# Patient Record
Sex: MALE | Race: WHITE | Employment: OTHER | URBAN - METROPOLITAN AREA
[De-identification: names, ages, dates, MRNs, and addresses within clinical notes are randomized per-mention and may not be internally consistent; named-entity substitution may affect disease eponyms.]

---

## 2017-01-01 ENCOUNTER — HOSPITAL ENCOUNTER (OUTPATIENT)
Dept: INFUSION THERAPY | Age: 77
End: 2017-01-01
Payer: MEDICARE

## 2017-01-01 ENCOUNTER — HOSPITAL ENCOUNTER (OUTPATIENT)
Dept: INFUSION THERAPY | Age: 77
Discharge: HOME | End: 2017-09-10
Payer: MEDICARE

## 2017-01-01 ENCOUNTER — HOSPITAL ENCOUNTER (OUTPATIENT)
Dept: INFUSION THERAPY | Age: 77
Discharge: HOME | End: 2017-07-29
Payer: MEDICARE

## 2017-01-01 ENCOUNTER — HOSPITAL ENCOUNTER (OUTPATIENT)
Dept: INFUSION THERAPY | Age: 77
Discharge: HOME | End: 2017-07-27
Payer: MEDICARE

## 2017-01-01 ENCOUNTER — TELEPHONE (OUTPATIENT)
Dept: INTERNAL MEDICINE CLINIC | Age: 77
End: 2017-01-01

## 2017-01-01 ENCOUNTER — HOSPITAL ENCOUNTER (OUTPATIENT)
Dept: INFUSION THERAPY | Age: 77
Discharge: HOME | End: 2017-08-09
Payer: MEDICARE

## 2017-01-01 ENCOUNTER — HOSPITAL ENCOUNTER (OUTPATIENT)
Dept: INFUSION THERAPY | Age: 77
Discharge: HOME | End: 2017-08-08
Payer: MEDICARE

## 2017-01-01 ENCOUNTER — HOSPITAL ENCOUNTER (OUTPATIENT)
Dept: INFUSION THERAPY | Age: 77
Discharge: HOME | End: 2017-08-24
Payer: MEDICARE

## 2017-01-01 ENCOUNTER — APPOINTMENT (OUTPATIENT)
Dept: CT IMAGING | Age: 77
DRG: 243 | End: 2017-01-01
Attending: INTERNAL MEDICINE
Payer: MEDICARE

## 2017-01-01 ENCOUNTER — APPOINTMENT (OUTPATIENT)
Dept: CT IMAGING | Age: 77
DRG: 243 | End: 2017-01-01
Attending: EMERGENCY MEDICINE
Payer: MEDICARE

## 2017-01-01 ENCOUNTER — HOSPITAL ENCOUNTER (OUTPATIENT)
Dept: INFUSION THERAPY | Age: 77
Discharge: HOME | End: 2017-09-04
Payer: MEDICARE

## 2017-01-01 ENCOUNTER — HOSPITAL ENCOUNTER (OUTPATIENT)
Dept: INFUSION THERAPY | Age: 77
Discharge: HOME | End: 2017-08-25
Payer: MEDICARE

## 2017-01-01 ENCOUNTER — HOSPITAL ENCOUNTER (OUTPATIENT)
Age: 77
Discharge: HOME | End: 2017-09-23
Attending: PHYSICAL MEDICINE & REHABILITATION | Admitting: PHYSICAL MEDICINE & REHABILITATION

## 2017-01-01 ENCOUNTER — PATIENT OUTREACH (OUTPATIENT)
Dept: INTERNAL MEDICINE CLINIC | Age: 77
End: 2017-01-01

## 2017-01-01 ENCOUNTER — OFFICE VISIT (OUTPATIENT)
Dept: INTERNAL MEDICINE CLINIC | Age: 77
End: 2017-01-01

## 2017-01-01 ENCOUNTER — HOSPITAL ENCOUNTER (OUTPATIENT)
Dept: INFUSION THERAPY | Age: 77
Discharge: HOME | End: 2017-09-03
Payer: MEDICARE

## 2017-01-01 ENCOUNTER — HOSPITAL ENCOUNTER (OUTPATIENT)
Dept: INFUSION THERAPY | Age: 77
Discharge: HOME | End: 2017-08-30
Payer: MEDICARE

## 2017-01-01 ENCOUNTER — HOSPITAL ENCOUNTER (OUTPATIENT)
Dept: INFUSION THERAPY | Age: 77
Discharge: HOME | End: 2017-08-07
Payer: MEDICARE

## 2017-01-01 ENCOUNTER — HOSPITAL ENCOUNTER (OUTPATIENT)
Dept: INFUSION THERAPY | Age: 77
Discharge: HOME | End: 2017-09-26
Payer: MEDICARE

## 2017-01-01 ENCOUNTER — APPOINTMENT (OUTPATIENT)
Dept: GENERAL RADIOLOGY | Age: 77
DRG: 064 | End: 2017-01-01
Attending: INTERNAL MEDICINE
Payer: MEDICARE

## 2017-01-01 ENCOUNTER — HOSPITAL ENCOUNTER (OUTPATIENT)
Dept: INFUSION THERAPY | Age: 77
Discharge: HOME | End: 2017-08-06
Payer: MEDICARE

## 2017-01-01 ENCOUNTER — ANESTHESIA (OUTPATIENT)
Dept: SURGERY | Age: 77
End: 2017-01-01
Payer: MEDICARE

## 2017-01-01 ENCOUNTER — HOSPITAL ENCOUNTER (OUTPATIENT)
Dept: INFUSION THERAPY | Age: 77
Discharge: HOME | End: 2017-10-16
Payer: MEDICARE

## 2017-01-01 ENCOUNTER — HOSPITAL ENCOUNTER (OUTPATIENT)
Dept: INFUSION THERAPY | Age: 77
Discharge: HOME | End: 2017-09-02
Payer: MEDICARE

## 2017-01-01 ENCOUNTER — HOSPITAL ENCOUNTER (OUTPATIENT)
Dept: INFUSION THERAPY | Age: 77
Discharge: HOME | End: 2017-08-03
Payer: MEDICARE

## 2017-01-01 ENCOUNTER — APPOINTMENT (OUTPATIENT)
Dept: GENERAL RADIOLOGY | Age: 77
DRG: 243 | End: 2017-01-01
Attending: INTERNAL MEDICINE
Payer: MEDICARE

## 2017-01-01 ENCOUNTER — HOSPITAL ENCOUNTER (OUTPATIENT)
Dept: INFUSION THERAPY | Age: 77
Discharge: HOME | End: 2017-10-02
Payer: MEDICARE

## 2017-01-01 ENCOUNTER — HOSPITAL ENCOUNTER (OUTPATIENT)
Dept: INFUSION THERAPY | Age: 77
Discharge: HOME | End: 2017-08-05
Payer: MEDICARE

## 2017-01-01 ENCOUNTER — HOSPITAL ENCOUNTER (OUTPATIENT)
Dept: INFUSION THERAPY | Age: 77
Discharge: HOME | End: 2017-10-11
Payer: MEDICARE

## 2017-01-01 ENCOUNTER — HOSPITAL ENCOUNTER (OUTPATIENT)
Age: 77
Discharge: HOME | End: 2017-07-26
Attending: ORTHOPAEDIC SURGERY | Admitting: ORTHOPAEDIC SURGERY
Payer: MEDICARE

## 2017-01-01 ENCOUNTER — APPOINTMENT (OUTPATIENT)
Dept: INFUSION THERAPY | Age: 77
End: 2017-01-01
Payer: MEDICARE

## 2017-01-01 ENCOUNTER — HOSPITAL ENCOUNTER (INPATIENT)
Age: 77
LOS: 6 days | Discharge: HOME HEALTH | DRG: 513 | End: 2017-08-21
Attending: ORTHOPAEDIC SURGERY | Admitting: ORTHOPAEDIC SURGERY
Payer: MEDICARE

## 2017-01-01 ENCOUNTER — HOSPITAL ENCOUNTER (OUTPATIENT)
Dept: INFUSION THERAPY | Age: 77
Discharge: HOME | End: 2017-08-31
Payer: MEDICARE

## 2017-01-01 ENCOUNTER — APPOINTMENT (OUTPATIENT)
Dept: CT IMAGING | Age: 77
DRG: 064 | End: 2017-01-01
Attending: INTERNAL MEDICINE
Payer: MEDICARE

## 2017-01-01 ENCOUNTER — HOSPITAL ENCOUNTER (OUTPATIENT)
Dept: INFUSION THERAPY | Age: 77
Discharge: HOME | End: 2017-07-31
Payer: MEDICARE

## 2017-01-01 ENCOUNTER — HOSPITAL ENCOUNTER (OUTPATIENT)
Dept: INFUSION THERAPY | Age: 77
Discharge: HOME | End: 2017-10-12
Payer: MEDICARE

## 2017-01-01 ENCOUNTER — HOSPITAL ENCOUNTER (OUTPATIENT)
Dept: INFUSION THERAPY | Age: 77
Discharge: HOME | End: 2017-10-08
Payer: MEDICARE

## 2017-01-01 ENCOUNTER — HOSPITAL ENCOUNTER (OUTPATIENT)
Dept: INFUSION THERAPY | Age: 77
Discharge: HOME | End: 2017-10-10
Payer: MEDICARE

## 2017-01-01 ENCOUNTER — APPOINTMENT (OUTPATIENT)
Dept: CT IMAGING | Age: 77
DRG: 064 | End: 2017-01-01
Attending: EMERGENCY MEDICINE
Payer: MEDICARE

## 2017-01-01 ENCOUNTER — HOSPITAL ENCOUNTER (OUTPATIENT)
Dept: INFUSION THERAPY | Age: 77
Discharge: HOME | End: 2017-09-28
Payer: MEDICARE

## 2017-01-01 ENCOUNTER — HOSPITAL ENCOUNTER (OUTPATIENT)
Dept: INFUSION THERAPY | Age: 77
Discharge: HOME | End: 2017-09-07
Payer: MEDICARE

## 2017-01-01 ENCOUNTER — HOSPITAL ENCOUNTER (OUTPATIENT)
Dept: INFUSION THERAPY | Age: 77
Discharge: HOME | End: 2017-09-06
Payer: MEDICARE

## 2017-01-01 ENCOUNTER — ANESTHESIA (OUTPATIENT)
Dept: MEDSURG UNIT | Age: 77
End: 2017-01-01
Payer: MEDICARE

## 2017-01-01 ENCOUNTER — APPOINTMENT (OUTPATIENT)
Dept: GENERAL RADIOLOGY | Age: 77
End: 2017-01-01
Attending: PHYSICIAN ASSISTANT
Payer: MEDICARE

## 2017-01-01 ENCOUNTER — HOSPITAL ENCOUNTER (OUTPATIENT)
Age: 77
Setting detail: OUTPATIENT SURGERY
Discharge: HOME | End: 2017-05-25
Attending: ORTHOPAEDIC SURGERY | Admitting: ORTHOPAEDIC SURGERY
Payer: MEDICARE

## 2017-01-01 ENCOUNTER — HOSPITAL ENCOUNTER (EMERGENCY)
Age: 77
Discharge: HOME | End: 2017-05-22
Attending: EMERGENCY MEDICINE | Admitting: EMERGENCY MEDICINE
Payer: MEDICARE

## 2017-01-01 ENCOUNTER — HOSPITAL ENCOUNTER (OUTPATIENT)
Dept: INFUSION THERAPY | Age: 77
Discharge: HOME | End: 2017-10-07
Payer: MEDICARE

## 2017-01-01 ENCOUNTER — HOSPITAL ENCOUNTER (OUTPATIENT)
Dept: INFUSION THERAPY | Age: 77
Discharge: HOME | End: 2017-08-28
Payer: MEDICARE

## 2017-01-01 ENCOUNTER — ANESTHESIA EVENT (OUTPATIENT)
Dept: SURGERY | Age: 77
End: 2017-01-01
Payer: MEDICARE

## 2017-01-01 ENCOUNTER — HOSPITAL ENCOUNTER (OUTPATIENT)
Dept: INFUSION THERAPY | Age: 77
Discharge: HOME | End: 2017-10-13
Payer: MEDICARE

## 2017-01-01 ENCOUNTER — HOSPITAL ENCOUNTER (OUTPATIENT)
Dept: INFUSION THERAPY | Age: 77
Discharge: HOME | End: 2017-09-29
Payer: MEDICARE

## 2017-01-01 ENCOUNTER — HOSPITAL ENCOUNTER (OUTPATIENT)
Dept: INFUSION THERAPY | Age: 77
Discharge: HOME | End: 2017-09-27
Payer: MEDICARE

## 2017-01-01 ENCOUNTER — HOSPITAL ENCOUNTER (OUTPATIENT)
Dept: INFUSION THERAPY | Age: 77
Discharge: HOME | End: 2017-07-26
Payer: MEDICARE

## 2017-01-01 ENCOUNTER — HOSPITAL ENCOUNTER (OUTPATIENT)
Dept: INFUSION THERAPY | Age: 77
Discharge: HOME | End: 2017-09-15
Payer: MEDICARE

## 2017-01-01 ENCOUNTER — HOSPITAL ENCOUNTER (OUTPATIENT)
Dept: INFUSION THERAPY | Age: 77
Discharge: HOME | End: 2017-07-25
Payer: MEDICARE

## 2017-01-01 ENCOUNTER — ANESTHESIA (OUTPATIENT)
Dept: SURGERY | Age: 77
DRG: 857 | End: 2017-01-01
Payer: MEDICARE

## 2017-01-01 ENCOUNTER — HOSPITAL ENCOUNTER (OUTPATIENT)
Dept: INFUSION THERAPY | Age: 77
Discharge: HOME | End: 2017-10-15
Payer: MEDICARE

## 2017-01-01 ENCOUNTER — ANESTHESIA EVENT (OUTPATIENT)
Dept: MEDSURG UNIT | Age: 77
End: 2017-01-01
Payer: MEDICARE

## 2017-01-01 ENCOUNTER — HOSPITAL ENCOUNTER (OUTPATIENT)
Age: 77
Setting detail: OUTPATIENT SURGERY
Discharge: HOME | End: 2017-08-08
Attending: ORTHOPAEDIC SURGERY | Admitting: ORTHOPAEDIC SURGERY
Payer: MEDICARE

## 2017-01-01 ENCOUNTER — HOSPITAL ENCOUNTER (OUTPATIENT)
Dept: INFUSION THERAPY | Age: 77
Discharge: HOME | End: 2017-10-14
Payer: MEDICARE

## 2017-01-01 ENCOUNTER — HOSPITAL ENCOUNTER (OUTPATIENT)
Dept: INFUSION THERAPY | Age: 77
Discharge: HOME | End: 2017-10-18
Payer: MEDICARE

## 2017-01-01 ENCOUNTER — HOSPITAL ENCOUNTER (OUTPATIENT)
Dept: INFUSION THERAPY | Age: 77
Discharge: HOME | End: 2017-10-20
Payer: MEDICARE

## 2017-01-01 ENCOUNTER — ANESTHESIA (OUTPATIENT)
Dept: MEDSURG UNIT | Age: 77
DRG: 513 | End: 2017-01-01
Payer: MEDICARE

## 2017-01-01 ENCOUNTER — HOSPITAL ENCOUNTER (OUTPATIENT)
Dept: INFUSION THERAPY | Age: 77
Discharge: HOME | End: 2017-10-06
Payer: MEDICARE

## 2017-01-01 ENCOUNTER — HOSPITAL ENCOUNTER (INPATIENT)
Age: 77
LOS: 5 days | Discharge: NURSING, REHAB OR LONG-TERM CARE | DRG: 243 | End: 2017-09-16
Attending: EMERGENCY MEDICINE | Admitting: INTERNAL MEDICINE
Payer: MEDICARE

## 2017-01-01 ENCOUNTER — HOSPITAL ENCOUNTER (OUTPATIENT)
Dept: INFUSION THERAPY | Age: 77
Discharge: HOME | End: 2017-08-26
Payer: MEDICARE

## 2017-01-01 ENCOUNTER — HOSPITAL ENCOUNTER (OUTPATIENT)
Dept: INFUSION THERAPY | Age: 77
Discharge: HOME | End: 2017-09-30
Payer: MEDICARE

## 2017-01-01 ENCOUNTER — HOSPITAL ENCOUNTER (INPATIENT)
Age: 77
LOS: 3 days | Discharge: HOME HEALTH | DRG: 064 | End: 2017-09-03
Attending: EMERGENCY MEDICINE | Admitting: INTERNAL MEDICINE
Payer: MEDICARE

## 2017-01-01 ENCOUNTER — HOSPITAL ENCOUNTER (OUTPATIENT)
Dept: INFUSION THERAPY | Age: 77
Discharge: HOME | End: 2017-08-22
Payer: MEDICARE

## 2017-01-01 ENCOUNTER — ANESTHESIA EVENT (OUTPATIENT)
Dept: SURGERY | Age: 77
DRG: 857 | End: 2017-01-01
Payer: MEDICARE

## 2017-01-01 ENCOUNTER — HOSPITAL ENCOUNTER (OUTPATIENT)
Dept: INFUSION THERAPY | Age: 77
Discharge: HOME | End: 2017-08-02
Payer: MEDICARE

## 2017-01-01 ENCOUNTER — HOSPITAL ENCOUNTER (OUTPATIENT)
Dept: INFUSION THERAPY | Age: 77
Discharge: HOME | End: 2017-10-17
Payer: MEDICARE

## 2017-01-01 ENCOUNTER — HOSPITAL ENCOUNTER (INPATIENT)
Age: 77
LOS: 4 days | Discharge: HOME HEALTH | DRG: 857 | End: 2017-07-24
Attending: ORTHOPAEDIC SURGERY | Admitting: ORTHOPAEDIC SURGERY
Payer: MEDICARE

## 2017-01-01 ENCOUNTER — HOSPITAL ENCOUNTER (OUTPATIENT)
Dept: INFUSION THERAPY | Age: 77
Discharge: HOME | End: 2017-08-27
Payer: MEDICARE

## 2017-01-01 ENCOUNTER — HOSPITAL ENCOUNTER (OUTPATIENT)
Dept: INFUSION THERAPY | Age: 77
Discharge: HOME | End: 2017-10-05
Payer: MEDICARE

## 2017-01-01 ENCOUNTER — HOSPITAL ENCOUNTER (OUTPATIENT)
Dept: INFUSION THERAPY | Age: 77
Discharge: HOME | End: 2017-09-11
Payer: MEDICARE

## 2017-01-01 ENCOUNTER — HOSPITAL ENCOUNTER (OUTPATIENT)
Dept: INFUSION THERAPY | Age: 77
Discharge: HOME | End: 2017-10-01
Payer: MEDICARE

## 2017-01-01 ENCOUNTER — HOSPITAL ENCOUNTER (OUTPATIENT)
Dept: INFUSION THERAPY | Age: 77
Discharge: HOME | End: 2017-10-19
Payer: MEDICARE

## 2017-01-01 ENCOUNTER — HOSPITAL ENCOUNTER (OUTPATIENT)
Dept: INFUSION THERAPY | Age: 77
Discharge: HOME | End: 2017-08-04
Payer: MEDICARE

## 2017-01-01 ENCOUNTER — HOSPITAL ENCOUNTER (OUTPATIENT)
Dept: INFUSION THERAPY | Age: 77
Discharge: HOME | End: 2017-09-24
Payer: MEDICARE

## 2017-01-01 ENCOUNTER — HOSPITAL ENCOUNTER (OUTPATIENT)
Dept: INFUSION THERAPY | Age: 77
Discharge: HOME | End: 2017-10-03
Payer: MEDICARE

## 2017-01-01 ENCOUNTER — HOSPITAL ENCOUNTER (OUTPATIENT)
Dept: INFUSION THERAPY | Age: 77
Discharge: HOME | End: 2017-07-30
Payer: MEDICARE

## 2017-01-01 ENCOUNTER — HOSPITAL ENCOUNTER (OUTPATIENT)
Dept: INFUSION THERAPY | Age: 77
Discharge: HOME | End: 2017-08-01
Payer: MEDICARE

## 2017-01-01 ENCOUNTER — APPOINTMENT (OUTPATIENT)
Dept: MRI IMAGING | Age: 77
DRG: 064 | End: 2017-01-01
Attending: INTERNAL MEDICINE
Payer: MEDICARE

## 2017-01-01 ENCOUNTER — HOSPITAL ENCOUNTER (OUTPATIENT)
Dept: INFUSION THERAPY | Age: 77
Discharge: HOME | End: 2017-08-29
Payer: MEDICARE

## 2017-01-01 ENCOUNTER — HOSPITAL ENCOUNTER (OUTPATIENT)
Dept: INFUSION THERAPY | Age: 77
Discharge: HOME | End: 2017-08-23
Payer: MEDICARE

## 2017-01-01 ENCOUNTER — HOSPITAL ENCOUNTER (OUTPATIENT)
Dept: INFUSION THERAPY | Age: 77
Discharge: HOME | End: 2017-09-05
Payer: MEDICARE

## 2017-01-01 ENCOUNTER — HOSPITAL ENCOUNTER (OUTPATIENT)
Dept: INFUSION THERAPY | Age: 77
Discharge: HOME | End: 2017-07-28
Payer: MEDICARE

## 2017-01-01 ENCOUNTER — HOSPITAL ENCOUNTER (OUTPATIENT)
Dept: INFUSION THERAPY | Age: 77
Discharge: HOME | End: 2017-09-25
Payer: MEDICARE

## 2017-01-01 ENCOUNTER — HOSPITAL ENCOUNTER (OUTPATIENT)
Dept: INFUSION THERAPY | Age: 77
Discharge: HOME | End: 2017-10-09
Payer: MEDICARE

## 2017-01-01 ENCOUNTER — HOSPITAL ENCOUNTER (OUTPATIENT)
Dept: INFUSION THERAPY | Age: 77
Discharge: HOME | End: 2017-10-04
Payer: MEDICARE

## 2017-01-01 ENCOUNTER — HOSPITAL ENCOUNTER (OUTPATIENT)
Dept: INFUSION THERAPY | Age: 77
Discharge: HOME | End: 2017-09-08
Payer: MEDICARE

## 2017-01-01 ENCOUNTER — HOSPITAL ENCOUNTER (OUTPATIENT)
Dept: INFUSION THERAPY | Age: 77
Discharge: HOME | End: 2017-09-09
Payer: MEDICARE

## 2017-01-01 ENCOUNTER — ANESTHESIA EVENT (OUTPATIENT)
Dept: MEDSURG UNIT | Age: 77
DRG: 513 | End: 2017-01-01
Payer: MEDICARE

## 2017-01-01 VITALS
HEART RATE: 59 BPM | DIASTOLIC BLOOD PRESSURE: 61 MMHG | SYSTOLIC BLOOD PRESSURE: 140 MMHG | TEMPERATURE: 97.7 F | RESPIRATION RATE: 18 BRPM

## 2017-01-01 VITALS
RESPIRATION RATE: 18 BRPM | RESPIRATION RATE: 18 BRPM | DIASTOLIC BLOOD PRESSURE: 58 MMHG | TEMPERATURE: 97.4 F | DIASTOLIC BLOOD PRESSURE: 52 MMHG | HEART RATE: 67 BPM | SYSTOLIC BLOOD PRESSURE: 126 MMHG | SYSTOLIC BLOOD PRESSURE: 146 MMHG | TEMPERATURE: 97.4 F | HEART RATE: 64 BPM

## 2017-01-01 VITALS
SYSTOLIC BLOOD PRESSURE: 150 MMHG | HEART RATE: 99 BPM | WEIGHT: 168 LBS | HEIGHT: 69 IN | BODY MASS INDEX: 24.88 KG/M2 | DIASTOLIC BLOOD PRESSURE: 65 MMHG

## 2017-01-01 VITALS
TEMPERATURE: 97 F | RESPIRATION RATE: 18 BRPM | OXYGEN SATURATION: 100 % | SYSTOLIC BLOOD PRESSURE: 138 MMHG | DIASTOLIC BLOOD PRESSURE: 66 MMHG | HEART RATE: 88 BPM

## 2017-01-01 VITALS
SYSTOLIC BLOOD PRESSURE: 122 MMHG | RESPIRATION RATE: 18 BRPM | DIASTOLIC BLOOD PRESSURE: 50 MMHG | TEMPERATURE: 98 F | HEART RATE: 72 BPM

## 2017-01-01 VITALS
RESPIRATION RATE: 18 BRPM | DIASTOLIC BLOOD PRESSURE: 48 MMHG | HEART RATE: 53 BPM | OXYGEN SATURATION: 99 % | SYSTOLIC BLOOD PRESSURE: 125 MMHG | TEMPERATURE: 97.1 F

## 2017-01-01 VITALS
HEART RATE: 62 BPM | OXYGEN SATURATION: 98 % | DIASTOLIC BLOOD PRESSURE: 42 MMHG | TEMPERATURE: 96.6 F | RESPIRATION RATE: 18 BRPM | SYSTOLIC BLOOD PRESSURE: 103 MMHG

## 2017-01-01 VITALS
RESPIRATION RATE: 18 BRPM | HEART RATE: 60 BPM | WEIGHT: 190 LBS | HEART RATE: 94 BPM | DIASTOLIC BLOOD PRESSURE: 63 MMHG | HEIGHT: 71 IN | OXYGEN SATURATION: 95 % | TEMPERATURE: 97.6 F | DIASTOLIC BLOOD PRESSURE: 56 MMHG | TEMPERATURE: 98.1 F | SYSTOLIC BLOOD PRESSURE: 156 MMHG | SYSTOLIC BLOOD PRESSURE: 134 MMHG | RESPIRATION RATE: 14 BRPM | BODY MASS INDEX: 26.6 KG/M2

## 2017-01-01 VITALS
RESPIRATION RATE: 18 BRPM | DIASTOLIC BLOOD PRESSURE: 50 MMHG | HEART RATE: 59 BPM | TEMPERATURE: 97.6 F | SYSTOLIC BLOOD PRESSURE: 118 MMHG

## 2017-01-01 VITALS
HEART RATE: 58 BPM | HEIGHT: 69 IN | SYSTOLIC BLOOD PRESSURE: 158 MMHG | WEIGHT: 193.4 LBS | BODY MASS INDEX: 28.64 KG/M2 | DIASTOLIC BLOOD PRESSURE: 60 MMHG | TEMPERATURE: 97.9 F | RESPIRATION RATE: 18 BRPM | OXYGEN SATURATION: 98 %

## 2017-01-01 VITALS
TEMPERATURE: 97.9 F | SYSTOLIC BLOOD PRESSURE: 130 MMHG | HEART RATE: 65 BPM | RESPIRATION RATE: 18 BRPM | DIASTOLIC BLOOD PRESSURE: 52 MMHG

## 2017-01-01 VITALS
TEMPERATURE: 98 F | RESPIRATION RATE: 18 BRPM | SYSTOLIC BLOOD PRESSURE: 156 MMHG | DIASTOLIC BLOOD PRESSURE: 61 MMHG | HEART RATE: 62 BPM

## 2017-01-01 VITALS
RESPIRATION RATE: 16 BRPM | SYSTOLIC BLOOD PRESSURE: 113 MMHG | OXYGEN SATURATION: 98 % | DIASTOLIC BLOOD PRESSURE: 61 MMHG | HEART RATE: 74 BPM | TEMPERATURE: 98.2 F | BODY MASS INDEX: 28.14 KG/M2 | WEIGHT: 190 LBS | HEIGHT: 69 IN

## 2017-01-01 VITALS
DIASTOLIC BLOOD PRESSURE: 53 MMHG | OXYGEN SATURATION: 100 % | HEART RATE: 64 BPM | RESPIRATION RATE: 18 BRPM | TEMPERATURE: 97.3 F | SYSTOLIC BLOOD PRESSURE: 143 MMHG

## 2017-01-01 VITALS
SYSTOLIC BLOOD PRESSURE: 117 MMHG | RESPIRATION RATE: 18 BRPM | HEART RATE: 63 BPM | TEMPERATURE: 97.6 F | DIASTOLIC BLOOD PRESSURE: 49 MMHG | OXYGEN SATURATION: 99 %

## 2017-01-01 VITALS
HEART RATE: 63 BPM | TEMPERATURE: 97.5 F | HEART RATE: 68 BPM | TEMPERATURE: 98 F | HEART RATE: 47 BPM | HEART RATE: 65 BPM | RESPIRATION RATE: 18 BRPM | DIASTOLIC BLOOD PRESSURE: 58 MMHG | SYSTOLIC BLOOD PRESSURE: 117 MMHG | OXYGEN SATURATION: 98 % | SYSTOLIC BLOOD PRESSURE: 160 MMHG | RESPIRATION RATE: 18 BRPM | OXYGEN SATURATION: 98 % | TEMPERATURE: 98.3 F | TEMPERATURE: 98 F | DIASTOLIC BLOOD PRESSURE: 57 MMHG | RESPIRATION RATE: 18 BRPM | DIASTOLIC BLOOD PRESSURE: 48 MMHG | SYSTOLIC BLOOD PRESSURE: 160 MMHG | SYSTOLIC BLOOD PRESSURE: 131 MMHG | DIASTOLIC BLOOD PRESSURE: 56 MMHG | RESPIRATION RATE: 18 BRPM

## 2017-01-01 VITALS
TEMPERATURE: 96.9 F | SYSTOLIC BLOOD PRESSURE: 143 MMHG | DIASTOLIC BLOOD PRESSURE: 51 MMHG | RESPIRATION RATE: 18 BRPM | HEART RATE: 60 BPM

## 2017-01-01 VITALS
OXYGEN SATURATION: 97 % | HEART RATE: 61 BPM | DIASTOLIC BLOOD PRESSURE: 50 MMHG | SYSTOLIC BLOOD PRESSURE: 134 MMHG | TEMPERATURE: 97.9 F | RESPIRATION RATE: 18 BRPM

## 2017-01-01 VITALS
HEART RATE: 63 BPM | SYSTOLIC BLOOD PRESSURE: 115 MMHG | RESPIRATION RATE: 18 BRPM | DIASTOLIC BLOOD PRESSURE: 55 MMHG | TEMPERATURE: 97.9 F

## 2017-01-01 VITALS
RESPIRATION RATE: 18 BRPM | TEMPERATURE: 96.9 F | DIASTOLIC BLOOD PRESSURE: 57 MMHG | SYSTOLIC BLOOD PRESSURE: 154 MMHG | HEART RATE: 55 BPM

## 2017-01-01 VITALS
TEMPERATURE: 98 F | BODY MASS INDEX: 26.6 KG/M2 | DIASTOLIC BLOOD PRESSURE: 52 MMHG | OXYGEN SATURATION: 97 % | RESPIRATION RATE: 14 BRPM | HEART RATE: 68 BPM | HEIGHT: 71 IN | SYSTOLIC BLOOD PRESSURE: 144 MMHG | WEIGHT: 190 LBS

## 2017-01-01 VITALS
SYSTOLIC BLOOD PRESSURE: 150 MMHG | HEART RATE: 56 BPM | TEMPERATURE: 97.7 F | DIASTOLIC BLOOD PRESSURE: 72 MMHG | OXYGEN SATURATION: 100 % | RESPIRATION RATE: 16 BRPM | HEIGHT: 69 IN | WEIGHT: 193 LBS | BODY MASS INDEX: 28.58 KG/M2

## 2017-01-01 VITALS
HEART RATE: 103 BPM | RESPIRATION RATE: 18 BRPM | TEMPERATURE: 96.9 F | DIASTOLIC BLOOD PRESSURE: 59 MMHG | SYSTOLIC BLOOD PRESSURE: 125 MMHG

## 2017-01-01 VITALS
DIASTOLIC BLOOD PRESSURE: 44 MMHG | RESPIRATION RATE: 18 BRPM | OXYGEN SATURATION: 100 % | TEMPERATURE: 96.9 F | SYSTOLIC BLOOD PRESSURE: 145 MMHG | HEART RATE: 84 BPM

## 2017-01-01 VITALS
RESPIRATION RATE: 18 BRPM | TEMPERATURE: 96.8 F | DIASTOLIC BLOOD PRESSURE: 51 MMHG | SYSTOLIC BLOOD PRESSURE: 154 MMHG | HEART RATE: 62 BPM

## 2017-01-01 VITALS
TEMPERATURE: 97.6 F | WEIGHT: 173 LBS | RESPIRATION RATE: 16 BRPM | BODY MASS INDEX: 25.62 KG/M2 | HEIGHT: 69 IN | DIASTOLIC BLOOD PRESSURE: 76 MMHG | SYSTOLIC BLOOD PRESSURE: 168 MMHG | OXYGEN SATURATION: 100 % | HEART RATE: 59 BPM

## 2017-01-01 VITALS
SYSTOLIC BLOOD PRESSURE: 168 MMHG | DIASTOLIC BLOOD PRESSURE: 56 MMHG | TEMPERATURE: 97.8 F | HEART RATE: 63 BPM | RESPIRATION RATE: 18 BRPM

## 2017-01-01 VITALS
WEIGHT: 175 LBS | SYSTOLIC BLOOD PRESSURE: 162 MMHG | HEIGHT: 69 IN | DIASTOLIC BLOOD PRESSURE: 64 MMHG | BODY MASS INDEX: 25.92 KG/M2 | RESPIRATION RATE: 16 BRPM | OXYGEN SATURATION: 98 % | HEART RATE: 115 BPM

## 2017-01-01 VITALS
OXYGEN SATURATION: 99 % | RESPIRATION RATE: 18 BRPM | SYSTOLIC BLOOD PRESSURE: 165 MMHG | TEMPERATURE: 98 F | DIASTOLIC BLOOD PRESSURE: 60 MMHG | HEART RATE: 60 BPM

## 2017-01-01 VITALS
SYSTOLIC BLOOD PRESSURE: 149 MMHG | HEART RATE: 54 BPM | TEMPERATURE: 96.9 F | DIASTOLIC BLOOD PRESSURE: 56 MMHG | RESPIRATION RATE: 18 BRPM

## 2017-01-01 VITALS
WEIGHT: 189.6 LBS | TEMPERATURE: 98.2 F | HEART RATE: 78 BPM | DIASTOLIC BLOOD PRESSURE: 62 MMHG | OXYGEN SATURATION: 100 % | SYSTOLIC BLOOD PRESSURE: 161 MMHG | BODY MASS INDEX: 27.14 KG/M2 | HEIGHT: 70 IN | RESPIRATION RATE: 18 BRPM

## 2017-01-01 VITALS
TEMPERATURE: 98.2 F | DIASTOLIC BLOOD PRESSURE: 62 MMHG | WEIGHT: 192.6 LBS | HEART RATE: 56 BPM | SYSTOLIC BLOOD PRESSURE: 126 MMHG | OXYGEN SATURATION: 98 % | HEIGHT: 69 IN | BODY MASS INDEX: 28.53 KG/M2 | RESPIRATION RATE: 18 BRPM

## 2017-01-01 VITALS
DIASTOLIC BLOOD PRESSURE: 45 MMHG | TEMPERATURE: 97.1 F | RESPIRATION RATE: 18 BRPM | SYSTOLIC BLOOD PRESSURE: 111 MMHG | HEART RATE: 53 BPM

## 2017-01-01 VITALS
RESPIRATION RATE: 18 BRPM | HEART RATE: 58 BPM | SYSTOLIC BLOOD PRESSURE: 150 MMHG | TEMPERATURE: 97.1 F | OXYGEN SATURATION: 100 % | DIASTOLIC BLOOD PRESSURE: 88 MMHG

## 2017-01-01 VITALS
DIASTOLIC BLOOD PRESSURE: 52 MMHG | TEMPERATURE: 98.5 F | HEART RATE: 94 BPM | SYSTOLIC BLOOD PRESSURE: 114 MMHG | RESPIRATION RATE: 18 BRPM

## 2017-01-01 VITALS
RESPIRATION RATE: 18 BRPM | TEMPERATURE: 98.7 F | DIASTOLIC BLOOD PRESSURE: 45 MMHG | HEART RATE: 93 BPM | SYSTOLIC BLOOD PRESSURE: 123 MMHG

## 2017-01-01 VITALS
DIASTOLIC BLOOD PRESSURE: 47 MMHG | TEMPERATURE: 97.6 F | RESPIRATION RATE: 16 BRPM | HEART RATE: 61 BPM | SYSTOLIC BLOOD PRESSURE: 118 MMHG

## 2017-01-01 VITALS
RESPIRATION RATE: 18 BRPM | DIASTOLIC BLOOD PRESSURE: 64 MMHG | HEART RATE: 76 BPM | OXYGEN SATURATION: 99 % | TEMPERATURE: 98.9 F | SYSTOLIC BLOOD PRESSURE: 132 MMHG

## 2017-01-01 VITALS
BODY MASS INDEX: 27.31 KG/M2 | HEIGHT: 69 IN | WEIGHT: 184.4 LBS | RESPIRATION RATE: 18 BRPM | OXYGEN SATURATION: 98 % | DIASTOLIC BLOOD PRESSURE: 56 MMHG | SYSTOLIC BLOOD PRESSURE: 148 MMHG | HEART RATE: 66 BPM

## 2017-01-01 VITALS
DIASTOLIC BLOOD PRESSURE: 39 MMHG | HEART RATE: 61 BPM | RESPIRATION RATE: 16 BRPM | TEMPERATURE: 97 F | SYSTOLIC BLOOD PRESSURE: 116 MMHG

## 2017-01-01 VITALS
SYSTOLIC BLOOD PRESSURE: 158 MMHG | DIASTOLIC BLOOD PRESSURE: 63 MMHG | RESPIRATION RATE: 18 BRPM | TEMPERATURE: 98 F | HEART RATE: 60 BPM | OXYGEN SATURATION: 100 %

## 2017-01-01 VITALS
DIASTOLIC BLOOD PRESSURE: 60 MMHG | TEMPERATURE: 97.7 F | SYSTOLIC BLOOD PRESSURE: 174 MMHG | RESPIRATION RATE: 18 BRPM | HEART RATE: 64 BPM

## 2017-01-01 VITALS
RESPIRATION RATE: 18 BRPM | DIASTOLIC BLOOD PRESSURE: 46 MMHG | SYSTOLIC BLOOD PRESSURE: 101 MMHG | TEMPERATURE: 98 F | HEART RATE: 49 BPM | OXYGEN SATURATION: 100 %

## 2017-01-01 VITALS
RESPIRATION RATE: 18 BRPM | TEMPERATURE: 97 F | SYSTOLIC BLOOD PRESSURE: 159 MMHG | DIASTOLIC BLOOD PRESSURE: 56 MMHG | HEART RATE: 58 BPM

## 2017-01-01 VITALS
TEMPERATURE: 97.6 F | HEART RATE: 58 BPM | SYSTOLIC BLOOD PRESSURE: 142 MMHG | DIASTOLIC BLOOD PRESSURE: 53 MMHG | RESPIRATION RATE: 18 BRPM

## 2017-01-01 VITALS
HEART RATE: 60 BPM | RESPIRATION RATE: 14 BRPM | DIASTOLIC BLOOD PRESSURE: 60 MMHG | SYSTOLIC BLOOD PRESSURE: 138 MMHG | HEIGHT: 69 IN | OXYGEN SATURATION: 97 % | TEMPERATURE: 94.6 F | BODY MASS INDEX: 25.33 KG/M2 | WEIGHT: 171 LBS

## 2017-01-01 VITALS
TEMPERATURE: 96.9 F | RESPIRATION RATE: 18 BRPM | OXYGEN SATURATION: 98 % | DIASTOLIC BLOOD PRESSURE: 62 MMHG | SYSTOLIC BLOOD PRESSURE: 161 MMHG | HEART RATE: 74 BPM

## 2017-01-01 VITALS
TEMPERATURE: 97.7 F | SYSTOLIC BLOOD PRESSURE: 94 MMHG | OXYGEN SATURATION: 100 % | DIASTOLIC BLOOD PRESSURE: 47 MMHG | HEART RATE: 60 BPM | RESPIRATION RATE: 18 BRPM

## 2017-01-01 VITALS
RESPIRATION RATE: 18 BRPM | OXYGEN SATURATION: 100 % | TEMPERATURE: 98.4 F | SYSTOLIC BLOOD PRESSURE: 164 MMHG | DIASTOLIC BLOOD PRESSURE: 60 MMHG | HEART RATE: 63 BPM

## 2017-01-01 VITALS
SYSTOLIC BLOOD PRESSURE: 169 MMHG | OXYGEN SATURATION: 100 % | TEMPERATURE: 97.8 F | DIASTOLIC BLOOD PRESSURE: 62 MMHG | RESPIRATION RATE: 18 BRPM | HEART RATE: 60 BPM

## 2017-01-01 VITALS
HEART RATE: 68 BPM | SYSTOLIC BLOOD PRESSURE: 157 MMHG | RESPIRATION RATE: 14 BRPM | BODY MASS INDEX: 26.6 KG/M2 | OXYGEN SATURATION: 100 % | DIASTOLIC BLOOD PRESSURE: 66 MMHG | HEIGHT: 71 IN | TEMPERATURE: 97.6 F | WEIGHT: 190 LBS

## 2017-01-01 VITALS
TEMPERATURE: 98 F | WEIGHT: 195.99 LBS | RESPIRATION RATE: 18 BRPM | HEIGHT: 71 IN | HEART RATE: 68 BPM | OXYGEN SATURATION: 100 % | BODY MASS INDEX: 27.44 KG/M2 | SYSTOLIC BLOOD PRESSURE: 148 MMHG | DIASTOLIC BLOOD PRESSURE: 71 MMHG

## 2017-01-01 VITALS
DIASTOLIC BLOOD PRESSURE: 52 MMHG | SYSTOLIC BLOOD PRESSURE: 150 MMHG | RESPIRATION RATE: 18 BRPM | RESPIRATION RATE: 18 BRPM | SYSTOLIC BLOOD PRESSURE: 171 MMHG | HEART RATE: 63 BPM | DIASTOLIC BLOOD PRESSURE: 58 MMHG | HEART RATE: 82 BPM | TEMPERATURE: 98.7 F | TEMPERATURE: 97.7 F

## 2017-01-01 VITALS
TEMPERATURE: 97.3 F | SYSTOLIC BLOOD PRESSURE: 148 MMHG | RESPIRATION RATE: 18 BRPM | HEART RATE: 67 BPM | DIASTOLIC BLOOD PRESSURE: 51 MMHG | OXYGEN SATURATION: 100 %

## 2017-01-01 VITALS
RESPIRATION RATE: 18 BRPM | SYSTOLIC BLOOD PRESSURE: 151 MMHG | HEART RATE: 68 BPM | DIASTOLIC BLOOD PRESSURE: 56 MMHG | TEMPERATURE: 96.6 F

## 2017-01-01 VITALS
OXYGEN SATURATION: 100 % | SYSTOLIC BLOOD PRESSURE: 154 MMHG | DIASTOLIC BLOOD PRESSURE: 44 MMHG | RESPIRATION RATE: 18 BRPM | TEMPERATURE: 97.7 F | HEART RATE: 50 BPM

## 2017-01-01 VITALS
TEMPERATURE: 97.8 F | DIASTOLIC BLOOD PRESSURE: 63 MMHG | HEART RATE: 86 BPM | OXYGEN SATURATION: 100 % | RESPIRATION RATE: 18 BRPM | SYSTOLIC BLOOD PRESSURE: 157 MMHG

## 2017-01-01 VITALS
OXYGEN SATURATION: 100 % | TEMPERATURE: 97.5 F | SYSTOLIC BLOOD PRESSURE: 155 MMHG | RESPIRATION RATE: 18 BRPM | TEMPERATURE: 97.8 F | RESPIRATION RATE: 18 BRPM | DIASTOLIC BLOOD PRESSURE: 47 MMHG | HEART RATE: 62 BPM | RESPIRATION RATE: 18 BRPM | SYSTOLIC BLOOD PRESSURE: 141 MMHG | DIASTOLIC BLOOD PRESSURE: 60 MMHG | DIASTOLIC BLOOD PRESSURE: 48 MMHG | TEMPERATURE: 98.5 F | OXYGEN SATURATION: 98 % | SYSTOLIC BLOOD PRESSURE: 115 MMHG | HEART RATE: 60 BPM | HEART RATE: 67 BPM

## 2017-01-01 VITALS
HEART RATE: 61 BPM | SYSTOLIC BLOOD PRESSURE: 129 MMHG | RESPIRATION RATE: 18 BRPM | TEMPERATURE: 96.9 F | DIASTOLIC BLOOD PRESSURE: 53 MMHG

## 2017-01-01 VITALS
TEMPERATURE: 97.7 F | SYSTOLIC BLOOD PRESSURE: 152 MMHG | OXYGEN SATURATION: 100 % | HEART RATE: 56 BPM | DIASTOLIC BLOOD PRESSURE: 46 MMHG | RESPIRATION RATE: 18 BRPM

## 2017-01-01 VITALS
HEART RATE: 74 BPM | TEMPERATURE: 97.8 F | SYSTOLIC BLOOD PRESSURE: 119 MMHG | OXYGEN SATURATION: 100 % | RESPIRATION RATE: 18 BRPM | DIASTOLIC BLOOD PRESSURE: 58 MMHG

## 2017-01-01 VITALS
RESPIRATION RATE: 16 BRPM | HEART RATE: 65 BPM | SYSTOLIC BLOOD PRESSURE: 116 MMHG | TEMPERATURE: 97.6 F | DIASTOLIC BLOOD PRESSURE: 45 MMHG

## 2017-01-01 VITALS
TEMPERATURE: 97.9 F | OXYGEN SATURATION: 100 % | HEART RATE: 105 BPM | SYSTOLIC BLOOD PRESSURE: 106 MMHG | BODY MASS INDEX: 26.73 KG/M2 | DIASTOLIC BLOOD PRESSURE: 50 MMHG | HEIGHT: 70 IN | WEIGHT: 186.73 LBS | RESPIRATION RATE: 18 BRPM

## 2017-01-01 VITALS
HEART RATE: 62 BPM | TEMPERATURE: 97.5 F | RESPIRATION RATE: 18 BRPM | SYSTOLIC BLOOD PRESSURE: 154 MMHG | DIASTOLIC BLOOD PRESSURE: 53 MMHG

## 2017-01-01 VITALS
WEIGHT: 181.8 LBS | RESPIRATION RATE: 16 BRPM | TEMPERATURE: 98 F | OXYGEN SATURATION: 98 % | SYSTOLIC BLOOD PRESSURE: 150 MMHG | BODY MASS INDEX: 26.93 KG/M2 | HEIGHT: 69 IN | DIASTOLIC BLOOD PRESSURE: 66 MMHG | HEART RATE: 90 BPM

## 2017-01-01 VITALS
DIASTOLIC BLOOD PRESSURE: 57 MMHG | RESPIRATION RATE: 18 BRPM | SYSTOLIC BLOOD PRESSURE: 153 MMHG | TEMPERATURE: 97 F | HEART RATE: 60 BPM

## 2017-01-01 DIAGNOSIS — I70.90 ASVD (ARTERIOSCLEROTIC VASCULAR DISEASE): ICD-10-CM

## 2017-01-01 DIAGNOSIS — I10 ESSENTIAL HYPERTENSION: Primary | ICD-10-CM

## 2017-01-01 DIAGNOSIS — L02.519 HAND ABSCESS: Primary | ICD-10-CM

## 2017-01-01 DIAGNOSIS — I63.311 THROMBOTIC STROKE INVOLVING RIGHT MIDDLE CEREBRAL ARTERY (HCC): ICD-10-CM

## 2017-01-01 DIAGNOSIS — E78.2 MIXED HYPERLIPIDEMIA: ICD-10-CM

## 2017-01-01 DIAGNOSIS — I44.2 COMPLETE HEART BLOCK (HCC): ICD-10-CM

## 2017-01-01 DIAGNOSIS — E11.9 CONTROLLED TYPE 2 DIABETES MELLITUS WITHOUT COMPLICATION, WITH LONG-TERM CURRENT USE OF INSULIN (HCC): ICD-10-CM

## 2017-01-01 DIAGNOSIS — K22.70 BARRETT'S ESOPHAGUS WITHOUT DYSPLASIA: ICD-10-CM

## 2017-01-01 DIAGNOSIS — N40.1 BENIGN NON-NODULAR PROSTATIC HYPERPLASIA WITH LOWER URINARY TRACT SYMPTOMS: ICD-10-CM

## 2017-01-01 DIAGNOSIS — I65.23 STENOSIS OF BOTH INTERNAL CAROTID ARTERIES: ICD-10-CM

## 2017-01-01 DIAGNOSIS — S61.213A LACERATION OF LEFT MIDDLE FINGER: ICD-10-CM

## 2017-01-01 DIAGNOSIS — Z79.4 CONTROLLED TYPE 2 DIABETES MELLITUS WITHOUT COMPLICATION, WITH LONG-TERM CURRENT USE OF INSULIN (HCC): ICD-10-CM

## 2017-01-01 DIAGNOSIS — D64.9 ANEMIA, UNSPECIFIED TYPE: ICD-10-CM

## 2017-01-01 DIAGNOSIS — Z95.0 S/P CARDIAC PACEMAKER PROCEDURE: ICD-10-CM

## 2017-01-01 DIAGNOSIS — S56.129A FLEXOR TENDON LACERATION OF FINGER WITH OPEN WOUND, INITIAL ENCOUNTER: ICD-10-CM

## 2017-01-01 DIAGNOSIS — I63.9 CEREBROVASCULAR ACCIDENT (CVA), UNSPECIFIED MECHANISM (HCC): Primary | ICD-10-CM

## 2017-01-01 DIAGNOSIS — R55 SYNCOPE AND COLLAPSE: ICD-10-CM

## 2017-01-01 DIAGNOSIS — S61.012A THUMB LACERATION, LEFT, INITIAL ENCOUNTER: Primary | ICD-10-CM

## 2017-01-01 DIAGNOSIS — I63.9 CEREBROVASCULAR ACCIDENT (CVA), UNSPECIFIED MECHANISM (HCC): ICD-10-CM

## 2017-01-01 DIAGNOSIS — M86.9 OSTEOMYELITIS OF LEFT HAND, UNSPECIFIED TYPE (HCC): ICD-10-CM

## 2017-01-01 DIAGNOSIS — S61.209A FLEXOR TENDON LACERATION OF FINGER WITH OPEN WOUND, INITIAL ENCOUNTER: ICD-10-CM

## 2017-01-01 DIAGNOSIS — I10 ESSENTIAL HYPERTENSION: ICD-10-CM

## 2017-01-01 DIAGNOSIS — L08.9 INFECTION OF LEFT HAND: ICD-10-CM

## 2017-01-01 DIAGNOSIS — R00.1 BRADYCARDIA: ICD-10-CM

## 2017-01-01 DIAGNOSIS — S61.215A LACERATION OF LEFT RING FINGER: ICD-10-CM

## 2017-01-01 DIAGNOSIS — E11.42 DIABETIC POLYNEUROPATHY ASSOCIATED WITH TYPE 2 DIABETES MELLITUS (HCC): ICD-10-CM

## 2017-01-01 DIAGNOSIS — L08.9 INFECTION OF LEFT HAND: Primary | ICD-10-CM

## 2017-01-01 DIAGNOSIS — S61.211A LACERATION OF LEFT INDEX FINGER: ICD-10-CM

## 2017-01-01 DIAGNOSIS — Z00.00 MEDICARE ANNUAL WELLNESS VISIT, INITIAL: ICD-10-CM

## 2017-01-01 DIAGNOSIS — I77.9 BILATERAL CAROTID ARTERY DISEASE (HCC): ICD-10-CM

## 2017-01-01 DIAGNOSIS — I65.23 BILATERAL CAROTID ARTERY STENOSIS: ICD-10-CM

## 2017-01-01 LAB
ALBUMIN SERPL-MCNC: 1.8 G/DL (ref 3.5–5)
ALBUMIN SERPL-MCNC: 1.9 G/DL (ref 3.5–5)
ALBUMIN SERPL-MCNC: 2 G/DL (ref 3.5–5)
ALBUMIN SERPL-MCNC: 2.1 G/DL (ref 3.5–5)
ALBUMIN SERPL-MCNC: 2.9 G/DL (ref 3.9–5.4)
ALBUMIN SERPL-MCNC: 3.1 G/DL (ref 3.9–5.4)
ALBUMIN/GLOB SERPL: 0.5 {RATIO} (ref 1.1–2.2)
ALBUMIN/GLOB SERPL: 0.6 {RATIO} (ref 1.1–2.2)
ALKALINE PHOS POC: 137 U/L (ref 38–126)
ALKALINE PHOS POC: 165 U/L (ref 38–126)
ALP SERPL-CCNC: 109 U/L (ref 45–117)
ALP SERPL-CCNC: 86 U/L (ref 45–117)
ALP SERPL-CCNC: 94 U/L (ref 45–117)
ALP SERPL-CCNC: 99 U/L (ref 45–117)
ALT SERPL-CCNC: 11 U/L (ref 12–78)
ALT SERPL-CCNC: 11 U/L (ref 12–78)
ALT SERPL-CCNC: 13 U/L (ref 12–78)
ALT SERPL-CCNC: 13 U/L (ref 12–78)
ALT SERPL-CCNC: 22 U/L (ref 9–52)
ALT SERPL-CCNC: 24 U/L (ref 9–52)
ANION GAP BLD CALC-SCNC: 10 MMOL/L (ref 5–15)
ANION GAP BLD CALC-SCNC: 10 MMOL/L (ref 5–15)
ANION GAP BLD CALC-SCNC: 5 MMOL/L (ref 5–15)
ANION GAP BLD CALC-SCNC: 5 MMOL/L (ref 5–15)
ANION GAP BLD CALC-SCNC: 6 MMOL/L (ref 5–15)
ANION GAP BLD CALC-SCNC: 7 MMOL/L (ref 5–15)
ANION GAP BLD CALC-SCNC: 8 MMOL/L (ref 5–15)
ANION GAP SERPL CALC-SCNC: 10 MMOL/L (ref 5–15)
ANION GAP SERPL CALC-SCNC: 11 MMOL/L (ref 5–15)
ANION GAP SERPL CALC-SCNC: 15 MMOL/L (ref 5–15)
ANION GAP SERPL CALC-SCNC: 4 MMOL/L (ref 5–15)
ANION GAP SERPL CALC-SCNC: 5 MMOL/L (ref 5–15)
ANION GAP SERPL CALC-SCNC: 6 MMOL/L (ref 5–15)
ANION GAP SERPL CALC-SCNC: 7 MMOL/L (ref 5–15)
ANION GAP SERPL CALC-SCNC: 8 MMOL/L (ref 5–15)
ANION GAP SERPL CALC-SCNC: 9 MMOL/L (ref 5–15)
APPEARANCE UR: ABNORMAL
APPEARANCE UR: CLEAR
APPEARANCE UR: CLEAR
AST SERPL-CCNC: 17 U/L (ref 15–37)
AST SERPL-CCNC: 17 U/L (ref 15–37)
AST SERPL-CCNC: 18 U/L (ref 15–37)
AST SERPL-CCNC: 20 U/L (ref 15–37)
AST SERPL-CCNC: 22 U/L (ref 14–36)
AST SERPL-CCNC: 22 U/L (ref 14–36)
ATRIAL RATE: 63 BPM
ATRIAL RATE: 64 BPM
ATRIAL RATE: 70 BPM
BACTERIA ISLT: ABNORMAL
BACTERIA SPEC CULT: ABNORMAL
BACTERIA SPEC CULT: NORMAL
BACTERIA UA POCT, BACTPOCT: ABNORMAL
BACTERIA URNS QL MICRO: NEGATIVE /HPF
BASOPHILS # BLD AUTO: 0 K/UL (ref 0–0.1)
BASOPHILS # BLD AUTO: 0 K/UL (ref 0–0.1)
BASOPHILS # BLD AUTO: 0.1 K/UL (ref 0–0.1)
BASOPHILS # BLD: 0 % (ref 0–1)
BASOPHILS # BLD: 0 K/UL
BASOPHILS # BLD: 0 K/UL (ref 0–0.1)
BASOPHILS # BLD: 0.1 K/UL (ref 0–0.1)
BASOPHILS # BLD: 1 % (ref 0–1)
BASOPHILS NFR BLD: 0 % (ref 0–1)
BASOPHILS NFR BLD: 1 %
BASOPHILS NFR BLD: 1 % (ref 0–1)
BILIRUB SERPL-MCNC: 0.2 MG/DL (ref 0.2–1)
BILIRUB SERPL-MCNC: 0.3 MG/DL (ref 0.2–1)
BILIRUB UR QL STRIP: NEGATIVE
BILIRUB UR QL: NEGATIVE
BUN BLD-MCNC: 21 MG/DL (ref 9–20)
BUN BLD-MCNC: 25 MG/DL (ref 9–20)
BUN BLD-MCNC: 25 MG/DL (ref 9–20)
BUN BLD-MCNC: 37 MG/DL (ref 9–20)
BUN BLD-MCNC: 51 MG/DL (ref 9–20)
BUN SERPL-MCNC: 13 MG/DL (ref 6–20)
BUN SERPL-MCNC: 14 MG/DL (ref 6–20)
BUN SERPL-MCNC: 17 MG/DL (ref 6–20)
BUN SERPL-MCNC: 17 MG/DL (ref 6–20)
BUN SERPL-MCNC: 18 MG/DL (ref 6–20)
BUN SERPL-MCNC: 18 MG/DL (ref 6–20)
BUN SERPL-MCNC: 19 MG/DL (ref 6–20)
BUN SERPL-MCNC: 20 MG/DL (ref 6–20)
BUN SERPL-MCNC: 20 MG/DL (ref 6–20)
BUN SERPL-MCNC: 21 MG/DL (ref 6–20)
BUN SERPL-MCNC: 21 MG/DL (ref 6–20)
BUN SERPL-MCNC: 22 MG/DL (ref 6–20)
BUN SERPL-MCNC: 22 MG/DL (ref 6–20)
BUN SERPL-MCNC: 23 MG/DL (ref 6–20)
BUN SERPL-MCNC: 24 MG/DL (ref 6–20)
BUN SERPL-MCNC: 25 MG/DL (ref 6–20)
BUN SERPL-MCNC: 26 MG/DL (ref 6–20)
BUN SERPL-MCNC: 27 MG/DL (ref 6–20)
BUN SERPL-MCNC: 28 MG/DL (ref 6–20)
BUN SERPL-MCNC: 28 MG/DL (ref 6–20)
BUN SERPL-MCNC: 29 MG/DL (ref 6–20)
BUN SERPL-MCNC: 30 MG/DL (ref 6–20)
BUN SERPL-MCNC: 31 MG/DL (ref 6–20)
BUN SERPL-MCNC: 33 MG/DL (ref 6–20)
BUN SERPL-MCNC: 34 MG/DL (ref 6–20)
BUN SERPL-MCNC: 36 MG/DL (ref 6–20)
BUN SERPL-MCNC: 41 MG/DL (ref 6–20)
BUN/CREAT SERPL: 15 (ref 12–20)
BUN/CREAT SERPL: 16 (ref 12–20)
BUN/CREAT SERPL: 17 (ref 12–20)
BUN/CREAT SERPL: 18 (ref 12–20)
BUN/CREAT SERPL: 19 (ref 12–20)
BUN/CREAT SERPL: 20 (ref 12–20)
BUN/CREAT SERPL: 20 (ref 12–20)
BUN/CREAT SERPL: 21 (ref 12–20)
BUN/CREAT SERPL: 22 (ref 12–20)
BUN/CREAT SERPL: 23 (ref 12–20)
BUN/CREAT SERPL: 24 (ref 12–20)
BUN/CREAT SERPL: 25 (ref 12–20)
BUN/CREAT SERPL: 26 (ref 12–20)
BUN/CREAT SERPL: 27 (ref 12–20)
BUN/CREAT SERPL: 30 (ref 12–20)
CALCIUM BLD-MCNC: 8.3 MG/DL (ref 8.4–10.2)
CALCIUM BLD-MCNC: 8.5 MG/DL (ref 8.4–10.2)
CALCIUM BLD-MCNC: 8.5 MG/DL (ref 8.4–10.2)
CALCIUM BLD-MCNC: 8.7 MG/DL (ref 8.4–10.2)
CALCIUM BLD-MCNC: 8.9 MG/DL (ref 8.4–10.2)
CALCIUM SERPL-MCNC: 7.1 MG/DL (ref 8.5–10.1)
CALCIUM SERPL-MCNC: 7.3 MG/DL (ref 8.5–10.1)
CALCIUM SERPL-MCNC: 7.3 MG/DL (ref 8.5–10.1)
CALCIUM SERPL-MCNC: 7.4 MG/DL (ref 8.5–10.1)
CALCIUM SERPL-MCNC: 7.5 MG/DL (ref 8.5–10.1)
CALCIUM SERPL-MCNC: 7.6 MG/DL (ref 8.5–10.1)
CALCIUM SERPL-MCNC: 7.7 MG/DL (ref 8.5–10.1)
CALCIUM SERPL-MCNC: 7.8 MG/DL (ref 8.5–10.1)
CALCIUM SERPL-MCNC: 7.9 MG/DL (ref 8.5–10.1)
CALCIUM SERPL-MCNC: 8 MG/DL (ref 8.5–10.1)
CALCIUM SERPL-MCNC: 8.1 MG/DL (ref 8.5–10.1)
CALCIUM SERPL-MCNC: 8.4 MG/DL (ref 8.5–10.1)
CALCULATED P AXIS, ECG09: 54 DEGREES
CALCULATED P AXIS, ECG09: 63 DEGREES
CALCULATED P AXIS, ECG09: 71 DEGREES
CALCULATED R AXIS, ECG10: -4 DEGREES
CALCULATED R AXIS, ECG10: -7 DEGREES
CALCULATED R AXIS, ECG10: 7 DEGREES
CALCULATED T AXIS, ECG11: -157 DEGREES
CALCULATED T AXIS, ECG11: -17 DEGREES
CALCULATED T AXIS, ECG11: -88 DEGREES
CASTS UA POCT: ABNORMAL
CC UR VC: ABNORMAL
CHLORIDE BLD-SCNC: 101 MMOL/L (ref 98–107)
CHLORIDE BLD-SCNC: 103 MMOL/L (ref 98–107)
CHLORIDE BLD-SCNC: 105 MMOL/L (ref 98–107)
CHLORIDE BLD-SCNC: 93 MMOL/L (ref 98–107)
CHLORIDE BLD-SCNC: 98 MMOL/L (ref 98–107)
CHLORIDE SERPL-SCNC: 101 MMOL/L (ref 97–108)
CHLORIDE SERPL-SCNC: 101 MMOL/L (ref 97–108)
CHLORIDE SERPL-SCNC: 102 MMOL/L (ref 97–108)
CHLORIDE SERPL-SCNC: 103 MMOL/L (ref 97–108)
CHLORIDE SERPL-SCNC: 104 MMOL/L (ref 97–108)
CHLORIDE SERPL-SCNC: 105 MMOL/L (ref 97–108)
CHLORIDE SERPL-SCNC: 105 MMOL/L (ref 97–108)
CHLORIDE SERPL-SCNC: 106 MMOL/L (ref 97–108)
CHLORIDE SERPL-SCNC: 108 MMOL/L (ref 97–108)
CHLORIDE SERPL-SCNC: 109 MMOL/L (ref 97–108)
CHLORIDE SERPL-SCNC: 110 MMOL/L (ref 97–108)
CHLORIDE SERPL-SCNC: 111 MMOL/L (ref 97–108)
CHLORIDE SERPL-SCNC: 112 MMOL/L (ref 97–108)
CHLORIDE SERPL-SCNC: 112 MMOL/L (ref 97–108)
CHLORIDE SERPL-SCNC: 98 MMOL/L (ref 97–108)
CHLORIDE SERPL-SCNC: 98 MMOL/L (ref 97–108)
CHOLEST SERPL-MCNC: 108 MG/DL
CHOLEST SERPL-MCNC: 181 MG/DL (ref 0–200)
CK (CPK) POC: 35 U/L (ref 30–135)
CK SERPL-CCNC: 125 U/L (ref 39–308)
CK SERPL-CCNC: 174 U/L (ref 39–308)
CK SERPL-CCNC: 175 U/L (ref 39–308)
CK SERPL-CCNC: 179 U/L (ref 39–308)
CK SERPL-CCNC: 273 U/L (ref 39–308)
CK SERPL-CCNC: 36 U/L (ref 39–308)
CK SERPL-CCNC: 46 U/L (ref 39–308)
CK SERPL-CCNC: 57 U/L (ref 39–308)
CLUE CELLS, CLUEPOCT: NEGATIVE
CO2 POC: 21 MMOL/L (ref 22–32)
CO2 POC: 24 MMOL/L (ref 22–32)
CO2 POC: 26 MMOL/L (ref 22–32)
CO2 POC: 27 MMOL/L (ref 22–32)
CO2 POC: 27 MMOL/L (ref 22–32)
CO2 SERPL-SCNC: 20 MMOL/L (ref 21–32)
CO2 SERPL-SCNC: 20 MMOL/L (ref 21–32)
CO2 SERPL-SCNC: 22 MMOL/L (ref 21–32)
CO2 SERPL-SCNC: 22 MMOL/L (ref 21–32)
CO2 SERPL-SCNC: 23 MMOL/L (ref 21–32)
CO2 SERPL-SCNC: 24 MMOL/L (ref 21–32)
CO2 SERPL-SCNC: 25 MMOL/L (ref 21–32)
CO2 SERPL-SCNC: 26 MMOL/L (ref 21–32)
CO2 SERPL-SCNC: 27 MMOL/L (ref 21–32)
CO2 SERPL-SCNC: 28 MMOL/L (ref 21–32)
CO2 SERPL-SCNC: 29 MMOL/L (ref 21–32)
COLOR UR: ABNORMAL
CREAT BLD-MCNC: 0.8 MG/DL (ref 0.8–1.5)
CREAT BLD-MCNC: 1 MG/DL (ref 0.8–1.5)
CREAT BLD-MCNC: 1.1 MG/DL (ref 0.8–1.5)
CREAT BLD-MCNC: 1.2 MG/DL (ref 0.8–1.5)
CREAT BLD-MCNC: 1.2 MG/DL (ref 0.8–1.5)
CREAT SERPL-MCNC: 0.81 MG/DL (ref 0.7–1.3)
CREAT SERPL-MCNC: 0.88 MG/DL (ref 0.7–1.3)
CREAT SERPL-MCNC: 0.92 MG/DL (ref 0.7–1.3)
CREAT SERPL-MCNC: 0.96 MG/DL (ref 0.7–1.3)
CREAT SERPL-MCNC: 0.97 MG/DL (ref 0.7–1.3)
CREAT SERPL-MCNC: 1.02 MG/DL (ref 0.7–1.3)
CREAT SERPL-MCNC: 1.04 MG/DL (ref 0.7–1.3)
CREAT SERPL-MCNC: 1.08 MG/DL (ref 0.7–1.3)
CREAT SERPL-MCNC: 1.08 MG/DL (ref 0.7–1.3)
CREAT SERPL-MCNC: 1.09 MG/DL (ref 0.7–1.3)
CREAT SERPL-MCNC: 1.09 MG/DL (ref 0.7–1.3)
CREAT SERPL-MCNC: 1.1 MG/DL (ref 0.7–1.3)
CREAT SERPL-MCNC: 1.1 MG/DL (ref 0.7–1.3)
CREAT SERPL-MCNC: 1.11 MG/DL (ref 0.7–1.3)
CREAT SERPL-MCNC: 1.12 MG/DL (ref 0.7–1.3)
CREAT SERPL-MCNC: 1.15 MG/DL (ref 0.7–1.3)
CREAT SERPL-MCNC: 1.2 MG/DL (ref 0.7–1.3)
CREAT SERPL-MCNC: 1.21 MG/DL (ref 0.7–1.3)
CREAT SERPL-MCNC: 1.21 MG/DL (ref 0.7–1.3)
CREAT SERPL-MCNC: 1.22 MG/DL (ref 0.7–1.3)
CREAT SERPL-MCNC: 1.23 MG/DL (ref 0.7–1.3)
CREAT SERPL-MCNC: 1.23 MG/DL (ref 0.7–1.3)
CREAT SERPL-MCNC: 1.24 MG/DL (ref 0.7–1.3)
CREAT SERPL-MCNC: 1.25 MG/DL (ref 0.7–1.3)
CREAT SERPL-MCNC: 1.27 MG/DL (ref 0.7–1.3)
CREAT SERPL-MCNC: 1.28 MG/DL (ref 0.7–1.3)
CREAT SERPL-MCNC: 1.29 MG/DL (ref 0.7–1.3)
CREAT SERPL-MCNC: 1.33 MG/DL (ref 0.7–1.3)
CREAT SERPL-MCNC: 1.5 MG/DL (ref 0.7–1.3)
CREAT SERPL-MCNC: 1.52 MG/DL (ref 0.7–1.3)
CREAT SERPL-MCNC: 1.53 MG/DL (ref 0.7–1.3)
CREAT SERPL-MCNC: 1.53 MG/DL (ref 0.7–1.3)
CREAT SERPL-MCNC: 1.57 MG/DL (ref 0.7–1.3)
CRP SERPL-MCNC: 0.56 MG/DL (ref 0–0.6)
CRP SERPL-MCNC: 13.1 MG/DL (ref 0–0.6)
CRP SERPL-MCNC: 6.95 MG/DL (ref 0–0.6)
CRYSTALS UA POCT, CRYSPOCT: NEGATIVE
DAILY QC (YES/NO)?: YES
DIAGNOSIS, 93000: NORMAL
DIFFERENTIAL METHOD BLD: ABNORMAL
EGFR (POC): 57.4
EGFR (POC): 58
EGFR (POC): 64.4
EGFR (POC): 72.3
EGFR (POC): 86.2
EOSINOPHIL # BLD: 0 K/UL (ref 0–0.4)
EOSINOPHIL # BLD: 0.1 K/UL
EOSINOPHIL # BLD: 0.1 K/UL (ref 0–0.4)
EOSINOPHIL # BLD: 0.2 K/UL (ref 0–0.4)
EOSINOPHIL NFR BLD: 0 % (ref 0–7)
EOSINOPHIL NFR BLD: 1 % (ref 0–7)
EOSINOPHIL NFR BLD: 2 %
EOSINOPHIL NFR BLD: 2 % (ref 0–7)
EOSINOPHIL NFR BLD: 3 % (ref 0–7)
EPITH CASTS URNS QL MICRO: ABNORMAL /LPF
EPITHELIAL CELLS POCT: ABNORMAL
ERYTHROCYTE [DISTWIDTH] IN BLOOD BY AUTOMATED COUNT: 15 % (ref 11.5–14.5)
ERYTHROCYTE [DISTWIDTH] IN BLOOD BY AUTOMATED COUNT: 15 % (ref 11.5–14.5)
ERYTHROCYTE [DISTWIDTH] IN BLOOD BY AUTOMATED COUNT: 15.1 % (ref 11.5–14.5)
ERYTHROCYTE [DISTWIDTH] IN BLOOD BY AUTOMATED COUNT: 15.1 % (ref 11.5–14.5)
ERYTHROCYTE [DISTWIDTH] IN BLOOD BY AUTOMATED COUNT: 15.3 % (ref 11.5–14.5)
ERYTHROCYTE [DISTWIDTH] IN BLOOD BY AUTOMATED COUNT: 15.4 % (ref 11.5–14.5)
ERYTHROCYTE [DISTWIDTH] IN BLOOD BY AUTOMATED COUNT: 15.4 % (ref 11.5–14.5)
ERYTHROCYTE [DISTWIDTH] IN BLOOD BY AUTOMATED COUNT: 15.5 % (ref 11.5–14.5)
ERYTHROCYTE [DISTWIDTH] IN BLOOD BY AUTOMATED COUNT: 15.6 % (ref 11.5–14.5)
ERYTHROCYTE [DISTWIDTH] IN BLOOD BY AUTOMATED COUNT: 15.6 % (ref 11.5–14.5)
ERYTHROCYTE [DISTWIDTH] IN BLOOD BY AUTOMATED COUNT: 15.8 % (ref 11.5–14.5)
ERYTHROCYTE [DISTWIDTH] IN BLOOD BY AUTOMATED COUNT: 16 % (ref 11.5–14.5)
ERYTHROCYTE [DISTWIDTH] IN BLOOD BY AUTOMATED COUNT: 16 % (ref 11.5–14.5)
ERYTHROCYTE [DISTWIDTH] IN BLOOD BY AUTOMATED COUNT: 16.1 % (ref 11.5–14.5)
ERYTHROCYTE [DISTWIDTH] IN BLOOD BY AUTOMATED COUNT: 16.3 % (ref 11.5–14.5)
ERYTHROCYTE [DISTWIDTH] IN BLOOD BY AUTOMATED COUNT: 16.4 % (ref 11.5–14.5)
ERYTHROCYTE [DISTWIDTH] IN BLOOD BY AUTOMATED COUNT: 16.7 % (ref 11.5–14.5)
ERYTHROCYTE [DISTWIDTH] IN BLOOD BY AUTOMATED COUNT: 17.1 % (ref 11.5–14.5)
ERYTHROCYTE [DISTWIDTH] IN BLOOD BY AUTOMATED COUNT: 17.2 % (ref 11.5–14.5)
ERYTHROCYTE [DISTWIDTH] IN BLOOD BY AUTOMATED COUNT: 17.3 % (ref 11.5–14.5)
ERYTHROCYTE [DISTWIDTH] IN BLOOD BY AUTOMATED COUNT: 17.4 % (ref 11.5–14.5)
ERYTHROCYTE [DISTWIDTH] IN BLOOD BY AUTOMATED COUNT: 17.6 % (ref 11.5–14.5)
ERYTHROCYTE [DISTWIDTH] IN BLOOD BY AUTOMATED COUNT: 17.7 % (ref 11.5–14.5)
ERYTHROCYTE [DISTWIDTH] IN BLOOD BY AUTOMATED COUNT: 17.7 % (ref 11.5–14.5)
ERYTHROCYTE [DISTWIDTH] IN BLOOD BY AUTOMATED COUNT: 17.9 % (ref 11.5–14.5)
ERYTHROCYTE [SEDIMENTATION RATE] IN BLOOD: 109 MM/HR (ref 0–20)
ERYTHROCYTE [SEDIMENTATION RATE] IN BLOOD: 39 MM/HR (ref 0–20)
ERYTHROCYTE [SEDIMENTATION RATE] IN BLOOD: 44 MM/HR (ref 0–20)
ERYTHROCYTE [SEDIMENTATION RATE] IN BLOOD: 50 MM/HR (ref 0–20)
ERYTHROCYTE [SEDIMENTATION RATE] IN BLOOD: 54 MM/HR (ref 0–20)
ERYTHROCYTE [SEDIMENTATION RATE] IN BLOOD: 56 MM/HR (ref 0–20)
ERYTHROCYTE [SEDIMENTATION RATE] IN BLOOD: 61 MM/HR (ref 0–20)
ERYTHROCYTE [SEDIMENTATION RATE] IN BLOOD: 65 MM/HR (ref 0–20)
ERYTHROCYTE [SEDIMENTATION RATE] IN BLOOD: 67 MM/HR (ref 0–20)
ERYTHROCYTE [SEDIMENTATION RATE] IN BLOOD: 71 MM/HR (ref 0–20)
ERYTHROCYTE [SEDIMENTATION RATE] IN BLOOD: 72 MM/HR (ref 0–10)
ERYTHROCYTE [SEDIMENTATION RATE] IN BLOOD: 89 MM/HR (ref 0–20)
ERYTHROCYTE [SEDIMENTATION RATE] IN BLOOD: 94 MM/HR (ref 0–20)
EST. AVERAGE GLUCOSE BLD GHB EST-MCNC: 151 MG/DL
EST. AVERAGE GLUCOSE BLD GHB EST-MCNC: 157 MG/DL
EST. AVERAGE GLUCOSE BLD GHB EST-MCNC: 186 MG/DL
GLOBULIN SER CALC-MCNC: 3.2 G/DL (ref 2–4)
GLOBULIN SER CALC-MCNC: 3.5 G/DL (ref 2–4)
GLOBULIN SER CALC-MCNC: 3.6 G/DL (ref 2–4)
GLOBULIN SER CALC-MCNC: 3.7 G/DL (ref 2–4)
GLUCOSE BLD STRIP.AUTO-MCNC: 100 MG/DL (ref 65–100)
GLUCOSE BLD STRIP.AUTO-MCNC: 101 MG/DL (ref 65–100)
GLUCOSE BLD STRIP.AUTO-MCNC: 108 MG/DL (ref 65–100)
GLUCOSE BLD STRIP.AUTO-MCNC: 111 MG/DL (ref 65–100)
GLUCOSE BLD STRIP.AUTO-MCNC: 112 MG/DL (ref 65–100)
GLUCOSE BLD STRIP.AUTO-MCNC: 118 MG/DL (ref 65–100)
GLUCOSE BLD STRIP.AUTO-MCNC: 118 MG/DL (ref 65–100)
GLUCOSE BLD STRIP.AUTO-MCNC: 123 MG/DL (ref 65–100)
GLUCOSE BLD STRIP.AUTO-MCNC: 127 MG/DL (ref 65–100)
GLUCOSE BLD STRIP.AUTO-MCNC: 131 MG/DL (ref 65–100)
GLUCOSE BLD STRIP.AUTO-MCNC: 135 MG/DL (ref 65–100)
GLUCOSE BLD STRIP.AUTO-MCNC: 136 MG/DL (ref 65–100)
GLUCOSE BLD STRIP.AUTO-MCNC: 139 MG/DL (ref 65–100)
GLUCOSE BLD STRIP.AUTO-MCNC: 142 MG/DL (ref 65–100)
GLUCOSE BLD STRIP.AUTO-MCNC: 145 MG/DL (ref 65–100)
GLUCOSE BLD STRIP.AUTO-MCNC: 146 MG/DL (ref 65–100)
GLUCOSE BLD STRIP.AUTO-MCNC: 147 MG/DL (ref 65–100)
GLUCOSE BLD STRIP.AUTO-MCNC: 158 MG/DL (ref 65–100)
GLUCOSE BLD STRIP.AUTO-MCNC: 159 MG/DL (ref 65–100)
GLUCOSE BLD STRIP.AUTO-MCNC: 171 MG/DL (ref 65–100)
GLUCOSE BLD STRIP.AUTO-MCNC: 176 MG/DL (ref 65–100)
GLUCOSE BLD STRIP.AUTO-MCNC: 177 MG/DL (ref 65–100)
GLUCOSE BLD STRIP.AUTO-MCNC: 179 MG/DL (ref 65–100)
GLUCOSE BLD STRIP.AUTO-MCNC: 188 MG/DL (ref 65–100)
GLUCOSE BLD STRIP.AUTO-MCNC: 189 MG/DL (ref 65–100)
GLUCOSE BLD STRIP.AUTO-MCNC: 191 MG/DL (ref 65–100)
GLUCOSE BLD STRIP.AUTO-MCNC: 191 MG/DL (ref 65–100)
GLUCOSE BLD STRIP.AUTO-MCNC: 193 MG/DL (ref 65–100)
GLUCOSE BLD STRIP.AUTO-MCNC: 193 MG/DL (ref 65–100)
GLUCOSE BLD STRIP.AUTO-MCNC: 197 MG/DL (ref 65–100)
GLUCOSE BLD STRIP.AUTO-MCNC: 198 MG/DL (ref 65–100)
GLUCOSE BLD STRIP.AUTO-MCNC: 201 MG/DL (ref 65–100)
GLUCOSE BLD STRIP.AUTO-MCNC: 202 MG/DL (ref 65–100)
GLUCOSE BLD STRIP.AUTO-MCNC: 202 MG/DL (ref 65–100)
GLUCOSE BLD STRIP.AUTO-MCNC: 205 MG/DL (ref 65–100)
GLUCOSE BLD STRIP.AUTO-MCNC: 205 MG/DL (ref 65–100)
GLUCOSE BLD STRIP.AUTO-MCNC: 206 MG/DL (ref 65–100)
GLUCOSE BLD STRIP.AUTO-MCNC: 207 MG/DL (ref 65–100)
GLUCOSE BLD STRIP.AUTO-MCNC: 211 MG/DL (ref 65–100)
GLUCOSE BLD STRIP.AUTO-MCNC: 214 MG/DL (ref 65–100)
GLUCOSE BLD STRIP.AUTO-MCNC: 215 MG/DL (ref 65–100)
GLUCOSE BLD STRIP.AUTO-MCNC: 221 MG/DL (ref 65–100)
GLUCOSE BLD STRIP.AUTO-MCNC: 223 MG/DL (ref 65–100)
GLUCOSE BLD STRIP.AUTO-MCNC: 225 MG/DL (ref 65–100)
GLUCOSE BLD STRIP.AUTO-MCNC: 230 MG/DL (ref 65–100)
GLUCOSE BLD STRIP.AUTO-MCNC: 232 MG/DL (ref 65–100)
GLUCOSE BLD STRIP.AUTO-MCNC: 235 MG/DL (ref 65–100)
GLUCOSE BLD STRIP.AUTO-MCNC: 239 MG/DL (ref 65–100)
GLUCOSE BLD STRIP.AUTO-MCNC: 241 MG/DL (ref 65–100)
GLUCOSE BLD STRIP.AUTO-MCNC: 242 MG/DL (ref 65–100)
GLUCOSE BLD STRIP.AUTO-MCNC: 245 MG/DL (ref 65–100)
GLUCOSE BLD STRIP.AUTO-MCNC: 249 MG/DL (ref 65–100)
GLUCOSE BLD STRIP.AUTO-MCNC: 250 MG/DL (ref 65–100)
GLUCOSE BLD STRIP.AUTO-MCNC: 256 MG/DL (ref 65–100)
GLUCOSE BLD STRIP.AUTO-MCNC: 263 MG/DL (ref 65–100)
GLUCOSE BLD STRIP.AUTO-MCNC: 263 MG/DL (ref 65–100)
GLUCOSE BLD STRIP.AUTO-MCNC: 267 MG/DL (ref 65–100)
GLUCOSE BLD STRIP.AUTO-MCNC: 269 MG/DL (ref 65–100)
GLUCOSE BLD STRIP.AUTO-MCNC: 271 MG/DL (ref 65–100)
GLUCOSE BLD STRIP.AUTO-MCNC: 272 MG/DL (ref 65–100)
GLUCOSE BLD STRIP.AUTO-MCNC: 272 MG/DL (ref 65–100)
GLUCOSE BLD STRIP.AUTO-MCNC: 274 MG/DL (ref 65–100)
GLUCOSE BLD STRIP.AUTO-MCNC: 277 MG/DL (ref 65–100)
GLUCOSE BLD STRIP.AUTO-MCNC: 282 MG/DL (ref 65–100)
GLUCOSE BLD STRIP.AUTO-MCNC: 292 MG/DL (ref 65–100)
GLUCOSE BLD STRIP.AUTO-MCNC: 302 MG/DL (ref 65–100)
GLUCOSE BLD STRIP.AUTO-MCNC: 303 MG/DL (ref 65–100)
GLUCOSE BLD STRIP.AUTO-MCNC: 306 MG/DL (ref 65–100)
GLUCOSE BLD STRIP.AUTO-MCNC: 313 MG/DL (ref 65–100)
GLUCOSE BLD STRIP.AUTO-MCNC: 321 MG/DL (ref 65–100)
GLUCOSE BLD STRIP.AUTO-MCNC: 332 MG/DL (ref 65–100)
GLUCOSE BLD STRIP.AUTO-MCNC: 350 MG/DL (ref 65–100)
GLUCOSE BLD STRIP.AUTO-MCNC: 353 MG/DL (ref 65–100)
GLUCOSE BLD STRIP.AUTO-MCNC: 353 MG/DL (ref 65–100)
GLUCOSE BLD STRIP.AUTO-MCNC: 355 MG/DL (ref 65–100)
GLUCOSE BLD STRIP.AUTO-MCNC: 355 MG/DL (ref 65–100)
GLUCOSE BLD STRIP.AUTO-MCNC: 367 MG/DL (ref 65–100)
GLUCOSE BLD STRIP.AUTO-MCNC: 368 MG/DL (ref 65–100)
GLUCOSE BLD STRIP.AUTO-MCNC: 369 MG/DL (ref 65–100)
GLUCOSE BLD STRIP.AUTO-MCNC: 373 MG/DL (ref 65–100)
GLUCOSE BLD STRIP.AUTO-MCNC: 379 MG/DL (ref 65–100)
GLUCOSE BLD STRIP.AUTO-MCNC: 380 MG/DL (ref 65–100)
GLUCOSE BLD STRIP.AUTO-MCNC: 397 MG/DL (ref 65–100)
GLUCOSE BLD STRIP.AUTO-MCNC: 405 MG/DL (ref 65–100)
GLUCOSE BLD STRIP.AUTO-MCNC: 407 MG/DL (ref 65–100)
GLUCOSE BLD STRIP.AUTO-MCNC: 424 MG/DL (ref 65–100)
GLUCOSE BLD STRIP.AUTO-MCNC: 426 MG/DL (ref 65–100)
GLUCOSE BLD STRIP.AUTO-MCNC: 53 MG/DL (ref 65–100)
GLUCOSE BLD STRIP.AUTO-MCNC: 54 MG/DL (ref 65–100)
GLUCOSE BLD STRIP.AUTO-MCNC: 61 MG/DL (ref 65–100)
GLUCOSE BLD STRIP.AUTO-MCNC: 61 MG/DL (ref 65–100)
GLUCOSE BLD STRIP.AUTO-MCNC: 62 MG/DL (ref 65–100)
GLUCOSE BLD STRIP.AUTO-MCNC: 62 MG/DL (ref 65–100)
GLUCOSE BLD STRIP.AUTO-MCNC: 65 MG/DL (ref 65–100)
GLUCOSE BLD STRIP.AUTO-MCNC: 69 MG/DL (ref 65–100)
GLUCOSE BLD STRIP.AUTO-MCNC: 72 MG/DL (ref 65–100)
GLUCOSE BLD STRIP.AUTO-MCNC: 75 MG/DL (ref 65–100)
GLUCOSE BLD STRIP.AUTO-MCNC: 76 MG/DL (ref 65–100)
GLUCOSE BLD STRIP.AUTO-MCNC: 76 MG/DL (ref 65–100)
GLUCOSE BLD STRIP.AUTO-MCNC: 78 MG/DL (ref 65–100)
GLUCOSE BLD STRIP.AUTO-MCNC: 80 MG/DL (ref 65–100)
GLUCOSE BLD STRIP.AUTO-MCNC: 86 MG/DL (ref 65–100)
GLUCOSE BLD STRIP.AUTO-MCNC: 89 MG/DL (ref 65–100)
GLUCOSE BLD STRIP.AUTO-MCNC: 90 MG/DL (ref 65–100)
GLUCOSE BLD STRIP.AUTO-MCNC: 92 MG/DL (ref 65–100)
GLUCOSE POC: 159 MG/DL (ref 75–110)
GLUCOSE POC: 162 MG/DL (ref 75–110)
GLUCOSE POC: 441 MG/DL (ref 75–110)
GLUCOSE POC: 568 MG/DL (ref 75–110)
GLUCOSE POC: 581 MG/DL (ref 75–110)
GLUCOSE SERPL-MCNC: 110 MG/DL (ref 65–100)
GLUCOSE SERPL-MCNC: 123 MG/DL (ref 65–100)
GLUCOSE SERPL-MCNC: 124 MG/DL (ref 65–100)
GLUCOSE SERPL-MCNC: 127 MG/DL (ref 65–100)
GLUCOSE SERPL-MCNC: 131 MG/DL (ref 65–100)
GLUCOSE SERPL-MCNC: 149 MG/DL (ref 65–100)
GLUCOSE SERPL-MCNC: 158 MG/DL (ref 65–100)
GLUCOSE SERPL-MCNC: 185 MG/DL (ref 65–100)
GLUCOSE SERPL-MCNC: 189 MG/DL (ref 65–100)
GLUCOSE SERPL-MCNC: 197 MG/DL (ref 65–100)
GLUCOSE SERPL-MCNC: 198 MG/DL (ref 65–100)
GLUCOSE SERPL-MCNC: 200 MG/DL (ref 65–100)
GLUCOSE SERPL-MCNC: 200 MG/DL (ref 65–100)
GLUCOSE SERPL-MCNC: 201 MG/DL (ref 65–100)
GLUCOSE SERPL-MCNC: 202 MG/DL (ref 65–100)
GLUCOSE SERPL-MCNC: 207 MG/DL (ref 65–100)
GLUCOSE SERPL-MCNC: 212 MG/DL (ref 65–100)
GLUCOSE SERPL-MCNC: 214 MG/DL (ref 65–100)
GLUCOSE SERPL-MCNC: 226 MG/DL (ref 65–100)
GLUCOSE SERPL-MCNC: 231 MG/DL (ref 65–100)
GLUCOSE SERPL-MCNC: 236 MG/DL (ref 65–100)
GLUCOSE SERPL-MCNC: 248 MG/DL (ref 65–100)
GLUCOSE SERPL-MCNC: 306 MG/DL (ref 65–100)
GLUCOSE SERPL-MCNC: 323 MG/DL (ref 65–100)
GLUCOSE SERPL-MCNC: 342 MG/DL (ref 65–100)
GLUCOSE SERPL-MCNC: 394 MG/DL (ref 65–100)
GLUCOSE SERPL-MCNC: 409 MG/DL (ref 65–100)
GLUCOSE SERPL-MCNC: 424 MG/DL (ref 65–100)
GLUCOSE SERPL-MCNC: 429 MG/DL (ref 65–100)
GLUCOSE SERPL-MCNC: 430 MG/DL (ref 65–100)
GLUCOSE SERPL-MCNC: 44 MG/DL (ref 65–100)
GLUCOSE SERPL-MCNC: 474 MG/DL (ref 65–100)
GLUCOSE SERPL-MCNC: 507 MG/DL (ref 65–100)
GLUCOSE SERPL-MCNC: 529 MG/DL (ref 65–100)
GLUCOSE SERPL-MCNC: 564 MG/DL (ref 65–100)
GLUCOSE SERPL-MCNC: 76 MG/DL (ref 65–100)
GLUCOSE SERPL-MCNC: 90 MG/DL (ref 65–100)
GLUCOSE UR STRIP.AUTO-MCNC: 250 MG/DL
GLUCOSE UR STRIP.AUTO-MCNC: NEGATIVE MG/DL
GLUCOSE UR STRIP.AUTO-MCNC: NEGATIVE MG/DL
GLUCOSE UR-MCNC: ABNORMAL MG/DL
GRAM STN SPEC: ABNORMAL
GRAN# POC: 3.2 K/UL (ref 2–7.8)
GRAN# POC: 3.7 K/UL (ref 2–7.8)
GRAN# POC: 4.8 K/UL (ref 2–7.8)
GRAN# POC: 5.4 K/UL (ref 2–7.8)
GRAN# POC: 5.9 K/UL (ref 2–7.8)
GRAN% POC: 70.7 % (ref 37–92)
GRAN% POC: 74.8 % (ref 37–92)
GRAN% POC: 77.4 % (ref 37–92)
GRAN% POC: 81.4 % (ref 37–92)
GRAN% POC: 83.9 % (ref 37–92)
HBA1C MFR BLD HPLC: 8.1 % (ref 4.5–5.7)
HBA1C MFR BLD: 6.9 % (ref 4.2–6.3)
HBA1C MFR BLD: 7.1 % (ref 4.2–6.3)
HBA1C MFR BLD: 8.1 % (ref 4.2–6.3)
HCT VFR BLD AUTO: 22.1 % (ref 36.6–50.3)
HCT VFR BLD AUTO: 22.7 % (ref 36.6–50.3)
HCT VFR BLD AUTO: 23 % (ref 36.6–50.3)
HCT VFR BLD AUTO: 23.3 % (ref 36.6–50.3)
HCT VFR BLD AUTO: 24 % (ref 36.6–50.3)
HCT VFR BLD AUTO: 25 % (ref 36.6–50.3)
HCT VFR BLD AUTO: 25.1 % (ref 36.6–50.3)
HCT VFR BLD AUTO: 25.6 % (ref 36.6–50.3)
HCT VFR BLD AUTO: 25.8 % (ref 36.6–50.3)
HCT VFR BLD AUTO: 26.1 % (ref 36.6–50.3)
HCT VFR BLD AUTO: 27.6 % (ref 36.6–50.3)
HCT VFR BLD AUTO: 27.6 % (ref 36.6–50.3)
HCT VFR BLD AUTO: 27.7 % (ref 36.6–50.3)
HCT VFR BLD AUTO: 27.8 % (ref 36.6–50.3)
HCT VFR BLD AUTO: 28 % (ref 36.6–50.3)
HCT VFR BLD AUTO: 28 % (ref 36.6–50.3)
HCT VFR BLD AUTO: 28.4 % (ref 36.6–50.3)
HCT VFR BLD AUTO: 28.6 % (ref 36.6–50.3)
HCT VFR BLD AUTO: 28.6 % (ref 36.6–50.3)
HCT VFR BLD AUTO: 29.2 % (ref 36.6–50.3)
HCT VFR BLD AUTO: 29.5 % (ref 36.6–50.3)
HCT VFR BLD AUTO: 29.6 % (ref 36.6–50.3)
HCT VFR BLD AUTO: 30.4 % (ref 36.6–50.3)
HCT VFR BLD AUTO: 30.9 % (ref 36.6–50.3)
HCT VFR BLD AUTO: 31.2 % (ref 36.6–50.3)
HCT VFR BLD CALC: 29.8 % (ref 37–51)
HCT VFR BLD CALC: 30.5 % (ref 37–51)
HCT VFR BLD CALC: 34.2 % (ref 37–51)
HCT VFR BLD CALC: 34.3 % (ref 37–51)
HCT VFR BLD CALC: 36.1 % (ref 37–51)
HDLC SERPL-MCNC: 48 MG/DL
HDLC SERPL-MCNC: 52 MG/DL (ref 35–130)
HDLC SERPL: 2.3 {RATIO} (ref 0–5)
HGB BLD-MCNC: 10 G/DL (ref 12.1–17)
HGB BLD-MCNC: 10 G/DL (ref 12–18)
HGB BLD-MCNC: 10.2 G/DL (ref 12.1–17)
HGB BLD-MCNC: 11.2 G/DL (ref 12–18)
HGB BLD-MCNC: 11.6 G/DL (ref 12–18)
HGB BLD-MCNC: 11.8 G/DL (ref 12.1–17)
HGB BLD-MCNC: 12.1 G/DL (ref 12–18)
HGB BLD-MCNC: 7.2 G/DL (ref 12.1–17)
HGB BLD-MCNC: 7.3 G/DL (ref 12.1–17)
HGB BLD-MCNC: 7.4 G/DL (ref 12.1–17)
HGB BLD-MCNC: 7.5 G/DL (ref 12.1–17)
HGB BLD-MCNC: 7.7 G/DL (ref 12.1–17)
HGB BLD-MCNC: 8 G/DL (ref 12.1–17)
HGB BLD-MCNC: 8.1 G/DL (ref 12.1–17)
HGB BLD-MCNC: 8.3 G/DL (ref 12.1–17)
HGB BLD-MCNC: 8.4 G/DL (ref 12.1–17)
HGB BLD-MCNC: 8.6 G/DL (ref 12.1–17)
HGB BLD-MCNC: 8.7 G/DL (ref 12.1–17)
HGB BLD-MCNC: 8.7 G/DL (ref 12.1–17)
HGB BLD-MCNC: 9 G/DL (ref 12.1–17)
HGB BLD-MCNC: 9.1 G/DL (ref 12.1–17)
HGB BLD-MCNC: 9.2 G/DL (ref 12.1–17)
HGB BLD-MCNC: 9.3 G/DL (ref 12.1–17)
HGB BLD-MCNC: 9.3 G/DL (ref 12.1–17)
HGB BLD-MCNC: 9.4 G/DL (ref 12.1–17)
HGB BLD-MCNC: 9.5 G/DL (ref 12.1–17)
HGB BLD-MCNC: 9.6 G/DL (ref 12.1–17)
HGB BLD-MCNC: 9.9 G/DL (ref 12.1–17)
HGB BLD-MCNC: 9.9 G/DL (ref 12–18)
HGB UR QL STRIP: ABNORMAL
HGB UR QL STRIP: NEGATIVE
HGB UR QL STRIP: NEGATIVE
HYALINE CASTS URNS QL MICRO: ABNORMAL /LPF (ref 0–5)
HYALINE CASTS URNS QL MICRO: ABNORMAL /LPF (ref 0–5)
INR PPP: 1 (ref 0.9–1.1)
INR PPP: 1.1 (ref 0.9–1.1)
KETONES P FAST UR STRIP-MCNC: NEGATIVE MG/DL
KETONES UR QL STRIP.AUTO: NEGATIVE MG/DL
LACTATE SERPL-SCNC: 1.2 MMOL/L (ref 0.4–2)
LDL CHOLESTEROL POC: 103.8 MG/DL (ref 0–130)
LDLC SERPL CALC-MCNC: 42.2 MG/DL (ref 0–100)
LEUKOCYTE ESTERASE UR QL STRIP.AUTO: ABNORMAL
LEUKOCYTE ESTERASE UR QL STRIP.AUTO: NEGATIVE
LEUKOCYTE ESTERASE UR QL STRIP.AUTO: NEGATIVE
LIPID PROFILE,FLP: NORMAL
LY# POC: 0.7 K/UL (ref 0.6–4.1)
LY# POC: 0.9 K/UL (ref 0.6–4.1)
LY# POC: 1 K/UL (ref 0.6–4.1)
LY# POC: 1.1 K/UL (ref 0.6–4.1)
LY# POC: 1.5 K/UL (ref 0.6–4.1)
LY% POC: 12.5 % (ref 10–58.5)
LY% POC: 15.6 % (ref 10–58.5)
LY% POC: 19.1 % (ref 10–58.5)
LY% POC: 21.5 % (ref 10–58.5)
LY% POC: 24.6 % (ref 10–58.5)
LYMPHOCYTES # BLD AUTO: 11 % (ref 12–49)
LYMPHOCYTES # BLD AUTO: 15 % (ref 12–49)
LYMPHOCYTES # BLD AUTO: 18 % (ref 12–49)
LYMPHOCYTES # BLD AUTO: 21 % (ref 12–49)
LYMPHOCYTES # BLD AUTO: 23 % (ref 12–49)
LYMPHOCYTES # BLD: 0.5 K/UL (ref 0.8–3.5)
LYMPHOCYTES # BLD: 0.7 K/UL (ref 0.8–3.5)
LYMPHOCYTES # BLD: 0.7 K/UL (ref 0.8–3.5)
LYMPHOCYTES # BLD: 0.8 K/UL
LYMPHOCYTES # BLD: 0.8 K/UL (ref 0.8–3.5)
LYMPHOCYTES # BLD: 0.9 K/UL (ref 0.8–3.5)
LYMPHOCYTES # BLD: 0.9 K/UL (ref 0.8–3.5)
LYMPHOCYTES # BLD: 1 K/UL (ref 0.8–3.5)
LYMPHOCYTES # BLD: 1.1 K/UL (ref 0.8–3.5)
LYMPHOCYTES # BLD: 1.2 K/UL (ref 0.8–3.5)
LYMPHOCYTES # BLD: 1.2 K/UL (ref 0.8–3.5)
LYMPHOCYTES # BLD: 1.3 K/UL (ref 0.8–3.5)
LYMPHOCYTES NFR BLD: 13 % (ref 12–49)
LYMPHOCYTES NFR BLD: 14 % (ref 12–49)
LYMPHOCYTES NFR BLD: 17 % (ref 12–49)
LYMPHOCYTES NFR BLD: 17 % (ref 12–49)
LYMPHOCYTES NFR BLD: 18 % (ref 12–49)
LYMPHOCYTES NFR BLD: 19 % (ref 12–49)
LYMPHOCYTES NFR BLD: 19 % (ref 12–49)
LYMPHOCYTES NFR BLD: 20 % (ref 12–49)
LYMPHOCYTES NFR BLD: 21 % (ref 12–49)
LYMPHOCYTES NFR BLD: 21 % (ref 12–49)
LYMPHOCYTES NFR BLD: 22 % (ref 12–49)
LYMPHOCYTES NFR BLD: 23 % (ref 12–49)
LYMPHOCYTES NFR BLD: 24 % (ref 12–49)
LYMPHOCYTES NFR BLD: 25 %
LYMPHOCYTES NFR BLD: 26 % (ref 12–49)
LYMPHOCYTES NFR BLD: 28 % (ref 12–49)
LYMPHOCYTES NFR BLD: 9 % (ref 12–49)
MAGNESIUM SERPL-MCNC: 2 MG/DL (ref 1.6–2.4)
MAGNESIUM SERPL-MCNC: 2 MG/DL (ref 1.6–2.4)
MCH RBC QN AUTO: 27.3 PG (ref 26–34)
MCH RBC QN AUTO: 27.3 PG (ref 26–34)
MCH RBC QN AUTO: 27.4 PG (ref 26–34)
MCH RBC QN AUTO: 27.5 PG (ref 26–34)
MCH RBC QN AUTO: 27.6 PG (ref 26–34)
MCH RBC QN AUTO: 27.7 PG (ref 26–34)
MCH RBC QN AUTO: 27.7 PG (ref 26–34)
MCH RBC QN AUTO: 27.8 PG (ref 26–34)
MCH RBC QN AUTO: 27.8 PG (ref 26–34)
MCH RBC QN AUTO: 27.9 PG (ref 26–34)
MCH RBC QN AUTO: 28 PG (ref 26–34)
MCH RBC QN AUTO: 28 PG (ref 26–34)
MCH RBC QN AUTO: 28.1 PG (ref 26–34)
MCH RBC QN AUTO: 28.1 PG (ref 26–34)
MCH RBC QN AUTO: 28.2 PG (ref 26–34)
MCH RBC QN AUTO: 28.4 PG (ref 26–34)
MCH RBC QN AUTO: 28.5 PG (ref 26–34)
MCH RBC QN AUTO: 28.5 PG (ref 26–34)
MCH RBC QN AUTO: 28.6 PG (ref 26–34)
MCH RBC QN AUTO: 28.8 PG (ref 26–34)
MCH RBC QN AUTO: 28.8 PG (ref 26–34)
MCH RBC QN AUTO: 28.9 PG (ref 26–34)
MCH RBC QN AUTO: 28.9 PG (ref 26–34)
MCH RBC QN AUTO: 29.1 PG (ref 26–34)
MCH RBC QN AUTO: 29.2 PG (ref 26–34)
MCH RBC QN: 28.2 PG (ref 26–32)
MCH RBC QN: 28.2 PG (ref 26–32)
MCH RBC QN: 28.8 PG (ref 26–32)
MCH RBC QN: 29.1 PG (ref 26–32)
MCH RBC QN: 29.8 PG (ref 26–32)
MCHC RBC AUTO-ENTMCNC: 31.5 G/DL (ref 30–36.5)
MCHC RBC AUTO-ENTMCNC: 31.5 G/DL (ref 30–36.5)
MCHC RBC AUTO-ENTMCNC: 31.7 G/DL (ref 30–36.5)
MCHC RBC AUTO-ENTMCNC: 32 G/DL (ref 30–36.5)
MCHC RBC AUTO-ENTMCNC: 32.1 G/DL (ref 30–36.5)
MCHC RBC AUTO-ENTMCNC: 32.1 G/DL (ref 30–36.5)
MCHC RBC AUTO-ENTMCNC: 32.2 G/DL (ref 30–36.5)
MCHC RBC AUTO-ENTMCNC: 32.3 G/DL (ref 30–36.5)
MCHC RBC AUTO-ENTMCNC: 32.4 G/DL (ref 30–36.5)
MCHC RBC AUTO-ENTMCNC: 32.5 G/DL (ref 30–36.5)
MCHC RBC AUTO-ENTMCNC: 32.5 G/DL (ref 30–36.5)
MCHC RBC AUTO-ENTMCNC: 32.6 G/DL (ref 30–36.5)
MCHC RBC AUTO-ENTMCNC: 32.7 G/DL (ref 30–36.5)
MCHC RBC AUTO-ENTMCNC: 32.8 G/DL (ref 30–36.5)
MCHC RBC AUTO-ENTMCNC: 32.9 G/DL (ref 30–36.5)
MCHC RBC AUTO-ENTMCNC: 32.9 G/DL (ref 30–36.5)
MCHC RBC AUTO-ENTMCNC: 33 G/DL (ref 30–36.5)
MCHC RBC AUTO-ENTMCNC: 33.2 G/DL (ref 30–36.5)
MCHC RBC AUTO-ENTMCNC: 33.6 G/DL (ref 30–36.5)
MCHC RBC-ENTMCNC: 32.7 G/DL (ref 30–36)
MCHC RBC-ENTMCNC: 32.7 G/DL (ref 30–36)
MCHC RBC-ENTMCNC: 33.4 G/DL (ref 30–36)
MCHC RBC-ENTMCNC: 33.4 G/DL (ref 30–36)
MCHC RBC-ENTMCNC: 34 G/DL (ref 30–36)
MCV RBC AUTO: 83.1 FL (ref 80–99)
MCV RBC AUTO: 83.9 FL (ref 80–99)
MCV RBC AUTO: 84.3 FL (ref 80–99)
MCV RBC AUTO: 85.8 FL (ref 80–99)
MCV RBC AUTO: 86 FL (ref 80–99)
MCV RBC AUTO: 86 FL (ref 80–99)
MCV RBC AUTO: 86.1 FL (ref 80–99)
MCV RBC AUTO: 86.2 FL (ref 80–99)
MCV RBC AUTO: 86.3 FL (ref 80–99)
MCV RBC AUTO: 86.5 FL (ref 80–99)
MCV RBC AUTO: 86.6 FL (ref 80–99)
MCV RBC AUTO: 86.8 FL (ref 80–99)
MCV RBC AUTO: 86.9 FL (ref 80–99)
MCV RBC AUTO: 87.1 FL (ref 80–99)
MCV RBC AUTO: 87.3 FL (ref 80–99)
MCV RBC AUTO: 87.3 FL (ref 80–99)
MCV RBC AUTO: 87.5 FL (ref 80–99)
MCV RBC AUTO: 87.6 FL (ref 80–99)
MCV RBC AUTO: 87.7 FL (ref 80–99)
MCV RBC AUTO: 87.9 FL (ref 80–99)
MCV RBC AUTO: 88 FL (ref 80–99)
MCV RBC AUTO: 88 FL (ref 80–99)
MCV RBC AUTO: 88.1 FL (ref 80–99)
MCV RBC AUTO: 88.7 FL (ref 80–99)
MCV RBC AUTO: 89.4 FL (ref 80–99)
MCV RBC: 85 FL (ref 80–97)
MCV RBC: 86 FL (ref 80–97)
MCV RBC: 87 FL (ref 80–97)
MCV RBC: 87 FL (ref 80–97)
MCV RBC: 89 FL (ref 80–97)
MICROALBUMIN UR TEST STR-MCNC: NEGATIVE MG/L (ref 0–20)
MID #, POC: 0.1 K/UL (ref 0–1.8)
MID #, POC: 0.2 K/UL (ref 0–1.8)
MID% POC: 3 % (ref 0.1–24)
MID% POC: 3.5 % (ref 0.1–24)
MID% POC: 3.6 % (ref 0.1–24)
MID% POC: 3.7 % (ref 0.1–24)
MID% POC: 4.7 % (ref 0.1–24)
MONOCYTES # BLD: 0.2 K/UL (ref 0–1)
MONOCYTES # BLD: 0.3 K/UL
MONOCYTES # BLD: 0.3 K/UL (ref 0–1)
MONOCYTES # BLD: 0.3 K/UL (ref 0–1)
MONOCYTES # BLD: 0.4 K/UL (ref 0–1)
MONOCYTES # BLD: 0.5 K/UL (ref 0–1)
MONOCYTES # BLD: 0.6 K/UL (ref 0–1)
MONOCYTES # BLD: 0.6 K/UL (ref 0–1)
MONOCYTES NFR BLD AUTO: 10 % (ref 5–13)
MONOCYTES NFR BLD AUTO: 3 % (ref 5–13)
MONOCYTES NFR BLD AUTO: 5 % (ref 5–13)
MONOCYTES NFR BLD AUTO: 6 % (ref 5–13)
MONOCYTES NFR BLD AUTO: 8 % (ref 5–13)
MONOCYTES NFR BLD: 10 % (ref 5–13)
MONOCYTES NFR BLD: 12 % (ref 5–13)
MONOCYTES NFR BLD: 3 % (ref 5–13)
MONOCYTES NFR BLD: 4 % (ref 5–13)
MONOCYTES NFR BLD: 5 % (ref 5–13)
MONOCYTES NFR BLD: 6 % (ref 5–13)
MONOCYTES NFR BLD: 7 % (ref 5–13)
MONOCYTES NFR BLD: 8 %
MONOCYTES NFR BLD: 8 % (ref 5–13)
MONOCYTES NFR BLD: 9 % (ref 5–13)
MONOCYTES NFR BLD: 9 % (ref 5–13)
MUCOUS THREADS URNS QL MICRO: ABNORMAL /LPF
MUCUS UA POCT, MUCPOCT: ABNORMAL
NEUTS SEG # BLD: 2 K/UL (ref 1.8–8)
NEUTS SEG # BLD: 2.1 K/UL
NEUTS SEG # BLD: 2.1 K/UL (ref 1.8–8)
NEUTS SEG # BLD: 2.9 K/UL (ref 1.8–8)
NEUTS SEG # BLD: 2.9 K/UL (ref 1.8–8)
NEUTS SEG # BLD: 3 K/UL (ref 1.8–8)
NEUTS SEG # BLD: 3.1 K/UL (ref 1.8–8)
NEUTS SEG # BLD: 3.3 K/UL (ref 1.8–8)
NEUTS SEG # BLD: 3.3 K/UL (ref 1.8–8)
NEUTS SEG # BLD: 3.5 K/UL (ref 1.8–8)
NEUTS SEG # BLD: 3.5 K/UL (ref 1.8–8)
NEUTS SEG # BLD: 4 K/UL (ref 1.8–8)
NEUTS SEG # BLD: 4.1 K/UL (ref 1.8–8)
NEUTS SEG # BLD: 4.1 K/UL (ref 1.8–8)
NEUTS SEG # BLD: 4.2 K/UL (ref 1.8–8)
NEUTS SEG # BLD: 4.2 K/UL (ref 1.8–8)
NEUTS SEG # BLD: 4.3 K/UL (ref 1.8–8)
NEUTS SEG # BLD: 4.4 K/UL (ref 1.8–8)
NEUTS SEG # BLD: 4.7 K/UL (ref 1.8–8)
NEUTS SEG # BLD: 5.7 K/UL (ref 1.8–8)
NEUTS SEG # BLD: 6.3 K/UL (ref 1.8–8)
NEUTS SEG # BLD: 7 K/UL (ref 1.8–8)
NEUTS SEG NFR BLD AUTO: 63 % (ref 32–75)
NEUTS SEG NFR BLD AUTO: 70 % (ref 32–75)
NEUTS SEG NFR BLD AUTO: 72 % (ref 32–75)
NEUTS SEG NFR BLD AUTO: 76 % (ref 32–75)
NEUTS SEG NFR BLD AUTO: 84 % (ref 32–75)
NEUTS SEG NFR BLD: 62 % (ref 32–75)
NEUTS SEG NFR BLD: 62 % (ref 32–75)
NEUTS SEG NFR BLD: 63 % (ref 32–75)
NEUTS SEG NFR BLD: 64 %
NEUTS SEG NFR BLD: 64 % (ref 32–75)
NEUTS SEG NFR BLD: 66 % (ref 32–75)
NEUTS SEG NFR BLD: 67 % (ref 32–75)
NEUTS SEG NFR BLD: 67 % (ref 32–75)
NEUTS SEG NFR BLD: 68 % (ref 32–75)
NEUTS SEG NFR BLD: 70 % (ref 32–75)
NEUTS SEG NFR BLD: 72 % (ref 32–75)
NEUTS SEG NFR BLD: 72 % (ref 32–75)
NEUTS SEG NFR BLD: 73 % (ref 32–75)
NEUTS SEG NFR BLD: 74 % (ref 32–75)
NEUTS SEG NFR BLD: 80 % (ref 32–75)
NEUTS SEG NFR BLD: 82 % (ref 32–75)
NEUTS SEG NFR BLD: 87 % (ref 32–75)
NITRITE UR QL STRIP.AUTO: NEGATIVE
OTHER ANTIBIOTIC SUSC ISLT: ABNORMAL
OTHER ANTIBIOTIC SUSC ISLT: NORMAL
P-R INTERVAL, ECG05: 232 MS
P-R INTERVAL, ECG05: 244 MS
PH UR STRIP: 5 [PH] (ref 5–7)
PH UR STRIP: 5.5 [PH] (ref 5–8)
PH UR STRIP: 6 [PH] (ref 5–8)
PH UR STRIP: 6 [PH] (ref 5–8)
PHOSPHATE SERPL-MCNC: 3.6 MG/DL (ref 2.6–4.7)
PLATELET # BLD AUTO: 172 K/UL (ref 150–400)
PLATELET # BLD AUTO: 200 K/UL (ref 150–400)
PLATELET # BLD AUTO: 211 K/UL (ref 150–400)
PLATELET # BLD AUTO: 213 K/UL (ref 150–400)
PLATELET # BLD AUTO: 213 K/UL (ref 150–400)
PLATELET # BLD AUTO: 214 K/UL (ref 150–400)
PLATELET # BLD AUTO: 216 K/UL (ref 150–400)
PLATELET # BLD AUTO: 221 K/UL (ref 150–400)
PLATELET # BLD AUTO: 226 K/UL (ref 150–400)
PLATELET # BLD AUTO: 226 K/UL (ref 150–400)
PLATELET # BLD AUTO: 230 K/UL (ref 150–400)
PLATELET # BLD AUTO: 241 K/UL (ref 150–400)
PLATELET # BLD AUTO: 249 K/UL (ref 150–400)
PLATELET # BLD AUTO: 250 K/UL (ref 150–400)
PLATELET # BLD AUTO: 262 K/UL (ref 150–400)
PLATELET # BLD AUTO: 264 K/UL (ref 150–400)
PLATELET # BLD AUTO: 274 K/UL (ref 150–400)
PLATELET # BLD AUTO: 275 K/UL (ref 150–400)
PLATELET # BLD AUTO: 285 K/UL (ref 150–400)
PLATELET # BLD AUTO: 290 K/UL (ref 150–400)
PLATELET # BLD AUTO: 297 K/UL (ref 150–400)
PLATELET # BLD AUTO: 301 K/UL (ref 150–400)
PLATELET # BLD AUTO: 305 K/UL (ref 150–400)
PLATELET # BLD AUTO: 314 K/UL (ref 150–400)
PLATELET # BLD AUTO: 318 K/UL (ref 150–400)
PLATELET # BLD: 193 K/UL (ref 140–440)
PLATELET # BLD: 283 K/UL (ref 140–440)
PLATELET # BLD: 335 K/UL (ref 140–440)
PLATELET # BLD: 337 K/UL (ref 140–440)
PLATELET # BLD: 372 K/UL (ref 140–440)
POTASSIUM SERPL-SCNC: 3.5 MMOL/L (ref 3.5–5.1)
POTASSIUM SERPL-SCNC: 3.5 MMOL/L (ref 3.5–5.1)
POTASSIUM SERPL-SCNC: 3.6 MMOL/L (ref 3.5–5.1)
POTASSIUM SERPL-SCNC: 3.6 MMOL/L (ref 3.5–5.1)
POTASSIUM SERPL-SCNC: 3.7 MMOL/L (ref 3.5–5.1)
POTASSIUM SERPL-SCNC: 3.7 MMOL/L (ref 3.5–5.1)
POTASSIUM SERPL-SCNC: 3.8 MMOL/L (ref 3.5–5.1)
POTASSIUM SERPL-SCNC: 3.9 MMOL/L (ref 3.5–5.1)
POTASSIUM SERPL-SCNC: 3.9 MMOL/L (ref 3.5–5.1)
POTASSIUM SERPL-SCNC: 4 MMOL/L (ref 3.5–5.1)
POTASSIUM SERPL-SCNC: 4.1 MMOL/L (ref 3.5–5.1)
POTASSIUM SERPL-SCNC: 4.2 MMOL/L (ref 3.5–5.1)
POTASSIUM SERPL-SCNC: 4.3 MMOL/L (ref 3.5–5.1)
POTASSIUM SERPL-SCNC: 4.3 MMOL/L (ref 3.6–5)
POTASSIUM SERPL-SCNC: 4.4 MMOL/L (ref 3.5–5.1)
POTASSIUM SERPL-SCNC: 4.4 MMOL/L (ref 3.6–5)
POTASSIUM SERPL-SCNC: 4.5 MMOL/L (ref 3.5–5.1)
POTASSIUM SERPL-SCNC: 4.7 MMOL/L (ref 3.6–5)
POTASSIUM SERPL-SCNC: 5 MMOL/L (ref 3.6–5)
POTASSIUM SERPL-SCNC: 5.1 MMOL/L (ref 3.6–5)
POTASSIUM SERPL-SCNC: 5.4 MMOL/L (ref 3.5–5.1)
PROT SERPL-MCNC: 5 G/DL (ref 6.4–8.2)
PROT SERPL-MCNC: 5.5 G/DL (ref 6.4–8.2)
PROT SERPL-MCNC: 5.5 G/DL (ref 6.4–8.2)
PROT SERPL-MCNC: 5.6 G/DL (ref 6.3–8.2)
PROT SERPL-MCNC: 5.8 G/DL (ref 6.4–8.2)
PROT SERPL-MCNC: 6.2 G/DL (ref 6.3–8.2)
PROT UR QL STRIP: ABNORMAL
PROT UR STRIP-MCNC: 300 MG/DL
PROT UR STRIP-MCNC: 300 MG/DL
PROT UR STRIP-MCNC: NEGATIVE MG/DL
PROTHROMBIN TIME: 10.5 SEC (ref 9–11.1)
PROTHROMBIN TIME: 10.6 SEC (ref 9–11.1)
Q-T INTERVAL, ECG07: 404 MS
Q-T INTERVAL, ECG07: 424 MS
Q-T INTERVAL, ECG07: 440 MS
QRS DURATION, ECG06: 104 MS
QRS DURATION, ECG06: 106 MS
QRS DURATION, ECG06: 98 MS
QTC CALCULATION (BEZET), ECG08: 416 MS
QTC CALCULATION (BEZET), ECG08: 420 MS
QTC CALCULATION (BEZET), ECG08: 433 MS
RBC # BLD AUTO: 2.56 M/UL (ref 4.1–5.7)
RBC # BLD AUTO: 2.6 M/UL (ref 4.1–5.7)
RBC # BLD AUTO: 2.67 M/UL (ref 4.1–5.7)
RBC # BLD AUTO: 2.71 M/UL (ref 4.1–5.7)
RBC # BLD AUTO: 2.74 M/UL (ref 4.1–5.7)
RBC # BLD AUTO: 2.88 M/UL (ref 4.1–5.7)
RBC # BLD AUTO: 2.9 M/UL (ref 4.1–5.7)
RBC # BLD AUTO: 3 M/UL (ref 4.1–5.7)
RBC # BLD AUTO: 3.05 M/UL (ref 4.1–5.7)
RBC # BLD AUTO: 3.11 M/UL (ref 4.1–5.7)
RBC # BLD AUTO: 3.14 M/UL (ref 4.1–5.7)
RBC # BLD AUTO: 3.16 M/UL (ref 4.1–5.7)
RBC # BLD AUTO: 3.16 M/UL (ref 4.1–5.7)
RBC # BLD AUTO: 3.18 M/UL (ref 4.1–5.7)
RBC # BLD AUTO: 3.19 M/UL (ref 4.1–5.7)
RBC # BLD AUTO: 3.2 M/UL (ref 4.1–5.7)
RBC # BLD AUTO: 3.23 M/UL (ref 4.1–5.7)
RBC # BLD AUTO: 3.25 M/UL (ref 4.1–5.7)
RBC # BLD AUTO: 3.25 M/UL (ref 4.1–5.7)
RBC # BLD AUTO: 3.36 M/UL (ref 4.1–5.7)
RBC # BLD AUTO: 3.37 M/UL (ref 4.1–5.7)
RBC # BLD AUTO: 3.39 M/UL (ref 4.1–5.7)
RBC # BLD AUTO: 3.49 M/UL (ref 4.1–5.7)
RBC # BLD AUTO: 3.6 M/UL (ref 4.1–5.7)
RBC # BLD AUTO: 3.7 M/UL (ref 4.1–5.7)
RBC # BLD: 3.42 M/UL (ref 4.2–6.3)
RBC # BLD: 3.53 M/UL (ref 4.2–6.3)
RBC # BLD: 3.97 M/UL (ref 4.2–6.3)
RBC # BLD: 4.04 M/UL (ref 4.2–6.3)
RBC # BLD: 4.05 M/UL (ref 4.2–6.3)
RBC #/AREA URNS HPF: ABNORMAL /HPF (ref 0–5)
RBC MORPH BLD: ABNORMAL
RBC UA POCT, RBCPOCT: ABNORMAL
SERVICE CMNT-IMP: ABNORMAL
SERVICE CMNT-IMP: NORMAL
SODIUM SERPL-SCNC: 129 MMOL/L (ref 137–145)
SODIUM SERPL-SCNC: 132 MMOL/L (ref 136–145)
SODIUM SERPL-SCNC: 133 MMOL/L (ref 136–145)
SODIUM SERPL-SCNC: 135 MMOL/L (ref 136–145)
SODIUM SERPL-SCNC: 135 MMOL/L (ref 137–145)
SODIUM SERPL-SCNC: 136 MMOL/L (ref 136–145)
SODIUM SERPL-SCNC: 137 MMOL/L (ref 136–145)
SODIUM SERPL-SCNC: 137 MMOL/L (ref 137–145)
SODIUM SERPL-SCNC: 138 MMOL/L (ref 136–145)
SODIUM SERPL-SCNC: 138 MMOL/L (ref 137–145)
SODIUM SERPL-SCNC: 138 MMOL/L (ref 137–145)
SODIUM SERPL-SCNC: 139 MMOL/L (ref 136–145)
SODIUM SERPL-SCNC: 140 MMOL/L (ref 136–145)
SODIUM SERPL-SCNC: 141 MMOL/L (ref 136–145)
SODIUM SERPL-SCNC: 142 MMOL/L (ref 136–145)
SODIUM SERPL-SCNC: 143 MMOL/L (ref 136–145)
SODIUM SERPL-SCNC: 144 MMOL/L (ref 136–145)
SODIUM SERPL-SCNC: 145 MMOL/L (ref 136–145)
SOURCE, RSRC70: ABNORMAL
SP GR UR REFRACTOMETRY: 1.02 (ref 1–1.03)
SP GR UR STRIP: 1.01 (ref 1.01–1.02)
SPECIMEN SOURCE: NORMAL
TCHOL/HDL RATIO (POC): 3.5 (ref 0–4)
TOTAL BILIRUBIN POC: 0.4 MG/DL (ref 0.2–1.3)
TOTAL BILIRUBIN POC: 0.4 MG/DL (ref 0.2–1.3)
TRICH UA POCT, TRICHPOC: NEGATIVE
TRIGL SERPL-MCNC: 126 MG/DL (ref 0–200)
TRIGL SERPL-MCNC: 89 MG/DL (ref ?–150)
TROPONIN I SERPL-MCNC: <0.04 NG/ML
UA UROBILINOGEN AMB POC: NORMAL (ref 0.2–1)
UA: UC IF INDICATED,UAUC: ABNORMAL
URINALYSIS CLARITY POC: CLEAR
URINALYSIS COLOR POC: ABNORMAL
URINE BLOOD POC: ABNORMAL
URINE CULT COMMENT, POCT: ABNORMAL
URINE LEUKOCYTES POC: NEGATIVE
URINE NITRITES POC: NEGATIVE
UROBILINOGEN UR QL STRIP.AUTO: 0.2 EU/DL (ref 0.2–1)
UROBILINOGEN UR QL STRIP.AUTO: 0.2 EU/DL (ref 0.2–1)
UROBILINOGEN UR QL STRIP.AUTO: 2 EU/DL (ref 0.2–1)
VENTRICULAR RATE, ECG03: 55 BPM
VENTRICULAR RATE, ECG03: 63 BPM
VENTRICULAR RATE, ECG03: 64 BPM
VIT B12 SERPL-MCNC: 232 PG/ML (ref 211–911)
VLDLC SERPL CALC-MCNC: 17.8 MG/DL
VLDLC SERPL CALC-MCNC: 25.2 MG/DL
WBC # BLD AUTO: 3 K/UL (ref 4.1–11.1)
WBC # BLD AUTO: 3.3 K/UL (ref 4.1–11.1)
WBC # BLD AUTO: 3.3 K/UL (ref 4.1–11.1)
WBC # BLD AUTO: 3.4 K/UL (ref 4.1–11.1)
WBC # BLD AUTO: 3.9 K/UL (ref 4.1–11.1)
WBC # BLD AUTO: 4.3 K/UL (ref 4.1–11.1)
WBC # BLD AUTO: 4.6 K/UL (ref 4.1–11.1)
WBC # BLD AUTO: 4.7 K/UL (ref 4.1–11.1)
WBC # BLD AUTO: 4.8 K/UL (ref 4.1–11.1)
WBC # BLD AUTO: 4.8 K/UL (ref 4.1–11.1)
WBC # BLD AUTO: 4.9 K/UL (ref 4.1–11.1)
WBC # BLD AUTO: 5 K/UL (ref 4.1–11.1)
WBC # BLD AUTO: 5 K/UL (ref 4.1–11.1)
WBC # BLD AUTO: 5.3 K/UL (ref 4.1–11.1)
WBC # BLD AUTO: 5.3 K/UL (ref 4.1–11.1)
WBC # BLD AUTO: 5.6 K/UL (ref 4.1–11.1)
WBC # BLD AUTO: 5.8 K/UL (ref 4.1–11.1)
WBC # BLD AUTO: 5.9 K/UL (ref 4.1–11.1)
WBC # BLD AUTO: 6 K/UL (ref 4.1–11.1)
WBC # BLD AUTO: 6.2 K/UL (ref 4.1–11.1)
WBC # BLD AUTO: 6.7 K/UL (ref 4.1–11.1)
WBC # BLD AUTO: 7.5 K/UL (ref 4.1–11.1)
WBC # BLD AUTO: 7.7 K/UL (ref 4.1–11.1)
WBC # BLD AUTO: 8.3 K/UL (ref 4.1–11.1)
WBC # BLD AUTO: 8.4 K/UL (ref 4.1–11.1)
WBC # BLD: 4.4 K/UL (ref 4.1–10.9)
WBC # BLD: 4.7 K/UL (ref 4.1–10.9)
WBC # BLD: 5.7 K/UL (ref 4.1–10.9)
WBC # BLD: 7.1 K/UL (ref 4.1–10.9)
WBC # BLD: 7.2 K/UL (ref 4.1–10.9)
WBC UA POCT, WBCPOCT: ABNORMAL
WBC URNS QL MICRO: ABNORMAL /HPF (ref 0–4)
YEAST UA POCT, YEASTPOC: NEGATIVE

## 2017-01-01 PROCEDURE — 74011000258 HC RX REV CODE- 258: Performed by: ORTHOPAEDIC SURGERY

## 2017-01-01 PROCEDURE — 36415 COLL VENOUS BLD VENIPUNCTURE: CPT | Performed by: INTERNAL MEDICINE

## 2017-01-01 PROCEDURE — 74011250637 HC RX REV CODE- 250/637: Performed by: INTERNAL MEDICINE

## 2017-01-01 PROCEDURE — 80048 BASIC METABOLIC PNL TOTAL CA: CPT | Performed by: INTERNAL MEDICINE

## 2017-01-01 PROCEDURE — 96365 THER/PROPH/DIAG IV INF INIT: CPT

## 2017-01-01 PROCEDURE — 74011250636 HC RX REV CODE- 250/636: Performed by: ORTHOPAEDIC SURGERY

## 2017-01-01 PROCEDURE — 86140 C-REACTIVE PROTEIN: CPT | Performed by: ORTHOPAEDIC SURGERY

## 2017-01-01 PROCEDURE — 85652 RBC SED RATE AUTOMATED: CPT | Performed by: PHYSICAL MEDICINE & REHABILITATION

## 2017-01-01 PROCEDURE — 74011000250 HC RX REV CODE- 250

## 2017-01-01 PROCEDURE — 87075 CULTR BACTERIA EXCEPT BLOOD: CPT | Performed by: ORTHOPAEDIC SURGERY

## 2017-01-01 PROCEDURE — 74011636637 HC RX REV CODE- 636/637: Performed by: ORTHOPAEDIC SURGERY

## 2017-01-01 PROCEDURE — 85025 COMPLETE CBC W/AUTO DIFF WBC: CPT | Performed by: PHYSICAL MEDICINE & REHABILITATION

## 2017-01-01 PROCEDURE — 87086 URINE CULTURE/COLONY COUNT: CPT | Performed by: PHYSICAL MEDICINE & REHABILITATION

## 2017-01-01 PROCEDURE — 80048 BASIC METABOLIC PNL TOTAL CA: CPT | Performed by: PHYSICAL MEDICINE & REHABILITATION

## 2017-01-01 PROCEDURE — 77030010509 HC AIRWY LMA MSK TELE -A: Performed by: ANESTHESIOLOGY

## 2017-01-01 PROCEDURE — 77030028224 HC PDNG CST BSNM -A: Performed by: ORTHOPAEDIC SURGERY

## 2017-01-01 PROCEDURE — 85025 COMPLETE CBC W/AUTO DIFF WBC: CPT | Performed by: INTERNAL MEDICINE

## 2017-01-01 PROCEDURE — 70551 MRI BRAIN STEM W/O DYE: CPT

## 2017-01-01 PROCEDURE — 74011000258 HC RX REV CODE- 258: Performed by: PHYSICAL MEDICINE & REHABILITATION

## 2017-01-01 PROCEDURE — 77030020782 HC GWN BAIR PAWS FLX 3M -B

## 2017-01-01 PROCEDURE — 74011000258 HC RX REV CODE- 258: Performed by: INTERNAL MEDICINE

## 2017-01-01 PROCEDURE — 82962 GLUCOSE BLOOD TEST: CPT

## 2017-01-01 PROCEDURE — 76210000020 HC REC RM PH II FIRST 0.5 HR: Performed by: ORTHOPAEDIC SURGERY

## 2017-01-01 PROCEDURE — 85025 COMPLETE CBC W/AUTO DIFF WBC: CPT | Performed by: ORTHOPAEDIC SURGERY

## 2017-01-01 PROCEDURE — 97162 PT EVAL MOD COMPLEX 30 MIN: CPT

## 2017-01-01 PROCEDURE — 74011250636 HC RX REV CODE- 250/636: Performed by: INTERNAL MEDICINE

## 2017-01-01 PROCEDURE — 99284 EMERGENCY DEPT VISIT MOD MDM: CPT

## 2017-01-01 PROCEDURE — 74011636637 HC RX REV CODE- 636/637: Performed by: HOSPITALIST

## 2017-01-01 PROCEDURE — 74011636637 HC RX REV CODE- 636/637: Performed by: PHYSICAL MEDICINE & REHABILITATION

## 2017-01-01 PROCEDURE — 74011250636 HC RX REV CODE- 250/636: Performed by: PHYSICAL MEDICINE & REHABILITATION

## 2017-01-01 PROCEDURE — 74011250636 HC RX REV CODE- 250/636: Performed by: EMERGENCY MEDICINE

## 2017-01-01 PROCEDURE — 51798 US URINE CAPACITY MEASURE: CPT

## 2017-01-01 PROCEDURE — 74011250637 HC RX REV CODE- 250/637: Performed by: ORTHOPAEDIC SURGERY

## 2017-01-01 PROCEDURE — 96375 TX/PRO/DX INJ NEW DRUG ADDON: CPT

## 2017-01-01 PROCEDURE — 74011636637 HC RX REV CODE- 636/637: Performed by: INTERNAL MEDICINE

## 2017-01-01 PROCEDURE — 77030011640 HC PAD GRND REM COVD -A: Performed by: ORTHOPAEDIC SURGERY

## 2017-01-01 PROCEDURE — 77030002912 HC SUT ETHBND J&J -A: Performed by: ORTHOPAEDIC SURGERY

## 2017-01-01 PROCEDURE — 82550 ASSAY OF CK (CPK): CPT | Performed by: EMERGENCY MEDICINE

## 2017-01-01 PROCEDURE — 77030009526 HC GEL CARSYN MDII -A

## 2017-01-01 PROCEDURE — 76060000033 HC ANESTHESIA 1 TO 1.5 HR: Performed by: ORTHOPAEDIC SURGERY

## 2017-01-01 PROCEDURE — 96374 THER/PROPH/DIAG INJ IV PUSH: CPT

## 2017-01-01 PROCEDURE — C1898 LEAD, PMKR, OTHER THAN TRANS: HCPCS

## 2017-01-01 PROCEDURE — 77030020754 HC CUF TRNQT 2BLA STRY -B: Performed by: ORTHOPAEDIC SURGERY

## 2017-01-01 PROCEDURE — 83605 ASSAY OF LACTIC ACID: CPT | Performed by: EMERGENCY MEDICINE

## 2017-01-01 PROCEDURE — 74011250637 HC RX REV CODE- 250/637: Performed by: PHYSICAL MEDICINE & REHABILITATION

## 2017-01-01 PROCEDURE — 36569 INSJ PICC 5 YR+ W/O IMAGING: CPT | Performed by: ORTHOPAEDIC SURGERY

## 2017-01-01 PROCEDURE — 36415 COLL VENOUS BLD VENIPUNCTURE: CPT | Performed by: PHYSICAL MEDICINE & REHABILITATION

## 2017-01-01 PROCEDURE — 77030018719 HC DRSG PTCH ANTIMIC J&J -A

## 2017-01-01 PROCEDURE — 74011000258 HC RX REV CODE- 258

## 2017-01-01 PROCEDURE — 85610 PROTHROMBIN TIME: CPT | Performed by: EMERGENCY MEDICINE

## 2017-01-01 PROCEDURE — 83036 HEMOGLOBIN GLYCOSYLATED A1C: CPT | Performed by: HOSPITALIST

## 2017-01-01 PROCEDURE — 76210000034 HC AMBSU PH I REC 0.5 TO 1 HR: Performed by: ORTHOPAEDIC SURGERY

## 2017-01-01 PROCEDURE — 80061 LIPID PANEL: CPT | Performed by: INTERNAL MEDICINE

## 2017-01-01 PROCEDURE — 77030002916 HC SUT ETHLN J&J -A: Performed by: ORTHOPAEDIC SURGERY

## 2017-01-01 PROCEDURE — 65620000000 HC RM CCU GENERAL

## 2017-01-01 PROCEDURE — 74011250636 HC RX REV CODE- 250/636: Performed by: HOSPITALIST

## 2017-01-01 PROCEDURE — 02HV33Z INSERTION OF INFUSION DEVICE INTO SUPERIOR VENA CAVA, PERCUTANEOUS APPROACH: ICD-10-PCS | Performed by: ORTHOPAEDIC SURGERY

## 2017-01-01 PROCEDURE — C1892 INTRO/SHEATH,FIXED,PEEL-AWAY: HCPCS

## 2017-01-01 PROCEDURE — 97530 THERAPEUTIC ACTIVITIES: CPT

## 2017-01-01 PROCEDURE — 77030006773 HC BLD SAW OSC BRSM -A: Performed by: ORTHOPAEDIC SURGERY

## 2017-01-01 PROCEDURE — 76210000006 HC OR PH I REC 0.5 TO 1 HR: Performed by: ORTHOPAEDIC SURGERY

## 2017-01-01 PROCEDURE — 96360 HYDRATION IV INFUSION INIT: CPT

## 2017-01-01 PROCEDURE — 88311 DECALCIFY TISSUE: CPT | Performed by: ORTHOPAEDIC SURGERY

## 2017-01-01 PROCEDURE — 70450 CT HEAD/BRAIN W/O DYE: CPT

## 2017-01-01 PROCEDURE — 87186 SC STD MICRODIL/AGAR DIL: CPT | Performed by: ORTHOPAEDIC SURGERY

## 2017-01-01 PROCEDURE — 36415 COLL VENOUS BLD VENIPUNCTURE: CPT | Performed by: ORTHOPAEDIC SURGERY

## 2017-01-01 PROCEDURE — 76030000001 HC AMB SURG OR TIME 1 TO 1.5: Performed by: ORTHOPAEDIC SURGERY

## 2017-01-01 PROCEDURE — 77030011255 HC DSG AQUACEL AG BMS -A

## 2017-01-01 PROCEDURE — 77030032490 HC SLV COMPR SCD KNE COVD -B

## 2017-01-01 PROCEDURE — 73130 X-RAY EXAM OF HAND: CPT

## 2017-01-01 PROCEDURE — 97116 GAIT TRAINING THERAPY: CPT

## 2017-01-01 PROCEDURE — 77030020847 HC STATLOK BARD -A

## 2017-01-01 PROCEDURE — 74011250636 HC RX REV CODE- 250/636

## 2017-01-01 PROCEDURE — 80048 BASIC METABOLIC PNL TOTAL CA: CPT | Performed by: ORTHOPAEDIC SURGERY

## 2017-01-01 PROCEDURE — 97165 OT EVAL LOW COMPLEX 30 MIN: CPT

## 2017-01-01 PROCEDURE — 84484 ASSAY OF TROPONIN QUANT: CPT | Performed by: EMERGENCY MEDICINE

## 2017-01-01 PROCEDURE — 77030019604 HC DRSG WND CA ALG S&N -A

## 2017-01-01 PROCEDURE — 82550 ASSAY OF CK (CPK): CPT | Performed by: INTERNAL MEDICINE

## 2017-01-01 PROCEDURE — 0JH606Z INSERTION OF PACEMAKER, DUAL CHAMBER INTO CHEST SUBCUTANEOUS TISSUE AND FASCIA, OPEN APPROACH: ICD-10-PCS | Performed by: INTERNAL MEDICINE

## 2017-01-01 PROCEDURE — 80053 COMPREHEN METABOLIC PANEL: CPT | Performed by: INTERNAL MEDICINE

## 2017-01-01 PROCEDURE — 77030020365 HC SOL INJ SOD CL 0.9% 50ML

## 2017-01-01 PROCEDURE — C1751 CATH, INF, PER/CENT/MIDLINE: HCPCS

## 2017-01-01 PROCEDURE — 77030002996 HC SUT SLK J&J -A

## 2017-01-01 PROCEDURE — 82607 VITAMIN B-12: CPT | Performed by: PSYCHIATRY & NEUROLOGY

## 2017-01-01 PROCEDURE — 90471 IMMUNIZATION ADMIN: CPT

## 2017-01-01 PROCEDURE — 77030018836 HC SOL IRR NACL ICUM -A: Performed by: ORTHOPAEDIC SURGERY

## 2017-01-01 PROCEDURE — 74011000250 HC RX REV CODE- 250: Performed by: INTERNAL MEDICINE

## 2017-01-01 PROCEDURE — 65270000029 HC RM PRIVATE

## 2017-01-01 PROCEDURE — 99285 EMERGENCY DEPT VISIT HI MDM: CPT

## 2017-01-01 PROCEDURE — G8979 MOBILITY GOAL STATUS: HCPCS

## 2017-01-01 PROCEDURE — 77030011640 HC PAD GRND REM COVD -A

## 2017-01-01 PROCEDURE — 99212 OFFICE O/P EST SF 10 MIN: CPT

## 2017-01-01 PROCEDURE — 74011250637 HC RX REV CODE- 250/637: Performed by: HOSPITALIST

## 2017-01-01 PROCEDURE — 77030018836 HC SOL IRR NACL ICUM -A

## 2017-01-01 PROCEDURE — 33208 INSRT HEART PM ATRIAL & VENT: CPT

## 2017-01-01 PROCEDURE — 76937 US GUIDE VASCULAR ACCESS: CPT

## 2017-01-01 PROCEDURE — 75810000293 HC SIMP/SUPERF WND  RPR

## 2017-01-01 PROCEDURE — 76210000021 HC REC RM PH II 0.5 TO 1 HR: Performed by: ORTHOPAEDIC SURGERY

## 2017-01-01 PROCEDURE — 74011000250 HC RX REV CODE- 250: Performed by: ORTHOPAEDIC SURGERY

## 2017-01-01 PROCEDURE — 77030019908 HC STETH ESOPH SIMS -A: Performed by: ANESTHESIOLOGY

## 2017-01-01 PROCEDURE — 02HV33Z INSERTION OF INFUSION DEVICE INTO SUPERIOR VENA CAVA, PERCUTANEOUS APPROACH: ICD-10-PCS | Performed by: INTERNAL MEDICINE

## 2017-01-01 PROCEDURE — 65660000000 HC RM CCU STEPDOWN

## 2017-01-01 PROCEDURE — 80053 COMPREHEN METABOLIC PANEL: CPT | Performed by: EMERGENCY MEDICINE

## 2017-01-01 PROCEDURE — 76210000046 HC AMBSU PH II REC FIRST 0.5 HR: Performed by: ORTHOPAEDIC SURGERY

## 2017-01-01 PROCEDURE — 70544 MR ANGIOGRAPHY HEAD W/O DYE: CPT

## 2017-01-01 PROCEDURE — C1785 PMKR, DUAL, RATE-RESP: HCPCS

## 2017-01-01 PROCEDURE — 71010 XR CHEST PORT: CPT

## 2017-01-01 PROCEDURE — 02H63JZ INSERTION OF PACEMAKER LEAD INTO RIGHT ATRIUM, PERCUTANEOUS APPROACH: ICD-10-PCS | Performed by: INTERNAL MEDICINE

## 2017-01-01 PROCEDURE — 92523 SPEECH SOUND LANG COMPREHEN: CPT

## 2017-01-01 PROCEDURE — 74011250637 HC RX REV CODE- 250/637: Performed by: ANESTHESIOLOGY

## 2017-01-01 PROCEDURE — G8987 SELF CARE CURRENT STATUS: HCPCS

## 2017-01-01 PROCEDURE — 77030013079 HC BLNKT BAIR HGGR 3M -A: Performed by: ANESTHESIOLOGY

## 2017-01-01 PROCEDURE — 85025 COMPLETE CBC W/AUTO DIFF WBC: CPT | Performed by: EMERGENCY MEDICINE

## 2017-01-01 PROCEDURE — 74011636320 HC RX REV CODE- 636/320

## 2017-01-01 PROCEDURE — 77030002986 HC SUT PROL J&J -A: Performed by: ORTHOPAEDIC SURGERY

## 2017-01-01 PROCEDURE — 36415 COLL VENOUS BLD VENIPUNCTURE: CPT | Performed by: PSYCHIATRY & NEUROLOGY

## 2017-01-01 PROCEDURE — 92610 EVALUATE SWALLOWING FUNCTION: CPT

## 2017-01-01 PROCEDURE — 77030002917 HC SUT ETHLN J&J -B: Performed by: ORTHOPAEDIC SURGERY

## 2017-01-01 PROCEDURE — 87040 BLOOD CULTURE FOR BACTERIA: CPT | Performed by: INTERNAL MEDICINE

## 2017-01-01 PROCEDURE — 76060000032 HC ANESTHESIA 0.5 TO 1 HR: Performed by: ORTHOPAEDIC SURGERY

## 2017-01-01 PROCEDURE — 81001 URINALYSIS AUTO W/SCOPE: CPT | Performed by: PHYSICAL MEDICINE & REHABILITATION

## 2017-01-01 PROCEDURE — 83735 ASSAY OF MAGNESIUM: CPT | Performed by: INTERNAL MEDICINE

## 2017-01-01 PROCEDURE — 81001 URINALYSIS AUTO W/SCOPE: CPT | Performed by: EMERGENCY MEDICINE

## 2017-01-01 PROCEDURE — L3670 SO ACRO/CLAV CAN WEB PRE OTS: HCPCS

## 2017-01-01 PROCEDURE — 77030005320 HC CATH PACE TEMP STJU -B

## 2017-01-01 PROCEDURE — 74011000272 HC RX REV CODE- 272: Performed by: ORTHOPAEDIC SURGERY

## 2017-01-01 PROCEDURE — 70460 CT HEAD/BRAIN W/DYE: CPT

## 2017-01-01 PROCEDURE — 0JDK0ZZ EXTRACTION OF LEFT HAND SUBCUTANEOUS TISSUE AND FASCIA, OPEN APPROACH: ICD-10-PCS | Performed by: ORTHOPAEDIC SURGERY

## 2017-01-01 PROCEDURE — G8988 SELF CARE GOAL STATUS: HCPCS | Performed by: OCCUPATIONAL THERAPIST

## 2017-01-01 PROCEDURE — 76060000061 HC AMB SURG ANES 0.5 TO 1 HR: Performed by: ORTHOPAEDIC SURGERY

## 2017-01-01 PROCEDURE — 77030018729 HC ELECTRD DEFIB PAD CARD -B

## 2017-01-01 PROCEDURE — 93005 ELECTROCARDIOGRAM TRACING: CPT

## 2017-01-01 PROCEDURE — 77030013169 SET IV BLD ICUM -A

## 2017-01-01 PROCEDURE — 74011250637 HC RX REV CODE- 250/637

## 2017-01-01 PROCEDURE — 99214 OFFICE O/P EST MOD 30 MIN: CPT

## 2017-01-01 PROCEDURE — 0PBQ0ZZ EXCISION OF LEFT METACARPAL, OPEN APPROACH: ICD-10-PCS | Performed by: ORTHOPAEDIC SURGERY

## 2017-01-01 PROCEDURE — G8978 MOBILITY CURRENT STATUS: HCPCS

## 2017-01-01 PROCEDURE — 90715 TDAP VACCINE 7 YRS/> IM: CPT | Performed by: PHYSICIAN ASSISTANT

## 2017-01-01 PROCEDURE — 02HK3JZ INSERTION OF PACEMAKER LEAD INTO RIGHT VENTRICLE, PERCUTANEOUS APPROACH: ICD-10-PCS | Performed by: INTERNAL MEDICINE

## 2017-01-01 PROCEDURE — 77030002996 HC SUT SLK J&J -A: Performed by: ORTHOPAEDIC SURGERY

## 2017-01-01 PROCEDURE — 74011250636 HC RX REV CODE- 250/636: Performed by: ANESTHESIOLOGY

## 2017-01-01 PROCEDURE — 71010 XR CHEST SNGL V: CPT

## 2017-01-01 PROCEDURE — G8987 SELF CARE CURRENT STATUS: HCPCS | Performed by: OCCUPATIONAL THERAPIST

## 2017-01-01 PROCEDURE — 93880 EXTRACRANIAL BILAT STUDY: CPT

## 2017-01-01 PROCEDURE — C1894 INTRO/SHEATH, NON-LASER: HCPCS

## 2017-01-01 PROCEDURE — 85652 RBC SED RATE AUTOMATED: CPT | Performed by: INTERNAL MEDICINE

## 2017-01-01 PROCEDURE — 87205 SMEAR GRAM STAIN: CPT | Performed by: ORTHOPAEDIC SURGERY

## 2017-01-01 PROCEDURE — 93306 TTE W/DOPPLER COMPLETE: CPT

## 2017-01-01 PROCEDURE — 85027 COMPLETE CBC AUTOMATED: CPT | Performed by: PHYSICAL MEDICINE & REHABILITATION

## 2017-01-01 PROCEDURE — 88305 TISSUE EXAM BY PATHOLOGIST: CPT | Performed by: ORTHOPAEDIC SURGERY

## 2017-01-01 PROCEDURE — 85652 RBC SED RATE AUTOMATED: CPT | Performed by: ORTHOPAEDIC SURGERY

## 2017-01-01 PROCEDURE — 77030034890 HC IMMOB HND ALUMI POS DISP KEYS -B: Performed by: ORTHOPAEDIC SURGERY

## 2017-01-01 PROCEDURE — 83735 ASSAY OF MAGNESIUM: CPT | Performed by: EMERGENCY MEDICINE

## 2017-01-01 PROCEDURE — 97535 SELF CARE MNGMENT TRAINING: CPT

## 2017-01-01 PROCEDURE — 76010000138 HC OR TIME 0.5 TO 1 HR: Performed by: ORTHOPAEDIC SURGERY

## 2017-01-01 PROCEDURE — 97165 OT EVAL LOW COMPLEX 30 MIN: CPT | Performed by: OCCUPATIONAL THERAPIST

## 2017-01-01 PROCEDURE — 77030034848

## 2017-01-01 PROCEDURE — 74011636320 HC RX REV CODE- 636/320: Performed by: INTERNAL MEDICINE

## 2017-01-01 PROCEDURE — 76010000149 HC OR TIME 1 TO 1.5 HR: Performed by: ORTHOPAEDIC SURGERY

## 2017-01-01 PROCEDURE — 77030029065 HC DRSG HEMO QCLOT ZMED -B

## 2017-01-01 PROCEDURE — 74011636637 HC RX REV CODE- 636/637: Performed by: ANESTHESIOLOGY

## 2017-01-01 PROCEDURE — 5A1223Z PERFORMANCE OF CARDIAC PACING, CONTINUOUS: ICD-10-PCS | Performed by: INTERNAL MEDICINE

## 2017-01-01 PROCEDURE — 77030031139 HC SUT VCRL2 J&J -A

## 2017-01-01 PROCEDURE — 74011250637 HC RX REV CODE- 250/637: Performed by: EMERGENCY MEDICINE

## 2017-01-01 PROCEDURE — 87077 CULTURE AEROBIC IDENTIFY: CPT | Performed by: ORTHOPAEDIC SURGERY

## 2017-01-01 PROCEDURE — 83036 HEMOGLOBIN GLYCOSYLATED A1C: CPT | Performed by: INTERNAL MEDICINE

## 2017-01-01 PROCEDURE — 80048 BASIC METABOLIC PNL TOTAL CA: CPT | Performed by: PSYCHIATRY & NEUROLOGY

## 2017-01-01 PROCEDURE — A4565 SLINGS: HCPCS

## 2017-01-01 PROCEDURE — 0J9K0ZZ DRAINAGE OF LEFT HAND SUBCUTANEOUS TISSUE AND FASCIA, OPEN APPROACH: ICD-10-PCS | Performed by: ORTHOPAEDIC SURGERY

## 2017-01-01 PROCEDURE — 77030031132 HC SUT NYL COVD -A

## 2017-01-01 PROCEDURE — 83036 HEMOGLOBIN GLYCOSYLATED A1C: CPT | Performed by: ORTHOPAEDIC SURGERY

## 2017-01-01 PROCEDURE — 97535 SELF CARE MNGMENT TRAINING: CPT | Performed by: OCCUPATIONAL THERAPIST

## 2017-01-01 PROCEDURE — 76030000000 HC AMB SURG OR TIME 0.5 TO 1: Performed by: ORTHOPAEDIC SURGERY

## 2017-01-01 PROCEDURE — 85027 COMPLETE CBC AUTOMATED: CPT | Performed by: ORTHOPAEDIC SURGERY

## 2017-01-01 PROCEDURE — 97161 PT EVAL LOW COMPLEX 20 MIN: CPT

## 2017-01-01 PROCEDURE — 64418 NJX AA&/STRD SPRSCAP NRV: CPT | Performed by: ANESTHESIOLOGY

## 2017-01-01 PROCEDURE — 70496 CT ANGIOGRAPHY HEAD: CPT

## 2017-01-01 PROCEDURE — 86140 C-REACTIVE PROTEIN: CPT | Performed by: INTERNAL MEDICINE

## 2017-01-01 PROCEDURE — 84100 ASSAY OF PHOSPHORUS: CPT | Performed by: INTERNAL MEDICINE

## 2017-01-01 PROCEDURE — 90686 IIV4 VACC NO PRSV 0.5 ML IM: CPT | Performed by: PHYSICAL MEDICINE & REHABILITATION

## 2017-01-01 PROCEDURE — 87076 CULTURE ANAEROBE IDENT EACH: CPT | Performed by: ORTHOPAEDIC SURGERY

## 2017-01-01 PROCEDURE — 82550 ASSAY OF CK (CPK): CPT | Performed by: PHYSICAL MEDICINE & REHABILITATION

## 2017-01-01 PROCEDURE — 76060000062 HC AMB SURG ANES 1 TO 1.5 HR: Performed by: ORTHOPAEDIC SURGERY

## 2017-01-01 PROCEDURE — 74011250636 HC RX REV CODE- 250/636: Performed by: PHYSICIAN ASSISTANT

## 2017-01-01 PROCEDURE — 85018 HEMOGLOBIN: CPT | Performed by: ANESTHESIOLOGY

## 2017-01-01 PROCEDURE — 77010033678 HC OXYGEN DAILY

## 2017-01-01 RX ORDER — KETOROLAC TROMETHAMINE 30 MG/ML
10 INJECTION, SOLUTION INTRAMUSCULAR; INTRAVENOUS
Status: COMPLETED | OUTPATIENT
Start: 2017-01-01 | End: 2017-01-01

## 2017-01-01 RX ORDER — TAMSULOSIN HYDROCHLORIDE 0.4 MG/1
0.4 CAPSULE ORAL DAILY
Status: DISCONTINUED | OUTPATIENT
Start: 2017-01-01 | End: 2017-01-01 | Stop reason: HOSPADM

## 2017-01-01 RX ORDER — LABETALOL HYDROCHLORIDE 5 MG/ML
10 INJECTION, SOLUTION INTRAVENOUS
Status: DISCONTINUED | OUTPATIENT
Start: 2017-01-01 | End: 2017-01-01 | Stop reason: HOSPADM

## 2017-01-01 RX ORDER — SODIUM CHLORIDE 0.9 % (FLUSH) 0.9 %
5-10 SYRINGE (ML) INJECTION EVERY 8 HOURS
Status: DISCONTINUED | OUTPATIENT
Start: 2017-01-01 | End: 2017-01-01 | Stop reason: HOSPADM

## 2017-01-01 RX ORDER — SODIUM CHLORIDE 0.9 % (FLUSH) 0.9 %
10 SYRINGE (ML) INJECTION EVERY 8 HOURS
Status: DISCONTINUED | OUTPATIENT
Start: 2017-01-01 | End: 2017-01-01 | Stop reason: HOSPADM

## 2017-01-01 RX ORDER — INSULIN GLARGINE 100 [IU]/ML
9-12 INJECTION, SOLUTION SUBCUTANEOUS
COMMUNITY
End: 2017-01-01 | Stop reason: SDUPTHER

## 2017-01-01 RX ORDER — SODIUM CHLORIDE 0.9 % (FLUSH) 0.9 %
5-10 SYRINGE (ML) INJECTION AS NEEDED
Status: DISCONTINUED | OUTPATIENT
Start: 2017-01-01 | End: 2017-01-01 | Stop reason: HOSPADM

## 2017-01-01 RX ORDER — FENTANYL CITRATE 50 UG/ML
50 INJECTION, SOLUTION INTRAMUSCULAR; INTRAVENOUS AS NEEDED
Status: DISCONTINUED | OUTPATIENT
Start: 2017-01-01 | End: 2017-01-01 | Stop reason: HOSPADM

## 2017-01-01 RX ORDER — HYDROMORPHONE HYDROCHLORIDE 1 MG/ML
0.2 INJECTION, SOLUTION INTRAMUSCULAR; INTRAVENOUS; SUBCUTANEOUS
Status: DISCONTINUED | OUTPATIENT
Start: 2017-01-01 | End: 2017-01-01 | Stop reason: HOSPADM

## 2017-01-01 RX ORDER — BACITRACIN 500 UNIT/G
1 PACKET (EA) TOPICAL AS NEEDED
Status: DISCONTINUED | OUTPATIENT
Start: 2017-01-01 | End: 2017-01-01 | Stop reason: HOSPADM

## 2017-01-01 RX ORDER — SODIUM CHLORIDE 9 MG/ML
25 INJECTION, SOLUTION INTRAVENOUS CONTINUOUS
Status: DISCONTINUED | OUTPATIENT
Start: 2017-01-01 | End: 2017-01-01 | Stop reason: HOSPADM

## 2017-01-01 RX ORDER — MIDAZOLAM HYDROCHLORIDE 1 MG/ML
1 INJECTION, SOLUTION INTRAMUSCULAR; INTRAVENOUS AS NEEDED
Status: DISCONTINUED | OUTPATIENT
Start: 2017-01-01 | End: 2017-01-01 | Stop reason: HOSPADM

## 2017-01-01 RX ORDER — SODIUM CHLORIDE, SODIUM LACTATE, POTASSIUM CHLORIDE, CALCIUM CHLORIDE 600; 310; 30; 20 MG/100ML; MG/100ML; MG/100ML; MG/100ML
INJECTION, SOLUTION INTRAVENOUS
Status: DISCONTINUED | OUTPATIENT
Start: 2017-01-01 | End: 2017-01-01 | Stop reason: HOSPADM

## 2017-01-01 RX ORDER — HEPARIN 100 UNIT/ML
500 SYRINGE INTRAVENOUS AS NEEDED
Status: ACTIVE | OUTPATIENT
Start: 2017-01-01 | End: 2017-01-01

## 2017-01-01 RX ORDER — SODIUM CHLORIDE 0.9 % (FLUSH) 0.9 %
10-40 SYRINGE (ML) INJECTION AS NEEDED
Status: ACTIVE | OUTPATIENT
Start: 2017-01-01 | End: 2017-01-01

## 2017-01-01 RX ORDER — LANOLIN ALCOHOL/MO/W.PET/CERES
3 CREAM (GRAM) TOPICAL
Status: DISCONTINUED | OUTPATIENT
Start: 2017-01-01 | End: 2017-01-01 | Stop reason: HOSPADM

## 2017-01-01 RX ORDER — ACETAMINOPHEN 325 MG/1
650 TABLET ORAL
Status: DISCONTINUED | OUTPATIENT
Start: 2017-01-01 | End: 2017-01-01

## 2017-01-01 RX ORDER — AMLODIPINE BESYLATE 5 MG/1
5 TABLET ORAL DAILY
Status: DISCONTINUED | OUTPATIENT
Start: 2017-01-01 | End: 2017-01-01 | Stop reason: HOSPADM

## 2017-01-01 RX ORDER — MUPIROCIN 20 MG/G
OINTMENT TOPICAL EVERY 12 HOURS
Status: DISCONTINUED | OUTPATIENT
Start: 2017-01-01 | End: 2017-01-01

## 2017-01-01 RX ORDER — SODIUM CHLORIDE 9 MG/ML
50 INJECTION, SOLUTION INTRAVENOUS AS NEEDED
Status: DISPENSED | OUTPATIENT
Start: 2017-01-01 | End: 2017-01-01

## 2017-01-01 RX ORDER — ONDANSETRON 2 MG/ML
4 INJECTION INTRAMUSCULAR; INTRAVENOUS
Status: DISCONTINUED | OUTPATIENT
Start: 2017-01-01 | End: 2017-01-01 | Stop reason: HOSPADM

## 2017-01-01 RX ORDER — PROPOFOL 10 MG/ML
INJECTION, EMULSION INTRAVENOUS
Status: DISCONTINUED | OUTPATIENT
Start: 2017-01-01 | End: 2017-01-01 | Stop reason: HOSPADM

## 2017-01-01 RX ORDER — SODIUM CHLORIDE, SODIUM LACTATE, POTASSIUM CHLORIDE, CALCIUM CHLORIDE 600; 310; 30; 20 MG/100ML; MG/100ML; MG/100ML; MG/100ML
125 INJECTION, SOLUTION INTRAVENOUS CONTINUOUS
Status: DISCONTINUED | OUTPATIENT
Start: 2017-01-01 | End: 2017-01-01 | Stop reason: HOSPADM

## 2017-01-01 RX ORDER — HEPARIN SODIUM 1000 [USP'U]/ML
INJECTION, SOLUTION INTRAVENOUS; SUBCUTANEOUS
Status: DISCONTINUED
Start: 2017-01-01 | End: 2017-01-01 | Stop reason: HOSPADM

## 2017-01-01 RX ORDER — MIDAZOLAM HYDROCHLORIDE 1 MG/ML
0.5 INJECTION, SOLUTION INTRAMUSCULAR; INTRAVENOUS
Status: DISCONTINUED | OUTPATIENT
Start: 2017-01-01 | End: 2017-01-01 | Stop reason: HOSPADM

## 2017-01-01 RX ORDER — PANTOPRAZOLE SODIUM 40 MG/1
40 TABLET, DELAYED RELEASE ORAL
Status: DISCONTINUED | OUTPATIENT
Start: 2017-01-01 | End: 2017-01-01 | Stop reason: HOSPADM

## 2017-01-01 RX ORDER — SODIUM CHLORIDE 0.9 % (FLUSH) 0.9 %
10 SYRINGE (ML) INJECTION EVERY 24 HOURS
Status: DISCONTINUED | OUTPATIENT
Start: 2017-01-01 | End: 2017-01-01 | Stop reason: HOSPADM

## 2017-01-01 RX ORDER — GLIPIZIDE 5 MG/1
5 TABLET, FILM COATED, EXTENDED RELEASE ORAL
Status: DISCONTINUED | OUTPATIENT
Start: 2017-01-01 | End: 2017-01-01 | Stop reason: HOSPADM

## 2017-01-01 RX ORDER — MAGNESIUM SULFATE 100 %
4 CRYSTALS MISCELLANEOUS AS NEEDED
Status: DISCONTINUED | OUTPATIENT
Start: 2017-01-01 | End: 2017-01-01 | Stop reason: HOSPADM

## 2017-01-01 RX ORDER — PRAVASTATIN SODIUM 10 MG/1
20 TABLET ORAL
Status: DISCONTINUED | OUTPATIENT
Start: 2017-01-01 | End: 2017-01-01 | Stop reason: HOSPADM

## 2017-01-01 RX ORDER — ONDANSETRON 2 MG/ML
INJECTION INTRAMUSCULAR; INTRAVENOUS AS NEEDED
Status: DISCONTINUED | OUTPATIENT
Start: 2017-01-01 | End: 2017-01-01 | Stop reason: HOSPADM

## 2017-01-01 RX ORDER — LIDOCAINE HYDROCHLORIDE 10 MG/ML
0.1 INJECTION, SOLUTION EPIDURAL; INFILTRATION; INTRACAUDAL; PERINEURAL AS NEEDED
Status: DISCONTINUED | OUTPATIENT
Start: 2017-01-01 | End: 2017-01-01 | Stop reason: HOSPADM

## 2017-01-01 RX ORDER — CLOPIDOGREL BISULFATE 75 MG/1
75 TABLET ORAL DAILY
Status: DISCONTINUED | OUTPATIENT
Start: 2017-01-01 | End: 2017-01-01 | Stop reason: HOSPADM

## 2017-01-01 RX ORDER — INSULIN LISPRO 100 [IU]/ML
8 INJECTION, SOLUTION INTRAVENOUS; SUBCUTANEOUS
Status: DISCONTINUED | OUTPATIENT
Start: 2017-01-01 | End: 2017-01-01

## 2017-01-01 RX ORDER — TRAMADOL HYDROCHLORIDE 50 MG/1
TABLET ORAL
Qty: 360 TAB | Refills: 1 | Status: SHIPPED | OUTPATIENT
Start: 2017-01-01

## 2017-01-01 RX ORDER — ACETAMINOPHEN 325 MG/1
650 TABLET ORAL
Status: DISCONTINUED | OUTPATIENT
Start: 2017-01-01 | End: 2017-01-01 | Stop reason: HOSPADM

## 2017-01-01 RX ORDER — SODIUM CHLORIDE 0.9 % (FLUSH) 0.9 %
10 SYRINGE (ML) INJECTION AS NEEDED
Status: DISCONTINUED | OUTPATIENT
Start: 2017-01-01 | End: 2017-01-01 | Stop reason: HOSPADM

## 2017-01-01 RX ORDER — HEPARIN 100 UNIT/ML
500 SYRINGE INTRAVENOUS AS NEEDED
Status: DISCONTINUED | OUTPATIENT
Start: 2017-01-01 | End: 2017-01-01 | Stop reason: HOSPADM

## 2017-01-01 RX ORDER — SODIUM CHLORIDE 0.9 % (FLUSH) 0.9 %
10 SYRINGE (ML) INJECTION
Status: COMPLETED | OUTPATIENT
Start: 2017-01-01 | End: 2017-01-01

## 2017-01-01 RX ORDER — METFORMIN HYDROCHLORIDE 500 MG/1
500 TABLET, EXTENDED RELEASE ORAL 2 TIMES DAILY
Status: DISCONTINUED | OUTPATIENT
Start: 2017-01-01 | End: 2017-01-01

## 2017-01-01 RX ORDER — LEVOFLOXACIN 5 MG/ML
250 INJECTION, SOLUTION INTRAVENOUS EVERY 24 HOURS
Status: DISCONTINUED | OUTPATIENT
Start: 2017-01-01 | End: 2017-01-01

## 2017-01-01 RX ORDER — LIDOCAINE HYDROCHLORIDE 20 MG/ML
INJECTION, SOLUTION EPIDURAL; INFILTRATION; INTRACAUDAL; PERINEURAL AS NEEDED
Status: DISCONTINUED | OUTPATIENT
Start: 2017-01-01 | End: 2017-01-01 | Stop reason: HOSPADM

## 2017-01-01 RX ORDER — SODIUM CHLORIDE 0.9 % (FLUSH) 0.9 %
5-10 SYRINGE (ML) INJECTION AS NEEDED
Status: ACTIVE | OUTPATIENT
Start: 2017-01-01 | End: 2017-01-01

## 2017-01-01 RX ORDER — BUPIVACAINE HYDROCHLORIDE 5 MG/ML
INJECTION, SOLUTION EPIDURAL; INTRACAUDAL AS NEEDED
Status: DISCONTINUED | OUTPATIENT
Start: 2017-01-01 | End: 2017-01-01 | Stop reason: HOSPADM

## 2017-01-01 RX ORDER — SODIUM CHLORIDE 0.9 % (FLUSH) 0.9 %
10-40 SYRINGE (ML) INJECTION AS NEEDED
Status: SHIPPED | OUTPATIENT
Start: 2017-01-01 | End: 2017-01-01

## 2017-01-01 RX ORDER — SODIUM CHLORIDE 9 MG/ML
25 INJECTION, SOLUTION INTRAVENOUS AS NEEDED
Status: DISPENSED | OUTPATIENT
Start: 2017-01-01 | End: 2017-01-01

## 2017-01-01 RX ORDER — DEXMEDETOMIDINE HYDROCHLORIDE 4 UG/ML
INJECTION, SOLUTION INTRAVENOUS AS NEEDED
Status: DISCONTINUED | OUTPATIENT
Start: 2017-01-01 | End: 2017-01-01 | Stop reason: HOSPADM

## 2017-01-01 RX ORDER — CEFTRIAXONE 1 G/1
1 INJECTION, POWDER, FOR SOLUTION INTRAMUSCULAR; INTRAVENOUS EVERY 24 HOURS
Status: DISCONTINUED | OUTPATIENT
Start: 2017-01-01 | End: 2017-01-01

## 2017-01-01 RX ORDER — PROPOFOL 10 MG/ML
INJECTION, EMULSION INTRAVENOUS AS NEEDED
Status: DISCONTINUED | OUTPATIENT
Start: 2017-01-01 | End: 2017-01-01 | Stop reason: HOSPADM

## 2017-01-01 RX ORDER — MORPHINE SULFATE 2 MG/ML
2 INJECTION, SOLUTION INTRAMUSCULAR; INTRAVENOUS
Status: DISCONTINUED | OUTPATIENT
Start: 2017-01-01 | End: 2017-01-01 | Stop reason: HOSPADM

## 2017-01-01 RX ORDER — MORPHINE SULFATE 4 MG/ML
INJECTION, SOLUTION INTRAMUSCULAR; INTRAVENOUS AS NEEDED
Status: DISCONTINUED | OUTPATIENT
Start: 2017-01-01 | End: 2017-01-01 | Stop reason: HOSPADM

## 2017-01-01 RX ORDER — TRAMADOL HYDROCHLORIDE 50 MG/1
50 TABLET ORAL
COMMUNITY
End: 2017-01-01 | Stop reason: ALTCHOICE

## 2017-01-01 RX ORDER — VANCOMYCIN 1.75 GRAM/500 ML IN 0.9 % SODIUM CHLORIDE INTRAVENOUS
1750 ONCE
Status: COMPLETED | OUTPATIENT
Start: 2017-01-01 | End: 2017-01-01

## 2017-01-01 RX ORDER — NADOLOL 40 MG/1
40 TABLET ORAL DAILY
Status: DISCONTINUED | OUTPATIENT
Start: 2017-01-01 | End: 2017-01-01 | Stop reason: HOSPADM

## 2017-01-01 RX ORDER — SODIUM CHLORIDE, SODIUM LACTATE, POTASSIUM CHLORIDE, CALCIUM CHLORIDE 600; 310; 30; 20 MG/100ML; MG/100ML; MG/100ML; MG/100ML
75 INJECTION, SOLUTION INTRAVENOUS CONTINUOUS
Status: DISCONTINUED | OUTPATIENT
Start: 2017-01-01 | End: 2017-01-01 | Stop reason: HOSPADM

## 2017-01-01 RX ORDER — SODIUM CHLORIDE 0.9 % (FLUSH) 0.9 %
10-40 SYRINGE (ML) INJECTION AS NEEDED
Status: DISCONTINUED | OUTPATIENT
Start: 2017-01-01 | End: 2017-01-01 | Stop reason: HOSPADM

## 2017-01-01 RX ORDER — HYDROCODONE BITARTRATE AND ACETAMINOPHEN 5; 325 MG/1; MG/1
1 TABLET ORAL
Qty: 20 TAB | Refills: 0 | Status: ON HOLD | OUTPATIENT
Start: 2017-01-01 | End: 2017-01-01

## 2017-01-01 RX ORDER — MIDAZOLAM HYDROCHLORIDE 1 MG/ML
INJECTION, SOLUTION INTRAMUSCULAR; INTRAVENOUS AS NEEDED
Status: DISCONTINUED | OUTPATIENT
Start: 2017-01-01 | End: 2017-01-01 | Stop reason: HOSPADM

## 2017-01-01 RX ORDER — LANOLIN ALCOHOL/MO/W.PET/CERES
325 CREAM (GRAM) TOPICAL
Status: DISCONTINUED | OUTPATIENT
Start: 2017-01-01 | End: 2017-01-01 | Stop reason: HOSPADM

## 2017-01-01 RX ORDER — TAMSULOSIN HYDROCHLORIDE 0.4 MG/1
0.4 CAPSULE ORAL
Status: DISCONTINUED | OUTPATIENT
Start: 2017-01-01 | End: 2017-01-01

## 2017-01-01 RX ORDER — HEPARIN SODIUM 200 [USP'U]/100ML
500 INJECTION, SOLUTION INTRAVENOUS ONCE
Status: COMPLETED | OUTPATIENT
Start: 2017-01-01 | End: 2017-01-01

## 2017-01-01 RX ORDER — MIDAZOLAM HYDROCHLORIDE 1 MG/ML
2 INJECTION, SOLUTION INTRAMUSCULAR; INTRAVENOUS ONCE
Status: COMPLETED | OUTPATIENT
Start: 2017-01-01 | End: 2017-01-01

## 2017-01-01 RX ORDER — ACETAMINOPHEN 650 MG/1
650 SUPPOSITORY RECTAL
Status: DISCONTINUED | OUTPATIENT
Start: 2017-01-01 | End: 2017-01-01 | Stop reason: HOSPADM

## 2017-01-01 RX ORDER — HYDROCODONE BITARTRATE AND ACETAMINOPHEN 5; 325 MG/1; MG/1
1-2 TABLET ORAL
Qty: 30 TAB | Refills: 0 | Status: ON HOLD | OUTPATIENT
Start: 2017-01-01 | End: 2017-01-01

## 2017-01-01 RX ORDER — INSULIN LISPRO 100 [IU]/ML
INJECTION, SOLUTION INTRAVENOUS; SUBCUTANEOUS EVERY 6 HOURS
Status: DISCONTINUED | OUTPATIENT
Start: 2017-01-01 | End: 2017-01-01 | Stop reason: HOSPADM

## 2017-01-01 RX ORDER — INSULIN LISPRO 100 [IU]/ML
10 INJECTION, SOLUTION INTRAVENOUS; SUBCUTANEOUS
Status: DISCONTINUED | OUTPATIENT
Start: 2017-01-01 | End: 2017-01-01

## 2017-01-01 RX ORDER — MIDAZOLAM HYDROCHLORIDE 1 MG/ML
INJECTION, SOLUTION INTRAMUSCULAR; INTRAVENOUS
Status: COMPLETED
Start: 2017-01-01 | End: 2017-01-01

## 2017-01-01 RX ORDER — OXYCODONE HYDROCHLORIDE 5 MG/1
2.5 TABLET ORAL
Status: DISCONTINUED | OUTPATIENT
Start: 2017-01-01 | End: 2017-01-01

## 2017-01-01 RX ORDER — HEPARIN SODIUM 200 [USP'U]/100ML
INJECTION, SOLUTION INTRAVENOUS
Status: COMPLETED
Start: 2017-01-01 | End: 2017-01-01

## 2017-01-01 RX ORDER — SODIUM CHLORIDE 0.9 % (FLUSH) 0.9 %
10-30 SYRINGE (ML) INJECTION AS NEEDED
Status: DISCONTINUED | OUTPATIENT
Start: 2017-01-01 | End: 2017-01-01 | Stop reason: HOSPADM

## 2017-01-01 RX ORDER — INSULIN LISPRO 100 [IU]/ML
INJECTION, SOLUTION INTRAVENOUS; SUBCUTANEOUS
Status: DISCONTINUED | OUTPATIENT
Start: 2017-01-01 | End: 2017-01-01 | Stop reason: HOSPADM

## 2017-01-01 RX ORDER — DEXTROSE MONOHYDRATE 100 MG/ML
125-250 INJECTION, SOLUTION INTRAVENOUS AS NEEDED
Status: DISCONTINUED | OUTPATIENT
Start: 2017-01-01 | End: 2017-01-01 | Stop reason: HOSPADM

## 2017-01-01 RX ORDER — SODIUM CHLORIDE 900 MG/100ML
INJECTION INTRAVENOUS
Status: DISCONTINUED
Start: 2017-01-01 | End: 2017-01-01 | Stop reason: HOSPADM

## 2017-01-01 RX ORDER — FENTANYL CITRATE 50 UG/ML
25 INJECTION, SOLUTION INTRAMUSCULAR; INTRAVENOUS
Status: DISCONTINUED | OUTPATIENT
Start: 2017-01-01 | End: 2017-01-01 | Stop reason: HOSPADM

## 2017-01-01 RX ORDER — HEPARIN 100 UNIT/ML
500 SYRINGE INTRAVENOUS AS NEEDED
Status: SHIPPED | OUTPATIENT
Start: 2017-01-01 | End: 2017-01-01

## 2017-01-01 RX ORDER — INSULIN GLARGINE 100 [IU]/ML
8 INJECTION, SOLUTION SUBCUTANEOUS
Status: DISCONTINUED | OUTPATIENT
Start: 2017-01-01 | End: 2017-01-01 | Stop reason: HOSPADM

## 2017-01-01 RX ORDER — GABAPENTIN 300 MG/1
300 CAPSULE ORAL 3 TIMES DAILY
Status: DISCONTINUED | OUTPATIENT
Start: 2017-01-01 | End: 2017-01-01

## 2017-01-01 RX ORDER — INSULIN GLARGINE 100 [IU]/ML
12 INJECTION, SOLUTION SUBCUTANEOUS
Status: DISCONTINUED | OUTPATIENT
Start: 2017-01-01 | End: 2017-01-01 | Stop reason: HOSPADM

## 2017-01-01 RX ORDER — GLIPIZIDE 5 MG/1
5 TABLET ORAL
Status: DISCONTINUED | OUTPATIENT
Start: 2017-01-01 | End: 2017-01-01 | Stop reason: ALTCHOICE

## 2017-01-01 RX ORDER — OXYCODONE AND ACETAMINOPHEN 5; 325 MG/1; MG/1
1 TABLET ORAL AS NEEDED
Status: DISCONTINUED | OUTPATIENT
Start: 2017-01-01 | End: 2017-01-01 | Stop reason: HOSPADM

## 2017-01-01 RX ORDER — HYDROCODONE BITARTRATE AND ACETAMINOPHEN 5; 325 MG/1; MG/1
1-2 TABLET ORAL
Status: DISCONTINUED | OUTPATIENT
Start: 2017-01-01 | End: 2017-01-01 | Stop reason: HOSPADM

## 2017-01-01 RX ORDER — BACITRACIN 50000 [IU]/1
INJECTION, POWDER, FOR SOLUTION INTRAMUSCULAR
Status: COMPLETED
Start: 2017-01-01 | End: 2017-01-01

## 2017-01-01 RX ORDER — SODIUM CHLORIDE 9 MG/ML
75 INJECTION, SOLUTION INTRAVENOUS CONTINUOUS
Status: DISCONTINUED | OUTPATIENT
Start: 2017-01-01 | End: 2017-01-01

## 2017-01-01 RX ORDER — HEPARIN 100 UNIT/ML
500 SYRINGE INTRAVENOUS AS NEEDED
Status: CANCELLED | OUTPATIENT
Start: 2017-01-01 | End: 2017-01-01

## 2017-01-01 RX ORDER — FENTANYL CITRATE 50 UG/ML
INJECTION, SOLUTION INTRAMUSCULAR; INTRAVENOUS
Status: COMPLETED
Start: 2017-01-01 | End: 2017-01-01

## 2017-01-01 RX ORDER — GUAIFENESIN 100 MG/5ML
81 LIQUID (ML) ORAL DAILY
Status: DISCONTINUED | OUTPATIENT
Start: 2017-01-01 | End: 2017-01-01 | Stop reason: HOSPADM

## 2017-01-01 RX ORDER — DIPHENHYDRAMINE HYDROCHLORIDE 50 MG/ML
12.5 INJECTION, SOLUTION INTRAMUSCULAR; INTRAVENOUS AS NEEDED
Status: DISCONTINUED | OUTPATIENT
Start: 2017-01-01 | End: 2017-01-01 | Stop reason: HOSPADM

## 2017-01-01 RX ORDER — GABAPENTIN 300 MG/1
300 CAPSULE ORAL 3 TIMES DAILY
Status: DISCONTINUED | OUTPATIENT
Start: 2017-01-01 | End: 2017-01-01 | Stop reason: HOSPADM

## 2017-01-01 RX ORDER — INSULIN GLARGINE 100 [IU]/ML
12 INJECTION, SOLUTION SUBCUTANEOUS
Status: DISCONTINUED | OUTPATIENT
Start: 2017-01-01 | End: 2017-01-01

## 2017-01-01 RX ORDER — CEPHALEXIN 500 MG/1
500 CAPSULE ORAL 4 TIMES DAILY
Qty: 28 CAP | Refills: 0 | Status: SHIPPED | OUTPATIENT
Start: 2017-01-01 | End: 2017-01-01

## 2017-01-01 RX ORDER — INSULIN DEGLUDEC 200 U/ML
20 INJECTION, SOLUTION SUBCUTANEOUS
Qty: 2 PEN | Refills: 0 | Status: SHIPPED | OUTPATIENT
Start: 2017-01-01 | End: 2018-01-01 | Stop reason: SDUPTHER

## 2017-01-01 RX ORDER — INSULIN LISPRO 100 [IU]/ML
INJECTION, SOLUTION INTRAVENOUS; SUBCUTANEOUS
Status: DISCONTINUED | OUTPATIENT
Start: 2017-01-01 | End: 2017-01-01

## 2017-01-01 RX ORDER — HEPARIN SODIUM 5000 [USP'U]/ML
5000 INJECTION, SOLUTION INTRAVENOUS; SUBCUTANEOUS EVERY 8 HOURS
Status: DISCONTINUED | OUTPATIENT
Start: 2017-01-01 | End: 2017-01-01 | Stop reason: HOSPADM

## 2017-01-01 RX ORDER — SODIUM CHLORIDE 9 MG/ML
25 INJECTION, SOLUTION INTRAVENOUS AS NEEDED
Status: DISCONTINUED | OUTPATIENT
Start: 2017-01-01 | End: 2017-01-01 | Stop reason: HOSPADM

## 2017-01-01 RX ORDER — HALOPERIDOL 5 MG/ML
5 INJECTION INTRAMUSCULAR
Status: DISCONTINUED | OUTPATIENT
Start: 2017-01-01 | End: 2017-01-01 | Stop reason: HOSPADM

## 2017-01-01 RX ORDER — HYDROCODONE BITARTRATE AND ACETAMINOPHEN 5; 325 MG/1; MG/1
1-2 TABLET ORAL
Qty: 20 TAB | Refills: 0 | Status: ON HOLD | OUTPATIENT
Start: 2017-01-01 | End: 2017-01-01

## 2017-01-01 RX ORDER — SODIUM CHLORIDE 0.9 % (FLUSH) 0.9 %
20 SYRINGE (ML) INJECTION AS NEEDED
Status: DISCONTINUED | OUTPATIENT
Start: 2017-01-01 | End: 2017-01-01 | Stop reason: HOSPADM

## 2017-01-01 RX ORDER — AMLODIPINE BESYLATE 5 MG/1
10 TABLET ORAL DAILY
Status: DISCONTINUED | OUTPATIENT
Start: 2017-01-01 | End: 2017-01-01

## 2017-01-01 RX ORDER — AMLODIPINE BESYLATE 5 MG/1
5 TABLET ORAL ONCE
Status: COMPLETED | OUTPATIENT
Start: 2017-01-01 | End: 2017-01-01

## 2017-01-01 RX ORDER — HEPARIN 100 UNIT/ML
300 SYRINGE INTRAVENOUS ONCE
Status: COMPLETED | OUTPATIENT
Start: 2017-01-01 | End: 2017-01-01

## 2017-01-01 RX ORDER — SODIUM CHLORIDE 0.9 % (FLUSH) 0.9 %
SYRINGE (ML) INJECTION
Status: COMPLETED
Start: 2017-01-01 | End: 2017-01-01

## 2017-01-01 RX ORDER — PRAVASTATIN SODIUM 10 MG/1
20 TABLET ORAL DAILY
Status: DISCONTINUED | OUTPATIENT
Start: 2017-01-01 | End: 2017-01-01

## 2017-01-01 RX ORDER — NADOLOL 40 MG/1
40 TABLET ORAL DAILY
COMMUNITY
Start: 2017-01-01

## 2017-01-01 RX ORDER — HYDROCODONE BITARTRATE AND ACETAMINOPHEN 5; 325 MG/1; MG/1
1-2 TABLET ORAL
Qty: 30 TAB | Refills: 0 | Status: SHIPPED | OUTPATIENT
Start: 2017-01-01 | End: 2017-01-01

## 2017-01-01 RX ORDER — ONDANSETRON 2 MG/ML
4 INJECTION INTRAMUSCULAR; INTRAVENOUS AS NEEDED
Status: DISCONTINUED | OUTPATIENT
Start: 2017-01-01 | End: 2017-01-01 | Stop reason: HOSPADM

## 2017-01-01 RX ORDER — ENOXAPARIN SODIUM 100 MG/ML
40 INJECTION SUBCUTANEOUS DAILY
Status: DISCONTINUED | OUTPATIENT
Start: 2017-01-01 | End: 2017-01-01 | Stop reason: HOSPADM

## 2017-01-01 RX ORDER — VALSARTAN 160 MG/1
160 TABLET ORAL DAILY
Status: DISCONTINUED | OUTPATIENT
Start: 2017-01-01 | End: 2017-01-01 | Stop reason: HOSPADM

## 2017-01-01 RX ORDER — INSULIN LISPRO 100 [IU]/ML
10 INJECTION, SOLUTION INTRAVENOUS; SUBCUTANEOUS ONCE
Status: COMPLETED | OUTPATIENT
Start: 2017-01-01 | End: 2017-01-01

## 2017-01-01 RX ORDER — MAGNESIUM SULFATE 100 %
CRYSTALS MISCELLANEOUS
Status: COMPLETED
Start: 2017-01-01 | End: 2017-01-01

## 2017-01-01 RX ORDER — INSULIN LISPRO 100 [IU]/ML
7 INJECTION, SOLUTION INTRAVENOUS; SUBCUTANEOUS ONCE
Status: COMPLETED | OUTPATIENT
Start: 2017-01-01 | End: 2017-01-01

## 2017-01-01 RX ORDER — DEXTROSE 50 % IN WATER (D50W) INTRAVENOUS SYRINGE
12.5-25 AS NEEDED
Status: DISCONTINUED | OUTPATIENT
Start: 2017-01-01 | End: 2017-01-01

## 2017-01-01 RX ORDER — PANTOPRAZOLE SODIUM 40 MG/1
40 TABLET, DELAYED RELEASE ORAL DAILY
Status: DISCONTINUED | OUTPATIENT
Start: 2017-01-01 | End: 2017-01-01 | Stop reason: HOSPADM

## 2017-01-01 RX ORDER — LOSARTAN POTASSIUM 50 MG/1
100 TABLET ORAL 2 TIMES DAILY
Status: DISCONTINUED | OUTPATIENT
Start: 2017-01-01 | End: 2017-01-01 | Stop reason: HOSPADM

## 2017-01-01 RX ORDER — PRAVASTATIN SODIUM 10 MG/1
20 TABLET ORAL
Status: DISCONTINUED | OUTPATIENT
Start: 2017-01-01 | End: 2017-01-01

## 2017-01-01 RX ORDER — LANOLIN ALCOHOL/MO/W.PET/CERES
500 CREAM (GRAM) TOPICAL
Status: DISCONTINUED | OUTPATIENT
Start: 2017-01-01 | End: 2017-01-01 | Stop reason: HOSPADM

## 2017-01-01 RX ORDER — INSULIN LISPRO 100 [IU]/ML
10 INJECTION, SOLUTION INTRAVENOUS; SUBCUTANEOUS
Status: DISCONTINUED | OUTPATIENT
Start: 2017-01-01 | End: 2017-01-01 | Stop reason: HOSPADM

## 2017-01-01 RX ORDER — SIMVASTATIN 10 MG/1
10 TABLET, FILM COATED ORAL
Status: DISCONTINUED | OUTPATIENT
Start: 2017-01-01 | End: 2017-01-01 | Stop reason: ALTCHOICE

## 2017-01-01 RX ORDER — SODIUM CHLORIDE 0.9 % (FLUSH) 0.9 %
10 SYRINGE (ML) INJECTION
Status: ACTIVE | OUTPATIENT
Start: 2017-01-01 | End: 2017-01-01

## 2017-01-01 RX ORDER — INSULIN GLARGINE 100 [IU]/ML
15 INJECTION, SOLUTION SUBCUTANEOUS
Status: DISCONTINUED | OUTPATIENT
Start: 2017-01-01 | End: 2017-01-01 | Stop reason: HOSPADM

## 2017-01-01 RX ORDER — METFORMIN HYDROCHLORIDE 500 MG/1
500 TABLET, EXTENDED RELEASE ORAL 2 TIMES DAILY WITH MEALS
Status: DISCONTINUED | OUTPATIENT
Start: 2017-01-01 | End: 2017-01-01 | Stop reason: HOSPADM

## 2017-01-01 RX ORDER — PRAVASTATIN SODIUM 40 MG/1
40 TABLET ORAL
Status: DISCONTINUED | OUTPATIENT
Start: 2017-01-01 | End: 2017-01-01 | Stop reason: HOSPADM

## 2017-01-01 RX ORDER — LISINOPRIL AND HYDROCHLOROTHIAZIDE 12.5; 2 MG/1; MG/1
2 TABLET ORAL
COMMUNITY
End: 2017-01-01

## 2017-01-01 RX ORDER — LORAZEPAM 2 MG/ML
1 INJECTION INTRAMUSCULAR
Status: DISCONTINUED | OUTPATIENT
Start: 2017-01-01 | End: 2017-01-01 | Stop reason: HOSPADM

## 2017-01-01 RX ORDER — LISINOPRIL 20 MG/1
20 TABLET ORAL DAILY
Status: DISCONTINUED | OUTPATIENT
Start: 2017-01-01 | End: 2017-01-01

## 2017-01-01 RX ORDER — HEPARIN 100 UNIT/ML
300 SYRINGE INTRAVENOUS AS NEEDED
Status: DISCONTINUED | OUTPATIENT
Start: 2017-01-01 | End: 2017-01-01 | Stop reason: HOSPADM

## 2017-01-01 RX ORDER — HYDROCODONE BITARTRATE AND ACETAMINOPHEN 5; 325 MG/1; MG/1
1 TABLET ORAL
Status: DISCONTINUED | OUTPATIENT
Start: 2017-01-01 | End: 2017-01-01 | Stop reason: HOSPADM

## 2017-01-01 RX ORDER — SODIUM CHLORIDE 9 MG/ML
25 INJECTION, SOLUTION INTRAVENOUS AS NEEDED
Status: CANCELLED | OUTPATIENT
Start: 2017-01-01 | End: 2017-01-01

## 2017-01-01 RX ORDER — HYDRALAZINE HYDROCHLORIDE 20 MG/ML
10 INJECTION INTRAMUSCULAR; INTRAVENOUS
Status: DISCONTINUED | OUTPATIENT
Start: 2017-01-01 | End: 2017-01-01 | Stop reason: HOSPADM

## 2017-01-01 RX ORDER — MORPHINE SULFATE 10 MG/ML
2 INJECTION, SOLUTION INTRAMUSCULAR; INTRAVENOUS
Status: DISCONTINUED | OUTPATIENT
Start: 2017-01-01 | End: 2017-01-01 | Stop reason: HOSPADM

## 2017-01-01 RX ORDER — VANCOMYCIN HYDROCHLORIDE
1250
Status: DISCONTINUED | OUTPATIENT
Start: 2017-01-01 | End: 2017-01-01

## 2017-01-01 RX ORDER — SODIUM CHLORIDE 0.9 % (FLUSH) 0.9 %
10-40 SYRINGE (ML) INJECTION EVERY 8 HOURS
Status: DISCONTINUED | OUTPATIENT
Start: 2017-01-01 | End: 2017-01-01 | Stop reason: HOSPADM

## 2017-01-01 RX ORDER — TAMSULOSIN HYDROCHLORIDE 0.4 MG/1
0.4 CAPSULE ORAL
Status: DISCONTINUED | OUTPATIENT
Start: 2017-01-01 | End: 2017-01-01 | Stop reason: HOSPADM

## 2017-01-01 RX ORDER — AMLODIPINE BESYLATE 5 MG/1
10 TABLET ORAL ONCE
Status: COMPLETED | OUTPATIENT
Start: 2017-01-01 | End: 2017-01-01

## 2017-01-01 RX ORDER — KETOROLAC TROMETHAMINE 30 MG/ML
15 INJECTION, SOLUTION INTRAMUSCULAR; INTRAVENOUS EVERY 6 HOURS
Status: COMPLETED | OUTPATIENT
Start: 2017-01-01 | End: 2017-01-01

## 2017-01-01 RX ORDER — INSULIN DEGLUDEC 200 U/ML
12 INJECTION, SOLUTION SUBCUTANEOUS
Qty: 2 PEN | Refills: 11 | Status: SHIPPED | OUTPATIENT
Start: 2017-01-01 | End: 2017-01-01 | Stop reason: SDUPTHER

## 2017-01-01 RX ORDER — LIDOCAINE HYDROCHLORIDE 10 MG/ML
INJECTION INFILTRATION; PERINEURAL
Status: COMPLETED
Start: 2017-01-01 | End: 2017-01-01

## 2017-01-01 RX ORDER — SODIUM CHLORIDE 0.9 % (FLUSH) 0.9 %
5-10 SYRINGE (ML) INJECTION AS NEEDED
Status: CANCELLED | OUTPATIENT
Start: 2017-01-01 | End: 2017-01-01

## 2017-01-01 RX ORDER — INSULIN DEGLUDEC 200 U/ML
16 INJECTION, SOLUTION SUBCUTANEOUS
Qty: 2 PEN | Refills: 11
Start: 2017-01-01 | End: 2017-01-01 | Stop reason: SDUPTHER

## 2017-01-01 RX ORDER — SODIUM CHLORIDE 9 MG/ML
50 INJECTION, SOLUTION INTRAVENOUS
Status: COMPLETED | OUTPATIENT
Start: 2017-01-01 | End: 2017-01-01

## 2017-01-01 RX ORDER — HEPARIN 100 UNIT/ML
300-900 SYRINGE INTRAVENOUS DAILY
Status: DISCONTINUED | OUTPATIENT
Start: 2017-01-01 | End: 2017-01-01 | Stop reason: HOSPADM

## 2017-01-01 RX ORDER — FENTANYL CITRATE 50 UG/ML
25-50 INJECTION, SOLUTION INTRAMUSCULAR; INTRAVENOUS
Status: DISCONTINUED | OUTPATIENT
Start: 2017-01-01 | End: 2017-01-01

## 2017-01-01 RX ORDER — ADHESIVE BANDAGE
30 BANDAGE TOPICAL DAILY PRN
Status: DISCONTINUED | OUTPATIENT
Start: 2017-01-01 | End: 2017-01-01 | Stop reason: HOSPADM

## 2017-01-01 RX ORDER — DEXTROSE 50 % IN WATER (D50W) INTRAVENOUS SYRINGE
12.5-25 AS NEEDED
Status: DISCONTINUED | OUTPATIENT
Start: 2017-01-01 | End: 2017-01-01 | Stop reason: RX

## 2017-01-01 RX ORDER — SODIUM CHLORIDE 9 MG/ML
100 INJECTION, SOLUTION INTRAVENOUS CONTINUOUS
Status: DISCONTINUED | OUTPATIENT
Start: 2017-01-01 | End: 2017-01-01

## 2017-01-01 RX ORDER — MORPHINE SULFATE 2 MG/ML
2 INJECTION, SOLUTION INTRAMUSCULAR; INTRAVENOUS
Status: DISCONTINUED | OUTPATIENT
Start: 2017-01-01 | End: 2017-01-01

## 2017-01-01 RX ORDER — LANOLIN ALCOHOL/MO/W.PET/CERES
65 CREAM (GRAM) TOPICAL
Qty: 90 TAB | Status: SHIPPED | OUTPATIENT
Start: 2017-01-01 | End: 2017-01-01 | Stop reason: DRUGHIGH

## 2017-01-01 RX ORDER — SODIUM CHLORIDE 0.9 % (FLUSH) 0.9 %
20-60 SYRINGE (ML) INJECTION EVERY 8 HOURS
Status: DISCONTINUED | OUTPATIENT
Start: 2017-01-01 | End: 2017-01-01 | Stop reason: HOSPADM

## 2017-01-01 RX ORDER — VANCOMYCIN HYDROCHLORIDE 1 G/20ML
INJECTION, POWDER, LYOPHILIZED, FOR SOLUTION INTRAVENOUS
Status: DISCONTINUED
Start: 2017-01-01 | End: 2017-01-01 | Stop reason: HOSPADM

## 2017-01-01 RX ORDER — ENOXAPARIN SODIUM 100 MG/ML
40 INJECTION SUBCUTANEOUS EVERY 24 HOURS
Status: DISCONTINUED | OUTPATIENT
Start: 2017-01-01 | End: 2017-01-01 | Stop reason: HOSPADM

## 2017-01-01 RX ORDER — MUPIROCIN 20 MG/G
OINTMENT TOPICAL 2 TIMES DAILY
Status: DISCONTINUED | OUTPATIENT
Start: 2017-01-01 | End: 2017-01-01 | Stop reason: HOSPADM

## 2017-01-01 RX ORDER — SODIUM CHLORIDE 9 MG/ML
10 INJECTION INTRAMUSCULAR; INTRAVENOUS; SUBCUTANEOUS AS NEEDED
Status: ACTIVE | OUTPATIENT
Start: 2017-01-01 | End: 2017-01-01

## 2017-01-01 RX ORDER — MIDAZOLAM HYDROCHLORIDE 1 MG/ML
.5-2 INJECTION, SOLUTION INTRAMUSCULAR; INTRAVENOUS
Status: DISCONTINUED | OUTPATIENT
Start: 2017-01-01 | End: 2017-01-01

## 2017-01-01 RX ORDER — AMLODIPINE BESYLATE 5 MG/1
5 TABLET ORAL DAILY
Status: DISCONTINUED | OUTPATIENT
Start: 2017-01-01 | End: 2017-01-01

## 2017-01-01 RX ORDER — SODIUM CHLORIDE 9 MG/ML
50 INJECTION, SOLUTION INTRAVENOUS CONTINUOUS
Status: DISCONTINUED | OUTPATIENT
Start: 2017-01-01 | End: 2017-01-01 | Stop reason: HOSPADM

## 2017-01-01 RX ORDER — LIDOCAINE HYDROCHLORIDE 10 MG/ML
1-40 INJECTION INFILTRATION; PERINEURAL
Status: DISCONTINUED | OUTPATIENT
Start: 2017-01-01 | End: 2017-01-01 | Stop reason: HOSPADM

## 2017-01-01 RX ORDER — MIDAZOLAM HYDROCHLORIDE 1 MG/ML
1-5 INJECTION, SOLUTION INTRAMUSCULAR; INTRAVENOUS
Status: DISCONTINUED | OUTPATIENT
Start: 2017-01-01 | End: 2017-01-01 | Stop reason: HOSPADM

## 2017-01-01 RX ORDER — GABAPENTIN 300 MG/1
300 CAPSULE ORAL 3 TIMES DAILY
COMMUNITY
End: 2017-01-01 | Stop reason: ALTCHOICE

## 2017-01-01 RX ORDER — SODIUM CHLORIDE 9 MG/ML
75 INJECTION, SOLUTION INTRAVENOUS CONTINUOUS
Status: DISCONTINUED | OUTPATIENT
Start: 2017-01-01 | End: 2017-01-01 | Stop reason: HOSPADM

## 2017-01-01 RX ORDER — LANOLIN ALCOHOL/MO/W.PET/CERES
325 CREAM (GRAM) TOPICAL
COMMUNITY

## 2017-01-01 RX ORDER — SODIUM CHLORIDE, SODIUM LACTATE, POTASSIUM CHLORIDE, CALCIUM CHLORIDE 600; 310; 30; 20 MG/100ML; MG/100ML; MG/100ML; MG/100ML
50 INJECTION, SOLUTION INTRAVENOUS CONTINUOUS
Status: DISCONTINUED | OUTPATIENT
Start: 2017-01-01 | End: 2017-01-01 | Stop reason: HOSPADM

## 2017-01-01 RX ORDER — MIDAZOLAM HYDROCHLORIDE 1 MG/ML
1 INJECTION, SOLUTION INTRAMUSCULAR; INTRAVENOUS AS NEEDED
Status: COMPLETED | OUTPATIENT
Start: 2017-01-01 | End: 2017-01-01

## 2017-01-01 RX ORDER — DEXTROSE MONOHYDRATE 100 MG/ML
INJECTION, SOLUTION INTRAVENOUS
Status: DISPENSED
Start: 2017-01-01 | End: 2017-01-01

## 2017-01-01 RX ORDER — FENTANYL CITRATE 50 UG/ML
12.5-5 INJECTION, SOLUTION INTRAMUSCULAR; INTRAVENOUS
Status: DISCONTINUED | OUTPATIENT
Start: 2017-01-01 | End: 2017-01-01 | Stop reason: HOSPADM

## 2017-01-01 RX ORDER — HEPARIN 100 UNIT/ML
300-900 SYRINGE INTRAVENOUS AS NEEDED
Status: DISCONTINUED | OUTPATIENT
Start: 2017-01-01 | End: 2017-01-01 | Stop reason: HOSPADM

## 2017-01-01 RX ORDER — LIDOCAINE HYDROCHLORIDE 20 MG/ML
20 INJECTION, SOLUTION EPIDURAL; INFILTRATION; INTRACAUDAL; PERINEURAL ONCE
Status: DISCONTINUED | OUTPATIENT
Start: 2017-01-01 | End: 2017-01-01 | Stop reason: HOSPADM

## 2017-01-01 RX ORDER — LISINOPRIL AND HYDROCHLOROTHIAZIDE 12.5; 2 MG/1; MG/1
2 TABLET ORAL DAILY
COMMUNITY

## 2017-01-01 RX ORDER — INSULIN LISPRO 100 [IU]/ML
12 INJECTION, SOLUTION INTRAVENOUS; SUBCUTANEOUS
Status: DISCONTINUED | OUTPATIENT
Start: 2017-01-01 | End: 2017-01-01

## 2017-01-01 RX ORDER — SODIUM CHLORIDE 9 MG/ML
50 INJECTION, SOLUTION INTRAVENOUS ONCE
Status: COMPLETED | OUTPATIENT
Start: 2017-01-01 | End: 2017-01-01

## 2017-01-01 RX ORDER — OXYCODONE HYDROCHLORIDE 5 MG/1
5 TABLET ORAL
Status: DISCONTINUED | OUTPATIENT
Start: 2017-01-01 | End: 2017-01-01 | Stop reason: HOSPADM

## 2017-01-01 RX ORDER — PHENYLEPHRINE HCL IN 0.9% NACL 0.4MG/10ML
SYRINGE (ML) INTRAVENOUS AS NEEDED
Status: DISCONTINUED | OUTPATIENT
Start: 2017-01-01 | End: 2017-01-01 | Stop reason: HOSPADM

## 2017-01-01 RX ORDER — SODIUM CHLORIDE 9 MG/ML
50 INJECTION, SOLUTION INTRAVENOUS
Status: DISPENSED | OUTPATIENT
Start: 2017-01-01 | End: 2017-01-01

## 2017-01-01 RX ORDER — FENTANYL CITRATE 50 UG/ML
INJECTION, SOLUTION INTRAMUSCULAR; INTRAVENOUS AS NEEDED
Status: DISCONTINUED | OUTPATIENT
Start: 2017-01-01 | End: 2017-01-01 | Stop reason: HOSPADM

## 2017-01-01 RX ORDER — ASCORBIC ACID 250 MG
250 TABLET ORAL 3 TIMES DAILY
Qty: 90 TAB | Status: SHIPPED | OUTPATIENT
Start: 2017-01-01 | End: 2017-01-01 | Stop reason: DRUGHIGH

## 2017-01-01 RX ORDER — LANOLIN ALCOHOL/MO/W.PET/CERES
65 CREAM (GRAM) TOPICAL
Status: ON HOLD | COMMUNITY
End: 2017-01-01

## 2017-01-01 RX ORDER — LOSARTAN POTASSIUM 50 MG/1
100 TABLET ORAL DAILY
Status: DISCONTINUED | OUTPATIENT
Start: 2017-01-01 | End: 2017-01-01 | Stop reason: HOSPADM

## 2017-01-01 RX ORDER — SODIUM CHLORIDE 9 MG/ML
INJECTION, SOLUTION INTRAVENOUS
Status: DISCONTINUED | OUTPATIENT
Start: 2017-01-01 | End: 2017-01-01 | Stop reason: HOSPADM

## 2017-01-01 RX ORDER — INSULIN ASPART 100 [IU]/ML
10 INJECTION, SOLUTION INTRAVENOUS; SUBCUTANEOUS
COMMUNITY
End: 2018-01-01 | Stop reason: SDUPTHER

## 2017-01-01 RX ORDER — ROPIVACAINE HYDROCHLORIDE 5 MG/ML
30 INJECTION, SOLUTION EPIDURAL; INFILTRATION; PERINEURAL ONCE
Status: DISCONTINUED | OUTPATIENT
Start: 2017-01-01 | End: 2017-01-01 | Stop reason: HOSPADM

## 2017-01-01 RX ORDER — LISINOPRIL 5 MG/1
5 TABLET ORAL
Status: DISCONTINUED | OUTPATIENT
Start: 2017-01-01 | End: 2017-01-01

## 2017-01-01 RX ORDER — SODIUM CHLORIDE 0.9 % (FLUSH) 0.9 %
10 SYRINGE (ML) INJECTION AS NEEDED
Status: ACTIVE | OUTPATIENT
Start: 2017-01-01 | End: 2017-01-01

## 2017-01-01 RX ORDER — ASPIRIN 325 MG
325 TABLET ORAL ONCE
Status: COMPLETED | OUTPATIENT
Start: 2017-01-01 | End: 2017-01-01

## 2017-01-01 RX ORDER — LANOLIN ALCOHOL/MO/W.PET/CERES
1 CREAM (GRAM) TOPICAL
Status: DISCONTINUED | OUTPATIENT
Start: 2017-01-01 | End: 2017-01-01 | Stop reason: HOSPADM

## 2017-01-01 RX ORDER — MORPHINE SULFATE 2 MG/ML
4 INJECTION, SOLUTION INTRAMUSCULAR; INTRAVENOUS
Status: COMPLETED | OUTPATIENT
Start: 2017-01-01 | End: 2017-01-01

## 2017-01-01 RX ORDER — SIMVASTATIN 10 MG/1
10 TABLET, FILM COATED ORAL
Status: ON HOLD | COMMUNITY
End: 2018-01-01

## 2017-01-01 RX ORDER — LIDOCAINE HYDROCHLORIDE AND EPINEPHRINE 10; 10 MG/ML; UG/ML
0-30 INJECTION, SOLUTION INFILTRATION; PERINEURAL ONCE
Status: ACTIVE | OUTPATIENT
Start: 2017-01-01 | End: 2017-01-01

## 2017-01-01 RX ORDER — INSULIN LISPRO 100 [IU]/ML
15 INJECTION, SOLUTION INTRAVENOUS; SUBCUTANEOUS
Status: DISCONTINUED | OUTPATIENT
Start: 2017-01-01 | End: 2017-01-01 | Stop reason: HOSPADM

## 2017-01-01 RX ORDER — GABAPENTIN 100 MG/1
100 CAPSULE ORAL 3 TIMES DAILY
Status: DISCONTINUED | OUTPATIENT
Start: 2017-01-01 | End: 2017-01-01 | Stop reason: HOSPADM

## 2017-01-01 RX ORDER — LANOLIN ALCOHOL/MO/W.PET/CERES
5 CREAM (GRAM) TOPICAL
Status: DISCONTINUED | OUTPATIENT
Start: 2017-01-01 | End: 2017-01-01 | Stop reason: HOSPADM

## 2017-01-01 RX ORDER — LEVOFLOXACIN 5 MG/ML
500 INJECTION, SOLUTION INTRAVENOUS ONCE
Status: COMPLETED | OUTPATIENT
Start: 2017-01-01 | End: 2017-01-01

## 2017-01-01 RX ORDER — SIMVASTATIN 10 MG/1
10 TABLET, FILM COATED ORAL
Status: DISCONTINUED | OUTPATIENT
Start: 2017-01-01 | End: 2017-01-01 | Stop reason: HOSPADM

## 2017-01-01 RX ORDER — NITROGLYCERIN 0.4 MG/1
0.4 TABLET SUBLINGUAL
Status: DISCONTINUED | OUTPATIENT
Start: 2017-01-01 | End: 2017-01-01 | Stop reason: HOSPADM

## 2017-01-01 RX ORDER — BACITRACIN 50000 [IU]/1
50000 INJECTION, POWDER, FOR SOLUTION INTRAMUSCULAR ONCE
Status: COMPLETED | OUTPATIENT
Start: 2017-01-01 | End: 2017-01-01

## 2017-01-01 RX ORDER — ACETAMINOPHEN 325 MG/1
650 TABLET ORAL EVERY 6 HOURS
Status: DISCONTINUED | OUTPATIENT
Start: 2017-01-01 | End: 2017-01-01 | Stop reason: HOSPADM

## 2017-01-01 RX ORDER — DEXAMETHASONE SODIUM PHOSPHATE 4 MG/ML
INJECTION, SOLUTION INTRA-ARTICULAR; INTRALESIONAL; INTRAMUSCULAR; INTRAVENOUS; SOFT TISSUE AS NEEDED
Status: DISCONTINUED | OUTPATIENT
Start: 2017-01-01 | End: 2017-01-01 | Stop reason: HOSPADM

## 2017-01-01 RX ORDER — TAMSULOSIN HYDROCHLORIDE 0.4 MG/1
0.4 CAPSULE ORAL DAILY
Status: DISCONTINUED | OUTPATIENT
Start: 2017-01-01 | End: 2017-01-01

## 2017-01-01 RX ORDER — KETOROLAC TROMETHAMINE 30 MG/ML
15 INJECTION, SOLUTION INTRAMUSCULAR; INTRAVENOUS ONCE
Status: COMPLETED | OUTPATIENT
Start: 2017-01-01 | End: 2017-01-01

## 2017-01-01 RX ORDER — DEXTROSE 50 % IN WATER (D50W) INTRAVENOUS SYRINGE
12.5-25 AS NEEDED
Status: DISCONTINUED | OUTPATIENT
Start: 2017-01-01 | End: 2017-01-01 | Stop reason: CLARIF

## 2017-01-01 RX ORDER — ATROPINE SULFATE 0.1 MG/ML
1 INJECTION INTRAVENOUS
Status: COMPLETED | OUTPATIENT
Start: 2017-01-01 | End: 2017-01-01

## 2017-01-01 RX ORDER — INSULIN GLARGINE 100 [IU]/ML
9-12 INJECTION, SOLUTION SUBCUTANEOUS
Qty: 1 VIAL | Refills: 0
Start: 2017-01-01 | End: 2017-01-01 | Stop reason: CLARIF

## 2017-01-01 RX ORDER — CLOPIDOGREL BISULFATE 75 MG/1
75 TABLET ORAL DAILY
Qty: 30 TAB | Status: SHIPPED | OUTPATIENT
Start: 2017-01-01 | End: 2017-01-01 | Stop reason: DRUGHIGH

## 2017-01-01 RX ORDER — SODIUM CHLORIDE 9 MG/ML
125 INJECTION, SOLUTION INTRAVENOUS CONTINUOUS
Status: DISCONTINUED | OUTPATIENT
Start: 2017-01-01 | End: 2017-01-01

## 2017-01-01 RX ORDER — SODIUM CHLORIDE 0.9 % (FLUSH) 0.9 %
10-40 SYRINGE (ML) INJECTION AS NEEDED
Status: CANCELLED | OUTPATIENT
Start: 2017-01-01 | End: 2017-01-01

## 2017-01-01 RX ORDER — SODIUM CHLORIDE 0.9 % (FLUSH) 0.9 %
20-60 SYRINGE (ML) INJECTION AS NEEDED
Status: DISCONTINUED | OUTPATIENT
Start: 2017-01-01 | End: 2017-01-01 | Stop reason: HOSPADM

## 2017-01-01 RX ORDER — CLOPIDOGREL BISULFATE 75 MG/1
75 TABLET ORAL DAILY
COMMUNITY

## 2017-01-01 RX ORDER — LANOLIN ALCOHOL/MO/W.PET/CERES
325 CREAM (GRAM) TOPICAL
Status: DISCONTINUED | OUTPATIENT
Start: 2017-01-01 | End: 2017-01-01

## 2017-01-01 RX ORDER — ROCURONIUM BROMIDE 10 MG/ML
INJECTION, SOLUTION INTRAVENOUS AS NEEDED
Status: DISCONTINUED | OUTPATIENT
Start: 2017-01-01 | End: 2017-01-01 | Stop reason: HOSPADM

## 2017-01-01 RX ORDER — ONDANSETRON 4 MG/1
4 TABLET, ORALLY DISINTEGRATING ORAL
Status: DISCONTINUED | OUTPATIENT
Start: 2017-01-01 | End: 2017-01-01 | Stop reason: HOSPADM

## 2017-01-01 RX ORDER — SODIUM CHLORIDE 0.9 % (FLUSH) 0.9 %
20 SYRINGE (ML) INJECTION EVERY 8 HOURS
Status: DISCONTINUED | OUTPATIENT
Start: 2017-01-01 | End: 2017-01-01 | Stop reason: HOSPADM

## 2017-01-01 RX ORDER — ASCORBIC ACID 500 MG
250 TABLET ORAL 3 TIMES DAILY
Status: DISCONTINUED | OUTPATIENT
Start: 2017-01-01 | End: 2017-01-01 | Stop reason: HOSPADM

## 2017-01-01 RX ORDER — VALSARTAN 80 MG/1
160 TABLET ORAL DAILY
Status: DISCONTINUED | OUTPATIENT
Start: 2017-01-01 | End: 2017-01-01 | Stop reason: HOSPADM

## 2017-01-01 RX ORDER — SODIUM CHLORIDE 450 MG/100ML
75 INJECTION, SOLUTION INTRAVENOUS CONTINUOUS
Status: DISCONTINUED | OUTPATIENT
Start: 2017-01-01 | End: 2017-01-01

## 2017-01-01 RX ORDER — PANTOPRAZOLE SODIUM 40 MG/1
40 TABLET, DELAYED RELEASE ORAL DAILY
Status: DISCONTINUED | OUTPATIENT
Start: 2017-01-01 | End: 2017-01-01

## 2017-01-01 RX ORDER — BACITRACIN ZINC 500 UNIT/G
OINTMENT (GRAM) TOPICAL AS NEEDED
Status: DISCONTINUED | OUTPATIENT
Start: 2017-01-01 | End: 2017-01-01 | Stop reason: HOSPADM

## 2017-01-01 RX ORDER — GLUCOSAMINE/CHONDR SU A SOD 167-133 MG
500 CAPSULE ORAL
COMMUNITY
End: 2017-01-01

## 2017-01-01 RX ORDER — NADOLOL 40 MG/1
40 TABLET ORAL DAILY
Status: DISCONTINUED | OUTPATIENT
Start: 2017-01-01 | End: 2017-01-01

## 2017-01-01 RX ORDER — DEXTROSE MONOHYDRATE 100 MG/ML
250 INJECTION, SOLUTION INTRAVENOUS ONCE
Status: DISCONTINUED | OUTPATIENT
Start: 2017-01-01 | End: 2017-01-01 | Stop reason: HOSPADM

## 2017-01-01 RX ORDER — LOSARTAN POTASSIUM 100 MG/1
100 TABLET ORAL 2 TIMES DAILY
COMMUNITY

## 2017-01-01 RX ORDER — SODIUM POLYSTYRENE SULFONATE 15 G/60ML
30 SUSPENSION ORAL; RECTAL
Status: ACTIVE | OUTPATIENT
Start: 2017-01-01 | End: 2017-01-01

## 2017-01-01 RX ORDER — SUCCINYLCHOLINE CHLORIDE 20 MG/ML
INJECTION INTRAMUSCULAR; INTRAVENOUS AS NEEDED
Status: DISCONTINUED | OUTPATIENT
Start: 2017-01-01 | End: 2017-01-01 | Stop reason: HOSPADM

## 2017-01-01 RX ORDER — ASCORBIC ACID 250 MG
250 TABLET ORAL 3 TIMES DAILY
COMMUNITY
End: 2017-01-01 | Stop reason: ALTCHOICE

## 2017-01-01 RX ORDER — LISINOPRIL 20 MG/1
20 TABLET ORAL DAILY
Status: CANCELLED | OUTPATIENT
Start: 2017-01-01

## 2017-01-01 RX ORDER — OMEPRAZOLE 20 MG/1
20 CAPSULE, DELAYED RELEASE ORAL
COMMUNITY

## 2017-01-01 RX ORDER — FACIAL-BODY WIPES
10 EACH TOPICAL DAILY PRN
Status: DISCONTINUED | OUTPATIENT
Start: 2017-01-01 | End: 2017-01-01 | Stop reason: HOSPADM

## 2017-01-01 RX ORDER — CEFAZOLIN SODIUM IN 0.9 % NACL 2 G/50 ML
2 INTRAVENOUS SOLUTION, PIGGYBACK (ML) INTRAVENOUS ONCE
Status: COMPLETED | OUTPATIENT
Start: 2017-01-01 | End: 2017-01-01

## 2017-01-01 RX ORDER — ASCORBIC ACID 500 MG
250 TABLET ORAL 3 TIMES DAILY
COMMUNITY
End: 2017-01-01

## 2017-01-01 RX ORDER — SIMVASTATIN 10 MG/1
10 TABLET, FILM COATED ORAL
Status: DISCONTINUED | OUTPATIENT
Start: 2017-01-01 | End: 2017-01-01 | Stop reason: SDUPTHER

## 2017-01-01 RX ADMIN — Medication 10 ML: at 14:16

## 2017-01-01 RX ADMIN — FENTANYL CITRATE 25 MCG: 50 INJECTION, SOLUTION INTRAMUSCULAR; INTRAVENOUS at 08:35

## 2017-01-01 RX ADMIN — LOSARTAN POTASSIUM 100 MG: 50 TABLET ORAL at 17:56

## 2017-01-01 RX ADMIN — SODIUM CHLORIDE, PRESERVATIVE FREE 500 UNITS: 5 INJECTION INTRAVENOUS at 11:45

## 2017-01-01 RX ADMIN — PIPERACILLIN SODIUM,TAZOBACTAM SODIUM 3.38 G: 3; .375 INJECTION, POWDER, FOR SOLUTION INTRAVENOUS at 21:40

## 2017-01-01 RX ADMIN — LIDOCAINE HYDROCHLORIDE 60 MG: 20 INJECTION, SOLUTION EPIDURAL; INFILTRATION; INTRACAUDAL; PERINEURAL at 10:45

## 2017-01-01 RX ADMIN — INSULIN LISPRO 12 UNITS: 100 INJECTION, SOLUTION INTRAVENOUS; SUBCUTANEOUS at 09:35

## 2017-01-01 RX ADMIN — Medication 40 ML: at 22:29

## 2017-01-01 RX ADMIN — FERROUS SULFATE TAB 325 MG (65 MG ELEMENTAL FE) 325 MG: 325 (65 FE) TAB at 08:49

## 2017-01-01 RX ADMIN — HUMAN INSULIN 30 UNITS: 100 INJECTION, SUSPENSION SUBCUTANEOUS at 08:00

## 2017-01-01 RX ADMIN — Medication 16 G: at 18:11

## 2017-01-01 RX ADMIN — AMLODIPINE BESYLATE 5 MG: 5 TABLET ORAL at 09:48

## 2017-01-01 RX ADMIN — HEPARIN SODIUM 5000 UNITS: 5000 INJECTION, SOLUTION INTRAVENOUS; SUBCUTANEOUS at 17:45

## 2017-01-01 RX ADMIN — Medication 10 ML: at 22:36

## 2017-01-01 RX ADMIN — CLOPIDOGREL BISULFATE 75 MG: 75 TABLET, FILM COATED ORAL at 08:53

## 2017-01-01 RX ADMIN — Medication 80 MCG: at 18:32

## 2017-01-01 RX ADMIN — Medication 10 ML: at 22:31

## 2017-01-01 RX ADMIN — AMLODIPINE BESYLATE 5 MG: 5 TABLET ORAL at 10:13

## 2017-01-01 RX ADMIN — Medication 500 UNITS: at 08:54

## 2017-01-01 RX ADMIN — CLOPIDOGREL BISULFATE 75 MG: 75 TABLET, FILM COATED ORAL at 08:40

## 2017-01-01 RX ADMIN — INSULIN LISPRO 12 UNITS: 100 INJECTION, SOLUTION INTRAVENOUS; SUBCUTANEOUS at 13:13

## 2017-01-01 RX ADMIN — Medication 10 ML: at 09:38

## 2017-01-01 RX ADMIN — Medication 500 UNITS: at 15:20

## 2017-01-01 RX ADMIN — Medication 10 ML: at 14:36

## 2017-01-01 RX ADMIN — Medication 1 CAPSULE: at 08:48

## 2017-01-01 RX ADMIN — PROPOFOL 10 MG: 10 INJECTION, EMULSION INTRAVENOUS at 13:12

## 2017-01-01 RX ADMIN — NADOLOL 40 MG: 40 TABLET ORAL at 09:48

## 2017-01-01 RX ADMIN — GABAPENTIN 300 MG: 300 CAPSULE ORAL at 15:08

## 2017-01-01 RX ADMIN — MUPIROCIN: 20 OINTMENT TOPICAL at 21:59

## 2017-01-01 RX ADMIN — TAMSULOSIN HYDROCHLORIDE 0.4 MG: 0.4 CAPSULE ORAL at 17:44

## 2017-01-01 RX ADMIN — PROPOFOL 100 MG: 10 INJECTION, EMULSION INTRAVENOUS at 09:38

## 2017-01-01 RX ADMIN — METFORMIN HYDROCHLORIDE 500 MG: 500 TABLET, EXTENDED RELEASE ORAL at 08:24

## 2017-01-01 RX ADMIN — CEFTRIAXONE 1 G: 1 INJECTION, POWDER, FOR SOLUTION INTRAMUSCULAR; INTRAVENOUS at 09:01

## 2017-01-01 RX ADMIN — Medication 10 ML: at 10:53

## 2017-01-01 RX ADMIN — HYDROCODONE BITARTRATE AND ACETAMINOPHEN 2 TABLET: 5; 325 TABLET ORAL at 22:40

## 2017-01-01 RX ADMIN — Medication 1 CAPSULE: at 09:13

## 2017-01-01 RX ADMIN — SODIUM CHLORIDE 1 G: 900 INJECTION, SOLUTION INTRAVENOUS at 13:02

## 2017-01-01 RX ADMIN — SODIUM CHLORIDE 1 G: 9 INJECTION, SOLUTION INTRAVENOUS at 16:26

## 2017-01-01 RX ADMIN — Medication 10 ML: at 15:33

## 2017-01-01 RX ADMIN — INSULIN LISPRO 10 UNITS: 100 INJECTION, SOLUTION INTRAVENOUS; SUBCUTANEOUS at 18:24

## 2017-01-01 RX ADMIN — GABAPENTIN 300 MG: 300 CAPSULE ORAL at 08:52

## 2017-01-01 RX ADMIN — VALSARTAN 160 MG: 80 TABLET ORAL at 08:43

## 2017-01-01 RX ADMIN — Medication 10 ML: at 18:53

## 2017-01-01 RX ADMIN — Medication 500 UNITS: at 15:08

## 2017-01-01 RX ADMIN — LIDOCAINE HYDROCHLORIDE 80 MG: 20 INJECTION, SOLUTION EPIDURAL; INFILTRATION; INTRACAUDAL; PERINEURAL at 09:38

## 2017-01-01 RX ADMIN — Medication 10 ML: at 14:57

## 2017-01-01 RX ADMIN — FERROUS SULFATE TAB 325 MG (65 MG ELEMENTAL FE) 325 MG: 325 (65 FE) TAB at 08:24

## 2017-01-01 RX ADMIN — FERROUS SULFATE TAB 325 MG (65 MG ELEMENTAL FE) 325 MG: 325 (65 FE) TAB at 08:53

## 2017-01-01 RX ADMIN — Medication 10 ML: at 11:04

## 2017-01-01 RX ADMIN — HYDROCODONE BITARTRATE AND ACETAMINOPHEN 2 TABLET: 5; 325 TABLET ORAL at 09:39

## 2017-01-01 RX ADMIN — FENTANYL CITRATE 25 MCG: 50 INJECTION, SOLUTION INTRAMUSCULAR; INTRAVENOUS at 18:27

## 2017-01-01 RX ADMIN — Medication 10 ML: at 15:43

## 2017-01-01 RX ADMIN — Medication 10 ML: at 15:09

## 2017-01-01 RX ADMIN — SODIUM CHLORIDE 1 G: 900 INJECTION, SOLUTION INTRAVENOUS at 14:54

## 2017-01-01 RX ADMIN — Medication 10 ML: at 05:21

## 2017-01-01 RX ADMIN — TAMSULOSIN HYDROCHLORIDE 0.4 MG: 0.4 CAPSULE ORAL at 21:11

## 2017-01-01 RX ADMIN — SODIUM CHLORIDE 1 G: 900 INJECTION, SOLUTION INTRAVENOUS at 08:15

## 2017-01-01 RX ADMIN — PANTOPRAZOLE SODIUM 40 MG: 40 TABLET, DELAYED RELEASE ORAL at 08:49

## 2017-01-01 RX ADMIN — DEXMEDETOMIDINE HYDROCHLORIDE 4 MCG: 4 INJECTION, SOLUTION INTRAVENOUS at 18:42

## 2017-01-01 RX ADMIN — SODIUM CHLORIDE 1 G: 900 INJECTION, SOLUTION INTRAVENOUS at 14:57

## 2017-01-01 RX ADMIN — SIMVASTATIN 10 MG: 10 TABLET, FILM COATED ORAL at 21:00

## 2017-01-01 RX ADMIN — ASPIRIN 81 MG 81 MG: 81 TABLET ORAL at 09:38

## 2017-01-01 RX ADMIN — SIMVASTATIN 10 MG: 10 TABLET, FILM COATED ORAL at 07:32

## 2017-01-01 RX ADMIN — GABAPENTIN 300 MG: 300 CAPSULE ORAL at 15:42

## 2017-01-01 RX ADMIN — Medication 10 ML: at 14:56

## 2017-01-01 RX ADMIN — PRAVASTATIN SODIUM 20 MG: 10 TABLET ORAL at 22:24

## 2017-01-01 RX ADMIN — Medication 20 ML: at 14:00

## 2017-01-01 RX ADMIN — HEPARIN SODIUM 5000 UNITS: 5000 INJECTION, SOLUTION INTRAVENOUS; SUBCUTANEOUS at 02:01

## 2017-01-01 RX ADMIN — TAMSULOSIN HYDROCHLORIDE 0.4 MG: 0.4 CAPSULE ORAL at 08:49

## 2017-01-01 RX ADMIN — Medication 1 CAPSULE: at 08:18

## 2017-01-01 RX ADMIN — Medication 10 ML: at 15:06

## 2017-01-01 RX ADMIN — Medication 500 UNITS: at 16:00

## 2017-01-01 RX ADMIN — SODIUM CHLORIDE 25 ML/HR: 900 INJECTION, SOLUTION INTRAVENOUS at 08:58

## 2017-01-01 RX ADMIN — LOSARTAN POTASSIUM 100 MG: 50 TABLET ORAL at 22:26

## 2017-01-01 RX ADMIN — VANCOMYCIN HYDROCHLORIDE 1750 MG: 10 INJECTION, POWDER, LYOPHILIZED, FOR SOLUTION INTRAVENOUS at 15:41

## 2017-01-01 RX ADMIN — Medication 5 ML: at 14:00

## 2017-01-01 RX ADMIN — ASPIRIN 81 MG 81 MG: 81 TABLET ORAL at 09:52

## 2017-01-01 RX ADMIN — Medication 10 ML: at 15:10

## 2017-01-01 RX ADMIN — Medication 500 UNITS: at 15:14

## 2017-01-01 RX ADMIN — INSULIN LISPRO 3 UNITS: 100 INJECTION, SOLUTION INTRAVENOUS; SUBCUTANEOUS at 06:50

## 2017-01-01 RX ADMIN — ONDANSETRON 4 MG: 2 INJECTION INTRAMUSCULAR; INTRAVENOUS at 19:35

## 2017-01-01 RX ADMIN — VALSARTAN 160 MG: 80 TABLET ORAL at 08:52

## 2017-01-01 RX ADMIN — INSULIN LISPRO 10 UNITS: 100 INJECTION, SOLUTION INTRAVENOUS; SUBCUTANEOUS at 18:33

## 2017-01-01 RX ADMIN — PIPERACILLIN SODIUM,TAZOBACTAM SODIUM 3.38 G: 3; .375 INJECTION, POWDER, FOR SOLUTION INTRAVENOUS at 21:21

## 2017-01-01 RX ADMIN — OXYCODONE HYDROCHLORIDE AND ACETAMINOPHEN 250 MG: 500 TABLET ORAL at 08:55

## 2017-01-01 RX ADMIN — SODIUM CHLORIDE, PRESERVATIVE FREE 500 UNITS: 5 INJECTION INTRAVENOUS at 15:32

## 2017-01-01 RX ADMIN — ASPIRIN 81 MG 81 MG: 81 TABLET ORAL at 09:48

## 2017-01-01 RX ADMIN — INSULIN LISPRO 4 UNITS: 100 INJECTION, SOLUTION INTRAVENOUS; SUBCUTANEOUS at 21:46

## 2017-01-01 RX ADMIN — Medication 10 ML: at 15:18

## 2017-01-01 RX ADMIN — FERROUS SULFATE TAB 325 MG (65 MG ELEMENTAL FE) 325 MG: 325 (65 FE) TAB at 08:23

## 2017-01-01 RX ADMIN — Medication 500 UNITS: at 15:43

## 2017-01-01 RX ADMIN — HUMAN INSULIN 20 UNITS: 100 INJECTION, SUSPENSION SUBCUTANEOUS at 08:24

## 2017-01-01 RX ADMIN — OXYCODONE HYDROCHLORIDE AND ACETAMINOPHEN 250 MG: 500 TABLET ORAL at 16:42

## 2017-01-01 RX ADMIN — Medication 10 ML: at 15:01

## 2017-01-01 RX ADMIN — INSULIN LISPRO 2 UNITS: 100 INJECTION, SOLUTION INTRAVENOUS; SUBCUTANEOUS at 22:01

## 2017-01-01 RX ADMIN — TAMSULOSIN HYDROCHLORIDE 0.4 MG: 0.4 CAPSULE ORAL at 08:18

## 2017-01-01 RX ADMIN — GABAPENTIN 300 MG: 300 CAPSULE ORAL at 22:30

## 2017-01-01 RX ADMIN — INSULIN LISPRO 4 UNITS: 100 INJECTION, SOLUTION INTRAVENOUS; SUBCUTANEOUS at 22:32

## 2017-01-01 RX ADMIN — Medication 10 ML: at 15:59

## 2017-01-01 RX ADMIN — Medication 500 UNITS: at 16:42

## 2017-01-01 RX ADMIN — GABAPENTIN 300 MG: 300 CAPSULE ORAL at 18:51

## 2017-01-01 RX ADMIN — Medication 10 ML: at 15:05

## 2017-01-01 RX ADMIN — Medication 500 UNITS: at 15:44

## 2017-01-01 RX ADMIN — HYDROCODONE BITARTRATE AND ACETAMINOPHEN 1 TABLET: 5; 325 TABLET ORAL at 12:22

## 2017-01-01 RX ADMIN — INSULIN LISPRO 3 UNITS: 100 INJECTION, SOLUTION INTRAVENOUS; SUBCUTANEOUS at 05:10

## 2017-01-01 RX ADMIN — SODIUM CHLORIDE 1 G: 900 INJECTION, SOLUTION INTRAVENOUS at 08:12

## 2017-01-01 RX ADMIN — PIPERACILLIN SODIUM,TAZOBACTAM SODIUM 3.38 G: 3; .375 INJECTION, POWDER, FOR SOLUTION INTRAVENOUS at 15:42

## 2017-01-01 RX ADMIN — METFORMIN HYDROCHLORIDE 500 MG: 500 TABLET, EXTENDED RELEASE ORAL at 17:00

## 2017-01-01 RX ADMIN — HEPARIN SODIUM 5000 UNITS: 5000 INJECTION, SOLUTION INTRAVENOUS; SUBCUTANEOUS at 03:25

## 2017-01-01 RX ADMIN — MUPIROCIN: 20 OINTMENT TOPICAL at 08:03

## 2017-01-01 RX ADMIN — PROPOFOL 100 MG: 10 INJECTION, EMULSION INTRAVENOUS at 18:27

## 2017-01-01 RX ADMIN — SODIUM CHLORIDE, PRESERVATIVE FREE 500 UNITS: 5 INJECTION INTRAVENOUS at 08:47

## 2017-01-01 RX ADMIN — LOSARTAN POTASSIUM 100 MG: 50 TABLET ORAL at 21:52

## 2017-01-01 RX ADMIN — OXYCODONE HYDROCHLORIDE AND ACETAMINOPHEN 250 MG: 500 TABLET ORAL at 22:34

## 2017-01-01 RX ADMIN — Medication 10 ML: at 21:57

## 2017-01-01 RX ADMIN — PIPERACILLIN SODIUM,TAZOBACTAM SODIUM 3.38 G: 3; .375 INJECTION, POWDER, FOR SOLUTION INTRAVENOUS at 14:43

## 2017-01-01 RX ADMIN — Medication 10 ML: at 15:12

## 2017-01-01 RX ADMIN — Medication 1 CAPSULE: at 08:43

## 2017-01-01 RX ADMIN — INSULIN LISPRO 10 UNITS: 100 INJECTION, SOLUTION INTRAVENOUS; SUBCUTANEOUS at 16:57

## 2017-01-01 RX ADMIN — AMLODIPINE BESYLATE 10 MG: 5 TABLET ORAL at 20:44

## 2017-01-01 RX ADMIN — PIPERACILLIN SODIUM,TAZOBACTAM SODIUM 3.38 G: 3; .375 INJECTION, POWDER, FOR SOLUTION INTRAVENOUS at 13:35

## 2017-01-01 RX ADMIN — Medication 500 MG: at 21:35

## 2017-01-01 RX ADMIN — Medication 20 ML: at 14:15

## 2017-01-01 RX ADMIN — SODIUM CHLORIDE 50 ML/HR: 900 INJECTION, SOLUTION INTRAVENOUS at 15:21

## 2017-01-01 RX ADMIN — HYDROCODONE BITARTRATE AND ACETAMINOPHEN 2 TABLET: 5; 325 TABLET ORAL at 19:43

## 2017-01-01 RX ADMIN — Medication 10 ML: at 13:47

## 2017-01-01 RX ADMIN — ASPIRIN 81 MG 81 MG: 81 TABLET ORAL at 08:49

## 2017-01-01 RX ADMIN — INSULIN LISPRO 7 UNITS: 100 INJECTION, SOLUTION INTRAVENOUS; SUBCUTANEOUS at 22:02

## 2017-01-01 RX ADMIN — ASPIRIN 81 MG 81 MG: 81 TABLET ORAL at 10:13

## 2017-01-01 RX ADMIN — PRAVASTATIN SODIUM 20 MG: 10 TABLET ORAL at 22:25

## 2017-01-01 RX ADMIN — LOSARTAN POTASSIUM 100 MG: 50 TABLET ORAL at 17:19

## 2017-01-01 RX ADMIN — CEFTRIAXONE 1 G: 1 INJECTION, POWDER, FOR SOLUTION INTRAMUSCULAR; INTRAVENOUS at 08:49

## 2017-01-01 RX ADMIN — GABAPENTIN 300 MG: 300 CAPSULE ORAL at 16:00

## 2017-01-01 RX ADMIN — ENOXAPARIN SODIUM 40 MG: 40 INJECTION SUBCUTANEOUS at 08:37

## 2017-01-01 RX ADMIN — Medication 10 ML: at 10:55

## 2017-01-01 RX ADMIN — GABAPENTIN 300 MG: 300 CAPSULE ORAL at 23:38

## 2017-01-01 RX ADMIN — ASPIRIN 81 MG 81 MG: 81 TABLET ORAL at 08:43

## 2017-01-01 RX ADMIN — Medication 500 UNITS: at 15:57

## 2017-01-01 RX ADMIN — SODIUM CHLORIDE 50 ML/HR: 900 INJECTION, SOLUTION INTRAVENOUS at 09:50

## 2017-01-01 RX ADMIN — Medication 500 UNITS: at 13:47

## 2017-01-01 RX ADMIN — ENOXAPARIN SODIUM 40 MG: 40 INJECTION SUBCUTANEOUS at 08:48

## 2017-01-01 RX ADMIN — Medication 1 CAPSULE: at 08:39

## 2017-01-01 RX ADMIN — Medication 500 MG: at 22:04

## 2017-01-01 RX ADMIN — Medication 10 ML: at 16:05

## 2017-01-01 RX ADMIN — SODIUM CHLORIDE 50 ML/HR: 900 INJECTION, SOLUTION INTRAVENOUS at 13:07

## 2017-01-01 RX ADMIN — PANTOPRAZOLE SODIUM 40 MG: 40 TABLET, DELAYED RELEASE ORAL at 06:55

## 2017-01-01 RX ADMIN — SIMVASTATIN 10 MG: 10 TABLET, FILM COATED ORAL at 21:46

## 2017-01-01 RX ADMIN — INSULIN LISPRO 5 UNITS: 100 INJECTION, SOLUTION INTRAVENOUS; SUBCUTANEOUS at 13:02

## 2017-01-01 RX ADMIN — CLOPIDOGREL BISULFATE 75 MG: 75 TABLET, FILM COATED ORAL at 08:43

## 2017-01-01 RX ADMIN — SODIUM CHLORIDE 1 G: 900 INJECTION, SOLUTION INTRAVENOUS at 08:02

## 2017-01-01 RX ADMIN — Medication 20 ML: at 06:00

## 2017-01-01 RX ADMIN — PIPERACILLIN SODIUM,TAZOBACTAM SODIUM 3.38 G: 3; .375 INJECTION, POWDER, FOR SOLUTION INTRAVENOUS at 05:50

## 2017-01-01 RX ADMIN — LOSARTAN POTASSIUM 100 MG: 50 TABLET ORAL at 21:19

## 2017-01-01 RX ADMIN — INSULIN LISPRO 8 UNITS: 100 INJECTION, SOLUTION INTRAVENOUS; SUBCUTANEOUS at 12:24

## 2017-01-01 RX ADMIN — FERROUS SULFATE TAB 325 MG (65 MG ELEMENTAL FE) 325 MG: 325 (65 FE) TAB at 18:51

## 2017-01-01 RX ADMIN — INSULIN GLARGINE 8 UNITS: 100 INJECTION, SOLUTION SUBCUTANEOUS at 22:30

## 2017-01-01 RX ADMIN — HEPARIN SODIUM 5000 UNITS: 5000 INJECTION, SOLUTION INTRAVENOUS; SUBCUTANEOUS at 17:20

## 2017-01-01 RX ADMIN — DAPTOMYCIN 500 MG: 500 INJECTION, POWDER, LYOPHILIZED, FOR SOLUTION INTRAVENOUS at 15:05

## 2017-01-01 RX ADMIN — Medication 20 ML: at 15:18

## 2017-01-01 RX ADMIN — Medication 10 ML: at 15:57

## 2017-01-01 RX ADMIN — Medication 10 ML: at 15:48

## 2017-01-01 RX ADMIN — Medication 500 UNITS: at 14:58

## 2017-01-01 RX ADMIN — FENTANYL CITRATE 25 MCG: 50 INJECTION, SOLUTION INTRAMUSCULAR; INTRAVENOUS at 10:51

## 2017-01-01 RX ADMIN — Medication 10 ML: at 15:49

## 2017-01-01 RX ADMIN — INSULIN GLARGINE 12 UNITS: 100 INJECTION, SOLUTION SUBCUTANEOUS at 22:24

## 2017-01-01 RX ADMIN — INSULIN LISPRO 8 UNITS: 100 INJECTION, SOLUTION INTRAVENOUS; SUBCUTANEOUS at 12:54

## 2017-01-01 RX ADMIN — OXYCODONE HYDROCHLORIDE 5 MG: 5 TABLET ORAL at 07:13

## 2017-01-01 RX ADMIN — INSULIN LISPRO 8 UNITS: 100 INJECTION, SOLUTION INTRAVENOUS; SUBCUTANEOUS at 08:41

## 2017-01-01 RX ADMIN — INSULIN LISPRO 7 UNITS: 100 INJECTION, SOLUTION INTRAVENOUS; SUBCUTANEOUS at 07:11

## 2017-01-01 RX ADMIN — Medication 10 ML: at 15:07

## 2017-01-01 RX ADMIN — Medication 10 ML: at 15:40

## 2017-01-01 RX ADMIN — SODIUM CHLORIDE 25 ML/HR: 900 INJECTION, SOLUTION INTRAVENOUS at 11:05

## 2017-01-01 RX ADMIN — Medication 10 ML: at 10:29

## 2017-01-01 RX ADMIN — SODIUM CHLORIDE 1 G: 900 INJECTION, SOLUTION INTRAVENOUS at 15:35

## 2017-01-01 RX ADMIN — SODIUM CHLORIDE 1 G: 900 INJECTION, SOLUTION INTRAVENOUS at 15:23

## 2017-01-01 RX ADMIN — Medication 500 UNITS: at 13:48

## 2017-01-01 RX ADMIN — INSULIN HUMAN 20 UNITS: 100 INJECTION, SUSPENSION SUBCUTANEOUS at 08:44

## 2017-01-01 RX ADMIN — GABAPENTIN 100 MG: 100 CAPSULE ORAL at 09:48

## 2017-01-01 RX ADMIN — OXYCODONE HYDROCHLORIDE AND ACETAMINOPHEN 250 MG: 500 TABLET ORAL at 08:23

## 2017-01-01 RX ADMIN — SODIUM CHLORIDE, SODIUM LACTATE, POTASSIUM CHLORIDE, AND CALCIUM CHLORIDE 50 ML/HR: 600; 310; 30; 20 INJECTION, SOLUTION INTRAVENOUS at 18:17

## 2017-01-01 RX ADMIN — LOSARTAN POTASSIUM 100 MG: 50 TABLET ORAL at 10:13

## 2017-01-01 RX ADMIN — INSULIN LISPRO 8 UNITS: 100 INJECTION, SOLUTION INTRAVENOUS; SUBCUTANEOUS at 18:09

## 2017-01-01 RX ADMIN — HYDROCODONE BITARTRATE AND ACETAMINOPHEN 2 TABLET: 5; 325 TABLET ORAL at 14:50

## 2017-01-01 RX ADMIN — SODIUM CHLORIDE 75 ML/HR: 900 INJECTION, SOLUTION INTRAVENOUS at 15:39

## 2017-01-01 RX ADMIN — VALSARTAN 160 MG: 160 TABLET ORAL at 08:43

## 2017-01-01 RX ADMIN — SODIUM CHLORIDE, PRESERVATIVE FREE 500 UNITS: 5 INJECTION INTRAVENOUS at 08:39

## 2017-01-01 RX ADMIN — Medication 10 ML: at 20:56

## 2017-01-01 RX ADMIN — Medication 10 ML: at 15:03

## 2017-01-01 RX ADMIN — Medication 10 ML: at 11:03

## 2017-01-01 RX ADMIN — SODIUM CHLORIDE 75 ML/HR: 900 INJECTION, SOLUTION INTRAVENOUS at 15:42

## 2017-01-01 RX ADMIN — HUMAN INSULIN 20 UNITS: 100 INJECTION, SUSPENSION SUBCUTANEOUS at 08:54

## 2017-01-01 RX ADMIN — ENOXAPARIN SODIUM 40 MG: 40 INJECTION SUBCUTANEOUS at 17:27

## 2017-01-01 RX ADMIN — PROPOFOL 25 MCG/KG/MIN: 10 INJECTION, EMULSION INTRAVENOUS at 13:11

## 2017-01-01 RX ADMIN — Medication 500 UNITS: at 10:54

## 2017-01-01 RX ADMIN — Medication 500 UNITS: at 14:56

## 2017-01-01 RX ADMIN — FENTANYL CITRATE 25 MCG: 50 INJECTION, SOLUTION INTRAMUSCULAR; INTRAVENOUS at 18:40

## 2017-01-01 RX ADMIN — BACITRACIN 50000 UNITS: 50000 INJECTION, POWDER, FOR SOLUTION INTRAMUSCULAR at 09:11

## 2017-01-01 RX ADMIN — SODIUM CHLORIDE, PRESERVATIVE FREE 500 UNITS: 5 INJECTION INTRAVENOUS at 11:49

## 2017-01-01 RX ADMIN — PIPERACILLIN SODIUM,TAZOBACTAM SODIUM 3.38 G: 3; .375 INJECTION, POWDER, FOR SOLUTION INTRAVENOUS at 14:12

## 2017-01-01 RX ADMIN — SODIUM CHLORIDE 25 ML/HR: 900 INJECTION, SOLUTION INTRAVENOUS at 15:19

## 2017-01-01 RX ADMIN — MUPIROCIN: 20 OINTMENT TOPICAL at 17:20

## 2017-01-01 RX ADMIN — INSULIN LISPRO 10 UNITS: 100 INJECTION, SOLUTION INTRAVENOUS; SUBCUTANEOUS at 12:27

## 2017-01-01 RX ADMIN — SODIUM CHLORIDE 1 G: 900 INJECTION, SOLUTION INTRAVENOUS at 15:24

## 2017-01-01 RX ADMIN — DEXAMETHASONE SODIUM PHOSPHATE 4 MG: 4 INJECTION, SOLUTION INTRA-ARTICULAR; INTRALESIONAL; INTRAMUSCULAR; INTRAVENOUS; SOFT TISSUE at 18:35

## 2017-01-01 RX ADMIN — Medication 500 UNITS: at 15:10

## 2017-01-01 RX ADMIN — SODIUM CHLORIDE 50 ML/HR: 900 INJECTION, SOLUTION INTRAVENOUS at 15:17

## 2017-01-01 RX ADMIN — TETANUS TOXOID, REDUCED DIPHTHERIA TOXOID AND ACELLULAR PERTUSSIS VACCINE, ADSORBED 0.5 ML: 5; 2.5; 8; 8; 2.5 SUSPENSION INTRAMUSCULAR at 13:11

## 2017-01-01 RX ADMIN — TAMSULOSIN HYDROCHLORIDE 0.4 MG: 0.4 CAPSULE ORAL at 17:00

## 2017-01-01 RX ADMIN — MIDAZOLAM HYDROCHLORIDE 2 MG: 1 INJECTION, SOLUTION INTRAMUSCULAR; INTRAVENOUS at 21:42

## 2017-01-01 RX ADMIN — SODIUM CHLORIDE 1 G: 900 INJECTION, SOLUTION INTRAVENOUS at 11:02

## 2017-01-01 RX ADMIN — OXYCODONE HYDROCHLORIDE 5 MG: 5 TABLET ORAL at 21:06

## 2017-01-01 RX ADMIN — ENOXAPARIN SODIUM 40 MG: 40 INJECTION SUBCUTANEOUS at 08:52

## 2017-01-01 RX ADMIN — HEPARIN SODIUM 5000 UNITS: 5000 INJECTION, SOLUTION INTRAVENOUS; SUBCUTANEOUS at 09:52

## 2017-01-01 RX ADMIN — ACETAMINOPHEN 650 MG: 325 TABLET, FILM COATED ORAL at 07:11

## 2017-01-01 RX ADMIN — SODIUM CHLORIDE 1 G: 900 INJECTION, SOLUTION INTRAVENOUS at 07:59

## 2017-01-01 RX ADMIN — Medication 500 UNITS: at 15:58

## 2017-01-01 RX ADMIN — GABAPENTIN 300 MG: 300 CAPSULE ORAL at 16:41

## 2017-01-01 RX ADMIN — OXYCODONE HYDROCHLORIDE 5 MG: 5 TABLET ORAL at 07:35

## 2017-01-01 RX ADMIN — ASPIRIN 81 MG 81 MG: 81 TABLET ORAL at 08:39

## 2017-01-01 RX ADMIN — INSULIN LISPRO 3 UNITS: 100 INJECTION, SOLUTION INTRAVENOUS; SUBCUTANEOUS at 06:54

## 2017-01-01 RX ADMIN — Medication 80 MCG: at 18:54

## 2017-01-01 RX ADMIN — SODIUM CHLORIDE 500 ML: 900 INJECTION, SOLUTION INTRAVENOUS at 14:11

## 2017-01-01 RX ADMIN — SODIUM CHLORIDE 75 ML/HR: 450 INJECTION, SOLUTION INTRAVENOUS at 09:54

## 2017-01-01 RX ADMIN — Medication 500 UNITS: at 15:40

## 2017-01-01 RX ADMIN — INSULIN LISPRO 10 UNITS: 100 INJECTION, SOLUTION INTRAVENOUS; SUBCUTANEOUS at 17:01

## 2017-01-01 RX ADMIN — AMLODIPINE BESYLATE 5 MG: 5 TABLET ORAL at 10:05

## 2017-01-01 RX ADMIN — HEPARIN SODIUM 5000 UNITS: 5000 INJECTION, SOLUTION INTRAVENOUS; SUBCUTANEOUS at 17:58

## 2017-01-01 RX ADMIN — Medication 10 ML: at 09:56

## 2017-01-01 RX ADMIN — Medication 10 ML: at 05:12

## 2017-01-01 RX ADMIN — GABAPENTIN 100 MG: 100 CAPSULE ORAL at 21:06

## 2017-01-01 RX ADMIN — ACETAMINOPHEN 650 MG: 325 TABLET, FILM COATED ORAL at 22:52

## 2017-01-01 RX ADMIN — PROPOFOL 125 MG: 10 INJECTION, EMULSION INTRAVENOUS at 10:45

## 2017-01-01 RX ADMIN — PRAVASTATIN SODIUM 20 MG: 10 TABLET ORAL at 21:52

## 2017-01-01 RX ADMIN — MUPIROCIN: 20 OINTMENT TOPICAL at 21:00

## 2017-01-01 RX ADMIN — CEFTRIAXONE 1 G: 1 INJECTION, POWDER, FOR SOLUTION INTRAMUSCULAR; INTRAVENOUS at 06:36

## 2017-01-01 RX ADMIN — Medication 10 ML: at 11:46

## 2017-01-01 RX ADMIN — AMLODIPINE BESYLATE 5 MG: 5 TABLET ORAL at 09:42

## 2017-01-01 RX ADMIN — Medication 10 ML: at 13:02

## 2017-01-01 RX ADMIN — FENTANYL CITRATE 25 MCG: 50 INJECTION, SOLUTION INTRAMUSCULAR; INTRAVENOUS at 21:51

## 2017-01-01 RX ADMIN — PANTOPRAZOLE SODIUM 40 MG: 40 TABLET, DELAYED RELEASE ORAL at 09:14

## 2017-01-01 RX ADMIN — Medication 20 ML: at 15:49

## 2017-01-01 RX ADMIN — GABAPENTIN 300 MG: 300 CAPSULE ORAL at 08:24

## 2017-01-01 RX ADMIN — OXYCODONE HYDROCHLORIDE AND ACETAMINOPHEN 250 MG: 500 TABLET ORAL at 09:39

## 2017-01-01 RX ADMIN — HYDROCODONE BITARTRATE AND ACETAMINOPHEN 2 TABLET: 5; 325 TABLET ORAL at 11:38

## 2017-01-01 RX ADMIN — INSULIN LISPRO 4 UNITS: 100 INJECTION, SOLUTION INTRAVENOUS; SUBCUTANEOUS at 08:00

## 2017-01-01 RX ADMIN — Medication 2 MG: at 07:30

## 2017-01-01 RX ADMIN — MORPHINE SULFATE 4 MG: 2 INJECTION, SOLUTION INTRAMUSCULAR; INTRAVENOUS at 13:23

## 2017-01-01 RX ADMIN — Medication 30 ML: at 21:52

## 2017-01-01 RX ADMIN — MORPHINE SULFATE 2 MG: 4 INJECTION, SOLUTION INTRAMUSCULAR; INTRAVENOUS at 11:09

## 2017-01-01 RX ADMIN — SODIUM CHLORIDE 25 ML/HR: 900 INJECTION, SOLUTION INTRAVENOUS at 15:16

## 2017-01-01 RX ADMIN — VALSARTAN 160 MG: 160 TABLET ORAL at 08:37

## 2017-01-01 RX ADMIN — MIDAZOLAM HYDROCHLORIDE 1 MG: 1 INJECTION, SOLUTION INTRAMUSCULAR; INTRAVENOUS at 08:31

## 2017-01-01 RX ADMIN — VALSARTAN 160 MG: 160 TABLET ORAL at 08:48

## 2017-01-01 RX ADMIN — LOSARTAN POTASSIUM 100 MG: 50 TABLET ORAL at 09:38

## 2017-01-01 RX ADMIN — METFORMIN HYDROCHLORIDE 500 MG: 500 TABLET, EXTENDED RELEASE ORAL at 08:55

## 2017-01-01 RX ADMIN — INSULIN GLARGINE 15 UNITS: 100 INJECTION, SOLUTION SUBCUTANEOUS at 22:03

## 2017-01-01 RX ADMIN — MIDAZOLAM HYDROCHLORIDE 2 MG: 1 INJECTION INTRAMUSCULAR; INTRAVENOUS at 10:15

## 2017-01-01 RX ADMIN — SODIUM CHLORIDE 1 G: 900 INJECTION, SOLUTION INTRAVENOUS at 14:26

## 2017-01-01 RX ADMIN — HYDROCODONE BITARTRATE AND ACETAMINOPHEN 2 TABLET: 5; 325 TABLET ORAL at 16:04

## 2017-01-01 RX ADMIN — KETOROLAC TROMETHAMINE 15 MG: 30 INJECTION, SOLUTION INTRAMUSCULAR at 11:18

## 2017-01-01 RX ADMIN — Medication 10 ML: at 13:36

## 2017-01-01 RX ADMIN — Medication 10 ML: at 15:44

## 2017-01-01 RX ADMIN — MIDAZOLAM HYDROCHLORIDE 1 MG: 1 INJECTION, SOLUTION INTRAMUSCULAR; INTRAVENOUS at 09:09

## 2017-01-01 RX ADMIN — Medication 20 ML: at 22:00

## 2017-01-01 RX ADMIN — Medication 500 UNITS: at 16:01

## 2017-01-01 RX ADMIN — AMLODIPINE BESYLATE 5 MG: 5 TABLET ORAL at 09:14

## 2017-01-01 RX ADMIN — CEFTRIAXONE 1 G: 1 INJECTION, POWDER, FOR SOLUTION INTRAMUSCULAR; INTRAVENOUS at 09:43

## 2017-01-01 RX ADMIN — LIDOCAINE HYDROCHLORIDE 30 MG: 10; .005 INJECTION, SOLUTION EPIDURAL; INFILTRATION; INTRACAUDAL; PERINEURAL at 08:32

## 2017-01-01 RX ADMIN — SODIUM CHLORIDE, SODIUM LACTATE, POTASSIUM CHLORIDE, CALCIUM CHLORIDE: 600; 310; 30; 20 INJECTION, SOLUTION INTRAVENOUS at 10:30

## 2017-01-01 RX ADMIN — HUMAN INSULIN 20 UNITS: 100 INJECTION, SUSPENSION SUBCUTANEOUS at 09:36

## 2017-01-01 RX ADMIN — Medication 10 ML: at 15:08

## 2017-01-01 RX ADMIN — PIPERACILLIN SODIUM,TAZOBACTAM SODIUM 3.38 G: 3; .375 INJECTION, POWDER, FOR SOLUTION INTRAVENOUS at 18:02

## 2017-01-01 RX ADMIN — Medication 40 ML: at 23:03

## 2017-01-01 RX ADMIN — INSULIN LISPRO 10 UNITS: 100 INJECTION, SOLUTION INTRAVENOUS; SUBCUTANEOUS at 17:34

## 2017-01-01 RX ADMIN — AMLODIPINE BESYLATE 5 MG: 5 TABLET ORAL at 09:39

## 2017-01-01 RX ADMIN — Medication 500 UNITS: at 15:03

## 2017-01-01 RX ADMIN — NADOLOL 40 MG: 40 TABLET ORAL at 09:39

## 2017-01-01 RX ADMIN — INSULIN HUMAN 20 UNITS: 100 INJECTION, SUSPENSION SUBCUTANEOUS at 08:48

## 2017-01-01 RX ADMIN — CLOPIDOGREL BISULFATE 75 MG: 75 TABLET, FILM COATED ORAL at 08:37

## 2017-01-01 RX ADMIN — HEPARIN SODIUM 5000 UNITS: 5000 INJECTION, SOLUTION INTRAVENOUS; SUBCUTANEOUS at 02:36

## 2017-01-01 RX ADMIN — PIPERACILLIN SODIUM,TAZOBACTAM SODIUM 3.38 G: 3; .375 INJECTION, POWDER, FOR SOLUTION INTRAVENOUS at 02:00

## 2017-01-01 RX ADMIN — VANCOMYCIN HYDROCHLORIDE 1000 MG: 1 INJECTION, POWDER, LYOPHILIZED, FOR SOLUTION INTRAVENOUS at 07:50

## 2017-01-01 RX ADMIN — Medication 20 ML: at 13:01

## 2017-01-01 RX ADMIN — INSULIN LISPRO 10 UNITS: 100 INJECTION, SOLUTION INTRAVENOUS; SUBCUTANEOUS at 13:07

## 2017-01-01 RX ADMIN — Medication 10 ML: at 15:19

## 2017-01-01 RX ADMIN — INSULIN LISPRO 7 UNITS: 100 INJECTION, SOLUTION INTRAVENOUS; SUBCUTANEOUS at 16:30

## 2017-01-01 RX ADMIN — INSULIN GLARGINE 12 UNITS: 100 INJECTION, SOLUTION SUBCUTANEOUS at 21:26

## 2017-01-01 RX ADMIN — ASPIRIN 81 MG CHEWABLE TABLET 81 MG: 81 TABLET CHEWABLE at 08:53

## 2017-01-01 RX ADMIN — PRAVASTATIN SODIUM 40 MG: 40 TABLET ORAL at 22:28

## 2017-01-01 RX ADMIN — Medication 10 ML: at 09:49

## 2017-01-01 RX ADMIN — CLOPIDOGREL BISULFATE 75 MG: 75 TABLET, FILM COATED ORAL at 09:14

## 2017-01-01 RX ADMIN — KETOROLAC TROMETHAMINE 10 MG: 30 INJECTION, SOLUTION INTRAMUSCULAR at 04:32

## 2017-01-01 RX ADMIN — PIPERACILLIN SODIUM,TAZOBACTAM SODIUM 3.38 G: 3; .375 INJECTION, POWDER, FOR SOLUTION INTRAVENOUS at 05:00

## 2017-01-01 RX ADMIN — Medication 10 ML: at 10:54

## 2017-01-01 RX ADMIN — ACETAMINOPHEN 650 MG: 325 TABLET, FILM COATED ORAL at 23:23

## 2017-01-01 RX ADMIN — TAMSULOSIN HYDROCHLORIDE 0.4 MG: 0.4 CAPSULE ORAL at 23:38

## 2017-01-01 RX ADMIN — GABAPENTIN 100 MG: 100 CAPSULE ORAL at 21:46

## 2017-01-01 RX ADMIN — FERROUS SULFATE TAB 325 MG (65 MG ELEMENTAL FE) 325 MG: 325 (65 FE) TAB at 13:03

## 2017-01-01 RX ADMIN — OXYCODONE HYDROCHLORIDE 5 MG: 5 TABLET ORAL at 01:26

## 2017-01-01 RX ADMIN — OXYCODONE HYDROCHLORIDE AND ACETAMINOPHEN 250 MG: 500 TABLET ORAL at 16:00

## 2017-01-01 RX ADMIN — MIDAZOLAM HYDROCHLORIDE 1 MG: 1 INJECTION, SOLUTION INTRAMUSCULAR; INTRAVENOUS at 08:34

## 2017-01-01 RX ADMIN — Medication 10 ML: at 14:15

## 2017-01-01 RX ADMIN — INSULIN LISPRO 15 UNITS: 100 INJECTION, SOLUTION INTRAVENOUS; SUBCUTANEOUS at 12:37

## 2017-01-01 RX ADMIN — LOSARTAN POTASSIUM 100 MG: 50 TABLET ORAL at 08:43

## 2017-01-01 RX ADMIN — ATROPINE SULFATE 1 MG: 0.1 INJECTION, SOLUTION INTRAVENOUS at 12:32

## 2017-01-01 RX ADMIN — AMLODIPINE BESYLATE 10 MG: 5 TABLET ORAL at 08:49

## 2017-01-01 RX ADMIN — HEPARIN SODIUM 5000 UNITS: 5000 INJECTION, SOLUTION INTRAVENOUS; SUBCUTANEOUS at 10:29

## 2017-01-01 RX ADMIN — GABAPENTIN 300 MG: 300 CAPSULE ORAL at 22:04

## 2017-01-01 RX ADMIN — GABAPENTIN 300 MG: 300 CAPSULE ORAL at 22:26

## 2017-01-01 RX ADMIN — SODIUM CHLORIDE 1 G: 900 INJECTION, SOLUTION INTRAVENOUS at 15:17

## 2017-01-01 RX ADMIN — INSULIN GLARGINE 12 UNITS: 100 INJECTION, SOLUTION SUBCUTANEOUS at 22:26

## 2017-01-01 RX ADMIN — MIDAZOLAM HYDROCHLORIDE 1 MG: 1 INJECTION, SOLUTION INTRAMUSCULAR; INTRAVENOUS at 08:51

## 2017-01-01 RX ADMIN — INSULIN LISPRO 8 UNITS: 100 INJECTION, SOLUTION INTRAVENOUS; SUBCUTANEOUS at 17:32

## 2017-01-01 RX ADMIN — SODIUM CHLORIDE 50 ML/HR: 900 INJECTION, SOLUTION INTRAVENOUS at 14:54

## 2017-01-01 RX ADMIN — ACETAMINOPHEN 650 MG: 325 TABLET, FILM COATED ORAL at 07:32

## 2017-01-01 RX ADMIN — SODIUM CHLORIDE 1 G: 900 INJECTION, SOLUTION INTRAVENOUS at 15:21

## 2017-01-01 RX ADMIN — INSULIN GLARGINE 8 UNITS: 100 INJECTION, SOLUTION SUBCUTANEOUS at 22:43

## 2017-01-01 RX ADMIN — LOSARTAN POTASSIUM 100 MG: 50 TABLET ORAL at 10:40

## 2017-01-01 RX ADMIN — MELATONIN 3 MG ORAL TABLET 4.5 MG: 3 TABLET ORAL at 00:21

## 2017-01-01 RX ADMIN — TAMSULOSIN HYDROCHLORIDE 0.4 MG: 0.4 CAPSULE ORAL at 08:51

## 2017-01-01 RX ADMIN — ACETAMINOPHEN 650 MG: 325 TABLET ORAL at 17:18

## 2017-01-01 RX ADMIN — LIDOCAINE HYDROCHLORIDE 30 ML: 10 INJECTION, SOLUTION INFILTRATION; PERINEURAL at 15:07

## 2017-01-01 RX ADMIN — INSULIN LISPRO 12 UNITS: 100 INJECTION, SOLUTION INTRAVENOUS; SUBCUTANEOUS at 12:40

## 2017-01-01 RX ADMIN — CLOPIDOGREL BISULFATE 75 MG: 75 TABLET ORAL at 08:52

## 2017-01-01 RX ADMIN — Medication 20 ML: at 22:12

## 2017-01-01 RX ADMIN — AMLODIPINE BESYLATE 5 MG: 5 TABLET ORAL at 08:37

## 2017-01-01 RX ADMIN — GABAPENTIN 300 MG: 300 CAPSULE ORAL at 08:54

## 2017-01-01 RX ADMIN — SODIUM CHLORIDE 25 ML/HR: 900 INJECTION, SOLUTION INTRAVENOUS at 14:20

## 2017-01-01 RX ADMIN — Medication 10 ML: at 06:00

## 2017-01-01 RX ADMIN — HYDROCHLOROTHIAZIDE: 25 TABLET ORAL at 08:54

## 2017-01-01 RX ADMIN — Medication 10 ML: at 08:54

## 2017-01-01 RX ADMIN — Medication 10 ML: at 15:04

## 2017-01-01 RX ADMIN — ACETAMINOPHEN 650 MG: 325 TABLET, FILM COATED ORAL at 17:56

## 2017-01-01 RX ADMIN — OXYCODONE HYDROCHLORIDE AND ACETAMINOPHEN 250 MG: 500 TABLET ORAL at 08:44

## 2017-01-01 RX ADMIN — GABAPENTIN 300 MG: 300 CAPSULE ORAL at 21:34

## 2017-01-01 RX ADMIN — INSULIN GLARGINE 15 UNITS: 100 INJECTION, SOLUTION SUBCUTANEOUS at 22:28

## 2017-01-01 RX ADMIN — AMLODIPINE BESYLATE 5 MG: 5 TABLET ORAL at 12:22

## 2017-01-01 RX ADMIN — FERROUS SULFATE TAB 325 MG (65 MG ELEMENTAL FE) 325 MG: 325 (65 FE) TAB at 08:37

## 2017-01-01 RX ADMIN — Medication 10 ML: at 13:38

## 2017-01-01 RX ADMIN — PIPERACILLIN SODIUM,TAZOBACTAM SODIUM 3.38 G: 3; .375 INJECTION, POWDER, FOR SOLUTION INTRAVENOUS at 07:07

## 2017-01-01 RX ADMIN — INSULIN LISPRO 2 UNITS: 100 INJECTION, SOLUTION INTRAVENOUS; SUBCUTANEOUS at 18:18

## 2017-01-01 RX ADMIN — INSULIN GLARGINE 12 UNITS: 100 INJECTION, SOLUTION SUBCUTANEOUS at 22:00

## 2017-01-01 RX ADMIN — Medication 10 ML: at 22:00

## 2017-01-01 RX ADMIN — FENTANYL CITRATE 50 MCG: 50 INJECTION, SOLUTION INTRAMUSCULAR; INTRAVENOUS at 08:17

## 2017-01-01 RX ADMIN — TAMSULOSIN HYDROCHLORIDE 0.4 MG: 0.4 CAPSULE ORAL at 08:40

## 2017-01-01 RX ADMIN — MORPHINE SULFATE 2 MG: 2 INJECTION, SOLUTION INTRAMUSCULAR; INTRAVENOUS at 12:14

## 2017-01-01 RX ADMIN — SIMVASTATIN 10 MG: 10 TABLET, FILM COATED ORAL at 22:05

## 2017-01-01 RX ADMIN — Medication 10 ML: at 08:12

## 2017-01-01 RX ADMIN — Medication 10 ML: at 08:10

## 2017-01-01 RX ADMIN — DAPTOMYCIN 500 MG: 500 INJECTION, POWDER, LYOPHILIZED, FOR SOLUTION INTRAVENOUS at 10:34

## 2017-01-01 RX ADMIN — OXYCODONE HYDROCHLORIDE AND ACETAMINOPHEN 250 MG: 500 TABLET ORAL at 15:16

## 2017-01-01 RX ADMIN — Medication 10 ML: at 16:41

## 2017-01-01 RX ADMIN — HALOPERIDOL LACTATE 5 MG: 5 INJECTION, SOLUTION INTRAMUSCULAR at 00:31

## 2017-01-01 RX ADMIN — Medication 10 ML: at 10:23

## 2017-01-01 RX ADMIN — PIPERACILLIN SODIUM,TAZOBACTAM SODIUM 3.38 G: 3; .375 INJECTION, POWDER, FOR SOLUTION INTRAVENOUS at 21:52

## 2017-01-01 RX ADMIN — INSULIN GLARGINE 12 UNITS: 100 INJECTION, SOLUTION SUBCUTANEOUS at 23:27

## 2017-01-01 RX ADMIN — MORPHINE SULFATE 2 MG: 4 INJECTION, SOLUTION INTRAMUSCULAR; INTRAVENOUS at 11:45

## 2017-01-01 RX ADMIN — INSULIN LISPRO 3 UNITS: 100 INJECTION, SOLUTION INTRAVENOUS; SUBCUTANEOUS at 12:25

## 2017-01-01 RX ADMIN — CLOPIDOGREL BISULFATE 75 MG: 75 TABLET, FILM COATED ORAL at 09:38

## 2017-01-01 RX ADMIN — SODIUM CHLORIDE 75 ML/HR: 900 INJECTION, SOLUTION INTRAVENOUS at 22:59

## 2017-01-01 RX ADMIN — INSULIN LISPRO 3 UNITS: 100 INJECTION, SOLUTION INTRAVENOUS; SUBCUTANEOUS at 00:47

## 2017-01-01 RX ADMIN — OXYCODONE HYDROCHLORIDE AND ACETAMINOPHEN 250 MG: 500 TABLET ORAL at 16:35

## 2017-01-01 RX ADMIN — Medication 10 ML: at 15:56

## 2017-01-01 RX ADMIN — GABAPENTIN 300 MG: 300 CAPSULE ORAL at 16:35

## 2017-01-01 RX ADMIN — OXYCODONE HYDROCHLORIDE AND ACETAMINOPHEN 250 MG: 500 TABLET ORAL at 21:46

## 2017-01-01 RX ADMIN — HYDROCODONE BITARTRATE AND ACETAMINOPHEN 2 TABLET: 5; 325 TABLET ORAL at 09:54

## 2017-01-01 RX ADMIN — Medication 500 UNITS: at 10:44

## 2017-01-01 RX ADMIN — PRAVASTATIN SODIUM 20 MG: 10 TABLET ORAL at 22:30

## 2017-01-01 RX ADMIN — Medication 10 ML: at 13:48

## 2017-01-01 RX ADMIN — SODIUM CHLORIDE 1 G: 900 INJECTION, SOLUTION INTRAVENOUS at 13:09

## 2017-01-01 RX ADMIN — DAPTOMYCIN 500 MG: 500 INJECTION, POWDER, LYOPHILIZED, FOR SOLUTION INTRAVENOUS at 15:02

## 2017-01-01 RX ADMIN — INSULIN LISPRO 10 UNITS: 100 INJECTION, SOLUTION INTRAVENOUS; SUBCUTANEOUS at 16:47

## 2017-01-01 RX ADMIN — HUMAN INSULIN 20 UNITS: 100 INJECTION, SUSPENSION SUBCUTANEOUS at 08:41

## 2017-01-01 RX ADMIN — SODIUM CHLORIDE 1 G: 900 INJECTION, SOLUTION INTRAVENOUS at 15:50

## 2017-01-01 RX ADMIN — METFORMIN HYDROCHLORIDE 500 MG: 500 TABLET, EXTENDED RELEASE ORAL at 17:38

## 2017-01-01 RX ADMIN — Medication 10 ML: at 14:00

## 2017-01-01 RX ADMIN — HEPARIN SODIUM 5000 UNITS: 5000 INJECTION, SOLUTION INTRAVENOUS; SUBCUTANEOUS at 02:17

## 2017-01-01 RX ADMIN — LOSARTAN POTASSIUM 100 MG: 50 TABLET ORAL at 09:48

## 2017-01-01 RX ADMIN — ACETAMINOPHEN 650 MG: 325 TABLET ORAL at 23:38

## 2017-01-01 RX ADMIN — HYDROCODONE BITARTRATE AND ACETAMINOPHEN 2 TABLET: 5; 325 TABLET ORAL at 17:48

## 2017-01-01 RX ADMIN — ENOXAPARIN SODIUM 40 MG: 40 INJECTION SUBCUTANEOUS at 08:38

## 2017-01-01 RX ADMIN — AMLODIPINE BESYLATE 5 MG: 5 TABLET ORAL at 08:41

## 2017-01-01 RX ADMIN — GABAPENTIN 300 MG: 300 CAPSULE ORAL at 15:39

## 2017-01-01 RX ADMIN — INSULIN HUMAN 20 UNITS: 100 INJECTION, SUSPENSION SUBCUTANEOUS at 08:37

## 2017-01-01 RX ADMIN — ASPIRIN 81 MG 81 MG: 81 TABLET ORAL at 08:37

## 2017-01-01 RX ADMIN — Medication 10 ML: at 13:05

## 2017-01-01 RX ADMIN — ASPIRIN 81 MG CHEWABLE TABLET 81 MG: 81 TABLET CHEWABLE at 08:24

## 2017-01-01 RX ADMIN — PROPOFOL 50 MCG/KG/MIN: 10 INJECTION, EMULSION INTRAVENOUS at 10:39

## 2017-01-01 RX ADMIN — Medication 10 ML: at 16:00

## 2017-01-01 RX ADMIN — Medication 10 ML: at 09:37

## 2017-01-01 RX ADMIN — ACETAMINOPHEN 650 MG: 325 TABLET ORAL at 09:41

## 2017-01-01 RX ADMIN — LOSARTAN POTASSIUM 100 MG: 50 TABLET ORAL at 08:27

## 2017-01-01 RX ADMIN — AMLODIPINE BESYLATE 5 MG: 5 TABLET ORAL at 08:43

## 2017-01-01 RX ADMIN — Medication 80 MCG: at 18:35

## 2017-01-01 RX ADMIN — OXYCODONE HYDROCHLORIDE 5 MG: 5 TABLET ORAL at 17:19

## 2017-01-01 RX ADMIN — Medication 1 CAPSULE: at 08:52

## 2017-01-01 RX ADMIN — Medication 20 ML: at 14:56

## 2017-01-01 RX ADMIN — FERROUS SULFATE TAB 325 MG (65 MG ELEMENTAL FE) 325 MG: 325 (65 FE) TAB at 08:42

## 2017-01-01 RX ADMIN — NADOLOL 40 MG: 40 TABLET ORAL at 08:54

## 2017-01-01 RX ADMIN — METFORMIN HYDROCHLORIDE 500 MG: 500 TABLET, EXTENDED RELEASE ORAL at 16:41

## 2017-01-01 RX ADMIN — DAPTOMYCIN 500 MG: 500 INJECTION, POWDER, LYOPHILIZED, FOR SOLUTION INTRAVENOUS at 15:07

## 2017-01-01 RX ADMIN — PRAVASTATIN SODIUM 20 MG: 10 TABLET ORAL at 21:01

## 2017-01-01 RX ADMIN — Medication 10 ML: at 15:32

## 2017-01-01 RX ADMIN — Medication 500 MG: at 22:52

## 2017-01-01 RX ADMIN — PANTOPRAZOLE SODIUM 40 MG: 40 TABLET, DELAYED RELEASE ORAL at 08:00

## 2017-01-01 RX ADMIN — Medication 500 MG: at 23:38

## 2017-01-01 RX ADMIN — Medication 10 ML: at 13:03

## 2017-01-01 RX ADMIN — INSULIN LISPRO 3 UNITS: 100 INJECTION, SOLUTION INTRAVENOUS; SUBCUTANEOUS at 12:10

## 2017-01-01 RX ADMIN — SODIUM CHLORIDE 1 G: 900 INJECTION, SOLUTION INTRAVENOUS at 15:04

## 2017-01-01 RX ADMIN — DAPTOMYCIN 500 MG: 500 INJECTION, POWDER, LYOPHILIZED, FOR SOLUTION INTRAVENOUS at 10:53

## 2017-01-01 RX ADMIN — Medication 10 ML: at 14:18

## 2017-01-01 RX ADMIN — Medication 10 ML: at 15:13

## 2017-01-01 RX ADMIN — LOSARTAN POTASSIUM 100 MG: 50 TABLET ORAL at 10:30

## 2017-01-01 RX ADMIN — MIDAZOLAM HYDROCHLORIDE 1 MG: 1 INJECTION, SOLUTION INTRAMUSCULAR; INTRAVENOUS at 08:40

## 2017-01-01 RX ADMIN — Medication: at 16:23

## 2017-01-01 RX ADMIN — LOSARTAN POTASSIUM 100 MG: 50 TABLET ORAL at 09:49

## 2017-01-01 RX ADMIN — KETOROLAC TROMETHAMINE 15 MG: 30 INJECTION, SOLUTION INTRAMUSCULAR at 22:28

## 2017-01-01 RX ADMIN — FENTANYL CITRATE 25 MCG: 50 INJECTION, SOLUTION INTRAMUSCULAR; INTRAVENOUS at 18:23

## 2017-01-01 RX ADMIN — SODIUM CHLORIDE 1 G: 900 INJECTION, SOLUTION INTRAVENOUS at 15:18

## 2017-01-01 RX ADMIN — FENTANYL CITRATE 25 MCG: 50 INJECTION, SOLUTION INTRAMUSCULAR; INTRAVENOUS at 18:33

## 2017-01-01 RX ADMIN — Medication 20 ML: at 14:57

## 2017-01-01 RX ADMIN — Medication 10 ML: at 15:20

## 2017-01-01 RX ADMIN — Medication 300 UNITS: at 19:50

## 2017-01-01 RX ADMIN — SODIUM CHLORIDE, PRESERVATIVE FREE 500 UNITS: 5 INJECTION INTRAVENOUS at 11:28

## 2017-01-01 RX ADMIN — Medication 500 UNITS: at 10:46

## 2017-01-01 RX ADMIN — VANCOMYCIN HYDROCHLORIDE 1250 MG: 10 INJECTION, POWDER, LYOPHILIZED, FOR SOLUTION INTRAVENOUS at 07:00

## 2017-01-01 RX ADMIN — CEFTRIAXONE 1 G: 1 INJECTION, POWDER, FOR SOLUTION INTRAMUSCULAR; INTRAVENOUS at 12:46

## 2017-01-01 RX ADMIN — ACETAMINOPHEN 650 MG: 325 TABLET, FILM COATED ORAL at 17:19

## 2017-01-01 RX ADMIN — Medication 20 ML: at 15:54

## 2017-01-01 RX ADMIN — PIPERACILLIN SODIUM,TAZOBACTAM SODIUM 3.38 G: 3; .375 INJECTION, POWDER, FOR SOLUTION INTRAVENOUS at 21:30

## 2017-01-01 RX ADMIN — Medication 10 ML: at 15:41

## 2017-01-01 RX ADMIN — ASPIRIN 325 MG ORAL TABLET 325 MG: 325 PILL ORAL at 14:06

## 2017-01-01 RX ADMIN — Medication 10 ML: at 10:46

## 2017-01-01 RX ADMIN — SODIUM CHLORIDE 1 G: 900 INJECTION, SOLUTION INTRAVENOUS at 14:34

## 2017-01-01 RX ADMIN — INSULIN LISPRO 12 UNITS: 100 INJECTION, SOLUTION INTRAVENOUS; SUBCUTANEOUS at 11:17

## 2017-01-01 RX ADMIN — INSULIN LISPRO 4 UNITS: 100 INJECTION, SOLUTION INTRAVENOUS; SUBCUTANEOUS at 21:26

## 2017-01-01 RX ADMIN — Medication 2 MG: at 23:03

## 2017-01-01 RX ADMIN — HEPARIN SODIUM 1000 UNITS: 200 INJECTION, SOLUTION INTRAVENOUS at 08:20

## 2017-01-01 RX ADMIN — MIDAZOLAM HYDROCHLORIDE 1 MG: 1 INJECTION, SOLUTION INTRAMUSCULAR; INTRAVENOUS at 09:26

## 2017-01-01 RX ADMIN — HUMAN INSULIN 20 UNITS: 100 INJECTION, SUSPENSION SUBCUTANEOUS at 08:17

## 2017-01-01 RX ADMIN — SODIUM CHLORIDE 25 ML/HR: 900 INJECTION, SOLUTION INTRAVENOUS at 14:58

## 2017-01-01 RX ADMIN — HALOPERIDOL LACTATE 5 MG: 5 INJECTION, SOLUTION INTRAMUSCULAR at 10:35

## 2017-01-01 RX ADMIN — INSULIN LISPRO 10 UNITS: 100 INJECTION, SOLUTION INTRAVENOUS; SUBCUTANEOUS at 11:49

## 2017-01-01 RX ADMIN — OXYCODONE HYDROCHLORIDE AND ACETAMINOPHEN 250 MG: 500 TABLET ORAL at 08:52

## 2017-01-01 RX ADMIN — Medication 10 ML: at 08:59

## 2017-01-01 RX ADMIN — INSULIN LISPRO 2 UNITS: 100 INJECTION, SOLUTION INTRAVENOUS; SUBCUTANEOUS at 12:00

## 2017-01-01 RX ADMIN — INSULIN GLARGINE 15 UNITS: 100 INJECTION, SOLUTION SUBCUTANEOUS at 21:49

## 2017-01-01 RX ADMIN — SODIUM CHLORIDE 1 G: 900 INJECTION, SOLUTION INTRAVENOUS at 14:28

## 2017-01-01 RX ADMIN — TAMSULOSIN HYDROCHLORIDE 0.4 MG: 0.4 CAPSULE ORAL at 18:33

## 2017-01-01 RX ADMIN — FERROUS SULFATE TAB 325 MG (65 MG ELEMENTAL FE) 325 MG: 325 (65 FE) TAB at 09:43

## 2017-01-01 RX ADMIN — DAPTOMYCIN 500 MG: 500 INJECTION, POWDER, LYOPHILIZED, FOR SOLUTION INTRAVENOUS at 15:09

## 2017-01-01 RX ADMIN — ACETAMINOPHEN 650 MG: 325 TABLET, FILM COATED ORAL at 12:26

## 2017-01-01 RX ADMIN — Medication 10 ML: at 15:22

## 2017-01-01 RX ADMIN — HEPARIN SODIUM 1000 UNITS: 200 INJECTION, SOLUTION INTRAVENOUS at 15:04

## 2017-01-01 RX ADMIN — LIDOCAINE HYDROCHLORIDE 80 MG: 20 INJECTION, SOLUTION EPIDURAL; INFILTRATION; INTRACAUDAL; PERINEURAL at 21:51

## 2017-01-01 RX ADMIN — ENOXAPARIN SODIUM 40 MG: 40 INJECTION SUBCUTANEOUS at 18:47

## 2017-01-01 RX ADMIN — AMLODIPINE BESYLATE 5 MG: 5 TABLET ORAL at 09:38

## 2017-01-01 RX ADMIN — SODIUM CHLORIDE 25 ML/HR: 900 INJECTION, SOLUTION INTRAVENOUS at 14:56

## 2017-01-01 RX ADMIN — PRAVASTATIN SODIUM 20 MG: 10 TABLET ORAL at 21:07

## 2017-01-01 RX ADMIN — Medication 10 ML: at 05:11

## 2017-01-01 RX ADMIN — GABAPENTIN 100 MG: 100 CAPSULE ORAL at 16:33

## 2017-01-01 RX ADMIN — HYDRALAZINE HYDROCHLORIDE 10 MG: 20 INJECTION INTRAMUSCULAR; INTRAVENOUS at 16:03

## 2017-01-01 RX ADMIN — MIDAZOLAM HYDROCHLORIDE 2 MG: 1 INJECTION, SOLUTION INTRAMUSCULAR; INTRAVENOUS at 10:15

## 2017-01-01 RX ADMIN — SODIUM CHLORIDE, PRESERVATIVE FREE 500 UNITS: 5 INJECTION INTRAVENOUS at 15:34

## 2017-01-01 RX ADMIN — Medication 10 ML: at 15:17

## 2017-01-01 RX ADMIN — DAPTOMYCIN 500 MG: 500 INJECTION, POWDER, LYOPHILIZED, FOR SOLUTION INTRAVENOUS at 17:52

## 2017-01-01 RX ADMIN — OXYCODONE HYDROCHLORIDE AND ACETAMINOPHEN 250 MG: 500 TABLET ORAL at 21:52

## 2017-01-01 RX ADMIN — Medication 10 ML: at 15:02

## 2017-01-01 RX ADMIN — SODIUM CHLORIDE 1 G: 900 INJECTION, SOLUTION INTRAVENOUS at 15:22

## 2017-01-01 RX ADMIN — Medication: at 16:40

## 2017-01-01 RX ADMIN — DAPTOMYCIN 500 MG: 500 INJECTION, POWDER, LYOPHILIZED, FOR SOLUTION INTRAVENOUS at 16:30

## 2017-01-01 RX ADMIN — PANTOPRAZOLE SODIUM 40 MG: 40 TABLET, DELAYED RELEASE ORAL at 09:48

## 2017-01-01 RX ADMIN — ACETAMINOPHEN 650 MG: 325 TABLET, FILM COATED ORAL at 12:54

## 2017-01-01 RX ADMIN — Medication 20 ML: at 13:49

## 2017-01-01 RX ADMIN — Medication 80 MCG: at 18:29

## 2017-01-01 RX ADMIN — Medication 500 UNITS: at 15:55

## 2017-01-01 RX ADMIN — HUMAN INSULIN 5 UNITS: 100 INJECTION, SOLUTION SUBCUTANEOUS at 12:14

## 2017-01-01 RX ADMIN — INSULIN LISPRO 3 UNITS: 100 INJECTION, SOLUTION INTRAVENOUS; SUBCUTANEOUS at 18:04

## 2017-01-01 RX ADMIN — HYDROCODONE BITARTRATE AND ACETAMINOPHEN 2 TABLET: 5; 325 TABLET ORAL at 21:28

## 2017-01-01 RX ADMIN — Medication 10 ML: at 15:16

## 2017-01-01 RX ADMIN — SODIUM CHLORIDE 1 G: 900 INJECTION, SOLUTION INTRAVENOUS at 09:51

## 2017-01-01 RX ADMIN — HYDROCODONE BITARTRATE AND ACETAMINOPHEN 2 TABLET: 5; 325 TABLET ORAL at 01:24

## 2017-01-01 RX ADMIN — DEXTROSE MONOHYDRATE 125 ML: 10 INJECTION, SOLUTION INTRAVENOUS at 18:38

## 2017-01-01 RX ADMIN — CEFTRIAXONE 1 G: 1 INJECTION, POWDER, FOR SOLUTION INTRAMUSCULAR; INTRAVENOUS at 09:29

## 2017-01-01 RX ADMIN — Medication 500 UNITS: at 15:32

## 2017-01-01 RX ADMIN — LOSARTAN POTASSIUM 100 MG: 50 TABLET ORAL at 08:53

## 2017-01-01 RX ADMIN — MIDAZOLAM HYDROCHLORIDE 2 MG: 1 INJECTION, SOLUTION INTRAMUSCULAR; INTRAVENOUS at 12:52

## 2017-01-01 RX ADMIN — Medication 1 CAPSULE: at 08:53

## 2017-01-01 RX ADMIN — SODIUM CHLORIDE, PRESERVATIVE FREE 500 UNITS: 5 INJECTION INTRAVENOUS at 11:15

## 2017-01-01 RX ADMIN — Medication 10 ML: at 15:15

## 2017-01-01 RX ADMIN — MUPIROCIN: 20 OINTMENT TOPICAL at 22:26

## 2017-01-01 RX ADMIN — ASPIRIN 81 MG 81 MG: 81 TABLET ORAL at 08:53

## 2017-01-01 RX ADMIN — Medication 500 MG: at 21:46

## 2017-01-01 RX ADMIN — DAPTOMYCIN 500 MG: 500 INJECTION, POWDER, LYOPHILIZED, FOR SOLUTION INTRAVENOUS at 15:40

## 2017-01-01 RX ADMIN — CLOPIDOGREL BISULFATE 75 MG: 75 TABLET, FILM COATED ORAL at 08:39

## 2017-01-01 RX ADMIN — OXYCODONE HYDROCHLORIDE 5 MG: 5 TABLET ORAL at 12:26

## 2017-01-01 RX ADMIN — MEROPENEM 1 G: 1 INJECTION, POWDER, FOR SOLUTION INTRAVENOUS at 15:40

## 2017-01-01 RX ADMIN — OXYCODONE HYDROCHLORIDE AND ACETAMINOPHEN 250 MG: 500 TABLET ORAL at 15:39

## 2017-01-01 RX ADMIN — HYDROCODONE BITARTRATE AND ACETAMINOPHEN 2 TABLET: 5; 325 TABLET ORAL at 05:25

## 2017-01-01 RX ADMIN — Medication 10 ML: at 05:33

## 2017-01-01 RX ADMIN — HYDROCODONE BITARTRATE AND ACETAMINOPHEN 2 TABLET: 5; 325 TABLET ORAL at 07:21

## 2017-01-01 RX ADMIN — GABAPENTIN 300 MG: 300 CAPSULE ORAL at 21:46

## 2017-01-01 RX ADMIN — GABAPENTIN 300 MG: 300 CAPSULE ORAL at 22:35

## 2017-01-01 RX ADMIN — INSULIN GLARGINE 12 UNITS: 100 INJECTION, SOLUTION SUBCUTANEOUS at 21:49

## 2017-01-01 RX ADMIN — FENTANYL CITRATE 50 MCG: 50 INJECTION, SOLUTION INTRAMUSCULAR; INTRAVENOUS at 08:30

## 2017-01-01 RX ADMIN — Medication 10 ML: at 22:29

## 2017-01-01 RX ADMIN — INSULIN LISPRO 10 UNITS: 100 INJECTION, SOLUTION INTRAVENOUS; SUBCUTANEOUS at 12:59

## 2017-01-01 RX ADMIN — METFORMIN HYDROCHLORIDE 500 MG: 500 TABLET, EXTENDED RELEASE ORAL at 09:39

## 2017-01-01 RX ADMIN — ASPIRIN 81 MG 81 MG: 81 TABLET ORAL at 09:14

## 2017-01-01 RX ADMIN — SODIUM CHLORIDE 1 G: 900 INJECTION, SOLUTION INTRAVENOUS at 15:13

## 2017-01-01 RX ADMIN — GABAPENTIN 300 MG: 300 CAPSULE ORAL at 17:44

## 2017-01-01 RX ADMIN — DAPTOMYCIN 500 MG: 500 INJECTION, POWDER, LYOPHILIZED, FOR SOLUTION INTRAVENOUS at 15:10

## 2017-01-01 RX ADMIN — SODIUM CHLORIDE 1 G: 900 INJECTION, SOLUTION INTRAVENOUS at 15:11

## 2017-01-01 RX ADMIN — MELATONIN 3 MG ORAL TABLET 4.5 MG: 3 TABLET ORAL at 00:59

## 2017-01-01 RX ADMIN — VANCOMYCIN HYDROCHLORIDE 1250 MG: 10 INJECTION, POWDER, LYOPHILIZED, FOR SOLUTION INTRAVENOUS at 16:41

## 2017-01-01 RX ADMIN — Medication 20 ML: at 15:02

## 2017-01-01 RX ADMIN — SODIUM CHLORIDE 1 G: 900 INJECTION, SOLUTION INTRAVENOUS at 16:10

## 2017-01-01 RX ADMIN — SODIUM CHLORIDE 1 G: 900 INJECTION, SOLUTION INTRAVENOUS at 15:14

## 2017-01-01 RX ADMIN — HYDROCODONE BITARTRATE AND ACETAMINOPHEN 2 TABLET: 5; 325 TABLET ORAL at 12:41

## 2017-01-01 RX ADMIN — OXYCODONE HYDROCHLORIDE AND ACETAMINOPHEN 250 MG: 500 TABLET ORAL at 22:00

## 2017-01-01 RX ADMIN — SODIUM CHLORIDE 1 G: 900 INJECTION, SOLUTION INTRAVENOUS at 14:22

## 2017-01-01 RX ADMIN — FERROUS SULFATE TAB 325 MG (65 MG ELEMENTAL FE) 325 MG: 325 (65 FE) TAB at 12:22

## 2017-01-01 RX ADMIN — Medication 20 ML: at 05:04

## 2017-01-01 RX ADMIN — INSULIN LISPRO 2 UNITS: 100 INJECTION, SOLUTION INTRAVENOUS; SUBCUTANEOUS at 12:38

## 2017-01-01 RX ADMIN — INSULIN LISPRO 8 UNITS: 100 INJECTION, SOLUTION INTRAVENOUS; SUBCUTANEOUS at 07:11

## 2017-01-01 RX ADMIN — Medication 500 UNITS: at 14:36

## 2017-01-01 RX ADMIN — MEROPENEM 1 G: 1 INJECTION, POWDER, FOR SOLUTION INTRAVENOUS at 09:30

## 2017-01-01 RX ADMIN — SODIUM CHLORIDE 1 G: 9 INJECTION, SOLUTION INTRAVENOUS at 16:02

## 2017-01-01 RX ADMIN — TAMSULOSIN HYDROCHLORIDE 0.4 MG: 0.4 CAPSULE ORAL at 18:04

## 2017-01-01 RX ADMIN — CEFTRIAXONE 1 G: 1 INJECTION, POWDER, FOR SOLUTION INTRAMUSCULAR; INTRAVENOUS at 11:19

## 2017-01-01 RX ADMIN — Medication 20 ML: at 21:01

## 2017-01-01 RX ADMIN — Medication 20 ML: at 15:13

## 2017-01-01 RX ADMIN — CEFTRIAXONE 1 G: 1 INJECTION, POWDER, FOR SOLUTION INTRAMUSCULAR; INTRAVENOUS at 10:33

## 2017-01-01 RX ADMIN — Medication 1 CAPSULE: at 08:37

## 2017-01-01 RX ADMIN — AMLODIPINE BESYLATE 5 MG: 5 TABLET ORAL at 13:00

## 2017-01-01 RX ADMIN — Medication 10 ML: at 10:44

## 2017-01-01 RX ADMIN — TAMSULOSIN HYDROCHLORIDE 0.4 MG: 0.4 CAPSULE ORAL at 09:38

## 2017-01-01 RX ADMIN — ACETAMINOPHEN 650 MG: 325 TABLET, FILM COATED ORAL at 07:08

## 2017-01-01 RX ADMIN — Medication 500 UNITS: at 08:02

## 2017-01-01 RX ADMIN — INSULIN LISPRO 5 UNITS: 100 INJECTION, SOLUTION INTRAVENOUS; SUBCUTANEOUS at 07:08

## 2017-01-01 RX ADMIN — Medication 40 ML: at 22:11

## 2017-01-01 RX ADMIN — HUMAN INSULIN 20 UNITS: 100 INJECTION, SUSPENSION SUBCUTANEOUS at 08:00

## 2017-01-01 RX ADMIN — GABAPENTIN 100 MG: 100 CAPSULE ORAL at 21:11

## 2017-01-01 RX ADMIN — Medication 10 ML: at 23:39

## 2017-01-01 RX ADMIN — INSULIN LISPRO 6 UNITS: 100 INJECTION, SOLUTION INTRAVENOUS; SUBCUTANEOUS at 17:20

## 2017-01-01 RX ADMIN — SODIUM CHLORIDE 1 G: 900 INJECTION, SOLUTION INTRAVENOUS at 14:25

## 2017-01-01 RX ADMIN — INSULIN LISPRO 2 UNITS: 100 INJECTION, SOLUTION INTRAVENOUS; SUBCUTANEOUS at 22:42

## 2017-01-01 RX ADMIN — GABAPENTIN 300 MG: 300 CAPSULE ORAL at 08:23

## 2017-01-01 RX ADMIN — HEPARIN SODIUM 5000 UNITS: 5000 INJECTION, SOLUTION INTRAVENOUS; SUBCUTANEOUS at 17:05

## 2017-01-01 RX ADMIN — HYDROCODONE BITARTRATE AND ACETAMINOPHEN 2 TABLET: 5; 325 TABLET ORAL at 20:01

## 2017-01-01 RX ADMIN — SODIUM CHLORIDE 1 G: 900 INJECTION, SOLUTION INTRAVENOUS at 13:50

## 2017-01-01 RX ADMIN — Medication 500 UNITS: at 15:54

## 2017-01-01 RX ADMIN — GABAPENTIN 300 MG: 300 CAPSULE ORAL at 22:29

## 2017-01-01 RX ADMIN — HUMAN INSULIN 20 UNITS: 100 INJECTION, SUSPENSION SUBCUTANEOUS at 10:06

## 2017-01-01 RX ADMIN — FERROUS SULFATE TAB 325 MG (65 MG ELEMENTAL FE) 325 MG: 325 (65 FE) TAB at 08:18

## 2017-01-01 RX ADMIN — Medication 500 UNITS: at 15:56

## 2017-01-01 RX ADMIN — MIDAZOLAM HYDROCHLORIDE 1 MG: 1 INJECTION, SOLUTION INTRAMUSCULAR; INTRAVENOUS at 09:11

## 2017-01-01 RX ADMIN — Medication 500 UNITS: at 10:48

## 2017-01-01 RX ADMIN — HUMAN INSULIN 20 UNITS: 100 INJECTION, SUSPENSION SUBCUTANEOUS at 08:49

## 2017-01-01 RX ADMIN — FERROUS SULFATE TAB 325 MG (65 MG ELEMENTAL FE) 325 MG: 325 (65 FE) TAB at 07:32

## 2017-01-01 RX ADMIN — Medication 500 UNITS: at 15:50

## 2017-01-01 RX ADMIN — FENTANYL CITRATE 25 MCG: 50 INJECTION, SOLUTION INTRAMUSCULAR; INTRAVENOUS at 15:00

## 2017-01-01 RX ADMIN — GABAPENTIN 100 MG: 100 CAPSULE ORAL at 08:28

## 2017-01-01 RX ADMIN — HUMAN INSULIN 20 UNITS: 100 INJECTION, SUSPENSION SUBCUTANEOUS at 09:38

## 2017-01-01 RX ADMIN — GABAPENTIN 300 MG: 300 CAPSULE ORAL at 08:43

## 2017-01-01 RX ADMIN — Medication 500 MG: at 22:26

## 2017-01-01 RX ADMIN — METFORMIN HYDROCHLORIDE 500 MG: 500 TABLET, EXTENDED RELEASE ORAL at 08:54

## 2017-01-01 RX ADMIN — OXYCODONE HYDROCHLORIDE 5 MG: 5 TABLET ORAL at 16:34

## 2017-01-01 RX ADMIN — ASPIRIN 81 MG CHEWABLE TABLET 81 MG: 81 TABLET CHEWABLE at 12:22

## 2017-01-01 RX ADMIN — PANTOPRAZOLE SODIUM 40 MG: 40 TABLET, DELAYED RELEASE ORAL at 08:42

## 2017-01-01 RX ADMIN — SODIUM CHLORIDE, POTASSIUM CHLORIDE, SODIUM LACTATE AND CALCIUM CHLORIDE: 600; 310; 30; 20 INJECTION, SOLUTION INTRAVENOUS at 21:45

## 2017-01-01 RX ADMIN — INSULIN LISPRO 4 UNITS: 100 INJECTION, SOLUTION INTRAVENOUS; SUBCUTANEOUS at 08:38

## 2017-01-01 RX ADMIN — OXYCODONE HYDROCHLORIDE AND ACETAMINOPHEN 250 MG: 500 TABLET ORAL at 15:08

## 2017-01-01 RX ADMIN — Medication 10 ML: at 16:40

## 2017-01-01 RX ADMIN — Medication 10 ML: at 10:07

## 2017-01-01 RX ADMIN — SIMVASTATIN 10 MG: 10 TABLET, FILM COATED ORAL at 21:52

## 2017-01-01 RX ADMIN — INFLUENZA VIRUS VACCINE 0.5 ML: 15; 15; 15; 15 SUSPENSION INTRAMUSCULAR at 08:45

## 2017-01-01 RX ADMIN — OXYCODONE HYDROCHLORIDE 2.5 MG: 5 TABLET ORAL at 01:57

## 2017-01-01 RX ADMIN — Medication 20 ML: at 16:01

## 2017-01-01 RX ADMIN — OXYCODONE HYDROCHLORIDE 5 MG: 5 TABLET ORAL at 07:15

## 2017-01-01 RX ADMIN — Medication 20 ML: at 12:48

## 2017-01-01 RX ADMIN — AMLODIPINE BESYLATE 5 MG: 5 TABLET ORAL at 09:49

## 2017-01-01 RX ADMIN — CLOPIDOGREL BISULFATE 75 MG: 75 TABLET, FILM COATED ORAL at 10:05

## 2017-01-01 RX ADMIN — INSULIN GLARGINE 12 UNITS: 100 INJECTION, SOLUTION SUBCUTANEOUS at 21:07

## 2017-01-01 RX ADMIN — PIPERACILLIN SODIUM,TAZOBACTAM SODIUM 3.38 G: 3; .375 INJECTION, POWDER, FOR SOLUTION INTRAVENOUS at 05:05

## 2017-01-01 RX ADMIN — Medication 30 ML: at 15:09

## 2017-01-01 RX ADMIN — Medication 20 ML: at 15:07

## 2017-01-01 RX ADMIN — INSULIN LISPRO 5 UNITS: 100 INJECTION, SOLUTION INTRAVENOUS; SUBCUTANEOUS at 17:20

## 2017-01-01 RX ADMIN — GABAPENTIN 100 MG: 100 CAPSULE ORAL at 17:19

## 2017-01-01 RX ADMIN — MUPIROCIN: 20 OINTMENT TOPICAL at 11:14

## 2017-01-01 RX ADMIN — TAMSULOSIN HYDROCHLORIDE 0.4 MG: 0.4 CAPSULE ORAL at 18:18

## 2017-01-01 RX ADMIN — Medication 20 ML: at 06:05

## 2017-01-01 RX ADMIN — Medication 1 CAPSULE: at 09:42

## 2017-01-01 RX ADMIN — HYDROCHLOROTHIAZIDE: 25 TABLET ORAL at 08:23

## 2017-01-01 RX ADMIN — INSULIN LISPRO 6 UNITS: 100 INJECTION, SOLUTION INTRAVENOUS; SUBCUTANEOUS at 08:52

## 2017-01-01 RX ADMIN — PANTOPRAZOLE SODIUM 40 MG: 40 TABLET, DELAYED RELEASE ORAL at 10:13

## 2017-01-01 RX ADMIN — ASPIRIN 81 MG 81 MG: 81 TABLET ORAL at 08:28

## 2017-01-01 RX ADMIN — Medication 40 ML: at 05:06

## 2017-01-01 RX ADMIN — INSULIN LISPRO 12 UNITS: 100 INJECTION, SOLUTION INTRAVENOUS; SUBCUTANEOUS at 16:35

## 2017-01-01 RX ADMIN — OXYCODONE HYDROCHLORIDE 5 MG: 5 TABLET ORAL at 03:07

## 2017-01-01 RX ADMIN — HYDROCODONE BITARTRATE AND ACETAMINOPHEN 2 TABLET: 5; 325 TABLET ORAL at 16:00

## 2017-01-01 RX ADMIN — ASPIRIN 81 MG 81 MG: 81 TABLET ORAL at 08:27

## 2017-01-01 RX ADMIN — SODIUM CHLORIDE 50 ML/HR: 900 INJECTION, SOLUTION INTRAVENOUS at 15:03

## 2017-01-01 RX ADMIN — SODIUM CHLORIDE 100 ML/HR: 900 INJECTION, SOLUTION INTRAVENOUS at 13:44

## 2017-01-01 RX ADMIN — PANTOPRAZOLE SODIUM 40 MG: 40 TABLET, DELAYED RELEASE ORAL at 08:29

## 2017-01-01 RX ADMIN — PIPERACILLIN SODIUM,TAZOBACTAM SODIUM 3.38 G: 3; .375 INJECTION, POWDER, FOR SOLUTION INTRAVENOUS at 13:30

## 2017-01-01 RX ADMIN — GABAPENTIN 300 MG: 300 CAPSULE ORAL at 09:39

## 2017-01-01 RX ADMIN — Medication 10 ML: at 15:47

## 2017-01-01 RX ADMIN — SODIUM CHLORIDE 25 ML/HR: 900 INJECTION, SOLUTION INTRAVENOUS at 15:18

## 2017-01-01 RX ADMIN — SODIUM CHLORIDE 50 ML/HR: 900 INJECTION, SOLUTION INTRAVENOUS at 08:12

## 2017-01-01 RX ADMIN — AMLODIPINE BESYLATE 5 MG: 5 TABLET ORAL at 08:23

## 2017-01-01 RX ADMIN — OXYCODONE HYDROCHLORIDE AND ACETAMINOPHEN 250 MG: 500 TABLET ORAL at 17:44

## 2017-01-01 RX ADMIN — HYDROCODONE BITARTRATE AND ACETAMINOPHEN 1 TABLET: 5; 325 TABLET ORAL at 19:09

## 2017-01-01 RX ADMIN — SODIUM CHLORIDE 1 G: 900 INJECTION, SOLUTION INTRAVENOUS at 15:16

## 2017-01-01 RX ADMIN — AMLODIPINE BESYLATE 5 MG: 5 TABLET ORAL at 08:28

## 2017-01-01 RX ADMIN — TAMSULOSIN HYDROCHLORIDE 0.4 MG: 0.4 CAPSULE ORAL at 08:37

## 2017-01-01 RX ADMIN — TAMSULOSIN HYDROCHLORIDE 0.4 MG: 0.4 CAPSULE ORAL at 21:06

## 2017-01-01 RX ADMIN — HYDRALAZINE HYDROCHLORIDE 10 MG: 20 INJECTION INTRAMUSCULAR; INTRAVENOUS at 06:09

## 2017-01-01 RX ADMIN — OXYCODONE HYDROCHLORIDE AND ACETAMINOPHEN 250 MG: 500 TABLET ORAL at 08:24

## 2017-01-01 RX ADMIN — SODIUM CHLORIDE 75 ML/HR: 900 INJECTION, SOLUTION INTRAVENOUS at 08:18

## 2017-01-01 RX ADMIN — NADOLOL 40 MG: 40 TABLET ORAL at 08:23

## 2017-01-01 RX ADMIN — PANTOPRAZOLE SODIUM 40 MG: 40 TABLET, DELAYED RELEASE ORAL at 07:00

## 2017-01-01 RX ADMIN — Medication 10 ML: at 11:45

## 2017-01-01 RX ADMIN — INSULIN LISPRO 4 UNITS: 100 INJECTION, SOLUTION INTRAVENOUS; SUBCUTANEOUS at 17:08

## 2017-01-01 RX ADMIN — Medication 10 ML: at 21:41

## 2017-01-01 RX ADMIN — SODIUM CHLORIDE 25 ML/HR: 900 INJECTION, SOLUTION INTRAVENOUS at 09:48

## 2017-01-01 RX ADMIN — HUMAN INSULIN 30 UNITS: 100 INJECTION, SUSPENSION SUBCUTANEOUS at 08:27

## 2017-01-01 RX ADMIN — Medication 500 UNITS: at 15:04

## 2017-01-01 RX ADMIN — FENTANYL CITRATE 50 MCG: 50 INJECTION, SOLUTION INTRAMUSCULAR; INTRAVENOUS at 12:50

## 2017-01-01 RX ADMIN — Medication 500 UNITS: at 15:38

## 2017-01-01 RX ADMIN — DAPTOMYCIN 500 MG: 500 INJECTION, POWDER, LYOPHILIZED, FOR SOLUTION INTRAVENOUS at 10:46

## 2017-01-01 RX ADMIN — INSULIN GLARGINE 15 UNITS: 100 INJECTION, SOLUTION SUBCUTANEOUS at 21:54

## 2017-01-01 RX ADMIN — PIPERACILLIN SODIUM,TAZOBACTAM SODIUM 3.38 G: 3; .375 INJECTION, POWDER, FOR SOLUTION INTRAVENOUS at 05:12

## 2017-01-01 RX ADMIN — SODIUM CHLORIDE, PRESERVATIVE FREE 500 UNITS: 5 INJECTION INTRAVENOUS at 08:53

## 2017-01-01 RX ADMIN — INSULIN LISPRO 6 UNITS: 100 INJECTION, SOLUTION INTRAVENOUS; SUBCUTANEOUS at 18:04

## 2017-01-01 RX ADMIN — FERROUS SULFATE TAB 325 MG (65 MG ELEMENTAL FE) 325 MG: 325 (65 FE) TAB at 08:39

## 2017-01-01 RX ADMIN — Medication 500 UNITS: at 15:48

## 2017-01-01 RX ADMIN — MUPIROCIN: 20 OINTMENT TOPICAL at 11:40

## 2017-01-01 RX ADMIN — Medication 20 ML: at 23:02

## 2017-01-01 RX ADMIN — SODIUM CHLORIDE 50 ML/HR: 900 INJECTION, SOLUTION INTRAVENOUS at 14:01

## 2017-01-01 RX ADMIN — AMLODIPINE BESYLATE 5 MG: 5 TABLET ORAL at 08:47

## 2017-01-01 RX ADMIN — INSULIN HUMAN 20 UNITS: 100 INJECTION, SUSPENSION SUBCUTANEOUS at 08:42

## 2017-01-01 RX ADMIN — OXYCODONE HYDROCHLORIDE AND ACETAMINOPHEN 250 MG: 500 TABLET ORAL at 08:19

## 2017-01-01 RX ADMIN — Medication 500 UNITS: at 10:23

## 2017-01-01 RX ADMIN — PRAVASTATIN SODIUM 40 MG: 40 TABLET ORAL at 22:35

## 2017-01-01 RX ADMIN — INSULIN LISPRO 7 UNITS: 100 INJECTION, SOLUTION INTRAVENOUS; SUBCUTANEOUS at 18:29

## 2017-01-01 RX ADMIN — Medication 40 ML: at 22:28

## 2017-01-01 RX ADMIN — LOSARTAN POTASSIUM 100 MG: 50 TABLET ORAL at 21:46

## 2017-01-01 RX ADMIN — INSULIN HUMAN 20 UNITS: 100 INJECTION, SUSPENSION SUBCUTANEOUS at 08:38

## 2017-01-01 RX ADMIN — FERROUS SULFATE TAB 325 MG (65 MG ELEMENTAL FE) 325 MG: 325 (65 FE) TAB at 16:35

## 2017-01-01 RX ADMIN — Medication 500 UNITS: at 10:05

## 2017-01-01 RX ADMIN — HUMAN INSULIN 20 UNITS: 100 INJECTION, SUSPENSION SUBCUTANEOUS at 11:06

## 2017-01-01 RX ADMIN — AMLODIPINE BESYLATE 5 MG: 5 TABLET ORAL at 08:18

## 2017-01-01 RX ADMIN — MUPIROCIN: 20 OINTMENT TOPICAL at 17:06

## 2017-01-01 RX ADMIN — Medication: at 18:00

## 2017-01-01 RX ADMIN — SODIUM CHLORIDE 1 G: 900 INJECTION, SOLUTION INTRAVENOUS at 14:16

## 2017-01-01 RX ADMIN — ASPIRIN 81 MG CHEWABLE TABLET 81 MG: 81 TABLET CHEWABLE at 09:38

## 2017-01-01 RX ADMIN — HYDROCHLOROTHIAZIDE: 25 TABLET ORAL at 08:53

## 2017-01-01 RX ADMIN — SODIUM CHLORIDE, SODIUM LACTATE, POTASSIUM CHLORIDE, AND CALCIUM CHLORIDE 125 ML/HR: 600; 310; 30; 20 INJECTION, SOLUTION INTRAVENOUS at 12:06

## 2017-01-01 RX ADMIN — PIPERACILLIN SODIUM,TAZOBACTAM SODIUM 3.38 G: 3; .375 INJECTION, POWDER, FOR SOLUTION INTRAVENOUS at 05:11

## 2017-01-01 RX ADMIN — Medication 40 ML: at 07:05

## 2017-01-01 RX ADMIN — AMLODIPINE BESYLATE 5 MG: 5 TABLET ORAL at 08:52

## 2017-01-01 RX ADMIN — LOSARTAN POTASSIUM 100 MG: 50 TABLET ORAL at 08:28

## 2017-01-01 RX ADMIN — Medication 80 MCG: at 18:52

## 2017-01-01 RX ADMIN — HEPARIN SODIUM 5000 UNITS: 5000 INJECTION, SOLUTION INTRAVENOUS; SUBCUTANEOUS at 09:29

## 2017-01-01 RX ADMIN — LEVOFLOXACIN 500 MG: 5 INJECTION, SOLUTION INTRAVENOUS at 00:38

## 2017-01-01 RX ADMIN — FERROUS SULFATE TAB 325 MG (65 MG ELEMENTAL FE) 325 MG: 325 (65 FE) TAB at 08:58

## 2017-01-01 RX ADMIN — Medication 20 ML: at 15:57

## 2017-01-01 RX ADMIN — AMLODIPINE BESYLATE 5 MG: 5 TABLET ORAL at 08:27

## 2017-01-01 RX ADMIN — TAMSULOSIN HYDROCHLORIDE 0.4 MG: 0.4 CAPSULE ORAL at 08:43

## 2017-01-01 RX ADMIN — DAPTOMYCIN 500 MG: 500 INJECTION, POWDER, LYOPHILIZED, FOR SOLUTION INTRAVENOUS at 18:33

## 2017-01-01 RX ADMIN — MIDAZOLAM HYDROCHLORIDE 2 MG: 1 INJECTION INTRAMUSCULAR; INTRAVENOUS at 08:17

## 2017-01-01 RX ADMIN — GLIPIZIDE 5 MG: 5 TABLET, FILM COATED, EXTENDED RELEASE ORAL at 13:38

## 2017-01-01 RX ADMIN — Medication 500 UNITS: at 15:49

## 2017-01-01 RX ADMIN — Medication 500 MG: at 21:47

## 2017-01-01 RX ADMIN — CLOPIDOGREL BISULFATE 75 MG: 75 TABLET ORAL at 18:51

## 2017-01-01 RX ADMIN — HYDROCODONE BITARTRATE AND ACETAMINOPHEN 1 TABLET: 5; 325 TABLET ORAL at 11:56

## 2017-01-01 RX ADMIN — DAPTOMYCIN 500 MG: 500 INJECTION, POWDER, LYOPHILIZED, FOR SOLUTION INTRAVENOUS at 10:44

## 2017-01-01 RX ADMIN — INSULIN LISPRO 2 UNITS: 100 INJECTION, SOLUTION INTRAVENOUS; SUBCUTANEOUS at 11:56

## 2017-01-01 RX ADMIN — GABAPENTIN 100 MG: 100 CAPSULE ORAL at 10:13

## 2017-01-01 RX ADMIN — ROCURONIUM BROMIDE 10 MG: 10 INJECTION, SOLUTION INTRAVENOUS at 09:38

## 2017-01-01 RX ADMIN — CEFTRIAXONE 1 G: 1 INJECTION, POWDER, FOR SOLUTION INTRAMUSCULAR; INTRAVENOUS at 10:09

## 2017-01-01 RX ADMIN — INSULIN LISPRO 7 UNITS: 100 INJECTION, SOLUTION INTRAVENOUS; SUBCUTANEOUS at 12:54

## 2017-01-01 RX ADMIN — INSULIN LISPRO 7 UNITS: 100 INJECTION, SOLUTION INTRAVENOUS; SUBCUTANEOUS at 16:47

## 2017-01-01 RX ADMIN — Medication 500 UNITS: at 15:06

## 2017-01-01 RX ADMIN — VALSARTAN 160 MG: 80 TABLET ORAL at 08:18

## 2017-01-01 RX ADMIN — CEFAZOLIN 2 G: 1 INJECTION, POWDER, FOR SOLUTION INTRAMUSCULAR; INTRAVENOUS; PARENTERAL at 13:11

## 2017-01-01 RX ADMIN — Medication 500 UNITS: at 16:43

## 2017-01-01 RX ADMIN — Medication 10 ML: at 15:42

## 2017-01-01 RX ADMIN — SODIUM CHLORIDE 75 ML/HR: 450 INJECTION, SOLUTION INTRAVENOUS at 17:38

## 2017-01-01 RX ADMIN — SODIUM CHLORIDE, PRESERVATIVE FREE 500 UNITS: 5 INJECTION INTRAVENOUS at 11:16

## 2017-01-01 RX ADMIN — Medication 10 ML: at 08:02

## 2017-01-01 RX ADMIN — SODIUM CHLORIDE 1 G: 900 INJECTION, SOLUTION INTRAVENOUS at 15:00

## 2017-01-01 RX ADMIN — PIPERACILLIN SODIUM,TAZOBACTAM SODIUM 3.38 G: 3; .375 INJECTION, POWDER, FOR SOLUTION INTRAVENOUS at 22:07

## 2017-01-01 RX ADMIN — ACETAMINOPHEN 650 MG: 325 TABLET, FILM COATED ORAL at 12:37

## 2017-01-01 RX ADMIN — SODIUM CHLORIDE, SODIUM LACTATE, POTASSIUM CHLORIDE, CALCIUM CHLORIDE: 600; 310; 30; 20 INJECTION, SOLUTION INTRAVENOUS at 13:05

## 2017-01-01 RX ADMIN — Medication 500 MG: at 21:51

## 2017-01-01 RX ADMIN — INSULIN LISPRO 10 UNITS: 100 INJECTION, SOLUTION INTRAVENOUS; SUBCUTANEOUS at 12:46

## 2017-01-01 RX ADMIN — NADOLOL 40 MG: 40 TABLET ORAL at 08:28

## 2017-01-01 RX ADMIN — Medication 10 ML: at 14:27

## 2017-01-01 RX ADMIN — Medication 10 ML: at 05:06

## 2017-01-01 RX ADMIN — MUPIROCIN: 20 OINTMENT TOPICAL at 08:41

## 2017-01-01 RX ADMIN — ACETAMINOPHEN 650 MG: 325 TABLET, FILM COATED ORAL at 12:50

## 2017-01-01 RX ADMIN — INSULIN LISPRO 3 UNITS: 100 INJECTION, SOLUTION INTRAVENOUS; SUBCUTANEOUS at 08:18

## 2017-01-01 RX ADMIN — KETOROLAC TROMETHAMINE 15 MG: 30 INJECTION, SOLUTION INTRAMUSCULAR at 04:59

## 2017-01-01 RX ADMIN — PRAVASTATIN SODIUM 20 MG: 10 TABLET ORAL at 22:10

## 2017-01-01 RX ADMIN — HYDROCODONE BITARTRATE AND ACETAMINOPHEN 2 TABLET: 5; 325 TABLET ORAL at 17:31

## 2017-01-01 RX ADMIN — MIDAZOLAM HYDROCHLORIDE 1 MG: 1 INJECTION, SOLUTION INTRAMUSCULAR; INTRAVENOUS at 10:32

## 2017-01-01 RX ADMIN — FERROUS SULFATE TAB 325 MG (65 MG ELEMENTAL FE) 325 MG: 325 (65 FE) TAB at 09:14

## 2017-01-01 RX ADMIN — MIDAZOLAM HYDROCHLORIDE 1 MG: 1 INJECTION INTRAMUSCULAR; INTRAVENOUS at 08:23

## 2017-01-01 RX ADMIN — LOSARTAN POTASSIUM 100 MG: 50 TABLET ORAL at 08:23

## 2017-01-01 RX ADMIN — ACETAMINOPHEN 650 MG: 325 TABLET, FILM COATED ORAL at 17:11

## 2017-01-01 RX ADMIN — PIPERACILLIN SODIUM,TAZOBACTAM SODIUM 3.38 G: 3; .375 INJECTION, POWDER, FOR SOLUTION INTRAVENOUS at 22:28

## 2017-01-01 RX ADMIN — FERROUS SULFATE TAB 325 MG (65 MG ELEMENTAL FE) 325 MG: 325 (65 FE) TAB at 17:27

## 2017-01-01 RX ADMIN — FENTANYL CITRATE 25 MCG: 50 INJECTION, SOLUTION INTRAMUSCULAR; INTRAVENOUS at 09:09

## 2017-01-01 RX ADMIN — HYDROCHLOROTHIAZIDE: 25 TABLET ORAL at 09:38

## 2017-01-01 RX ADMIN — HUMAN INSULIN 20 UNITS: 100 INJECTION, SUSPENSION SUBCUTANEOUS at 22:00

## 2017-01-01 RX ADMIN — Medication 10 ML: at 14:02

## 2017-01-01 RX ADMIN — Medication 30 ML: at 03:11

## 2017-01-01 RX ADMIN — VALSARTAN 160 MG: 160 TABLET ORAL at 08:53

## 2017-01-01 RX ADMIN — MIDAZOLAM HYDROCHLORIDE 1 MG: 1 INJECTION, SOLUTION INTRAMUSCULAR; INTRAVENOUS at 08:23

## 2017-01-01 RX ADMIN — HEPARIN SODIUM 5000 UNITS: 5000 INJECTION, SOLUTION INTRAVENOUS; SUBCUTANEOUS at 18:33

## 2017-01-01 RX ADMIN — SODIUM CHLORIDE 1 G: 900 INJECTION, SOLUTION INTRAVENOUS at 15:02

## 2017-01-01 RX ADMIN — Medication 10 ML: at 14:53

## 2017-01-01 RX ADMIN — AMLODIPINE BESYLATE 5 MG: 5 TABLET ORAL at 08:54

## 2017-01-01 RX ADMIN — Medication 10 ML: at 11:15

## 2017-01-01 RX ADMIN — AMLODIPINE BESYLATE 5 MG: 5 TABLET ORAL at 08:39

## 2017-01-01 RX ADMIN — INSULIN LISPRO 10 UNITS: 100 INJECTION, SOLUTION INTRAVENOUS; SUBCUTANEOUS at 18:04

## 2017-01-01 RX ADMIN — INSULIN LISPRO 10 UNITS: 100 INJECTION, SOLUTION INTRAVENOUS; SUBCUTANEOUS at 16:59

## 2017-01-01 RX ADMIN — Medication 10 ML: at 15:38

## 2017-01-01 RX ADMIN — ASPIRIN 81 MG 81 MG: 81 TABLET ORAL at 09:42

## 2017-01-01 RX ADMIN — SODIUM CHLORIDE, PRESERVATIVE FREE 300 UNITS: 5 INJECTION INTRAVENOUS at 11:00

## 2017-01-01 RX ADMIN — INSULIN GLARGINE 12 UNITS: 100 INJECTION, SOLUTION SUBCUTANEOUS at 22:10

## 2017-01-01 RX ADMIN — DEXMEDETOMIDINE HYDROCHLORIDE 4 MCG: 4 INJECTION, SOLUTION INTRAVENOUS at 18:39

## 2017-01-01 RX ADMIN — PANTOPRAZOLE SODIUM 40 MG: 40 TABLET, DELAYED RELEASE ORAL at 22:35

## 2017-01-01 RX ADMIN — FERROUS SULFATE TAB 325 MG (65 MG ELEMENTAL FE) 325 MG: 325 (65 FE) TAB at 08:52

## 2017-01-01 RX ADMIN — Medication 20 ML: at 22:13

## 2017-01-01 RX ADMIN — ACETAMINOPHEN 650 MG: 325 TABLET ORAL at 08:49

## 2017-01-01 RX ADMIN — INSULIN LISPRO 5 UNITS: 100 INJECTION, SOLUTION INTRAVENOUS; SUBCUTANEOUS at 05:50

## 2017-01-01 RX ADMIN — IOPAMIDOL 100 ML: 755 INJECTION, SOLUTION INTRAVENOUS at 14:01

## 2017-01-01 RX ADMIN — OXYCODONE HYDROCHLORIDE AND ACETAMINOPHEN 250 MG: 500 TABLET ORAL at 22:26

## 2017-01-01 RX ADMIN — ASPIRIN 81 MG CHEWABLE TABLET 81 MG: 81 TABLET CHEWABLE at 08:23

## 2017-01-01 RX ADMIN — FERROUS SULFATE TAB 325 MG (65 MG ELEMENTAL FE) 325 MG: 325 (65 FE) TAB at 09:39

## 2017-01-01 RX ADMIN — AMLODIPINE BESYLATE 5 MG: 5 TABLET ORAL at 08:53

## 2017-01-01 RX ADMIN — PANTOPRAZOLE SODIUM 40 MG: 40 TABLET, DELAYED RELEASE ORAL at 06:51

## 2017-01-01 RX ADMIN — PANTOPRAZOLE SODIUM 40 MG: 40 TABLET, DELAYED RELEASE ORAL at 06:30

## 2017-01-01 RX ADMIN — Medication 40 ML: at 21:08

## 2017-01-01 RX ADMIN — SODIUM CHLORIDE 1 G: 900 INJECTION, SOLUTION INTRAVENOUS at 09:46

## 2017-01-01 RX ADMIN — Medication 500 UNITS: at 15:07

## 2017-01-01 RX ADMIN — GABAPENTIN 300 MG: 300 CAPSULE ORAL at 08:18

## 2017-01-01 RX ADMIN — TAMSULOSIN HYDROCHLORIDE 0.4 MG: 0.4 CAPSULE ORAL at 17:53

## 2017-01-01 RX ADMIN — PANTOPRAZOLE SODIUM 40 MG: 40 TABLET, DELAYED RELEASE ORAL at 08:39

## 2017-01-01 RX ADMIN — NADOLOL 40 MG: 40 TABLET ORAL at 08:27

## 2017-01-01 RX ADMIN — NADOLOL 40 MG: 40 TABLET ORAL at 10:12

## 2017-01-01 RX ADMIN — Medication 500 UNITS: at 08:10

## 2017-01-01 RX ADMIN — SODIUM CHLORIDE 25 ML/HR: 900 INJECTION, SOLUTION INTRAVENOUS at 14:54

## 2017-01-01 RX ADMIN — VANCOMYCIN HYDROCHLORIDE 1250 MG: 10 INJECTION, POWDER, LYOPHILIZED, FOR SOLUTION INTRAVENOUS at 23:55

## 2017-01-01 RX ADMIN — ENOXAPARIN SODIUM 40 MG: 40 INJECTION SUBCUTANEOUS at 18:51

## 2017-01-01 RX ADMIN — MIDAZOLAM HYDROCHLORIDE 1 MG: 1 INJECTION INTRAMUSCULAR; INTRAVENOUS at 15:00

## 2017-01-01 RX ADMIN — Medication 10 ML: at 16:37

## 2017-01-01 RX ADMIN — SODIUM CHLORIDE 1 G: 900 INJECTION, SOLUTION INTRAVENOUS at 15:12

## 2017-01-01 RX ADMIN — SODIUM CHLORIDE, PRESERVATIVE FREE 500 UNITS: 5 INJECTION INTRAVENOUS at 14:58

## 2017-01-01 RX ADMIN — INSULIN LISPRO 8 UNITS: 100 INJECTION, SOLUTION INTRAVENOUS; SUBCUTANEOUS at 17:38

## 2017-01-01 RX ADMIN — FERROUS SULFATE TAB 325 MG (65 MG ELEMENTAL FE) 325 MG: 325 (65 FE) TAB at 07:35

## 2017-01-01 RX ADMIN — LISINOPRIL 20 MG: 20 TABLET ORAL at 10:10

## 2017-01-01 RX ADMIN — ACETAMINOPHEN 650 MG: 325 TABLET ORAL at 20:56

## 2017-01-01 RX ADMIN — INSULIN LISPRO 3 UNITS: 100 INJECTION, SOLUTION INTRAVENOUS; SUBCUTANEOUS at 18:00

## 2017-01-01 RX ADMIN — PANTOPRAZOLE SODIUM 40 MG: 40 TABLET, DELAYED RELEASE ORAL at 08:28

## 2017-01-01 RX ADMIN — INSULIN LISPRO 2 UNITS: 100 INJECTION, SOLUTION INTRAVENOUS; SUBCUTANEOUS at 21:53

## 2017-01-01 RX ADMIN — Medication 10 ML: at 14:30

## 2017-01-01 RX ADMIN — MIDAZOLAM HYDROCHLORIDE 2 MG: 1 INJECTION, SOLUTION INTRAMUSCULAR; INTRAVENOUS at 12:50

## 2017-01-01 RX ADMIN — INSULIN LISPRO 10 UNITS: 100 INJECTION, SOLUTION INTRAVENOUS; SUBCUTANEOUS at 07:31

## 2017-01-01 RX ADMIN — CLOPIDOGREL BISULFATE 75 MG: 75 TABLET ORAL at 08:43

## 2017-01-01 RX ADMIN — HYDROCODONE BITARTRATE AND ACETAMINOPHEN 2 TABLET: 5; 325 TABLET ORAL at 06:57

## 2017-01-01 RX ADMIN — IOPAMIDOL 20 ML: 755 INJECTION, SOLUTION INTRAVENOUS at 08:13

## 2017-01-01 RX ADMIN — SODIUM CHLORIDE 1 G: 900 INJECTION, SOLUTION INTRAVENOUS at 07:49

## 2017-01-01 RX ADMIN — PROPOFOL 80 MG: 10 INJECTION, EMULSION INTRAVENOUS at 21:51

## 2017-01-01 RX ADMIN — GABAPENTIN 300 MG: 300 CAPSULE ORAL at 21:19

## 2017-01-01 RX ADMIN — GABAPENTIN 300 MG: 300 CAPSULE ORAL at 21:52

## 2017-01-01 RX ADMIN — CEFTRIAXONE 1 G: 1 INJECTION, POWDER, FOR SOLUTION INTRAMUSCULAR; INTRAVENOUS at 12:48

## 2017-01-01 RX ADMIN — Medication 10 ML: at 11:16

## 2017-01-01 RX ADMIN — SODIUM CHLORIDE 25 ML/HR: 900 INJECTION, SOLUTION INTRAVENOUS at 14:16

## 2017-01-01 RX ADMIN — HYDROCODONE BITARTRATE AND ACETAMINOPHEN 1 TABLET: 5; 325 TABLET ORAL at 13:46

## 2017-01-01 RX ADMIN — SODIUM CHLORIDE 1 G: 900 INJECTION, SOLUTION INTRAVENOUS at 14:55

## 2017-01-01 RX ADMIN — SODIUM CHLORIDE 1 G: 900 INJECTION, SOLUTION INTRAVENOUS at 08:58

## 2017-01-01 RX ADMIN — PANTOPRAZOLE SODIUM 40 MG: 40 TABLET, DELAYED RELEASE ORAL at 09:42

## 2017-01-01 RX ADMIN — Medication 20 ML: at 06:55

## 2017-01-01 RX ADMIN — Medication 10 ML: at 10:45

## 2017-01-01 RX ADMIN — CLOPIDOGREL BISULFATE 75 MG: 75 TABLET, FILM COATED ORAL at 09:42

## 2017-01-01 RX ADMIN — FERROUS SULFATE TAB 325 MG (65 MG ELEMENTAL FE) 325 MG: 325 (65 FE) TAB at 08:27

## 2017-01-01 RX ADMIN — ENOXAPARIN SODIUM 40 MG: 40 INJECTION SUBCUTANEOUS at 09:43

## 2017-01-01 RX ADMIN — SODIUM CHLORIDE 1 G: 900 INJECTION, SOLUTION INTRAVENOUS at 10:44

## 2017-01-01 RX ADMIN — Medication 500 UNITS: at 15:09

## 2017-01-01 RX ADMIN — SODIUM CHLORIDE 1 G: 900 INJECTION, SOLUTION INTRAVENOUS at 11:22

## 2017-01-01 RX ADMIN — MUPIROCIN: 20 OINTMENT TOPICAL at 08:56

## 2017-01-01 RX ADMIN — LOSARTAN POTASSIUM 100 MG: 50 TABLET ORAL at 21:35

## 2017-01-01 RX ADMIN — Medication 500 UNITS: at 14:40

## 2017-01-01 RX ADMIN — FERROUS SULFATE TAB 325 MG (65 MG ELEMENTAL FE) 325 MG: 325 (65 FE) TAB at 08:28

## 2017-01-01 RX ADMIN — Medication 10 ML: at 09:50

## 2017-01-01 RX ADMIN — INSULIN LISPRO 6 UNITS: 100 INJECTION, SOLUTION INTRAVENOUS; SUBCUTANEOUS at 17:27

## 2017-01-01 RX ADMIN — GABAPENTIN 300 MG: 300 CAPSULE ORAL at 15:16

## 2017-01-01 RX ADMIN — DEXTROSE MONOHYDRATE 125 ML: 10 INJECTION, SOLUTION INTRAVENOUS at 22:39

## 2017-01-01 RX ADMIN — PANTOPRAZOLE SODIUM 40 MG: 40 TABLET, DELAYED RELEASE ORAL at 22:28

## 2017-01-01 RX ADMIN — OXYCODONE HYDROCHLORIDE AND ACETAMINOPHEN 250 MG: 500 TABLET ORAL at 22:28

## 2017-01-01 RX ADMIN — OXYCODONE HYDROCHLORIDE AND ACETAMINOPHEN 250 MG: 500 TABLET ORAL at 08:54

## 2017-01-01 RX ADMIN — Medication 2 MG: at 02:52

## 2017-01-01 RX ADMIN — INSULIN HUMAN 20 UNITS: 100 INJECTION, SUSPENSION SUBCUTANEOUS at 08:56

## 2017-01-01 RX ADMIN — INSULIN LISPRO 10 UNITS: 100 INJECTION, SOLUTION INTRAVENOUS; SUBCUTANEOUS at 11:17

## 2017-01-01 RX ADMIN — INSULIN LISPRO 10 UNITS: 100 INJECTION, SOLUTION INTRAVENOUS; SUBCUTANEOUS at 12:51

## 2017-01-01 RX ADMIN — SODIUM CHLORIDE, SODIUM LACTATE, POTASSIUM CHLORIDE, AND CALCIUM CHLORIDE 125 ML/HR: 600; 310; 30; 20 INJECTION, SOLUTION INTRAVENOUS at 10:05

## 2017-01-01 RX ADMIN — SODIUM CHLORIDE, PRESERVATIVE FREE 500 UNITS: 5 INJECTION INTRAVENOUS at 09:01

## 2017-01-01 RX ADMIN — METFORMIN HYDROCHLORIDE 500 MG: 500 TABLET, EXTENDED RELEASE ORAL at 16:57

## 2017-01-01 RX ADMIN — INSULIN GLARGINE 12 UNITS: 100 INJECTION, SOLUTION SUBCUTANEOUS at 21:25

## 2017-01-01 RX ADMIN — SODIUM CHLORIDE 25 ML/HR: 900 INJECTION, SOLUTION INTRAVENOUS at 13:49

## 2017-01-01 RX ADMIN — INSULIN GLARGINE 15 UNITS: 100 INJECTION, SOLUTION SUBCUTANEOUS at 22:45

## 2017-01-01 RX ADMIN — Medication 20 ML: at 13:08

## 2017-01-01 RX ADMIN — Medication 10 ML: at 21:39

## 2017-01-01 RX ADMIN — PANTOPRAZOLE SODIUM 40 MG: 40 TABLET, DELAYED RELEASE ORAL at 22:30

## 2017-01-01 RX ADMIN — INSULIN LISPRO 3 UNITS: 100 INJECTION, SOLUTION INTRAVENOUS; SUBCUTANEOUS at 22:00

## 2017-01-01 RX ADMIN — SODIUM CHLORIDE 25 ML/HR: 900 INJECTION, SOLUTION INTRAVENOUS at 15:10

## 2017-01-01 RX ADMIN — DAPTOMYCIN 500 MG: 500 INJECTION, POWDER, LYOPHILIZED, FOR SOLUTION INTRAVENOUS at 10:47

## 2017-01-01 RX ADMIN — Medication 10 ML: at 22:07

## 2017-01-01 RX ADMIN — ENOXAPARIN SODIUM 40 MG: 40 INJECTION SUBCUTANEOUS at 08:45

## 2017-01-01 RX ADMIN — Medication 1 MG: at 18:57

## 2017-01-01 RX ADMIN — Medication 10 ML: at 16:38

## 2017-01-01 RX ADMIN — HYDROCODONE BITARTRATE AND ACETAMINOPHEN 2 TABLET: 5; 325 TABLET ORAL at 18:18

## 2017-01-01 RX ADMIN — INSULIN GLARGINE 8 UNITS: 100 INJECTION, SOLUTION SUBCUTANEOUS at 22:33

## 2017-01-01 RX ADMIN — CLOPIDOGREL BISULFATE 75 MG: 75 TABLET, FILM COATED ORAL at 08:49

## 2017-01-01 RX ADMIN — SODIUM CHLORIDE, PRESERVATIVE FREE 500 UNITS: 5 INJECTION INTRAVENOUS at 10:29

## 2017-01-01 RX ADMIN — PIPERACILLIN SODIUM,TAZOBACTAM SODIUM 3.38 G: 3; .375 INJECTION, POWDER, FOR SOLUTION INTRAVENOUS at 13:46

## 2017-01-01 RX ADMIN — HEPARIN SODIUM 5000 UNITS: 5000 INJECTION, SOLUTION INTRAVENOUS; SUBCUTANEOUS at 10:05

## 2017-01-01 RX ADMIN — LIDOCAINE HYDROCHLORIDE 80 MG: 20 INJECTION, SOLUTION EPIDURAL; INFILTRATION; INTRACAUDAL; PERINEURAL at 18:27

## 2017-01-01 RX ADMIN — INSULIN LISPRO 2 UNITS: 100 INJECTION, SOLUTION INTRAVENOUS; SUBCUTANEOUS at 22:05

## 2017-01-01 RX ADMIN — MIDAZOLAM HYDROCHLORIDE 2 MG: 1 INJECTION, SOLUTION INTRAMUSCULAR; INTRAVENOUS at 08:17

## 2017-01-01 RX ADMIN — INSULIN LISPRO 12 UNITS: 100 INJECTION, SOLUTION INTRAVENOUS; SUBCUTANEOUS at 11:06

## 2017-01-01 RX ADMIN — INSULIN LISPRO 3 UNITS: 100 INJECTION, SOLUTION INTRAVENOUS; SUBCUTANEOUS at 08:03

## 2017-01-01 RX ADMIN — HUMAN INSULIN 20 UNITS: 100 INJECTION, SUSPENSION SUBCUTANEOUS at 08:22

## 2017-01-01 RX ADMIN — HYDROCODONE BITARTRATE AND ACETAMINOPHEN 1 TABLET: 5; 325 TABLET ORAL at 05:11

## 2017-01-01 RX ADMIN — LIDOCAINE HYDROCHLORIDE 30 ML: 10 INJECTION INFILTRATION; PERINEURAL at 15:07

## 2017-01-01 RX ADMIN — OXYCODONE HYDROCHLORIDE AND ACETAMINOPHEN 250 MG: 500 TABLET ORAL at 15:42

## 2017-01-01 RX ADMIN — ASPIRIN 81 MG 81 MG: 81 TABLET ORAL at 10:05

## 2017-01-01 RX ADMIN — SODIUM CHLORIDE 50 ML/HR: 900 INJECTION, SOLUTION INTRAVENOUS at 14:32

## 2017-01-01 RX ADMIN — DAPTOMYCIN 500 MG: 500 INJECTION, POWDER, LYOPHILIZED, FOR SOLUTION INTRAVENOUS at 18:08

## 2017-01-01 RX ADMIN — SODIUM CHLORIDE 1 G: 900 INJECTION, SOLUTION INTRAVENOUS at 10:10

## 2017-01-01 RX ADMIN — OXYCODONE HYDROCHLORIDE AND ACETAMINOPHEN 250 MG: 500 TABLET ORAL at 21:19

## 2017-01-01 RX ADMIN — INSULIN GLARGINE 12 UNITS: 100 INJECTION, SOLUTION SUBCUTANEOUS at 23:01

## 2017-01-01 RX ADMIN — CLOPIDOGREL BISULFATE 75 MG: 75 TABLET ORAL at 08:18

## 2017-01-01 RX ADMIN — CEFTRIAXONE 1 G: 1 INJECTION, POWDER, FOR SOLUTION INTRAMUSCULAR; INTRAVENOUS at 10:06

## 2017-01-01 RX ADMIN — ONDANSETRON 4 MG: 2 INJECTION INTRAMUSCULAR; INTRAVENOUS at 18:35

## 2017-01-01 RX ADMIN — FERROUS SULFATE TAB 325 MG (65 MG ELEMENTAL FE) 325 MG: 325 (65 FE) TAB at 08:44

## 2017-01-01 RX ADMIN — Medication 10 ML: at 15:54

## 2017-01-01 RX ADMIN — TAMSULOSIN HYDROCHLORIDE 0.4 MG: 0.4 CAPSULE ORAL at 21:46

## 2017-01-01 RX ADMIN — Medication 500 MG: at 21:19

## 2017-01-01 RX ADMIN — ONDANSETRON 4 MG: 2 INJECTION INTRAMUSCULAR; INTRAVENOUS at 22:05

## 2017-01-01 RX ADMIN — ENOXAPARIN SODIUM 40 MG: 40 INJECTION SUBCUTANEOUS at 09:14

## 2017-01-01 RX ADMIN — Medication 10 ML: at 14:40

## 2017-01-01 RX ADMIN — INSULIN LISPRO 6 UNITS: 100 INJECTION, SOLUTION INTRAVENOUS; SUBCUTANEOUS at 10:05

## 2017-01-01 RX ADMIN — SODIUM CHLORIDE 75 ML/HR: 900 INJECTION, SOLUTION INTRAVENOUS at 16:36

## 2017-01-01 RX ADMIN — SUCCINYLCHOLINE CHLORIDE 100 MG: 20 INJECTION INTRAMUSCULAR; INTRAVENOUS at 09:38

## 2017-01-01 RX ADMIN — VALSARTAN 160 MG: 160 TABLET ORAL at 08:38

## 2017-01-01 RX ADMIN — SODIUM CHLORIDE 1 G: 900 INJECTION, SOLUTION INTRAVENOUS at 15:07

## 2017-01-01 RX ADMIN — Medication 500 MG: at 21:11

## 2017-01-01 RX ADMIN — HYDROCHLOROTHIAZIDE: 25 TABLET ORAL at 09:49

## 2017-01-01 RX ADMIN — KETOROLAC TROMETHAMINE 15 MG: 30 INJECTION, SOLUTION INTRAMUSCULAR at 17:00

## 2017-01-01 RX ADMIN — Medication 10 ML: at 17:28

## 2017-01-01 RX ADMIN — INSULIN HUMAN 20 UNITS: 100 INJECTION, SUSPENSION SUBCUTANEOUS at 09:17

## 2017-01-01 RX ADMIN — SODIUM CHLORIDE, PRESERVATIVE FREE 500 UNITS: 5 INJECTION INTRAVENOUS at 10:30

## 2017-01-01 RX ADMIN — METFORMIN HYDROCHLORIDE 500 MG: 500 TABLET, EXTENDED RELEASE ORAL at 17:44

## 2017-01-01 RX ADMIN — VALSARTAN 160 MG: 160 TABLET ORAL at 09:42

## 2017-01-01 RX ADMIN — ASPIRIN 81 MG CHEWABLE TABLET 81 MG: 81 TABLET CHEWABLE at 09:00

## 2017-01-01 RX ADMIN — INSULIN GLARGINE 12 UNITS: 100 INJECTION, SOLUTION SUBCUTANEOUS at 21:00

## 2017-01-01 RX ADMIN — PANTOPRAZOLE SODIUM 40 MG: 40 TABLET, DELAYED RELEASE ORAL at 06:56

## 2017-01-01 RX ADMIN — Medication 10 ML: at 09:48

## 2017-01-01 RX ADMIN — INSULIN LISPRO 8 UNITS: 100 INJECTION, SOLUTION INTRAVENOUS; SUBCUTANEOUS at 16:22

## 2017-01-01 RX ADMIN — INSULIN LISPRO 4 UNITS: 100 INJECTION, SOLUTION INTRAVENOUS; SUBCUTANEOUS at 08:44

## 2017-01-01 RX ADMIN — VALSARTAN 160 MG: 160 TABLET ORAL at 09:14

## 2017-01-01 RX ADMIN — MIDAZOLAM HYDROCHLORIDE 1 MG: 1 INJECTION, SOLUTION INTRAMUSCULAR; INTRAVENOUS at 15:00

## 2017-01-01 RX ADMIN — Medication 1 CAPSULE: at 10:07

## 2017-01-01 RX ADMIN — METFORMIN HYDROCHLORIDE 500 MG: 500 TABLET, EXTENDED RELEASE ORAL at 08:23

## 2017-01-01 RX ADMIN — INSULIN LISPRO 10 UNITS: 100 INJECTION, SOLUTION INTRAVENOUS; SUBCUTANEOUS at 22:29

## 2017-01-01 RX ADMIN — OXYCODONE HYDROCHLORIDE AND ACETAMINOPHEN 250 MG: 500 TABLET ORAL at 22:04

## 2017-01-01 RX ADMIN — FENTANYL CITRATE 25 MCG: 50 INJECTION, SOLUTION INTRAMUSCULAR; INTRAVENOUS at 10:56

## 2017-01-01 RX ADMIN — Medication 10 ML: at 10:05

## 2017-01-01 RX ADMIN — INSULIN LISPRO 3 UNITS: 100 INJECTION, SOLUTION INTRAVENOUS; SUBCUTANEOUS at 11:25

## 2017-01-01 RX ADMIN — FENTANYL CITRATE 25 MCG: 50 INJECTION, SOLUTION INTRAMUSCULAR; INTRAVENOUS at 22:03

## 2017-01-01 RX ADMIN — INSULIN GLARGINE 15 UNITS: 100 INJECTION, SOLUTION SUBCUTANEOUS at 21:52

## 2017-01-01 RX ADMIN — DAPTOMYCIN 500 MG: 500 INJECTION, POWDER, LYOPHILIZED, FOR SOLUTION INTRAVENOUS at 15:15

## 2017-01-01 RX ADMIN — SODIUM CHLORIDE, PRESERVATIVE FREE 500 UNITS: 5 INJECTION INTRAVENOUS at 14:57

## 2017-01-01 RX ADMIN — ACETAMINOPHEN 650 MG: 325 TABLET, FILM COATED ORAL at 07:35

## 2017-01-01 RX ADMIN — Medication 20 ML: at 17:00

## 2017-01-01 RX ADMIN — CEFTRIAXONE 1 G: 1 INJECTION, POWDER, FOR SOLUTION INTRAMUSCULAR; INTRAVENOUS at 09:14

## 2017-01-01 RX ADMIN — INSULIN LISPRO 5 UNITS: 100 INJECTION, SOLUTION INTRAVENOUS; SUBCUTANEOUS at 11:55

## 2017-01-01 RX ADMIN — Medication 20 ML: at 14:54

## 2017-01-01 RX ADMIN — LOSARTAN POTASSIUM 100 MG: 50 TABLET ORAL at 17:11

## 2017-01-01 RX ADMIN — OXYCODONE HYDROCHLORIDE 5 MG: 5 TABLET ORAL at 21:11

## 2017-01-01 RX ADMIN — PRAVASTATIN SODIUM 20 MG: 10 TABLET ORAL at 23:01

## 2017-01-01 RX ADMIN — OXYCODONE HYDROCHLORIDE AND ACETAMINOPHEN 1 TABLET: 5; 325 TABLET ORAL at 20:30

## 2017-01-01 RX ADMIN — SODIUM CHLORIDE, PRESERVATIVE FREE 300 UNITS: 5 INJECTION INTRAVENOUS at 13:15

## 2017-01-01 RX ADMIN — ASPIRIN 81 MG 81 MG: 81 TABLET ORAL at 08:40

## 2017-01-01 RX ADMIN — INSULIN LISPRO 3 UNITS: 100 INJECTION, SOLUTION INTRAVENOUS; SUBCUTANEOUS at 12:21

## 2017-01-01 RX ADMIN — SODIUM CHLORIDE: 9 INJECTION, SOLUTION INTRAVENOUS at 09:29

## 2017-05-22 NOTE — DISCHARGE INSTRUCTIONS
Cuts on the Hand Closed With Stitches: Care Instructions  Your Care Instructions    A cut on your hand can be on your fingers, your thumb, or the front or back of your hand. Sometimes a cut can injure the tendons, blood vessels, or nerves of your hand. The doctor used stitches to close the cut. Using stitches also helps the cut heal and reduces scarring. The doctor may have given you a splint to help prevent you from moving your hand, fingers, or thumb. If the cut went deep and through the skin, the doctor put in two layers of stitches. The deeper layer brings the deep part of the cut together. These stitches will dissolve and don't need to be removed. The stitches in the upper layer are the ones you see on the cut. You will probably have a bandage. You will need to have the stitches removed, usually in 10 to 14 days. The doctor may suggest that you see a hand specialist if the cut is very deep or if you have trouble moving your fingers or have less feeling in your hand. The doctor has checked you carefully, but problems can develop later. If you notice any problems or new symptoms, get medical treatment right away. Follow-up care is a key part of your treatment and safety. Be sure to make and go to all appointments, and call your doctor if you are having problems. It's also a good idea to know your test results and keep a list of the medicines you take. How can you care for yourself at home? · Keep the cut dry for the first 24 to 48 hours. After this, you can shower if your doctor okays it. Pat the cut dry. · Don't soak the cut, such as in a bathtub. Your doctor will tell you when it's safe to get the cut wet. · If your doctor told you how to care for your cut, follow your doctor's instructions. If you did not get instructions, follow this general advice:  ¨ After the first 24 to 48 hours, wash around the cut with clean water 2 times a day.  Don't use hydrogen peroxide or alcohol, which can slow healing. ¨ You may cover the cut with a thin layer of petroleum jelly, such as Vaseline, and a nonstick bandage. ¨ Apply more petroleum jelly and replace the bandage as needed. · Prop up the sore hand on a pillow anytime you sit or lie down during the next 3 days. Try to keep it above the level of your heart. This will help reduce swelling. · Avoid any activity that could cause your cut to reopen. · Do not remove the stitches on your own. Your doctor will tell you when to come back to have the stitches removed. · Be safe with medicines. Take pain medicines exactly as directed. ¨ If the doctor gave you a prescription medicine for pain, take it as prescribed. ¨ If you are not taking a prescription pain medicine, ask your doctor if you can take an over-the-counter medicine. When should you call for help? Call your doctor now or seek immediate medical care if:  · You have new pain, or your pain gets worse. · The skin near the cut is cold or pale or changes color. · You have tingling, weakness, or numbness near the cut. · The cut starts to bleed, and blood soaks through the bandage. Oozing small amounts of blood is normal.  · You have trouble moving the area of the hand near the cut. · You have symptoms of infection, such as:  ¨ Increased pain, swelling, warmth, or redness around the cut. ¨ Red streaks leading from the cut. ¨ Pus draining from the cut. ¨ A fever. Watch closely for changes in your health, and be sure to contact your doctor if:  · You do not get better as expected. Where can you learn more? Go to http://cielo-jayne.info/. Enter T250 in the search box to learn more about \"Cuts on the Hand Closed With Stitches: Care Instructions. \"  Current as of: May 27, 2016  Content Version: 11.2  © 6697-5694 [employer]. Care instructions adapted under license by [employer] (which disclaims liability or warranty for this information).  If you have questions about a medical condition or this instruction, always ask your healthcare professional. [place] [address] any warranty or liability for your use of this information.

## 2017-05-22 NOTE — ROUTINE PROCESS
Marcelo NUNN reviewed discharge instructions with the patient and spouse. The patient and spouse verbalized understanding. Wound care supplies given with instruction. Pt alert and stable to walk at discharge.

## 2017-05-22 NOTE — ED NOTES
Two yellow in color rings were cut with ring cutter and removed by this RN.  They were both washed, placed in a specimen bag and handed to wife who then placed them in her pocket book

## 2017-05-22 NOTE — ED PROVIDER NOTES
HPI Comments: Craig Pepe is a 68 y.o. male who presents ambulatory to ED [facility] ED with cc of laceration to the right first through fourth fingers. Pt states that he was using a table saw earlier this morning when he slipped, cutting his fingers and prompting onset of pain, bleeding, and swelling to the area. Of note, pt also notes numbness and decreased ROM of the right second finger since the injury. He is unsure of his last Tetanus immunization and denies taking any medications at home for relief of symptoms. Pt states that he is currently taking ASA 81 mg daily, but denies any anticoagulation medications. He specifically denies any lightheadedness or other injuries at this time. Primitivo Doran MD    PMHx: DM, HTN, GERD, hypercholesterolemia  Social Hx: - smoking; - EtOH; - illicit drug use    There are no other complains, changes, or physical findings at this time. The history is provided by the patient. No  was used.         Past Medical History:   Diagnosis Date    Diabetes ([address]     glucose runs 70 - 160's at home    Enlarged prostate     GERD (gastroesophageal reflux disease)     controlled with med    Hypercholesterolemia     Hypertension        Past Surgical History:   Procedure Laterality Date    CARDIAC SURG PROCEDURE UNLIST      CATH-2008    COLONOSCOPY N/A 6/15/2016    COLONOSCOPY performed by Iman Nur [facility] at [facility]  6/15/2016         HX CATARACT REMOVAL      BILATERAL    HX ORTHOPAEDIC  1977    BONE CYST REM,ANU FROM RIGHT LEG    NEUROLOGICAL PROCEDURE UNLISTED  2011    Back: spinal stenosis, pinched nerve repair    UPPER GI ENDOSCOPY,BIOPSY  6/15/2016              Family History:   Problem Relation Age of Onset    Diabetes Mother     Cancer Father      LUNG    Heart Disease Father        Social History     Social History    Marital status:      Spouse name: N/A    Number of children: N/A    Years of education: N/A     Occupational History    Not on file. Social History Main Topics    Smoking status: Former Smoker     Quit date: 7/7/1996    Smokeless tobacco: Never Used    Alcohol use No    Drug use: No    Sexual activity: Not on file     Other Topics Concern    Not on file     Social History Narrative         ALLERGIES: Review of patient's allergies indicates no known allergies. Review of Systems   Constitutional: Negative for fatigue and fever. HENT: Negative for congestion, ear pain and rhinorrhea. Eyes: Negative for pain and redness. Respiratory: Negative for cough and wheezing. Cardiovascular: Negative for chest pain and palpitations. Gastrointestinal: Negative for abdominal pain, nausea and vomiting. Genitourinary: Negative for dysuria, frequency and urgency. Musculoskeletal: Negative for back pain, neck pain and neck stiffness. Skin: Positive for wound. Negative for rash. Neurological: Positive for numbness. Negative for weakness, light-headedness and headaches. Vitals:    05/22/17 1011   BP: 148/71   Pulse: 68   Resp: 18   Temp: 98 °F (36.7 °C)   SpO2: 100%   Weight: 88.9 kg (195 lb 15.8 oz)   Height: 5' 11\" (1.803 m)            Physical Exam   Constitutional: He is oriented to person, place, and time. He appears well-developed and well-nourished. No distress. HENT:   Head: Normocephalic and atraumatic. Right Ear: External ear normal.   Left Ear: External ear normal.   Nose: Nose normal.   Mouth/Throat: Uvula is midline. No trismus in the jaw. Eyes: Conjunctivae and EOM are normal. Pupils are equal, round, and reactive to light. Neck: Normal range of motion and full passive range of motion without pain. Cardiovascular: Normal rate, regular rhythm and normal heart sounds. Pulmonary/Chest: Effort normal and breath sounds normal. No respiratory distress. Abdominal: There is no tenderness.    Musculoskeletal:   Unable to flex right second finger Neurological: He is alert and oriented to person, place, and time. Skin: No rash noted. RIGHT HAND:  Thumb with 3 cm irregular stellate laceration to the fat pad with loss of tissue, but no nail involvement. Second finger with 2.5 cm linear laceration on the dorsal aspect at the MCPJ flexor crease. Third finger with 2.5 cm linear laceration on the dorsal aspect at the MCPJ flexor crease. Fourth finger with 1.5 cm linear laceration on the dorsal aspect at the MCPJ flexor crease. Psychiatric: He has a normal mood and affect. His speech is normal.   Nursing note and vitals reviewed. MDM  Number of Diagnoses or Management Options  Diagnosis management comments: DDx: laceration, foreign body, fracture, avulsion       Amount and/or Complexity of Data Reviewed  Tests in the radiology section of CPT®: ordered and reviewed  Review and summarize past medical records: yes  Discuss the patient with other providers: yes (Orthopedics)    Patient Progress  Patient progress: stable    ED Course       Procedures    Procedure Note - Laceration Repair: R THUMB  11:55 AM  Procedure by Xavier Oliva. Complexity: complex  3cm irregular stellate with loss of tissue laceration to the fat pad of the thumb, with no nail involvement was irrigated copiously with NS under jet lavage, prepped with Shurcleanse and draped in a sterile fashion. A tourniquet was applied, with excellent hemastasis. The area was anesthetized with 2 mLs of  Lidocaine 2% without epinephrine via digital block. The wound was explored with the following results: No foreign bodies found. The wound was repaired with One layer suture closure: Skin Layer:  5 sutures placed, stitch type:simple interrupted, suture: 4-0 nylon. .  The wound was closed with good hemostasis and approximation. Sterile dressing applied. Estimated blood loss: <2mL  The procedure took 1-15 minutes, and pt tolerated well.   Written by SUSIE Acostaibsabra, as dictated by DEMARCUS [facility]. Procedure Note - Laceration Repair: R SECOND FINGER  11:55 AM  Procedure by Everett Aguilar. Complexity: complex  2.5cm linear laceration to R second finger was irrigated copiously with NS under jet lavage, prepped with Shurcleanse and draped in a sterile fashion. The area was anesthetized with 2 mLs of  Lidocaine 2% without epinephrine via digital block. The wound was explored with the following results: No foreign bodies found. The wound was repaired with One layer suture closure: Skin Layer:  6 sutures placed, stitch type:simple interrupted, suture: 4-0 nylon. .  The wound was closed with good hemostasis and approximation. Sterile dressing applied. Estimated blood loss: <2mL  The procedure took 1-15 minutes, and pt tolerated well. Written by SUSIE Pineda, as dictated by Everett Aguilar. Procedure Note - Laceration Repair: R THIRD FINGER  11:55 AM  Procedure by Everett Aguilar. Complexity: complex  2.5cm linear laceration to R third fingerwas irrigated copiously with NS under jet lavage, prepped with Shurcleanse and draped in a sterile fashion. The area was anesthetized with 2 mLs of  Lidocaine 2% without epinephrine via digital block. The wound was explored with the following results: No foreign bodies found. The wound was repaired with One layer suture closure: Skin Layer:  5 sutures placed, stitch type:simple interrupted, suture: 4-0 nylon. .  The wound was closed with good hemostasis and approximation. Sterile dressing applied. Estimated blood loss: <2mL  The procedure took 1-15 minutes, and pt tolerated well. Written by SUSIE Pineda, as dictated by Everett Aguilar. Procedure Note - Laceration Repair: R FOURTH FINGER  11:55 AM  Procedure by Everett Aguilar. Complexity: complex  1.5cm linear laceration to R fourth fingerwas irrigated copiously with NS under jet lavage, prepped with Shurcleanse and draped in a sterile fashion.   The area was anesthetized with 2 mLs of  Lidocaine 2% without epinephrine via digital block. The wound was explored with the following results: No foreign bodies found. The wound was repaired with One layer suture closure: Skin Layer:  2 sutures placed, stitch type:simple interrupted, suture: 4-0 nylon. .  The wound was closed with good hemostasis and approximation. Sterile dressing applied. Estimated blood loss: <2mL  The procedure took 1-15 minutes, and pt tolerated well. Written by SUSIE Gaviria, as dictated by Roddy Simon. Consult Note:   12:57 PM  DEMARCUS Blanco spoke with Marysol Prasad PA-C  Specialty: Orthopedics  Reviewed patient history, disposition, and available diagnostic and imaging results. Reviewed patient's care plan. Consult agrees with plan as outlined and recommends close follow up with Dr. Storm Jacobs. IMAGING RESULTS:   INDICATION: table saw injury to thumb and index through pinky fingers     COMPARISON: None     FINDINGS:  3 views of the left hand demonstrate no acute fracture or subluxation. Soft  tissue lacerations along the pulmonary surfaces of the first through fifth  fingers. Numerous punctate densities in the soft tissues at the site of  laceration.     IMPRESSION  IMPRESSION:  Soft tissue lacerations involving the first through fifth fingers, with  associated punctate foreign bodies.           VITALS:  Patient Vitals for the past 12 hrs:   Temp Pulse Resp BP SpO2   05/22/17 1011 98 °F (36.7 °C) 68 18 148/71 100 %       MEDICATIONS GIVEN:  Medications   lidocaine (PF) (XYLOCAINE) 20 mg/mL (2 %) injection 400 mg (not administered)   diph,Pertuss(AC),Tet Vac-PF (BOOSTRIX) suspension 0.5 mL (not administered)       IMPRESSION:  1. Thumb laceration, left, initial encounter    2. Laceration of left index finger    3. Laceration of left middle finger    4. Laceration of left ring finger    5.  Flexor tendon laceration of finger with open wound, initial encounter PLAN:  1. Current Discharge Medication List      START taking these medications    Details   cephALEXin (KEFLEX) 500 mg capsule Take 1 Cap by mouth four (4) times daily for 7 days. Qty: 28 Cap, Refills: 0      HYDROcodone-acetaminophen (NORCO) 5-325 mg per tablet Take 1 Tab by mouth every four (4) hours as needed for Pain. Max Daily Amount: 6 Tabs. Qty: 20 Tab, Refills: 0         CONTINUE these medications which have NOT CHANGED    Details   oxyCODONE IR (ROXICODONE) 5 mg immediate release tablet Take 1-2 Tabs by mouth every four (4) hours as needed. Max Daily Amount: 60 mg.  Qty: 20 Tab, Refills: 0           2. Follow-up Information     Follow up With Details Comments Contact Info    May Rinaldi MD Schedule an appointment as soon as possible for a visit ORTHO/HAND: call today to schedule follow up [facility]  [phone]          3. Return to ED if worse     Discharge Note:  1:07 PM  The patient has been re-evaluated and is ready for discharge. Reviewed available results with patient. Counseled patient on diagnosis and care plan. Patient has expressed understanding, and all questions have been answered. Patient agrees with plan and agrees to follow up as recommended, or to return to the ED if their symptoms worsen. Discharge instructions have been provided and explained to the patient, along with reasons to return to the ED. This note is prepared by Jameson An, acting as Scribe for [employer] Sushil Mckeon. DEMARCUS Mckeon: The scribe's documentation has been prepared under my direction and personally reviewed by me in its entirety. I confirm that the note above accurately reflects all work, treatment, procedures, and medical decision making performed by me.

## 2017-05-22 NOTE — ED NOTES
Assumed care of patient. Patient placed in position of comfort. Call bell in reach. Skin warm and dry. Respirations even and unlabored. In no apparent distress at this time. Pt presents ambulatory into the ED with c/o lacerations to Lt hand. Reports injuring himself on a table saw pta. Unsure of last tetanus vaccine. Lt hand soaking in a water/shur cleanse solution. Wife at bedside. Reports numbness and decreased ROM to Lt index finger.

## 2017-05-24 NOTE — H&P
Chief complaint:  Left hand laceration    History of present illness:  51-year-old male who lacerated the left hand with a table saw yesterday evening. He was seen at [facility]. He had multiple x-rays. He had multiple lacerations on the volar aspect of the hand and the tip of the thumb. He had his wounds irrigated and sutured closed. There was concern for a tendon laceration to the index finger. He is on p.o. antibiotics. Very active. Past medical history significant for hypertension, hypercholesterolemia, GERD, diabetes    No current facility-administered medications on file prior to encounter. Current Outpatient Prescriptions on File Prior to Encounter   Medication Sig Dispense Refill    cephALEXin (KEFLEX) 500 mg capsule Take 1 Cap by mouth four (4) times daily for 7 days. 28 Cap 0    HYDROcodone-acetaminophen (NORCO) 5-325 mg per tablet Take 1 Tab by mouth every four (4) hours as needed for Pain. Max Daily Amount: 6 Tabs. 20 Tab 0    metFORMIN (GLUMETZA ER) 500 mg TG24 24 hour tablet Take 1 Tab by mouth two (2) times a day.  oxyCODONE IR (ROXICODONE) 5 mg immediate release tablet Take 1-2 Tabs by mouth every four (4) hours as needed. Max Daily Amount: 60 mg. 20 Tab 0    lisinopril-hydrochlorothiazide (PRINZIDE, ZESTORETIC) 20-12.5 mg per tablet Take 1 Tab by mouth two (2) times a day.  amLODIPine (NORVASC) 5 mg tablet Take 5 mg by mouth daily.  ascorbic acid (VITAMIN C) 250 mg tablet Take 250 mg by mouth.  IRON, FERROUS SULFATE, PO Take 65 mg by mouth.  pantoprazole (PROTONIX) 20 mg tablet Take 2 Tabs by mouth Before breakfast and dinner. Indications: GASTROESOPHAGEAL REFLUX 60 Tab 3    traMADol (ULTRAM) 50 mg tablet Take 50 mg by mouth four (4) times daily.  losartan (COZAAR) 100 mg tablet Take 100 mg by mouth two (2) times a day. Indications: HYPERTENSION      aspirin 81 mg chewable tablet Take 81 mg by mouth daily.       meloxicam (MOBIC) 15 mg tablet Take 15 mg by mouth daily.  insulin lispro (HUMALOG) 100 unit/mL flexpen 10 Units by SubCUTAneous route. STATES TAKES 5 UNITS IN MORNING AND 10 UNITS IN PM      NPH, HUMAN INSULIN ISOPHANE (HUMULIN N SC) 20 Units by SubCUTAneous route every morning.  INSULIN GLARGINE,HUM. REC. ANLOG (LANTUS SC) by SubCUTAneous route. STATES SLIDING SCALE AT BEDTIME      tamsulosin (FLOMAX) 0.4 mg capsule Take 0.4 mg by mouth nightly.  gabapentin (NEURONTIN) 300 mg capsule Take 300 mg by mouth three (3) times daily.  niacin ER (NIASPAN) 500 mg tablet Take  by mouth nightly.  nadolol (CORGARD) 40 mg tablet Take 40 mg by mouth daily.  simvastatin (ZOCOR) 10 mg tablet Take 10 mg by mouth every morning. Past Medical History:   Diagnosis Date    Diabetes ([facility] [address]     glucose runs 70 - 160's at home    Enlarged prostate     GERD (gastroesophageal reflux disease)     controlled with med    Hypercholesterolemia     Hypertension        ROS neg    Social History     Social History    Marital status:      Spouse name: N/A    Number of children: N/A    Years of education: N/A     Occupational History    Not on file. Social History Main Topics    Smoking status: Former Smoker     Quit date: 7/7/1996    Smokeless tobacco: Never Used    Alcohol use No    Drug use: No    Sexual activity: Not on file     Other Topics Concern    Not on file     Social History Narrative         Physical exam: Alert and oriented, in no acute distress. Respirations even and nonlabored. Mood and affect appropriate. Hearing intact to spoken word. He has multiple lacerations to the hand. He has a complex laceration of the tip of the thumb which has been closed. This is clean and dry. No signs of infection. He has brisk cap refill at the tip of the digit. He has sluggish cap refill which measures approximately 3 cm to the tip of the index finger. He has good turgor. Good color.   He has absent sensibility at the tip of the index finger. He has absent FDS and FDP function. He has superficial appearing lacerations transversely at the base of the middle and ring fingers. He has intact FDS and FDP function to the digits. He has intact sensibility to the tips of the middle and ring fingers. X-rays:  I reviewed his x-rays. He has no fractures. Assessment:    Left index finger flexor tendon and nerve lacerations    Plan:  We discussed this at length. I recommend surgical management for exploration of his wound with tendon and nerve repairs. He is in agreement. He understands the need for extensive hand therapy postoperatively. The risks, benefits, alternatives and potential complications of surgery were discussed with the patient and he has elected to proceed.

## 2017-05-25 NOTE — ANESTHESIA POSTPROCEDURE EVALUATION
Post-Anesthesia Evaluation and Assessment    Patient: Eva Dupree MRN: [id number]  SSN: [id number]    YOB: 1940  Age: 68 y.o. Sex: male       Cardiovascular Function/Vital Signs  Visit Vitals    /63 (BP 1 Location: Right arm, BP Patient Position: At rest)    Pulse 60    Temp 36.4 °C (97.6 °F)    Resp 14    Ht 5' 11\" (1.803 m)    Wt 86.2 kg (190 lb)    SpO2 95%    BMI 26.5 kg/m2       Patient is status post No value filed. anesthesia for Procedure(s):  LEFT INDEX FINGER TENDON AND NERVE REPAIRS. Nausea/Vomiting: None    Postoperative hydration reviewed and adequate. Pain:  Pain Scale 1: Numeric (0 - 10) (05/25/17 1454)  Pain Intensity 1: 0 (05/25/17 1454)   Managed    Neurological Status:   Neuro (WDL): Within Defined Limits (05/25/17 1417)  Neuro  LUE Motor Response: Pharmacologically paralyzed (05/25/17 1417)   At baseline    Mental Status and Level of Consciousness: Arousable    Pulmonary Status:   O2 Device: Room air (05/25/17 1454)   Adequate oxygenation and airway patent    Complications related to anesthesia: None    Post-anesthesia assessment completed.  No concerns    Signed By: Ondina Suazo MD     May 25, 2017

## 2017-05-25 NOTE — IP AVS SNAPSHOT
[address] 
[phone] Patient: Edwin To MRN: [id number]:[id number] You are allergic to the following No active allergies Recent Documentation Height Weight BMI Smoking Status 1.803 m 86.2 kg 26.5 kg/m2 Former Smoker Emergency Contacts Name Discharge Info Relation Home Work Mobile Neisha Harrington DISCHARGE CAREGIVER [3] Spouse [3] [phone] About your hospitalization You were admitted on:  May 25, 2017 You last received care in the:  [facility] PACU You were discharged on:  May 25, 2017 Unit phone number:  [phone] Why you were hospitalized Your primary diagnosis was:  Not on File Providers Seen During Your Hospitalizations Provider Role Specialty Primary office phone Emili Guerrero MD Attending Provider Orthopedic Surgery [phone] Your Primary Care Physician (PCP) Primary Care Physician Office Phone Office Fax [facility] [place] [phone] Follow-up Information Follow up With Details Comments Contact Info Emili Guerrero MD In 2 weeks  [address] [facility] [address] [place] [address] 
[phone] Eusebio Whipple MD   [address] 
[phone] Current Discharge Medication List  
  
CONTINUE these medications which have CHANGED Dose & Instructions Dispensing Information Comments Morning Noon Evening Bedtime HYDROcodone-acetaminophen 5-325 mg per tablet Commonly known as:  [place] What changed:  how much to take Your last dose was: Your next dose is:    
   
   
 Dose:  1-2 Tab Take 1-2 Tabs by mouth every four (4) hours as needed for Pain. Max Daily Amount: 12 Tabs. Quantity:  30 Tab Refills:  0 CONTINUE these medications which have NOT CHANGED Dose & Instructions Dispensing Information Comments Morning Noon Evening Bedtime  
 amLODIPine 5 mg tablet Commonly known as:  Anny Half Your last dose was: Your next dose is:    
   
   
 Dose:  5 mg Take 5 mg by mouth daily. Refills:  0  
     
   
   
   
  
 ascorbic acid (vitamin C) 250 mg tablet Commonly known as:  VITAMIN C Your last dose was: Your next dose is:    
   
   
 Dose:  250 mg Take 250 mg by mouth. Refills:  0  
     
   
   
   
  
 aspirin 81 mg chewable tablet Your last dose was: Your next dose is:    
   
   
 Dose:  81 mg Take 81 mg by mouth daily. Refills:  0  
     
   
   
   
  
 cephALEXin 500 mg capsule Commonly known as:  Melvenia Stabs Your last dose was: Your next dose is:    
   
   
 Dose:  500 mg Take 1 Cap by mouth four (4) times daily for 7 days. Quantity:  28 Cap Refills:  0  
     
   
   
   
  
 gabapentin 300 mg capsule Commonly known as:  NEURONTIN Your last dose was: Your next dose is:    
   
   
 Dose:  300 mg Take 300 mg by mouth three (3) times daily. Refills:  0 IRON (FERROUS SULFATE) PO Your last dose was: Your next dose is:    
   
   
 Dose:  65 mg Take 65 mg by mouth. Refills:  0  
     
   
   
   
  
 LANTUS SC Your last dose was: Your next dose is:    
   
   
 by SubCUTAneous route. STATES SLIDING SCALE AT BEDTIME Refills:  0  
     
   
   
   
  
 lisinopril-hydroCHLOROthiazide 20-12.5 mg per tablet Commonly known as:  Marcelo Cruz Your last dose was: Your next dose is:    
   
   
 Dose:  1 Tab Take 1 Tab by mouth two (2) times a day. Refills:  0  
     
   
   
   
  
 losartan 100 mg tablet Commonly known as:  COZAAR Your last dose was:     
   
Your next dose is:    
   
   
 Dose:  100 mg  
 Take 100 mg by mouth two (2) times a day. Indications: HYPERTENSION Refills:  0  
     
   
   
   
  
 meloxicam 15 mg tablet Commonly known as:  MOBIC Your last dose was: Your next dose is:    
   
   
 Dose:  15 mg Take 15 mg by mouth daily. Refills:  0  
     
   
   
   
  
 metFORMIN 500 mg Tg24 24 hour tablet Commonly known Jen ABDULLAHI Your last dose was: Your next dose is:    
   
   
 Dose:  1 Tab Take 1 Tab by mouth two (2) times a day. Refills:  0  
     
   
   
   
  
 nadolol 40 mg tablet Commonly known as:  CORGARD Your last dose was: Your next dose is:    
   
   
 Dose:  40 mg Take 40 mg by mouth daily. Refills:  0 Niaspan 500 mg tablet Generic drug:  niacin ER Your last dose was: Your next dose is: Take  by mouth nightly. Refills:  0 NovoLIN N 100 unit/mL injection Generic drug:  insulin NPH Your last dose was: Your next dose is:    
   
   
 Dose:  20 Units 20 Units by SubCUTAneous route once. Indications: type 2 diabetes mellitus Refills:  0 NovoLOG 100 unit/mL injection Generic drug:  insulin aspart Your last dose was: Your next dose is:    
   
   
 Dose:  10 Units 10 Units by SubCUTAneous route once. Indications: type 2 diabetes mellitus Refills:  0  
     
   
   
   
  
 omeprazole 20 mg capsule Commonly known as:  PRILOSEC Your last dose was: Your next dose is:    
   
   
 Dose:  20 mg Take 20 mg by mouth daily. Refills:  0  
     
   
   
   
  
 simvastatin 10 mg tablet Commonly known as:  ZOCOR Your last dose was: Your next dose is:    
   
   
 Dose:  10 mg Take 10 mg by mouth every morning. Refills:  0  
     
   
   
   
  
 tamsulosin 0.4 mg capsule Commonly known as:  FLOMAX Your last dose was: Your next dose is:    
   
   
 Dose:  0.4 mg Take 0.4 mg by mouth nightly. Refills:  0  
     
   
   
   
  
 traMADol 50 mg tablet Commonly known as:  ULTRAM  
   
Your last dose was: Your next dose is:    
   
   
 Dose:  50 mg Take 50 mg by mouth four (4) times daily. Refills:  0 STOP taking these medications   
 oxyCODONE IR 5 mg immediate release tablet Commonly known as:  Gar Spine Where to Get Your Medications Information on where to get these meds will be given to you by the nurse or doctor. ! Ask your nurse or doctor about these medications HYDROcodone-acetaminophen 5-325 mg per tablet Discharge Instructions [facility] DISCHARGE SUMMARY NOTE DISCHARGE SUMMARY:  
 
Patient ID: 
Name: Araceli Petersen Discharge Date: 5/25/2017 Time: 2:21 PM 
 
The following personal items collected during your admission are returned to you:  
Dental Appliance: Dental Appliances: None Vision:   
Hearing Aid:   
Jewelry:   
Clothing: Clothing:  (clothes in locker) Other Valuables:   
Valuables sent to safe:   
 
 
PATIENT INSTRUCTIONS: 
 
What to do at Home: 
 
Recommended diet: Clear liquids, advance as tolerated, limit alcohol, Recommended activity: Activity as tolerated and no driving for today, 
 
Stroke risk factors: overweight, smoking, sedentary lifestyle Call 911 immediately if sudden numbness or weakness of an extremity is noted. Warning signs of a stroke include: 
 
· Sudden numbness or weakness of the face (is smile equal on both sides?) · Sudden numbness or weakness of the arm or leg, especially on one side of the body · Sudden confusion, trouble speaking or understanding. I have reviewed discharge instructions with the patient and caregiver. The patient and caregiver verbalized understanding. Dr. Keri Hernandez Upper Extremity Postoperative Instructions 1. Please maintain the dressing and splint placed at surgery until your follow up appointment where it will be removed. Please keep the dressing clean and dry. If you have any questions or problems, please call our office at [phone]. 
 
2. Please elevate the operative extremity to the level of the heart to keep swelling at a minimum. You may get up to move around, but when seated please keep the extremity elevated as much as possible. This will decrease swelling and pain. 3. Please do not use your left hand. 4. You may ice your Arm/Hand 5 times a day for 20 minutes at a time. 5. If you had a block from the Anesthesiologist  before surgery expect this to wear off around 12-24 hours after you received it and you should start taking your pain medication before the feeling begins to come back into your arm/ hand. 6. A prescription for pain medication is provided. The key to pain control is staying ahead of the pain. For the first 48-72 hours after your surgery, you may want to take your pain medication on a regular schedule. After that, you may only need it on an as needed basis. 7. If you experience any redness, increased pain, increased swelling not relieved by elevation, drainage, fever, or chills, please call the office at [phone], and ask for RUKHSANA. Please Follow-up at my office [address] [facility], [address] in 2 weeks unless otherwise directed. Discharge Orders None Introducing [facility] SERVICES! [facility] introduces AppSlingr patient portal. Now you can access parts of your medical record, email your doctor's office, and request medication refills online. 1. In your internet browser, go to [email or web address] 2. Click on the First Time User? Click Here link in the Sign In box. You will see the New Member Sign Up page. 3. Enter your AppSlingr Access Code exactly as it appears below.  You will not need to use this code after youve completed the sign-up process. If you do not sign up before the expiration date, you must request a new code. · Cometa Access Code: [email or web address] Expires: 8/20/2017 10:06 AM 
 
4. Enter the last four digits of your Social Security Number (xxxx) and Date of Birth (mm/dd/yyyy) as indicated and click Submit. You will be taken to the next sign-up page. 5. Create a Cometa ID. This will be your Cometa login ID and cannot be changed, so think of one that is secure and easy to remember. 6. Create a Cometa password. You can change your password at any time. 7. Enter your Password Reset Question and Answer. This can be used at a later time if you forget your password. 8. Enter your e-mail address. You will receive e-mail notification when new information is available in [address] 9. Click Sign Up. You can now view and download portions of your medical record. 10. Click the Download Summary menu link to download a portable copy of your medical information. If you have questions, please visit the Frequently Asked Questions section of the Cometa website. Remember, Cometa is NOT to be used for urgent needs. For medical emergencies, dial 911. Now available from your iPhone and Android! General Information Please provide this summary of care documentation to your next provider. Patient Signature:  ____________________________________________________________ Date:  ____________________________________________________________  
  
Dominique Rubalcava Provider Signature:  ____________________________________________________________ Date:  ____________________________________________________________

## 2017-05-25 NOTE — BRIEF OP NOTE
BRIEF OPERATIVE NOTE    Date of Procedure: 5/25/2017   Preoperative Diagnosis: LEFT INDEX FINGER TENDON AND NERVE LACERATION  Postoperative Diagnosis: LEFT INDEX FINGER TENDON AND NERVE LACERATION    Procedure(s):  LEFT INDEX FINGER TENDON AND NERVE REPAIRS  Surgeon(s) and Role:     * Susana Hummel MD - Primary         Assistant Staff:       Surgical Staff:  Circ-1: Susana Orr RN  Registered Nurse Assistant: Farnaz Clark RN  Scrub RN-1: Salome Stroud RN  Event Time In   Incision Start 1319   Incision Close 1403     Anesthesia: Regional   Estimated Blood Loss: min  Specimens: * No specimens in log *   Findings: lacerated tendon, nerve, artery   Complications: none  Implants: * No implants in log *

## 2017-05-25 NOTE — ANESTHESIA PREPROCEDURE EVALUATION
Anesthetic History   No history of anesthetic complications            Review of Systems / Medical History  Patient summary reviewed, nursing notes reviewed and pertinent labs reviewed    Pulmonary  Within defined limits                 Neuro/Psych   Within defined limits           Cardiovascular    Hypertension: well controlled              Exercise tolerance: >4 METS     GI/Hepatic/Renal     GERD: well controlled           Endo/Other    Diabetes: using insulin    Arthritis     Other Findings   Comments: Hypertension,   hypercholesterolemia,  GERD,   diabetes         Physical Exam    Airway  Mallampati: II  TM Distance: > 6 cm  Neck ROM: normal range of motion   Mouth opening: Normal     Cardiovascular  Regular rate and rhythm,  S1 and S2 normal,  no murmur, click, rub, or gallop             Dental  No notable dental hx       Pulmonary  Breath sounds clear to auscultation               Abdominal  GI exam deferred       Other Findings            Anesthetic Plan    ASA: 3  Anesthesia type: general and MAC          Induction: Intravenous  Anesthetic plan and risks discussed with: Patient

## 2017-05-25 NOTE — OP NOTES
[address] [facility] [place] [address], [address]   OP NOTE       Name:  Emma Ellis   MR#:  [id number]   :  1940   Account #:  [de-identified]    Surgery Date:  2017   Date of Adm:  2017       PREOPERATIVE DIAGNOSES     1. Left index finger flexor tendon laceration. 2. Left index finger digital nerve lacerations. POSTOPERATIVE DIAGNOSES     1. Left index finger flexor tendon laceration. 2. Left index finger digital nerve lacerations. PROCEDURES PERFORMED     1. Left index finger flexor digitorum profundus laceration repair in zone 2.   2. Left index finger flexor digitorum superficialis laceration repair in zone 2.   3. Left index finger digital nerve repairs x2. SURGEON: Michael Miller. Marco Mckinnon MD.    ANESTHESIA: Regional.    ESTIMATED BLOOD LOSS: Minimal.     SPECIMENS REMOVED: None. COMPLICATIONS: None. IMPLANTS: None. INDICATIONS FOR PROCEDURE: This is a 72-year-old male who   suffered a table saw injury to the left hand resulting in lacerations to   the thumb, index, middle, and ring fingers. He has absent sensibility at   the tip of the index finger with sluggish capillary refill. He has evidence   of flexor tendon laceration to the index finger. Otherwise, his wounds   appear to be superficial without underlying tendon or nerve injury. He   has been on p.o. antibiotics. He denies fevers or chills. He is indicated   for exploration of the wound of the index finger with possible tendon   and nerve repair. We discussed risks, benefits, potential complications   and alternatives of surgery, and he gave informed consent to proceed. DESCRIPTION OF PROCEDURE: The patient was identified in the   preoperative holding area. Informed consent was obtained. The   operative site was marked. Regional anesthesia was then established   by the anesthesia team. He was then transported to the OR and   placed supine on the operating table.  All bony prominences were well   padded. A tourniquet was applied to the upper brachium. After   induction of deep sedation, the left upper extremity was sterilely   prepped and draped in the usual fashion. Surgical time-out was held,   and the operative site was confirmed. Preop antibiotics were given. After Esmarch examination, the tourniquet was elevated to 250 mmHg. He had a transverse laceration just distal to the palmodigital crease. This was   extended proximally and distally. Large skin flaps were raised. Necrotic   skin edges were debrided sharply with a 15 blade. The flexor sheath   was found to be violated in the area of the A2 pulley. The A1 pulley   was released to expose the proximal tendons. The distal tendons were   exposed with digital flexion. The radial neurovascular bundle was   found to be lacerated. The ulnar neurovascular bundle was also found   to be lacerated. Both digital arteries were lacerated with significant proximal and distal  arterial injury. The digital nerves were freshened sharply with a 15   blade. Attention was then turned to the flexor tendons. The FDP   tendon was repaired first. This was repaired with a 3-0 Ethibond core   locking cruciate repair. This created a 4-strand repair. A 5-0 Prolene   was then used for a running locking epitendinous suture. The FDS was   then exposed. One slip of the FDS was excised. The other slip was   repaired with a 3-0 Ethibond locking core cruciate repair and a 5-0   running locking Prolene epitendinous repair. This provided excellent   repair. The digit was taken through a full range of motion without   impingement of his repairs on the flexor sheath. The digital nerves   were then repaired with an 8-0 nylon suture under loupe magnification. The wound was thoroughly irrigated. The tourniquet was let down. After a period of approximately 5 minutes, capillary refill returned to the   index finger.  He must be obtaining perfusion of the digit through a   dorsal collateral circulation. The wound was thoroughly irrigated. The   skin was closed with 4-0 nylon sutures. Sterile dressings were applied. A dorsal blocking splint was applied. He was awakened from light   sedation and transferred to the PACU in stable condition without   complication.         Mari Senior MD      [facility]   D:  05/25/2017   14:22   T:  05/25/2017   14:52   Job #:  [id number]

## 2017-05-25 NOTE — DISCHARGE INSTRUCTIONS
[facility] DISCHARGE SUMMARY NOTE    DISCHARGE SUMMARY:     Patient ID:  Name: Santiago Mcclure  Discharge Date: 5/25/2017  Time: 2:21 PM    The following personal items collected during your admission are returned to you:   Dental Appliance: Dental Appliances: None  Vision:    Hearing Aid:    Jewelry:    Clothing: Clothing:  (clothes in locker)  Other Valuables:    Valuables sent to safe:        PATIENT INSTRUCTIONS:    What to do at Home:    Recommended diet: Clear liquids, advance as tolerated, limit alcohol,    Recommended activity: Activity as tolerated and no driving for today,    Stroke risk factors: overweight, smoking, sedentary lifestyle    Call 911 immediately if sudden numbness or weakness of an extremity is noted. Warning signs of a stroke include:    · Sudden numbness or weakness of the face (is smile equal on both sides?)  · Sudden numbness or weakness of the arm or leg, especially on one side of the body  · Sudden confusion, trouble speaking or understanding. I have reviewed discharge instructions with the patient and caregiver. The patient and caregiver verbalized understanding. Dr. Xu Banuelos Upper Extremity Postoperative Instructions      1. Please maintain the dressing and splint placed at surgery until your follow up appointment where it will be removed. Please keep the dressing clean and dry. If you have any questions or problems, please call our office at [phone].    2. Please elevate the operative extremity to the level of the heart to keep swelling at a minimum. You may get up to move around, but when seated please keep the extremity elevated as much as possible. This will decrease swelling and pain. 3. Please do not use your left hand. 4. You may ice your Arm/Hand 5 times a day for 20 minutes at a time.     5. If you had a block from the Anesthesiologist  before surgery expect this to wear off around 12-24 hours after you received it and you should start taking your pain medication before the feeling begins to come back into your arm/ hand. 6. A prescription for pain medication is provided. The key to pain control is staying ahead of the pain. For the first 48-72 hours after your surgery, you may want to take your pain medication on a regular schedule. After that, you may only need it on an as needed basis. 7. If you experience any redness, increased pain, increased swelling not relieved by elevation, drainage, fever, or chills, please call the office at [phone], and ask for RUKHSANA. Please Follow-up at my office [address] [facility], [address] in 2 weeks unless otherwise directed.

## 2017-07-20 PROBLEM — L08.9 INFECTION OF LEFT HAND: Status: ACTIVE | Noted: 2017-01-01

## 2017-07-20 NOTE — IP AVS SNAPSHOT
[address] [facility] [address] [place] [address] 
[phone] Patient: Manuel Vance MRN: [id number] PNY:[id number]/94/[id number] You are allergic to the following No active allergies Recent Documentation Height Weight BMI Smoking Status 1.803 m 86.2 kg 26.5 kg/m2 Former Smoker Emergency Contacts Name Discharge Info Relation Home Work [place] Neisha Harrington DISCHARGE CAREGIVER [3] Spouse [3] [phone] About your hospitalization You were admitted on:  July 20, 2017 [facility] on:  July 24, 2017 Unit phone number:  [phone] Why you were hospitalized Your primary diagnosis was:  Not on File Your diagnoses also included:  Infection Of Left Hand Providers Seen During Your Hospitalizations Provider Role Specialty Primary office phone Alex Charles MD Attending Provider Orthopedic Surgery [phone] Your Primary Care Physician (PCP) Primary Care Physician Office Phone Office Fax  [place] [phone] Follow-up Information Follow up With Details Comments Contact Info Alex Charles MD In 2 days  6019 [facility] [address] [place] [address] 
[phone] Nithya Bailey MD   [place] 70 [facility] 
[phone] [facility]   3pm start of care on Tuesday July 25th Your Appointments Tuesday July 25, 2017  3:00 PM EDT INFUSION 30 with LUIGI INFUSION NURSE 1  
[place] ([address]) [address]  
[phone] Go to [facility], 85[address] [facility], [address] Wednesday July 26, 2017  3:00 PM EDT INFUSION 30 with LUIGI INFUSION NURSE 1  
 [address] O[place] ([address]) [address]  
[phone] Go to [facility], [facility] 3, 85O [facility], [place], [address] Thursday July 27, 2017  3:00 PM EDT INFUSION 30 with HANOVER INFUSION NURSE 1  
[place] ([address]) [address]  
[phone] Go to [facility], [facility] 3, 85[facility], [place], [address] Friday July 28, 2017  3:00 PM EDT INFUSION 30 with HANOVER INFUSION NURSE 1  
[place] ([address]) [address]  
[phone] Go to [facility], [facility] 3, 85O [facility], [place], [address] Saturday July 29, 2017 11:00 AM EDT INFUSION 30 with RM 102A- CHAIR BREMO [address] [facility] [address]  
[address] ([address]) [address] [phone]  
[phone] Go to [address], ground floor. Sunday July 30, 2017 11:00 AM EDT INFUSION 30 with RM 102A- CHAIR BREMO [address] [facility] [address]  
455 [facility] ([address]) [address] [phone]  
[phone] Go to [address], ground floor. Monday July 31, 2017  3:00 PM EDT INFUSION 30 with HANOVER INFUSION NURSE 1  
[place] ([address]) [address]  
[phone] Go to [facility], [facility] 3, 85O [facility] hospital, [place], [address] Tuesday August 01, 2017  3:00 PM EDT INFUSION 30 with HANOVER INFUSION NURSE 1  
[place] ([address]) [address]  
[phone] Go to [facility], M[facility] 3, [address] [facility] [address], [place], [address] Wednesday August 02, 2017  3:00 PM EDT INFUSION 30 with HANOVER INFUSION NURSE 1  
[place] ([address]) [address]  
[phone] Go to [facility], M[facility] 3, [address] [facility] [address] [address], [place], [address] Thursday August 03, 2017  3:00 PM EDT INFUSION 30 with HANOVER INFUSION NURSE 1  
[place] ([address]) [address]  
[phone] Go to [facility], [facility] 3, [address] [facility] [address] [address], [place], [address] Friday August 04, 2017  3:00 PM EDT INFUSION 30 with HANOVER INFUSION NURSE 1  
[place] ([address]) [address]  
[phone] Go to [facility], M[facility] 3, 85O [facility], [place], [address] Saturday August 05, 2017 11:00 AM EDT INFUSION 30 with RM 102A- CHAIR [facility] - [address] ([address]) [address] [phone] Go to [address], ground floor. Sunday August 06, 2017 11:00 AM EDT INFUSION 30 with RM 102A- CHAIR [facility] - [address] ([address]) [address] [phone]  
[phone] Go to [address], ground floor. Monday August 07, 2017  3:00 PM EDT INFUSION 30 with HANOVER INFUSION NURSE 1  
[place] ([address]) [address]  
[phone] Go to [facility], M[facility] 3, [address] [facility], [place], [address] [place] [address]  Tuesday August 08, 2017  3:00 PM EDT  
 INFUSION 30 with HANOVER INFUSION NURSE 1  
[place] ([address]) [address]  
[phone] Go to [facility], M[facility] 3, 85O [facility], [place], [address] Wednesday August 09, 2017  3:00 PM EDT INFUSION 30 with HANOVER INFUSION NURSE 1  
[place] ([address]) [address]  
[phone] Go to [facility], [facility] 3, 85[address] [facility], [place], [address] Thursday August 10, 2017  3:00 PM EDT INFUSION 30 with HANOVER INFUSION NURSE 1  
[place] ([address]) [address]  
[phone] Go to [facility], [facility] 3, 85[address] [facility], [place], [address] Friday August 11, 2017  3:00 PM EDT INFUSION 30 with HANOVER INFUSION NURSE 1  
[place] ([address]) [address]  
[phone] Go to [facility], [facility] 3, 85O [facility], [place], [address] Saturday August 12, 2017 11:00 AM EDT INFUSION 30 with RM 102A- CHAIR [facility] ([address]) [address] [address] [phone]  
[phone] Go to [address], ground floor. Sunday August 13, 2017 11:00 AM EDT INFUSION 30 with RM 102A- CHAIR [facility] ([address]) [address] [phone]  
[phone] Go to [address], ground floor. Monday August 14, 2017  3:00 PM EDT INFUSION 30 with HANOVER INFUSION NURSE 1  
[place] ([address]) [address]  
[phone] Go to [facility], M[facility] 3, [address] [place] JOZEF [facility], [address] Tuesday August 15, 2017  3:00 PM EDT INFUSION 30 with LUIGI INFUSION NURSE 1  
[place] ([address]) [address]  
[phone] Go to [facility], [facility] 3, [address] [place] HASKINS [facility], [place] [address] [address] [facility] Current Discharge Medication List  
  
CONTINUE these medications which have NOT CHANGED Dose & Instructions Dispensing Information Comments Morning Noon Evening Bedtime  
 amLODIPine 5 mg tablet Commonly known as:  Johann Jensen Your last dose was: Your next dose is:    
   
   
 Dose:  5 mg Take 5 mg by mouth daily. Refills:  0  
     
   
   
   
  
 ascorbic acid (vitamin C) 250 mg tablet Commonly known as:  VITAMIN C Your last dose was: Your next dose is:    
   
   
 Dose:  250 mg Take 250 mg by mouth. Refills:  0  
     
   
   
   
  
 aspirin 81 mg chewable tablet Your last dose was: Your next dose is:    
   
   
 Dose:  81 mg Take 81 mg by mouth daily. Refills:  0  
     
   
   
   
  
 gabapentin 300 mg capsule Commonly known as:  NEURONTIN Your last dose was: Your next dose is:    
   
   
 Dose:  300 mg Take 300 mg by mouth three (3) times daily. Refills:  0 HYDROcodone-acetaminophen 5-325 mg per tablet Commonly known as:  Joshua Grider Your last dose was: Your next dose is:    
   
   
 Dose:  1-2 Tab Take 1-2 Tabs by mouth every four (4) hours as needed for Pain. Max Daily Amount: 12 Tabs. Quantity:  20 Tab Refills:  0 IRON (FERROUS SULFATE) PO Your last dose was: Your next dose is:    
   
   
 Dose:  65 mg Take 65 mg by mouth. Refills:  0 LANTUS 100 unit/mL injection Generic drug:  insulin glargine Your last dose was: Your next dose is:    
   
   
 by SubCUTAneous route nightly. By SS. Usually is < 20 units. Refills:  0  
     
   
   
   
  
 lisinopril-hydroCHLOROthiazide 20-12.5 mg per tablet Commonly known as:  Alexa Parke Your last dose was: Your next dose is:    
   
   
 Dose:  1 Tab Take 1 Tab by mouth two (2) times a day. Refills:  0  
     
   
   
   
  
 losartan 100 mg tablet Commonly known as:  COZAAR Your last dose was: Your next dose is:    
   
   
 Dose:  100 mg Take 100 mg by mouth two (2) times a day. Indications: HYPERTENSION Refills:  0  
     
   
   
   
  
 meloxicam 15 mg tablet Commonly known as:  MOBIC Your last dose was: Your next dose is:    
   
   
 Dose:  15 mg Take 15 mg by mouth daily. Refills:  0  
     
   
   
   
  
 metFORMIN 500 mg Tg24 24 hour tablet Commonly known asJacquiline Najjar ER Your last dose was: Your next dose is:    
   
   
 Dose:  1 Tab Take 1 Tab by mouth two (2) times a day. Refills:  0  
     
   
   
   
  
 nadolol 40 mg tablet Commonly known as:  CORGARD Your last dose was: Your next dose is:    
   
   
 Dose:  40 mg Take 40 mg by mouth daily. Refills:  0 Niaspan 500 mg tablet Generic drug:  niacin ER Your last dose was: Your next dose is: Take  by mouth nightly. Refills:  0 NovoLIN N 100 unit/mL injection Generic drug:  insulin NPH Your last dose was: Your next dose is:    
   
   
 Dose:  20 Units 20 Units by SubCUTAneous route daily (with breakfast). Indications: type 2 diabetes mellitus Refills:  0 NovoLOG 100 unit/mL injection Generic drug:  insulin aspart Your last dose was: Your next dose is: Dose:  10 Units 10 Units by SubCUTAneous route daily (with dinner). Indications: type 2 diabetes mellitus Refills:  0  
     
   
   
   
  
 omeprazole 20 mg capsule Commonly known as:  PRILOSEC Your last dose was: Your next dose is:    
   
   
 Dose:  20 mg Take 20 mg by mouth daily. Refills:  0  
     
   
   
   
  
 simvastatin 10 mg tablet Commonly known as:  ZOCOR Your last dose was: Your next dose is:    
   
   
 Dose:  10 mg Take 10 mg by mouth every morning. Refills:  0  
     
   
   
   
  
 tamsulosin 0.4 mg capsule Commonly known as:  FLOMAX Your last dose was: Your next dose is:    
   
   
 Dose:  0.4 mg Take 0.4 mg by mouth nightly. Refills:  0  
     
   
   
   
  
 traMADol 50 mg tablet Commonly known as:  ULTRAM  
   
Your last dose was: Your next dose is:    
   
   
 Dose:  50 mg Take 50 mg by mouth four (4) times daily. Refills:  0 Where to Get Your Medications Information on where to get these meds will be given to you by the nurse or doctor. ! Ask your nurse or doctor about these medications HYDROcodone-acetaminophen 5-325 mg per tablet Discharge Instructions Dr. Frida Colon Upper Extremity Postoperative Instructions 1. Please change your dressing on your left hand daily. Soak the finger daily in warm soapy water for 15-20 minutes. Please keep the wound bandaged and dry. If you have any questions or problems, please call our office at [phone]. 
 
2. Please elevate the operative extremity to the level of the heart to keep swelling at a minimum. You may get up to move around, but when seated please keep the extremity elevated as much as possible. This will decrease swelling and pain. 3. You may do activities as tolerated with your left hand. 4. You may ice your Arm 5 times a day for 20 minutes at a time. 5. Take your antibiotics as prescribed by the infectious disease doctor. 6. A prescription for pain medication is provided. Take it only on an as needed basis. 7. If you experience any redness, increased pain, increased swelling not relieved by elevation, drainage, fever, or chills, please call the office at [phone], and ask for Craig Mancini. Please Follow-up at my office [address] [facility], [address] in 2-3 days. Discharge Orders None Introducing \[facility] [place] [facility]\"" & [facility] SERVICES! Jhonathan Nori introduces [employer] patient portal. Now you can access parts of your medical record, email your doctor's office, and request medication refills online. 1. In your internet browser, go to [email or web address] 2. Click on the First Time User? Click Here link in the Sign In box. You will see the New Member Sign Up page. 3. Enter your [employer] Access Code exactly as it appears below. You will not need to use this code after youve completed the sign-up process. If you do not sign up before the expiration date, you must request a new code. · [employer] Access Code: [email or web address] Expires: 8/20/2017 10:06 AM 
 
4. Enter the last four digits of your Social Security Number (xxxx) and Date of Birth (mm/dd/yyyy) as indicated and click Submit. You will be taken to the next sign-up page. 5. Create a [employer] ID. This will be your [employer] login ID and cannot be changed, so think of one that is secure and easy to remember. 6. Create a [employer] password. You can change your password at any time. 7. Enter your Password Reset Question and Answer. This can be used at a later time if you forget your password. 8. Enter your e-mail address. You will receive e-mail notification when new information is available in [address] 9. Click Sign Up. You can now view and download portions of your medical record. 10. Click the Download Summary menu link to download a portable copy of your medical information. If you have questions, please visit the Frequently Asked Questions section of the MyChart website. Remember, MyChart is NOT to be used for urgent needs. For medical emergencies, dial 911. Now available from your iPhone and Android! General Information Please provide this summary of care documentation to your next provider. Patient Signature:  ____________________________________________________________ Date:  ____________________________________________________________  
  
[facility] Provider Signature:  ____________________________________________________________ Date:  ____________________________________________________________

## 2017-07-20 NOTE — ANESTHESIA PREPROCEDURE EVALUATION
Anesthetic History   No history of anesthetic complications            Review of Systems / Medical History  Patient summary reviewed, nursing notes reviewed and pertinent labs reviewed    Pulmonary  Within defined limits                 Neuro/Psych   Within defined limits           Cardiovascular    Hypertension: well controlled                   GI/Hepatic/Renal     GERD: well controlled           Endo/Other    Diabetes: well controlled, type 2         Other Findings            Physical Exam    Airway  Mallampati: III  TM Distance: > 6 cm  Neck ROM: normal range of motion   Mouth opening: Normal     Cardiovascular  Regular rate and rhythm,  S1 and S2 normal,  no murmur, click, rub, or gallop             Dental  No notable dental hx       Pulmonary  Breath sounds clear to auscultation               Abdominal  GI exam deferred       Other Findings            Anesthetic Plan    ASA: 2  Anesthesia type: general          Induction: Intravenous  Anesthetic plan and risks discussed with: Patient

## 2017-07-20 NOTE — H&P
Chief complaint:  Left hand pain and swelling    History of present illness:  22-year-old male who is status post left index finger flexor tendon repairs after a saw injury. Surgery was May 25th. [place] He presented to my office recently with pain and swelling. Cultures were taken. These were positive for Staph aureus and E coli. He has been on p.o. Bactrim without relief. He has gotten worse. He presented to the office today with significant drainage. Worsening erythema and warmth. He has failed outpatient management is therefore indicated for admission for IV antibiotics and surgical debridement. No current facility-administered medications on file prior to encounter. Current Outpatient Prescriptions on File Prior to Encounter   Medication Sig Dispense Refill    HYDROcodone-acetaminophen (NORCO) 5-325 mg per tablet Take 1-2 Tabs by mouth every four (4) hours as needed for Pain. Max Daily Amount: 12 Tabs. 30 Tab 0    omeprazole (PRILOSEC) 20 mg capsule Take 20 mg by mouth daily.  insulin NPH (NOVOLIN N) 100 unit/mL injection 20 Units by SubCUTAneous route once. Indications: type 2 diabetes mellitus      insulin aspart (NOVOLOG) 100 unit/mL injection 10 Units by SubCUTAneous route once. Indications: type 2 diabetes mellitus      metFORMIN (GLUMETZA ER) 500 mg TG24 24 hour tablet Take 1 Tab by mouth two (2) times a day.  lisinopril-hydrochlorothiazide (PRINZIDE, ZESTORETIC) 20-12.5 mg per tablet Take 1 Tab by mouth two (2) times a day.  amLODIPine (NORVASC) 5 mg tablet Take 5 mg by mouth daily.  ascorbic acid (VITAMIN C) 250 mg tablet Take 250 mg by mouth.  IRON, FERROUS SULFATE, PO Take 65 mg by mouth.  traMADol (ULTRAM) 50 mg tablet Take 50 mg by mouth four (4) times daily.  losartan (COZAAR) 100 mg tablet Take 100 mg by mouth two (2) times a day. Indications: HYPERTENSION      aspirin 81 mg chewable tablet Take 81 mg by mouth daily.       meloxicam (MOBIC) 15 mg tablet Take 15 mg by mouth daily.  INSULIN GLARGINE,HUM. REC. ANLOG (LANTUS SC) by SubCUTAneous route. STATES SLIDING SCALE AT BEDTIME      tamsulosin (FLOMAX) 0.4 mg capsule Take 0.4 mg by mouth nightly.  gabapentin (NEURONTIN) 300 mg capsule Take 300 mg by mouth three (3) times daily.  niacin ER (NIASPAN) 500 mg tablet Take  by mouth nightly.  nadolol (CORGARD) 40 mg tablet Take 40 mg by mouth daily.  simvastatin (ZOCOR) 10 mg tablet Take 10 mg by mouth every morning. Past Medical History:   Diagnosis Date    Allergic rhinitis     Anemia     ASVD (arteriosclerotic vascular disease)     Chronic kidney disease     Diabetes ([facility] [address]     glucose runs 79 - 160's at home    Diverticulosis     DJD (degenerative joint disease)     ED (erectile dysfunction)     Enlarged prostate     JUDY (generalized anxiety disorder)     GERD (gastroesophageal reflux disease)     controlled with med; alfonso's esophagus    Hiatal hernia     Hypercholesterolemia     Hypertension     Ill-defined condition     peripheral vascular disease    Lung nodule     Neuropathy (HCC)     PVD (peripheral vascular disease) ([facility]     Spinal stenosis        No Known Allergies    ROS neg    Social History     Social History    Marital status:      Spouse name: N/A    Number of children: N/A    Years of education: N/A     Occupational History    Not on file. Social History Main Topics    Smoking status: Former Smoker     Quit date: 7/7/1996    Smokeless tobacco: Never Used    Alcohol use No    Drug use: No    Sexual activity: Not on file     Other Topics Concern    Not on file     Social History Narrative     PE:    He is alert and oriented. He is in no acute distress. Regular rate and rhythm. Clear to auscultation bilaterally. His left hand is severely swollen. He has significant erythema. He has purulent material draining from his wound volarly. Intact sensibility.   Brisk cap refill. X-rays:  None    Labs:  Pending    Assessment:  Left hand index finger infection    Plan:  He will be admitted for IV antibiotics. NPO for surgical debridement later tonight. He has given informed consent to proceed.

## 2017-07-20 NOTE — PROGRESS NOTES
TRANSFER - OUT REPORT:    Verbal report given to ADRIEL Alcazar(name) on Alicia Shafer  being transferred to pre op holding(unit) for ordered procedure       Report consisted of patients Situation, Background, Assessment and   Recommendations(SBAR). Information from the following report(s) SBAR, Kardex, Procedure Summary, Intake/Output, MAR and Recent Results was reviewed with the receiving nurse. Lines:   Peripheral IV 07/20/17 Right Wrist (Active)   Site Assessment Clean, dry, & intact 7/20/2017  3:45 PM   Phlebitis Assessment 0 7/20/2017  3:45 PM   Infiltration Assessment 0 7/20/2017  3:45 PM   Dressing Status Clean, dry, & intact 7/20/2017  3:45 PM   Dressing Type Transparent 7/20/2017  3:45 PM   Hub Color/Line Status Infusing 7/20/2017  3:45 PM   Alcohol Cap Used Yes 7/20/2017  3:45 PM        Opportunity for questions and clarification was provided.       Patient transported with:   [employer]

## 2017-07-20 NOTE — IP AVS SNAPSHOT
[address] 
[phone] Patient: Garth Upp MRN: [id number] DLE:[id number] Current Discharge Medication List  
  
CONTINUE these medications which have NOT CHANGED Dose & Instructions Dispensing Information Comments Morning Noon Evening Bedtime  
 amLODIPine 5 mg tablet Commonly known as:  Priscila Garcia Your last dose was: Your next dose is:    
   
   
 Dose:  5 mg Take 5 mg by mouth daily. Refills:  0  
     
   
   
   
  
 ascorbic acid (vitamin C) 250 mg tablet Commonly known as:  VITAMIN C Your last dose was: Your next dose is:    
   
   
 Dose:  250 mg Take 250 mg by mouth. Refills:  0  
     
   
   
   
  
 aspirin 81 mg chewable tablet Your last dose was: Your next dose is:    
   
   
 Dose:  81 mg Take 81 mg by mouth daily. Refills:  0  
     
   
   
   
  
 gabapentin 300 mg capsule Commonly known as:  NEURONTIN Your last dose was: Your next dose is:    
   
   
 Dose:  300 mg Take 300 mg by mouth three (3) times daily. Refills:  0 HYDROcodone-acetaminophen 5-325 mg per tablet Commonly known as:  Pau Gray Your last dose was: Your next dose is:    
   
   
 Dose:  1-2 Tab Take 1-2 Tabs by mouth every four (4) hours as needed for Pain. Max Daily Amount: 12 Tabs. Quantity:  20 Tab Refills:  0 IRON (FERROUS SULFATE) PO Your last dose was: Your next dose is:    
   
   
 Dose:  65 mg Take 65 mg by mouth. Refills:  0  
     
   
   
   
  
 LANTUS 100 unit/mL injection Generic drug:  insulin glargine Your last dose was: Your next dose is:    
   
   
 by SubCUTAneous route nightly. By SS. Usually is < 20 units. Refills:  0  
     
   
   
   
  
 lisinopril-hydroCHLOROthiazide 20-12.5 mg per tablet Commonly known as:  Terryl Range Your last dose was: Your next dose is:    
   
   
 Dose:  1 Tab Take 1 Tab by mouth two (2) times a day. Refills:  0  
     
   
   
   
  
 losartan 100 mg tablet Commonly known as:  COZAAR Your last dose was: Your next dose is:    
   
   
 Dose:  100 mg Take 100 mg by mouth two (2) times a day. Indications: HYPERTENSION Refills:  0  
     
   
   
   
  
 meloxicam 15 mg tablet Commonly known as:  MOBIC Your last dose was: Your next dose is:    
   
   
 Dose:  15 mg Take 15 mg by mouth daily. Refills:  0  
     
   
   
   
  
 metFORMIN 500 mg Tg24 24 hour tablet Commonly known Reba ABDULLAHI Your last dose was: Your next dose is:    
   
   
 Dose:  1 Tab Take 1 Tab by mouth two (2) times a day. Refills:  0  
     
   
   
   
  
 nadolol 40 mg tablet Commonly known as:  CORGARD Your last dose was: Your next dose is:    
   
   
 Dose:  40 mg Take 40 mg by mouth daily. Refills:  0 Niaspan 500 mg tablet Generic drug:  niacin ER Your last dose was: Your next dose is: Take  by mouth nightly. Refills:  0 NovoLIN N 100 unit/mL injection Generic drug:  insulin NPH Your last dose was: Your next dose is:    
   
   
 Dose:  20 Units 20 Units by SubCUTAneous route daily (with breakfast). Indications: type 2 diabetes mellitus Refills:  0 NovoLOG 100 unit/mL injection Generic drug:  insulin aspart Your last dose was: Your next dose is:    
   
   
 Dose:  10 Units 10 Units by SubCUTAneous route daily (with dinner). Indications: type 2 diabetes mellitus Refills:  0  
     
   
   
   
  
 omeprazole 20 mg capsule Commonly known as:  PRILOSEC Your last dose was:     
   
Your next dose is:    
   
   
 Dose:  20 mg  
 Take 20 mg by mouth daily. Refills:  0  
     
   
   
   
  
 simvastatin 10 mg tablet Commonly known as:  ZOCOR Your last dose was: Your next dose is:    
   
   
 Dose:  10 mg Take 10 mg by mouth every morning. Refills:  0  
     
   
   
   
  
 tamsulosin 0.4 mg capsule Commonly known as:  FLOMAX Your last dose was: Your next dose is:    
   
   
 Dose:  0.4 mg Take 0.4 mg by mouth nightly. Refills:  0  
     
   
   
   
  
 traMADol 50 mg tablet Commonly known as:  ULTRAM  
   
Your last dose was: Your next dose is:    
   
   
 Dose:  50 mg Take 50 mg by mouth four (4) times daily. Refills:  0 Where to Get Your Medications Information on where to get these meds will be given to you by the nurse or doctor. ! Ask your nurse or doctor about these medications HYDROcodone-acetaminophen 5-325 mg per tablet

## 2017-07-20 NOTE — PROGRESS NOTES
Primary Nurse Kenia Dejesus RN and Mandeep Fontenot RN performed a dual skin assessment on this patient Impairment noted- see wound doc flow sheet. Pt is here for an left hand I&D. He had surgery May 25 on his pointer finger and it has gotten infected. There is a bandage POA on that hand. Heels, sacrum, knees, and elbows are all intact.       Rocco score is 22

## 2017-07-21 NOTE — PROGRESS NOTES
Levofloxacin Dose Adjustment  Indication:  SSTI ( post I& D of left hand)  Current regimen:  500 mg IV Q24H  Abx regimen: monotherapy  Recent Labs      17   1533   WBC  5.3   CREA  1.57*   BUN  33*     Est CrCl: ~42 ml/min  Temp (24hrs), Av °F (36.7 °C), Min:97.7 °F (36.5 °C), Max:98.2 °F (36.8 °C)    Cultures: no current    Plan: Change to 500 mg IV X1 , then 250 mg IV Q24H  for pt with CrCl 20-49 ml/min

## 2017-07-21 NOTE — ROUTINE PROCESS
Bedside and Verbal shift change report given to [facility] (oncoming nurse) by Roverto Robles (offgoing nurse). Report included the following information SBAR, Kardex, Intake/Output, MAR and Recent Results.

## 2017-07-21 NOTE — ANESTHESIA POSTPROCEDURE EVALUATION
Post-Anesthesia Evaluation and Assessment    Patient: Violeta Escalante MRN: [id number]  SSN: [id number]    YOB: 1940  Age: 68 y.o. Sex: male       Cardiovascular Function/Vital Signs  Visit Vitals    /64 (BP 1 Location: Right arm, BP Patient Position: At rest)    Pulse 62    Temp 36.5 °C (97.7 °F)    Resp 16    Ht 5' 11\" (1.803 m)    Wt 86.2 kg (190 lb)    SpO2 96%    BMI 26.5 kg/m2       Patient is status post general anesthesia for Procedure(s):  I & d LEFT HAND/ RM. 568/ REQ. 1800. Nausea/Vomiting: None    Postoperative hydration reviewed and adequate. Pain:  Pain Scale 1: Numeric (0 - 10) (07/20/17 2300)  Pain Intensity 1: 0 (07/20/17 2300)   Managed    Neurological Status:   Neuro (WDL): Within Defined Limits (07/20/17 2300)  Neuro  Neurologic State: Unresponsive (07/20/17 2226)  Orientation Level: Oriented X4 (07/20/17 1510)  LUE Motor Response: Weak;Purposeful (07/20/17 1510)   At baseline    Mental Status and Level of Consciousness: Arousable    Pulmonary Status:   O2 Device: Room air (07/20/17 2300)   Adequate oxygenation and airway patent    Complications related to anesthesia: None    Post-anesthesia assessment completed.  No concerns    Signed By: Rosey Swartz MD     July 21, 2017

## 2017-07-21 NOTE — OP NOTES
[address] [facility] [place] [address]   OP NOTE       Name:  Rae King   MR#:  [id number]   :  1940   Account #:  [de-identified]    Surgery Date:  2017   Date of Adm:  2017       PREOPERATIVE DIAGNOSIS: Left hand infection. POSTOPERATIVE DIAGNOSIS: Left index finger flexor suppurative tenosynovitis. PROCEDURE PERFORMED   1. Incision and drainage of left index finger flexor sheath for suppurative flexor tenosynovitis. 2. Incision and drainage multiple abscesses, left hand. ANESTHESIA: General.    ESTIMATED BLOOD LOSS: Minimal.    SPECIMENS REMOVED: Cultures from hand. IMPLANTS: None. COMPLICATIONS: None. INDICATIONS FOR PROCEDURE: This is a 27-year-old diabetic male   who is status post a saw injury to the index finger. He suffered flexor   tendon lacerations to the index finger. He underwent surgical repair. He was doing well for a period of time, but has developed an infection   over the last week. He has failed treatment with p.o. antibiotics as an   outpatient. He has been admitted to the hospital today and indicated   for surgical debridement. I discussed the risks, benefits, potential   complications and alternatives of surgery with him, and he gave   informed consent to proceed. DESCRIPTION OF PROCEDURE: The patient was identified in the   preoperative holding area. Informed consent was obtained. The   operative site was marked. He was then transported to the OR and   placed supine on the operating table. A tourniquet was applied to the   upper brachium. After induction of general anesthesia, the left upper   extremity was sterilely prepped and draped in the usual fashion. Surgical time-out was held, and the operative site was confirmed. Preoperative antibiotics were not used. After gravity exsanguination,   the tourniquet was elevated to 250 mmHg. His prior incision was   opened in the palm and extended out onto the digit. He was found to   have a large abscess cavity in the subcutaneous tissues proximally. He was found to have significant purulent material within the flexor   sheath. The flexor sheath was opened distally  over the A3 pulley. The sheath was thoroughly irrigated. The   flexor tendon repair was found to be ruptured. The suture material was   debrided. An extensive amount of fibrinous material around the tendon   was also debrided with a rongeur. Once the flexor sheath had been   debrided, the wound and the sheath were thoroughly irrigated with 1 L   of normal saline with bacitracin. The wound was very loosely closed   with a 4-0 nylon suture. Sterile dressings were applied. The tourniquet   was let down and brisk cap refill returned to the digit. He was   transferred to the PACU in stable condition without complication.         Silver Carrington MD      [address] [place] [address] / Deana Gosselin   D:  07/20/2017   22:21   T:  07/21/2017   16:42   Job #:  [id number]

## 2017-07-21 NOTE — BRIEF OP NOTE
BRIEF OPERATIVE NOTE    Date of Procedure: 7/20/2017   Preoperative Diagnosis: Infection  Postoperative Diagnosis: INFECTION LEFT HAND    Procedure(s):  I & d LEFT HAND/ RM. 568/ REQ. 1800  Surgeon(s) and Role:     * Griselda Huntsman, MD - Primary         Assistant Staff:       Surgical Staff:  Circ-1: Esperanza Ott  Circ-2: Edgard Zarate RN  Scrub Tech-1: Jeanette Kim  Scrub RN-2: Eddie Jorgensen RN  No case tracking events are documented in the log.   Anesthesia: General   Estimated Blood Loss: min  Specimens:   ID Type Source Tests Collected by Time Destination   1 : LEFT HAND CULTURE Wound Left Hand ANAEROBIC/AEROBIC/GRAM STAIN Griselda Huntsman, MD 7/20/2017 2201 Microbiology      Findings: extensive infection hand   Complications: none  Implants: * No implants in log *

## 2017-07-21 NOTE — PROGRESS NOTES
Spiritual Care Partner Volunteer visited patient in [address] [place] [address] unit on 7/21/17.   Documented by:  Liz Frankel 1937 [employer] Javan ([id number])

## 2017-07-21 NOTE — ROUTINE PROCESS
Patient: Noni Salcedo MRN: [id number]  SSN: [id number]   YOB: 1940  Age: 68 y.o. Sex: male     Patient is status post Procedure(s):  I & d LEFT HAND/ RM. 568/ REQ. 1800.     Surgeon(s) and Role:     * Dominga Ellington MD - Primary    Local/Dose/Irrigation: 17 ML 0.5 MARCAINE PLAIN/ BACITRACIN OINTMENT / BACITRACIN RRIGANTION                  Peripheral IV 07/20/17 Right Wrist (Active)   Site Assessment Clean, dry, & intact 7/20/2017  3:45 PM   Phlebitis Assessment 0 7/20/2017  3:45 PM   Infiltration Assessment 0 7/20/2017  3:45 PM   Dressing Status Clean, dry, & intact 7/20/2017  3:45 PM   Dressing Type Transparent 7/20/2017  3:45 PM   Hub Color/Line Status Infusing 7/20/2017  3:45 PM   Alcohol Cap Used Yes 7/20/2017  3:45 PM                           Dressing/Packing:  Wound Hand-DRESSING TYPE: 4 x 4;Cast padding (ADAPTIC/ COBAN) (07/20/17 2100)  Wound Hand Left-DRESSING TYPE: Elastic bandage (POA) (07/20/17 1553)  Splint/Cast:  ]    Other:

## 2017-07-21 NOTE — PROGRESS NOTES
TRANSFER - OUT REPORT:    Verbal report given to RN on Corinna Briones  being transferred to [address] [place] [address] for routine progression of care       Report consisted of patients Situation, Background, Assessment and   Recommendations(SBAR). Information from the following report(s) SBAR, Kardex, OR Summary, Procedure Summary, Intake/Output, MAR, Recent Results and Med Rec Status was reviewed with the receiving nurse. Lines:   Peripheral IV 07/20/17 Right Wrist (Active)   Site Assessment Clean, dry, & intact 7/20/2017  3:45 PM   Phlebitis Assessment 0 7/20/2017  3:45 PM   Infiltration Assessment 0 7/20/2017  3:45 PM   Dressing Status Clean, dry, & intact 7/20/2017  3:45 PM   Dressing Type Transparent 7/20/2017  3:45 PM   Hub Color/Line Status Infusing 7/20/2017  3:45 PM   Alcohol Cap Used Yes 7/20/2017  3:45 PM        Opportunity for questions and clarification was provided.       Patient transported with:   Registered Nurse

## 2017-07-21 NOTE — ROUTINE PROCESS
TRANSFER - IN REPORT:    Verbal report received from [employer] RN(name) on Modesta Archibald  being received from PACU (unit) for routine post - op      Report consisted of patients Situation, Background, Assessment and   Recommendations(SBAR). Information from the following report(s) SBAR, Kardex, Intake/Output, MAR and Recent Results was reviewed with the receiving nurse. Opportunity for questions and clarification was provided. Assessment completed upon patients arrival to unit and care assumed.

## 2017-07-21 NOTE — PROGRESS NOTES
Patient is unable to tolerate pain. Patient was given 2.5 mg oxycodone at 0157 and 2 mg morphine IV. Called Dr. Criss Galvan. MD ordered 10 mg Toradol one time now.

## 2017-07-21 NOTE — DIABETES MGMT
DTC Consult Note    Recommendations/ Comments: BG's widely variable - 300's yesterday then dropped to 53 at 2236.  this AM.  POD 1 I&D left hand. If appropriate, please consider  Add home medication Metformin 500 mg BID and                                         Lantus - start with 15 units at bedtime  If BG's remain elevated he may require lispro at each meal  Document po intake to determine need for lispro at each meal    Consult received for:  [x]             Assessment of home management                   Chart reviewed and initial evaluation complete on Chris Abby. Pt received lying in bed, awake alert, pleasant. Patient is a 68 y.o. male with known DM x 33 years during which time he has always been on insulin. At home he takes the following:  Metformin 500 mg BID  NPH 20 units each AM  Lispro 10 units at supper and  Lantus scale at bedtime If BG < 90 - 8 units     For every 10 mg/dL above that he adds 2 units. Max dose is 42 units. He states his dose is usually under 20 units. He states this is based on instructions from his PC Dr. Jonny Roberts    Although this is an unusual regimen, it works for him. He does not want to change it without talking to Dr. Jonny Roberts  A1c is 7.1%. However, the one prior to that was 6.5%   He reports rare hypogylcmeia    BG monitoring at home 3x/day with results as follows. FBG are 80 - 100  Pre supper are < 150  Bedtime is higher, up to  225    Assessed and instructed patient on the following:   · interpretation of lab result - A1c results and goals  · blood sugar goals; post op BG goals explained  · complications of diabetes mellitus,   · hypoglycemia prevention and treatment, he states this is rare,   · illness and infection - effect on BG,   · SMBG  - he has meter and supplies and is testing,   · nutrition - he avoids sweet drinks and sweet foods.  Guidelines reveiwed  · referred to Diabetes Educator - informed of availability of outpt education    Encouraged the following:   F/U with Dr. Vonnie Olivo if BG's remain high post discharge      Provided patient with the following: [x]             Survival skills education materials               []             Insulin education materials               []             CHO counting education materials               [x]             Outpatient DTC contact number               []             Glucometer               Discussed with patient and/or family need for follow up appointment for diabetes management after discharge. A1c:   Lab Results   Component Value Date/Time    Hemoglobin A1c 7.1 07/21/2017 05:04 AM       Recent Glucose Results:   Lab Results   Component Value Date/Time     (H) 07/21/2017 05:04 AM     (H) 07/20/2017 03:33 PM    GLUCPOC 367 (H) 07/21/2017 11:32 AM    GLUCPOC 426 (H) 07/21/2017 06:24 AM    GLUCPOC 176 (H) 07/20/2017 10:58 PM        Lab Results   Component Value Date/Time    Creatinine 1.53 07/21/2017 05:04 AM     Estimated Creatinine Clearance: 43.7 mL/min (based on Cr of 1.53). Active Orders   Diet    DIET DIABETIC CONSISTENT CARB Regular        PO intake: No data found. Current hospital DM medication: NPH 20 units daily at breakfast, lispro 10 units daily at supper, lispro normal correction scale    Will continue to follow as needed. Thank you.   Daniel Shrestha RN, CDE

## 2017-07-21 NOTE — PROGRESS NOTES
Ra SMITH because patient's blood sugar this morning is 426. Dr. Criss Galvan said to consult hospitalist and ask for recommendation before giving 7 units SSI.    6:56 AM  Paged hospitalist for new consult. 7:12 AM  Dr. Augusto Hair said to give 10 units of Humalog now and hospitalist will be up soon to see the patient.

## 2017-07-21 NOTE — CONSULTS
Consult Note    Primary Care Provider: Lizeth Santizo MD  Source of Information: Patient  Reason for consult MGMT of DM    History of Presenting Illness:   Kiara Faith is a 68 y.o. male who presents with hand POD#1 I&D left hand by Dr. Karen Germain. Hospitalist service was consulted for DM MGMT. Pt told me he has a PCP for 30 years and he make changes to his insulin regimen. He also told me he is on NPH in morning and lantus  At bedtime. H eis unhappy with cost related to lantus. Pt could not elaborate much why he is on tow different regimen. Its  A possibility that due to cost related issue or convenience he  Was given NPH and later to lantus and  back to NPH again. He could not tell me his numbers of glycemic control, he takes meals coverage but again could not elaborate much. He seems to be frustrated with his fingers not healed. The patient denies any fever, chills, chest pain, cough, congestion, recent illness, palpitations, or dysuria. Review of Systems:  A comprehensive review of systems was negative.      Past Medical History:   Diagnosis Date    Allergic rhinitis     Anemia     ASVD (arteriosclerotic vascular disease)     Chronic kidney disease     Diabetes ([address]     glucose runs 79 - 160's at home    Diverticulosis     DJD (degenerative joint disease)     ED (erectile dysfunction)     Enlarged prostate     JUDY (generalized anxiety disorder)     GERD (gastroesophageal reflux disease)     controlled with med; alfonso's esophagus    Hiatal hernia     Hypercholesterolemia     Hypertension     Ill-defined condition     peripheral vascular disease    Lung nodule     Neuropathy (HCC)     PVD (peripheral vascular disease) ([address]     Spinal stenosis       Past Surgical History:   Procedure Laterality Date    CARDIAC SURG PROCEDURE UNLIST      CATH-2008    COLONOSCOPY N/A 6/15/2016    COLONOSCOPY performed by Cassie Hickman MD at \[facility]"" ENDOSCOPY    COLONOSCOPY,DIAGNOSTIC  6/15/2016         HX CATARACT REMOVAL      BILATERAL    HX ORTHOPAEDIC  1977    BONE CYST REM,ANU FROM RIGHT LEG    HX OTHER SURGICAL Left 06/2016    carotid endarectomy    NEUROLOGICAL PROCEDURE UNLISTED  2011    Back: spinal stenosis, pinched nerve repair    UPPER GI ENDOSCOPY,BIOPSY  6/15/2016          Prior to Admission medications    Medication Sig Start Date End Date Taking? Authorizing Provider   insulin glargine (LANTUS) 100 unit/mL injection by SubCUTAneous route nightly. By SS. Usually is < 20 units. Yes Historical Provider   insulin NPH (NOVOLIN N) 100 unit/mL injection 20 Units by SubCUTAneous route daily (with breakfast). Indications: type 2 diabetes mellitus   Yes Historical Provider   nadolol (CORGARD) 40 mg tablet Take 40 mg by mouth daily. Yes Historical Provider   HYDROcodone-acetaminophen (NORCO) 5-325 mg per tablet Take 1-2 Tabs by mouth every four (4) hours as needed for Pain. Max Daily Amount: 12 Tabs. 5/25/17   Josi Tarango MD   omeprazole (PRILOSEC) 20 mg capsule Take 20 mg by mouth daily. Historical Provider   insulin aspart (NOVOLOG) 100 unit/mL injection 10 Units by SubCUTAneous route daily (with dinner). Indications: type 2 diabetes mellitus    Historical Provider   metFORMIN (GLUMETZA ER) 500 mg TG24 24 hour tablet Take 1 Tab by mouth two (2) times a day. Historical Provider   lisinopril-hydrochlorothiazide (PRINZIDE, ZESTORETIC) 20-12.5 mg per tablet Take 1 Tab by mouth two (2) times a day. Historical Provider   amLODIPine (NORVASC) 5 mg tablet Take 5 mg by mouth daily. Historical Provider   ascorbic acid (VITAMIN C) 250 mg tablet Take 250 mg by mouth. Historical Provider   IRON, FERROUS SULFATE, PO Take 65 mg by mouth. Historical Provider   traMADol (ULTRAM) 50 mg tablet Take 50 mg by mouth four (4) times daily. Historical Provider   losartan (COZAAR) 100 mg tablet Take 100 mg by mouth two (2) times a day.  Indications: HYPERTENSION    Historical Provider   aspirin 81 mg chewable tablet Take 81 mg by mouth daily. Historical Provider   meloxicam (MOBIC) 15 mg tablet Take 15 mg by mouth daily. Historical Provider   tamsulosin (FLOMAX) 0.4 mg capsule Take 0.4 mg by mouth nightly. Historical Provider   gabapentin (NEURONTIN) 300 mg capsule Take 300 mg by mouth three (3) times daily. Historical Provider   niacin ER (NIASPAN) 500 mg tablet Take  by mouth nightly. Historical Provider   simvastatin (ZOCOR) 10 mg tablet Take 10 mg by mouth every morning. Historical Provider     No Known Allergies   Family History   Problem Relation Age of Onset    Diabetes Mother     Cancer Father      LUNG    Heart Disease Father         SOCIAL HISTORY:  Patient resides:  Independently    Assisted Living    SNF    With family care x      Smoking history:   None x   Former    Chronic      Alcohol history:   None x   Social    Chronic      Ambulates:   Independently x   w/cane    w/walker    w/wc    CODE STATUS:  DNR    Full x   Other      Objective:     Physical Exam:     Visit Vitals    /64    Pulse 80    Temp 98.1 °F (36.7 °C)    Resp 16    Ht 5' 11\" (1.803 m)    Wt 86.2 kg (190 lb)    SpO2 99%    BMI 26.5 kg/m2    O2 Flow Rate (L/min): 2 l/min O2 Device: Room air    General:  Alert, cooperative, no distress, appears stated age. Head:  Normocephalic, without obvious abnormality, atraumatic. Eyes:  Conjunctivae/corneas clear. PERRL, EOMs intact. Nose: Nares normal. Septum midline. Mucosa normal. No drainage or sinus tenderness. Throat: Lips, mucosa, and tongue normal. Teeth and gums normal.   Neck: Supple, symmetrical, trachea midline, no adenopathy, thyroid: no enlargement/tenderness/nodules, no carotid bruit and no JVD. Back:   Symmetric, no curvature. ROM normal. No CVA tenderness. Lungs:   Clear to auscultation bilaterally. Chest wall:  No tenderness or deformity.    Heart:  Regular rate and rhythm, S1, S2 normal, no murmur, click, rub or gallop. Abdomen:   Soft, non-tender. Bowel sounds normal. No masses,  No organomegaly. Extremities: Extremities normal, atraumatic, no cyanosis or edema. Pulses: 2+ and symmetric all extremities. Skin: Skin color, texture, turgor normal. No rashes or lesions   Neurologic: CNII-XII intact. EKG:  normal sinus rhythm. Data Review:     Recent Days:  Recent Labs      07/20/17   1533   WBC  5.3   HGB  9.9*   HCT  30.4*   PLT  216     Recent Labs      07/21/17   0504  07/20/17   1533   NA  136  132*   K  5.4*  4.5   CL  104  102   CO2  22  24   GLU  394*  342*   BUN  29*  33*   CREA  1.53*  1.57*   CA  8.0*  7.8*     No results for input(s): PH, PCO2, PO2, HCO3, FIO2 in the last 72 hours. 24 Hour Results:  Recent Results (from the past 24 hour(s))   CBC WITH AUTOMATED DIFF    Collection Time: 07/20/17  3:33 PM   Result Value Ref Range    WBC 5.3 4.1 - 11.1 K/uL    RBC 3.49 (L) 4.10 - 5.70 M/uL    HGB 9.9 (L) 12.1 - 17.0 g/dL    HCT 30.4 (L) 36.6 - 50.3 %    MCV 87.1 80.0 - 99.0 FL    MCH 28.4 26.0 - 34.0 PG    MCHC 32.6 30.0 - 36.5 g/dL    RDW 15.0 (H) 11.5 - 14.5 %    PLATELET 924 263 - 408 K/uL    NEUTROPHILS 63 32 - 75 %    LYMPHOCYTES 23 12 - 49 %    MONOCYTES 10 5 - 13 %    EOSINOPHILS 3 0 - 7 %    BASOPHILS 1 0 - 1 %    ABS. NEUTROPHILS 3.3 1.8 - 8.0 K/UL    ABS. LYMPHOCYTES 1.2 0.8 - 3.5 K/UL    ABS. MONOCYTES 0.5 0.0 - 1.0 K/UL    ABS. EOSINOPHILS 0.2 0.0 - 0.4 K/UL    ABS.  BASOPHILS 0.1 0.0 - 0.1 K/UL    DF SMEAR SCANNED      RBC COMMENTS ANISOCYTOSIS  1+       SED RATE (ESR)    Collection Time: 07/20/17  3:33 PM   Result Value Ref Range    Sed rate, automated 67 (H) 0 - 20 mm/hr   C REACTIVE PROTEIN, QT    Collection Time: 07/20/17  3:33 PM   Result Value Ref Range    C-Reactive protein 6.95 (H) 0.00 - 2.72 mg/dL   METABOLIC PANEL, BASIC    Collection Time: 07/20/17  3:33 PM   Result Value Ref Range    Sodium 132 (L) 136 - 145 mmol/L    Potassium 4.5 3.5 - 5.1 mmol/L    Chloride 102 97 - 108 mmol/L    CO2 24 21 - 32 mmol/L    Anion gap 6 5 - 15 mmol/L    Glucose 342 (H) 65 - 100 mg/dL    BUN 33 (H) 6 - 20 MG/DL    Creatinine 1.57 (H) 0.70 - 1.30 MG/DL    BUN/Creatinine ratio 21 (H) 12 - 20      GFR est AA 52 (L) >60 ml/min/1.73m2    GFR est non-AA 43 (L) >60 ml/min/1.73m2    Calcium 7.8 (L) 8.5 - 10.1 MG/DL   EKG, 12 LEAD, INITIAL    Collection Time: 07/20/17  3:35 PM   Result Value Ref Range    Ventricular Rate 64 BPM    Atrial Rate 64 BPM    P-R Interval 244 ms    QRS Duration 98 ms    Q-T Interval 404 ms    QTC Calculation (Bezet) 416 ms    Calculated P Axis 63 degrees    Calculated R Axis -7 degrees    Calculated T Axis -17 degrees    Diagnosis       Sinus rhythm with 1st degree AV block  Otherwise normal ECG  When compared with ECG of 22-OCT-2013 09:16,  IA interval has increased  Nonspecific T wave abnormality now evident in Lateral leads     GLUCOSE, POC    Collection Time: 07/20/17  4:10 PM   Result Value Ref Range    Glucose (POC) 355 (H) 65 - 100 mg/dL    Performed by [address] [place] [address], POC    Collection Time: 07/20/17  4:12 PM   Result Value Ref Range    Glucose (POC) 369 (H) 65 - 100 mg/dL    Performed by Blanquita Stanton    GLUCOSE, POC    Collection Time: 07/20/17 10:36 PM   Result Value Ref Range    Glucose (POC) 53 (L) 65 - 100 mg/dL    Performed by Stephane Nuno    GLUCOSE, POC    Collection Time: 07/20/17 10:58 PM   Result Value Ref Range    Glucose (POC) 176 (H) 65 - 100 mg/dL    Performed by Stephane Nuno    METABOLIC PANEL, BASIC    Collection Time: 07/21/17  5:04 AM   Result Value Ref Range    Sodium 136 136 - 145 mmol/L    Potassium 5.4 (H) 3.5 - 5.1 mmol/L    Chloride 104 97 - 108 mmol/L    CO2 22 21 - 32 mmol/L    Anion gap 10 5 - 15 mmol/L    Glucose 394 (H) 65 - 100 mg/dL    BUN 29 (H) 6 - 20 MG/DL    Creatinine 1.53 (H) 0.70 - 1.30 MG/DL    BUN/Creatinine ratio 19 12 - 20      GFR est AA 54 (L) >60 ml/min/1.73m2    GFR est non-AA 44 (L) >60 ml/min/1.73m2    Calcium 8.0 (L) 8.5 - 10.1 MG/DL   HEMOGLOBIN A1C WITH EAG    Collection Time: 07/21/17  5:04 AM   Result Value Ref Range    Hemoglobin A1c 7.1 (H) 4.2 - 6.3 %    Est. average glucose 157 mg/dL   GLUCOSE, POC    Collection Time: 07/21/17  6:24 AM   Result Value Ref Range    Glucose (POC) 426 (H) 65 - 100 mg/dL    Performed by Jessica Bray ([id number])          Imaging:     Assessment:     Active Problems:    Infection of left hand ([address] (7/20/2017)           Plan:     1. Uncontrolled DM - His HBA1C is 7.1 which is not bad given his age , will increase the dose of NPH while in hospital, get records and verify from PCP/ Pharmacy actual insulin dose and cont SSI he takes 10 units with Dinner possibly he has early morning hypoglycemia , for his age hypoglycemia would be more harmful than hyperglycemia I will get verification first before change of any insulin dose  2. DTC and pharmacy consulted for above  3. Hyperkalemia- pt is on prinzide and ARB will d/c one again need med rec   4. Cr bump from ? - gentle hydration and d/c metformin   5. On gabapentin - with renal failure cut dose to 1/3   6.  Further MGMT - will follow up         Signed By: Dee Madrigal MD     July 21, 2017

## 2017-07-21 NOTE — CONSULTS
Infectious Disease Consult    Today's Date: 7/21/2017   Admit Date: 7/20/2017    Impression:   · Post-traumatic, post-op wound infection--appears to be strep on gram stain of the operative specimens, but cultures pending    Plan:   · Change to daptomycin  · Will need several weeks of therapy for the tenosynovitis     Anti-infectives:     Inpat Anti-Infectives     Start     Ordered Stop    07/22/17 0100  levoFLOXacin (LEVAQUIN) 250 mg in D5W IVPB  250 mg,   IntraVENous,   EVERY 24 HOURS      07/21/17 0010 --          Subjective:   Date of Consultation:  July 21, 2017  Referring Physician: Dr Irais Sharma    Patient is a 68 y.o. male who injured his left hand on a table saw about 2 months ago. He underwent tendon repair around that time and was participating with physical therapy, but the finger didn't 'look good' last week. He was started on Bactrim in the outpatient setting, but this did not improve things and he was taken to the OR last night. He had a suppurative tenosynovitis of the left second finger and cultures are showing GPC.      Patient Active Problem List   Diagnosis Code    Lumbar stenosis with neurogenic claudication M48.06    Encounter for screening colonoscopy Z12.11    Diverticulosis K57.90    Colitis, nonspecific K52.9    Carotid arterial disease (HCC) I77.9    Infection of left hand ([address] L08.9     Past Medical History:   Diagnosis Date    Allergic rhinitis     Anemia     ASVD (arteriosclerotic vascular disease)     Chronic kidney disease     Diabetes ([address]     glucose runs 79 - 160's at home    Diverticulosis     DJD (degenerative joint disease)     ED (erectile dysfunction)     Enlarged prostate     JUDY (generalized anxiety disorder)     GERD (gastroesophageal reflux disease)     controlled with med; alfonso's esophagus    Hiatal hernia     Hypercholesterolemia     Hypertension     Ill-defined condition     peripheral vascular disease    Lung nodule     Neuropathy ([address]     P[facility]ipheral [facility] disease) [facility]     Spinal stenosis       Family History   Problem Relation Age of Onset    Diabetes Mother     Cancer Father      LUNG    Heart Disease Father       Social History   Substance Use Topics    Smoking status: Former Smoker     Quit date: 7/7/1996    Smokeless tobacco: Never Used    Alcohol use No     Past Surgical History:   Procedure Laterality Date    CARDIAC SURG PROCEDURE UNLIST      CATH-2008    COLONOSCOPY N/A 6/15/2016    [facility]  6/15/2016         HX CATARACT REMOVAL      BILATERAL    HX ORTHOPAEDIC  1977    BONE CYST REM,ANU FROM RIGHT LEG    HX OTHER SURGICAL Left 06/2016    carotid endarectomy    NEUROLOGICAL PROCEDURE UNLISTED  2011    Back: spinal stenosis, pinched nerve repair    UPPER GI ENDOSCOPY,BIOPSY  6/15/2016           Prior to Admission medications    Medication Sig Start Date End Date Taking? Authorizing Provider   insulin glargine (LANTUS) 100 unit/mL injection by SubCUTAneous route nightly. By SS. Usually is < 20 units. Yes Historical Provider   insulin NPH (NOVOLIN N) 100 unit/mL injection 20 Units by SubCUTAneous route daily (with breakfast). Indications: type 2 diabetes mellitus   Yes Historical Provider   nadolol (CORGARD) 40 mg tablet Take 40 mg by mouth daily. Yes Historical Provider   HYDROcodone-acetaminophen (NORCO) 5-325 mg per tablet Take 1-2 Tabs by mouth every four (4) hours as needed for Pain. Max Daily Amount: 12 Tabs. 5/25/17   Mariama Dinero MD   omeprazole (PRILOSEC) 20 mg capsule Take 20 mg by mouth daily. Historical Provider   insulin aspart (NOVOLOG) 100 unit/mL injection 10 Units by SubCUTAneous route daily (with dinner). Indications: type 2 diabetes mellitus    Historical Provider   metFORMIN (GLUMETZA ER) 500 mg TG24 24 hour tablet Take 1 Tab by mouth two (2) times a day.     Historical Provider   lisinopril-hydrochlorothiazide (PRINZIDE, ZESTORETIC) 20-12.5 mg per tablet Take 1 Tab by mouth two (2) times a day. Historical Provider   amLODIPine (NORVASC) 5 mg tablet Take 5 mg by mouth daily. Historical Provider   ascorbic acid (VITAMIN C) 250 mg tablet Take 250 mg by mouth. Historical Provider   IRON, FERROUS SULFATE, PO Take 65 mg by mouth. Historical Provider   traMADol (ULTRAM) 50 mg tablet Take 50 mg by mouth four (4) times daily. Historical Provider   losartan (COZAAR) 100 mg tablet Take 100 mg by mouth two (2) times a day. Indications: HYPERTENSION    Historical Provider   aspirin 81 mg chewable tablet Take 81 mg by mouth daily. Historical Provider   meloxicam (MOBIC) 15 mg tablet Take 15 mg by mouth daily. Historical Provider   tamsulosin (FLOMAX) 0.4 mg capsule Take 0.4 mg by mouth nightly. Historical Provider   gabapentin (NEURONTIN) 300 mg capsule Take 300 mg by mouth three (3) times daily. Historical Provider   niacin ER (NIASPAN) 500 mg tablet Take  by mouth nightly. Historical Provider   simvastatin (ZOCOR) 10 mg tablet Take 10 mg by mouth every morning. Historical Provider       No Known Allergies     Review of Systems:  Pertinent items are noted in the History of Present Illness. Objective:     Visit Vitals    /57    Pulse 68    Temp 97.9 °F (36.6 °C)    Resp 14    Ht 5' 11\" (1.803 m)    Wt 86.2 kg (190 lb)    SpO2 99%    BMI 26.5 kg/m2     Temp (24hrs), Av.9 °F (36.6 °C), Min:97.5 °F (36.4 °C), Max:98.2 °F (36.8 °C)       Lines:  Peripheral IV:            Physical Exam:  Lungs:  clear to auscultation bilaterally  Heart:  regular rate and rhythm  Abdomen:  soft, non-tender.  Bowel sounds normal. No masses,  no organomegaly  Skin:  no rash or abnormalities  Finger dressed, no proximal cellulitis, adenopathy, etc    Data Review:     CBC:  Recent Labs      17   1533   WBC  5.3   GRANS  63   MONOS  10   EOS  3   ANEU  3.3   ABL  1.2   HGB  9.9*   HCT  30.4*   PLT  216 BMP:  Recent Labs      07/21/17   0504  07/20/17   1533   CREA  1.53*  1.57*   BUN  29*  33*   NA  136  132*   K  5.4*  4.5   CL  104  102   CO2  22  24   AGAP  10  6   GLU  394*  342*       LFTS:  No results for input(s): TBILI, ALT, SGOT, AP, TP, ALB in the last 72 hours.     Microbiology:     All Micro Results     Procedure Component Value Units Date/Time    CULTURE, Sophia Rm STAIN [[id number]] Collected:  07/20/17 2201    Order Status:  Completed Specimen:  Wound Updated:  07/21/17 1202     Special Requests: LEFT HAND CULTURE     GRAM STAIN OCCASIONAL WBCS SEEN                 OCCASIONAL GRAM POSITIVE COCCI (IN CHAINS)     Culture result: PENDING    CULTURE, ANAEROBIC [[id number]] Collected:  07/20/17 2201    Order Status:  Completed Updated:  07/21/17 0656    CULTURE, ANAEROBIC AND AEROBIC [[id number]] Collected:  07/20/17 2201    Order Status:  Canceled Updated:  07/20/17 2319          Imaging:   None     Signed By: Adi Mattson MD     July 21, 2017

## 2017-07-21 NOTE — PROGRESS NOTES
This nurse called Dr. Claire Ortega office for medication review.  took information and stated she will have a nurse call back with that information.

## 2017-07-21 NOTE — PROGRESS NOTES
Care Management Interventions  PCP Verified by CM: Yes (Dr. Nuvia Kelly)  Palliative Care Consult (Criteria: CHF and RRAT>21): No  Reason for No Palliative Care Consult: Other (see comment) (no needs/orders)  Mode of Transport at Discharge: Self  Transition of Care Consult (CM Consult): Discharge Planning  MyChart Signup: No  Discharge Durable Medical Equipment: No  Physical Therapy Consult: No  Occupational Therapy Consult: No  Speech Therapy Consult: No  Current Support Network: Lives with Spouse  Confirm Follow Up Transport: Family  Plan discussed with Pt/Family/Caregiver: Yes  Freedom of Choice Offered: Yes  Discharge Location  Discharge Placement: Home (TBD, ID consult noted)    Chart reviewed. The patient was admitted for I & D of Left Hand (Post op day #1) ID consult noted. The patient lives with his wife and is independent. CRM will follow for discharge planning as needed.  Cate

## 2017-07-22 NOTE — PROGRESS NOTES
Bedside shift change report given to Derrek Mc RN (oncoming nurse) by JANET Lockett RN (offgoing nurse). Report included the following information SBAR.

## 2017-07-22 NOTE — PROGRESS NOTES
Called Dr. Elinor Clifton for patient blood sugar of 355. New order received to give a total of 15 units with lunch.   New orders received for change in insulin management

## 2017-07-22 NOTE — PROGRESS NOTES
Infectious Diseases Progress Note    Antibiotic Summary:  Bactrim  ~  --   Levaquin 7/21 x 1 dose  Daptomycin  -- present    I&D left index finger flexor tendon sheath on 2017: POD #2    Subjective:     Pain in left index finger controlled by meds. No other new symptoms    Objective:     Vitals:   Visit Vitals    /61 (BP 1 Location: Left leg, BP Patient Position: Sitting)    Pulse 65    Temp 98 °F (36.7 °C)    Resp 16    Ht 5' 11\" (1.803 m)    Wt 86.2 kg (190 lb)    SpO2 99%    BMI 26.5 kg/m2        Tmax:  Temp (24hrs), Av.1 °F (36.7 °C), Min:98 °F (36.7 °C), Max:98.2 °F (36.8 °C)      Exam:  General appearance: alert, no distress  Lungs: clear to auscultation bilaterally  Heart: regular rate and rhythm  Abdomen: soft, non-tender. Bowel sounds normal.   Left index finger: dressed postop    IV Lines: peripheral    Labs:    Recent Labs      17   0303  17   0504  17   1533   WBC   --    --   5.3   HGB   --    --   9.9*   PLT   --    --   216   BUN  36*  29*  33*   CREA  1.52*  1.53*  1.57*     Left index finger cultures 2017:   light possible alpha Streptococcus   rare possible Staph aureus    Assessment:     1. Septic flexor tenosynovitis left index finger with multiple small abscesses -- S/P I&D on : Stable postop    2. NIDDM    3. CRF    4. Anemia    5. Periph Vasc Dz    6. Lumbar spinal stenosis    Plan:     1. Continue Dapto    2. Await final culture data    3.  Consult  to begin plans for home IV antibiotics vs Rama Yates MD

## 2017-07-22 NOTE — ROUTINE PROCESS
Bedside and Verbal shift change report given to Zoe Martínez (oncoming nurse) by Javier Quiles RN (offgoing nurse). Report included the following information SBAR, Kardex, Intake/Output, MAR and Recent Results.

## 2017-07-22 NOTE — PROGRESS NOTES
POD#2 I&D left hand  Pain better controlled    Afebrile  Dressing taken down. Wounds stable. No purulence noted.   BCR    cx strep and staph  E. Coli on cultures from lab [facility]    Plan:  Hospitalist for diabetes mgmt  Appreciate ID consult - agree with several weeks iv abx for FTS  Pain control

## 2017-07-22 NOTE — PROGRESS NOTES
Called MD about blood sugar of 353. Ordered to give 7 units plus 8 units scheduled of insulin.   Also received new order to increase to 12 units with meals starting in am on 7/23/17

## 2017-07-23 NOTE — ROUTINE PROCESS
Bedside shift change report given to [employer], RN (oncoming nurse) by Ponce Rivas RN (offgoing nurse). Report included the following information SBAR, Kardex and MAR.

## 2017-07-23 NOTE — PROGRESS NOTES
Infectious Diseases Progress Note    Antibiotic Summary:  Bactrim  ~  --   Levaquin 7/21 x 1 dose  Daptomycin  -- present    I&D left index finger flexor tendon sheath on 2017: POD #3    Subjective:     He is comfortable with no new symptoms    Objective:     Vitals:   Visit Vitals    /56 (BP 1 Location: Left arm, BP Patient Position: Post activity; Sitting)    Pulse 61    Temp 97.7 °F (36.5 °C)    Resp 17    Ht 5' 11\" (1.803 m)    Wt 86.2 kg (190 lb)    SpO2 100%    BMI 26.5 kg/m2        Tmax:  Temp (24hrs), Av.8 °F (36.6 °C), Min:97.6 °F (36.4 °C), Max:98.1 °F (36.7 °C)      Exam:  General appearance: alert, no distress  Lungs: clear to auscultation bilaterally  Heart: regular rate and rhythm  Abdomen: soft, non-tender. Bowel sounds normal.   Left index finger: dressed postop    IV Lines: peripheral    Labs:    Recent Labs      17   0303  17   0504   BUN  36*  29*   CREA  1.52*  1.53*     Left index finger cultures 2017:   light alpha Streptococcus -- sensitivities pending   rare Staph aureus -- MSSA; Dapto SULMA <=0.5    no anaerobes    Assessment:     1. Septic flexor tenosynovitis left index finger with multiple small abscesses -- S/P I&D on : Stable postop    2. NIDDM    3. CRF    4. Anemia    5. Periph Vasc Dz    6. Lumbar spinal stenosis    Plan:     1. Continue Dapto    2. Await final culture data    3.  Consult placed to  to begin plans for home IV antibiotics vs [facility] Blair with the patient    Steph Hill MD

## 2017-07-23 NOTE — PROGRESS NOTES
POD#3 I&D left hand  Pain controlled    Afebrile  Dressing taken down. Wounds stable. No purulence noted.   Erythema maybe slightly improved  BCR    cx strep and staph  E. Coli on cultures from lab ro[facility]    Plan:  Hospitalist for diabetes mgmt  Appreciate ID consult - agree with several weeks iv abx for FTS  Pain control  Case management - patient prefers infusion center versus home health

## 2017-07-24 NOTE — PROGRESS NOTES
IV orders noted. CRM will check with [facility] to find out what the IV coverage is for home. Discharge orders noted for today. The patient will need a picc line. CATE      This patient may not be home bound. CRM may have to look into the Critical access hospital for IV option. Will follow. Cate KELLER spoke with [address] [place] [address] with the [address] The patient is set up for start of care tomorrow at 3pm at the [facility] location. [address] [place] [address] stated that they will not accept orders in the progress notes and that she will fax the order form to [address] [place], [place] [address]e station at [phone]. CRM will contact Dr. Dilshad Arredondo. CATE KELLER contacted Dr. Dilshad Arredondo and faxed the order from with a copy of the patient's orders that he had entered in [address] [place] [address] with the forwarding fax # to the [address] [facility]. The patient will be discharged after his IV dose here today.  CATE

## 2017-07-24 NOTE — PROGRESS NOTES
I have reviewed discharge instructions with the patient. The patient verbalized understanding. Patient leaving via car with daughter and wife. Patient leaving with discharge instructions, a script, and wound care dressings.

## 2017-07-24 NOTE — PROGRESS NOTES
Called NP Aravind Mantilla because blood sugar is 407 this morning. Insulin was held last night because low BG. NP said to give 7 units SSI with 8 units scheduled humalog for total of 15 units humalog.

## 2017-07-24 NOTE — ROUTINE PROCESS
Bedside shift change report given to Larkin Heimlich, RN (oncoming nurse) by Jose Pena RN (offgoing nurse). Report included the following information SBAR, Kardex, Intake/Output and MAR.

## 2017-07-24 NOTE — PROCEDURES
PICC Placement Note. Order received. PICC procedure. Risks, benefits and complications reviewed with Mr. Lolly De Los Santos and his wife. Questions answered to satisfaction. Mr. Lolly De Los Santos signed informed consent for bedside placement of PICC line. PRE-PROCEDURE VERIFICATION  Correct Procedure: yes  Correct Site:  yes  Temperature: Temp: 98.2 °F (36.8 °C), Temperature Source: Temp Source: Oral  Recent Labs      07/22/17   0303   BUN  36*   CREA  1.52*     Allergies: Review of patient's allergies indicates no known allergies. Education materials, including PICC Booklet, for PICC Care given to patient: yes. See Patient Education activity for further details. PROCEDURE DETAIL  A single lumen PICC line was started for antibiotic therapy and desire for reliable access. The following documentation is in addition to the PICC properties in the lines/airways flowsheet : The vein was accessed with a 20g IV catheter using ultrasound guidance and a guidewire was easily thread. Modified Seldinger Technique was used to thread the PICC and the tip direction was determined with a PICC tip  device  Lot #: [id number]  Was xylocaine 1% used intradermally:  yes  Catheter Length: 40 (cm) right upper arm circ. 33  Vein Selection for PICC:right basilic  Central Line Bundle followed yes  Complication Related to Insertion: none    The placement was verified by ECG technology: Waveform placed on the patients chart to document the the PICC tip is located in the superior vena cava. Report to ADRIEL De Jesus  PICC line is functioning and ready for immediate use.

## 2017-07-24 NOTE — ROUTINE PROCESS
Bedside and Verbal shift change report given to Darryl Hernandes 44 (oncoming nurse) by Rose Marie Gardiner RN (offgoing nurse). Report included the following information SBAR, Kardex, Intake/Output, MAR and Recent Results.

## 2017-07-24 NOTE — PROGRESS NOTES
Paged Dr. Mayra Milner for patient's blood glucose of 379.     1227: Spoke with Dr. Dennis Cordova he stated to give 7 units of coverage and 8 units scheduled for a total of 15 units.

## 2017-07-24 NOTE — PROGRESS NOTES
POD#4 I&D left hand  Pain controlled    Afebrile  Dressing taken down. Erythema improved significantlly. Mild drainage noted distally.     BCR    cx strep and staph  E. Coli on cultures from lab rodrigo    Plan:  Hospitalist for diabetes mgmt  Appreciate ID consult - agree with several weeks iv abx for FTS  Pain control  Case management - patient prefers infusion center versus home health  Home today with close follow up

## 2017-07-24 NOTE — TELEPHONE ENCOUNTER
Printed copy of labs from the former [facility] EMR because patient had been in prior to the [facility] start date. Probably the most up dated list available. Will have  fax the information requested.

## 2017-07-24 NOTE — DISCHARGE INSTRUCTIONS
Dr. Alfredo Si Upper Extremity Postoperative Instructions      1. Please change your dressing on your left hand daily. Soak the finger daily in warm soapy water for 15-20 minutes. Please keep the wound bandaged and dry. If you have any questions or problems, please call our office at [phone].    2. Please elevate the operative extremity to the level of the heart to keep swelling at a minimum. You may get up to move around, but when seated please keep the extremity elevated as much as possible. This will decrease swelling and pain. 3. You may do activities as tolerated with your left hand. 4. You may ice your Arm 5 times a day for 20 minutes at a time. 5. Take your antibiotics as prescribed by the infectious disease doctor. 6. A prescription for pain medication is provided. Take it only on an as needed basis. 7. If you experience any redness, increased pain, increased swelling not relieved by elevation, drainage, fever, or chills, please call the office at [phone], and ask for Craig Mancini. Please Follow-up at my office [address], [address] in 2-3 days.

## 2017-07-24 NOTE — DIABETES MGMT
DTC Progress Note    Recommendations/ Comments: BG's widely variable  - /mg/dL in the last 24 hours. Blood sugar elevated this morning, most likely due to NPH being held last night. If appropriate, please consider   - changing pre-meal insulin to ac meals and discontinue hs  - may want to discontinue  Glipizide due to patient receiving pre-meal insulin   - may want to decrease pre-meal insulin to 8 units and change correction insulin to high sensitivity - most likely cause of hypoglycemia is 11 units of Humalog given at dinner    A1c:   Lab Results   Component Value Date/Time    Hemoglobin A1c 7.1 07/21/2017 05:04 AM       Recent Glucose Results:   Lab Results   Component Value Date/Time    GLUCPOC 379 (H) 07/24/2017 12:00 PM    GLUCPOC 407 (H) 07/24/2017 06:38 AM    GLUCPOC 92 07/23/2017 10:09 PM        Lab Results   Component Value Date/Time    Creatinine 1.52 07/22/2017 03:03 AM     Estimated Creatinine Clearance: 44 mL/min (based on Cr of 1.52). Active Orders   Diet    DIET DIABETIC CONSISTENT CARB Regular        PO intake:   Patient Vitals for the past 72 hrs:   % Diet Eaten   07/23/17 1804 100 %   07/23/17 1226 50 %   07/23/17 0853 50 %       Current hospital DM medication: NPH 30 units daily at breakfast and 20 units at bedtime, lispro 15 units ac and hs (needs to be changed), lispro normal correction scale, glipizide 5 mg BID    Will continue to follow as needed. Thank you.   Kendra Fleming, MS, RN, CDE

## 2017-07-24 NOTE — PROGRESS NOTES
IV Antibiotic Orders  1. Diagnosis:  Infected left index finger  2. Routine PICC care  3. Antibiotic:  Daptomycin 500 mg IV Q 24 hours  4. Lab each Monday:   CBC/diff/platelets   CMP   CPK   ESR  5. Lab each Thursday:   CBC/diff/platelets   BMP   CPK  6. Fax lab to Dr. Corinne Backer @ [phone].  7.  Call Dr. Corinne Backer @ [phone] for wbc under 4, creatinine over 2 or CPK over 300.  8.  Duration of therapy: 3 weeks. Call [phone] to set up 2 week appointment with me  9.   Allergies:  No Known Allergies    Sameer Boles MD

## 2017-07-24 NOTE — PROGRESS NOTES
HYPOGLYCEMIC EPISODE DOCUMENTATION    Patient with hypoglycemic episode(s) at 2151(time) on 7/23/2017(date). BG value(s) pre-treatment: 67    Was patient symptomatic?  [] yes, [x] no  Patient was treated with the following rescue medications/treatments: [] D50                [] Glucose tablets                [] Glucagon                [x] 4oz juice                [] 6oz reg soda                [] 8oz low fat milk  BG value post-treatment: 92  Once BG treated and value greater than 80mg/dl, pt was provided with the following:  [x] snack  [] meal  Name of MD notified:  SHAE Hernandez Sensing   The following orders were received:   Hold 8 units Humalog and hold 20 units NPH

## 2017-07-25 NOTE — DISCHARGE SUMMARY
ORTHO Discharge Summary      Patient ID:  Naila Andi  [id number]  66 y.o.  1940    Admit date: 7/20/2017    Discharge date and time: 7/24/2017  5:26 PM     Admitting Physician: Chloe Gallagher MD     Discharge Physician: Nima Brito    Admission Diagnoses: Infected left index finger  Infection of left hand [facility])  unknown    Discharge Diagnoses: Active Problems:    Infection of left hand (7/20/2017)        Surgeon: Nima Brito                          Perioperative Antibiotics: Levo; Daptomycin      Postoperative Pain Management:  Oxycodone    DVT Prophylaxis: SCD    Post Op complications: none    69 yo male s/p flexor tendon repair after saw laceration. Presented to office with warmth and erythema. Did not improve on several days of PO abx. Admitted for I&D. Found to have ruptured repair and flexor tenosynovitis. Underwent extensive debridement. Placed on broad spectrum IV abx. Cultures staph and strep. ID consulted. Wound improved in appearance. PICC. Stable for discharge with IV via infusion center. Discharged to: Home     Discharge instructions:  1. Please change your dressing on your left hand daily. Soak the finger daily in warm soapy water for 15-20 minutes. Please keep the wound bandaged and dry. If you have any questions or problems, please call our office at [phone].    2. Please elevate the operative extremity to the level of the heart to keep swelling at a minimum. You may get up to move around, but when seated please keep the extremity elevated as much as possible. This will decrease swelling and pain. 3. You may do activities as tolerated with your left hand. 4. You may ice your Arm 5 times a day for 20 minutes at a time. 5. Take your antibiotics as prescribed by the infectious disease doctor. 6. A prescription for pain medication is provided. Take it only on an as needed basis.     7. If you experience any redness, increased pain, increased swelling not relieved by elevation, drainage, fever, or chills, please call the office at [phone], and ask for Craig Mancini. Please Follow-up at my office [address] [facility], [address] in 2-3 days. Condition: stable    IV Antibiotic Orders  1. Diagnosis:  Infected left index finger  2. Routine PICC care  3. Antibiotic:  Daptomycin 500 mg IV Q 24 hours  4. Lab each Monday:                        CBC/diff/platelets                        CMP                        CPK                        ESR  5. Lab each Thursday:                        CBC/diff/platelets                        BMP                        CPK  6. Fax lab to Dr. Vicky Middleton @ [phone].  7.  Call Dr. Vicky Middleton @ [phone] for wbc under 4, creatinine over 2 or CPK over 300.  8.  Duration of therapy: 3 weeks. Call [phone] to set up 2 week appointment with me  9.   Allergies:  No Known Allergies    Signed:  Sonny Sanches MD  7/25/2017  12:01 PM

## 2017-07-25 NOTE — PROGRESS NOTES
1500 Pt arrived to [facility] ambulatory. Assessment completed. Pt denies any distress or discomfort at this time. PICC line noted with single lumen, blood return noted, flushed without difficulty. Visit Vitals    /56 (BP 1 Location: Left arm, BP Patient Position: At rest)    Pulse 68    Temp 98 °F (36.7 °C)    Resp 18                 1540 Daptomycin 500 Mg IV given over 5 min.           1545 PICC line flushed with normal saline and heparin then secured. Pt tolerated well. Pt denies any distress or discomfort at this time.  Pt discharged home ambulatory with next apt

## 2017-07-25 NOTE — TELEPHONE ENCOUNTER
Patient wanted you to that he was just released from [facility] where he stayed for 5 days because of secondary infection to his recent hand surgery. Says Dr. Jhoan Ayon did the surgery. Was discharged and is going to the infusion center for 21 days as an outpatient for treatment of the infection.

## 2017-07-26 NOTE — PROGRESS NOTES
Outpatient Infusion Center Progress Note    1500- Pt admit to [facility] for Daptomycin ambulatory in stable condition. Assessment completed. No new concerns voiced. Single lumen PICC line flushed w/o issue and positive blood return noted. Visit Vitals    /63 (BP 1 Location: Left arm, BP Patient Position: Sitting)    Pulse 60    Temp 98 °F (36.7 °C)    Resp 18    SpO2 100%     Medications:  Daptomycin 500 mg IVP  NS flush  Heparin     1508- Pt tolerated treatment well. PICC flushed with NS and heparin. D/c home ambulatory in no distress.  Pt aware of next appointment scheduled for 7/27 at 3 PM.

## 2017-07-26 NOTE — PERIOP NOTES
Patient: Zohreh Dove MRN: [id number]  SSN: [id number]   YOB: 1940  Age: 68 y.o. Sex: male     Patient is status post Procedure(s):  INCISION AND DRAINAGE LEFT HAND. Surgeon(s) and Role:     * General Mario MD - Primary    Local/Dose/Irrigation:  20ML 0.5% MARCAINE            PICC Single Lumen 81/87/19 Right;Basilic (Active)   Central Line Being Utilized Yes 7/26/2017  3:00 PM   Criteria for Appropriate Use Long term IV/antibiotic administration 7/26/2017  3:00 PM   Site Assessment Clean, dry, & intact 7/26/2017  3:00 PM   Phlebitis Assessment 0 7/26/2017  3:00 PM   Infiltration Assessment 0 7/26/2017  3:00 PM   Dressing Status Clean, dry, & intact 7/26/2017  3:00 PM   Dressing Type Disk with Chlorhexadine gluconate (CHG); Transparent 7/26/2017  3:00 PM   Hub Color/Line Status Purple;Flushed 7/26/2017  3:00 PM   Action Taken Open ports on tubing capped 7/26/2017  3:00 PM   Positive Blood Return (Site #1) Yes 7/26/2017  3:00 PM   Alcohol Cap Used Yes 7/26/2017  3:00 PM          Peripheral IV 07/20/17 Right Wrist (Active)            Airway - Endotracheal Tube 07/26/17 (Active)                   Dressing/Packing:  Wound Hand Left-DRESSING TYPE: 4 x 4;Elastic bandage; Oil emulsion (07/26/17 7215)  Splint/Cast:  ]    Other:

## 2017-07-26 NOTE — ANESTHESIA POSTPROCEDURE EVALUATION
Post-Anesthesia Evaluation and Assessment    Patient: Penelope Diggs MRN: [id number]  SSN: [id number]    YOB: 1940  Age: 68 y.o. Sex: male       Cardiovascular Function/Vital Signs  Visit Vitals    /58 (BP 1 Location: Right arm, BP Patient Position: At rest)    Pulse (!) 56    Temp 36.4 °C (97.5 °F)    Resp 11    Ht 5' 11\" (1.803 m)    Wt 86.2 kg (190 lb)    SpO2 99%    BMI 26.5 kg/m2       Patient is status post general anesthesia for Procedure(s):  INCISION AND DRAINAGE LEFT HAND. Nausea/Vomiting: None    Postoperative hydration reviewed and adequate. Pain:  Pain Scale 1: FLACC (07/26/17 1905)  Pain Intensity 1: 4 (07/26/17 1745)   Managed    Neurological Status:   Neuro (WDL): Within Defined Limits (07/26/17 1751)   At baseline    Mental Status and Level of Consciousness: Alert and oriented     Pulmonary Status:   O2 Device: Nasal cannula (07/26/17 1905)   Adequate oxygenation and airway patent    Complications related to anesthesia: None    Post-anesthesia assessment completed.  No concerns    Signed By: Juli Gilliam DO     July 26, 2017

## 2017-07-26 NOTE — BRIEF OP NOTE
BRIEF OPERATIVE NOTE    Date of Procedure: 7/26/2017   Preoperative Diagnosis: INFECTION LEFT HAND  Postoperative Diagnosis: INFECTION LEFT HAND    Procedure(s):  INCISION AND DRAINAGE LEFT HAND  Surgeon(s) and Role:     * Dominga Ellington MD - Primary         Assistant Staff:       Surgical Staff:  Circ-1: Brisa Rose RN  Scrub RN-1: Ely Frost RN  Event Time In   Incision Start 1837   Incision Close 1856     Anesthesia: General   Estimated Blood Loss: min  Specimens:   ID Type Source Tests Collected by Time Destination   1 : Left Index Finger Wound Finger AEROBIC/ANAEROBIC CULTURE Dominga Ellington MD 7/26/2017 1840 Microbiology      Findings: necrotic tissue in flexor sheath distal to laceration.      Complications: none  Implants: * No implants in log *

## 2017-07-26 NOTE — H&P (VIEW-ONLY)
Chief complaint:  Left hand pain and swelling    History of present illness:  55-year-old male who is status post left index finger flexor tendon repairs after a saw injury. Surgery was May 25th. Nasir Mcnally He presented to my office recently with pain and swelling. Cultures were taken. These were positive for Staph aureus and E coli. He has been on p.o. Bactrim without relief. He has gotten worse. He presented to the office today with significant drainage. Worsening erythema and warmth. He has failed outpatient management is therefore indicated for admission for IV antibiotics and surgical debridement. No current facility-administered medications on file prior to encounter. Current Outpatient Prescriptions on File Prior to Encounter   Medication Sig Dispense Refill    HYDROcodone-acetaminophen (NORCO) 5-325 mg per tablet Take 1-2 Tabs by mouth every four (4) hours as needed for Pain. Max Daily Amount: 12 Tabs. 30 Tab 0    omeprazole (PRILOSEC) 20 mg capsule Take 20 mg by mouth daily.  insulin NPH (NOVOLIN N) 100 unit/mL injection 20 Units by SubCUTAneous route once. Indications: type 2 diabetes mellitus      insulin aspart (NOVOLOG) 100 unit/mL injection 10 Units by SubCUTAneous route once. Indications: type 2 diabetes mellitus      metFORMIN (GLUMETZA ER) 500 mg TG24 24 hour tablet Take 1 Tab by mouth two (2) times a day.  lisinopril-hydrochlorothiazide (PRINZIDE, ZESTORETIC) 20-12.5 mg per tablet Take 1 Tab by mouth two (2) times a day.  amLODIPine (NORVASC) 5 mg tablet Take 5 mg by mouth daily.  ascorbic acid (VITAMIN C) 250 mg tablet Take 250 mg by mouth.  IRON, FERROUS SULFATE, PO Take 65 mg by mouth.  traMADol (ULTRAM) 50 mg tablet Take 50 mg by mouth four (4) times daily.  losartan (COZAAR) 100 mg tablet Take 100 mg by mouth two (2) times a day. Indications: HYPERTENSION      aspirin 81 mg chewable tablet Take 81 mg by mouth daily.       meloxicam (MOBIC) 15 mg tablet Take 15 mg by mouth daily.  INSULIN GLARGINE,HUM. REC. ANLOG (LANTUS SC) by SubCUTAneous route. STATES SLIDING SCALE AT BEDTIME      tamsulosin (FLOMAX) 0.4 mg capsule Take 0.4 mg by mouth nightly.  gabapentin (NEURONTIN) 300 mg capsule Take 300 mg by mouth three (3) times daily.  niacin ER (NIASPAN) 500 mg tablet Take  by mouth nightly.  nadolol (CORGARD) 40 mg tablet Take 40 mg by mouth daily.  simvastatin (ZOCOR) 10 mg tablet Take 10 mg by mouth every morning. Past Medical History:   Diagnosis Date    Allergic rhinitis     Anemia     ASVD (arteriosclerotic vascular disease)     Chronic kidney disease     Diabetes ([facility] [address]     glucose runs 79 - 160's at home    Diverticulosis     DJD (degenerative joint disease)     ED (erectile dysfunction)     Enlarged prostate     JUDY (generalized anxiety disorder)     GERD (gastroesophageal reflux disease)     controlled with med; alfonso's esophagus    Hiatal hernia     Hypercholesterolemia     Hypertension     Ill-defined condition     peripheral vascular disease    Lung nodule     Neuropathy (HCC)     PVD (peripheral vascular disease) ([facility] [address]     Spinal stenosis        No Known Allergies    ROS neg    Social History     Social History    Marital status:      Spouse name: N/A    Number of children: N/A    Years of education: N/A     Occupational History    Not on file. Social History Main Topics    Smoking status: Former Smoker     Quit date: 7/7/1996    Smokeless tobacco: Never Used    Alcohol use No    Drug use: No    Sexual activity: Not on file     Other Topics Concern    Not on file     Social History Narrative     PE:    He is alert and oriented. He is in no acute distress. Regular rate and rhythm. Clear to auscultation bilaterally. His left hand is severely swollen. He has significant erythema. He has purulent material draining from his wound volarly. Intact sensibility.   Brisk cap refill. X-rays:  None    Labs:  Pending    Assessment:  Left hand index finger infection    Plan:  He will be admitted for IV antibiotics. NPO for surgical debridement later tonight. He has given informed consent to proceed.

## 2017-07-26 NOTE — TELEPHONE ENCOUNTER
Spoke with pt re: need to schedule hospital follow up visit. Pt d/c was 7/25/2017.   Appt. Scheduled for 7/28/2017.

## 2017-07-26 NOTE — INTERVAL H&P NOTE
H&P Update:  Benjamín Lopez was seen and examined. History and physical has been reviewed. Significant clinical changes have occurred as noted:  Discharged from hospital on Monday. Is receiving Daptomycin daily at infusion center. Presented today with continued drainage from finger distally. I recommend repeat I&D of finger. He is in agreement. Will be done as an outpatient. He will continue to change to the dressing daily and receive iv abx daily as scheduled. Discussed with patient and wife that if he we are not able to get control of his infection that amputation of the digit will be the next surgical option. They are in agreement. I discussed the risks, benefits, alternatives, potential complications and reasonable expectations of surgery with the patient. Risks discussed including persistent infection, bleeding, nerve damage, chronic pain and need for amputation of the index finger. After this discussion, the patient elected to proceed.         Signed By: Elton Beltran MD     July 26, 2017 6:04 PM

## 2017-07-26 NOTE — IP AVS SNAPSHOT
[address] 
[phone] Patient: Jerzy Gardiner MRN: [id number] BHQ:[id number] You are allergic to the following No active allergies Recent Documentation Height Weight BMI Smoking Status 1.803 m 86.2 kg 26.5 kg/m2 Former Smoker Emergency Contacts Name Discharge Info Relation Home Work Mobile Neisha Harrington DISCHARGE CAREGIVER [3] Spouse [3] [phone] About your hospitalization You were admitted on:  July 26, 2017 You last received care in the:  [facility] PACU You were discharged on:  July 26, 2017 Unit phone number:  [phone] Why you were hospitalized Your primary diagnosis was:  Not on File Providers Seen During Your Hospitalizations Provider Role Specialty Primary office phone Epifanio Sandhoff, MD Attending Provider Orthopedic Surgery [phone] Your Primary Care Physician (PCP) Primary Care Physician Office Phone Office Fax Charis Rinaldi [phone] Follow-up Information Follow up With Details Comments Contact Info MD Daysi Muro [facility] 
[phone] Epifanio Sandhoff, MD Follow up in 2 day(s)  [address] [facility] [address] 
[phone] Your Appointments Thursday July 27, 2017  3:00 PM EDT INFUSION 30 with LUIGI INFUSION NURSE 1  
[place] ([address]) [address]  
[phone] Go to [facility], [facility], [address] Friday July 28, 2017 10:30 AM EDT FOLLOW UP 10 with Danna Mcmullen MD  
[facility] ([facility]) [address] [address] [phone] Friday July 28, 2017  3:00 PM EDT INFUSION 30 with HANOVER INFUSION NURSE 1  
[place] ([address]) [address]  
[phone] Go to [facility], [facility] 3, [address] [facility], [place], [address] Saturday July 29, 2017 11:00 AM EDT INFUSION 30 with RM 102A- CHAIR [facility] - [address] ([address]) [address] [phone]  
[phone] Go to [address], ground floor. Sunday July 30, 2017 11:00 AM EDT INFUSION 30 with RM 102A- CHAIR [facility] - [address] ([address]) [address] [phone]  
[phone] Go to [address], ground floor. Monday July 31, 2017  3:00 PM EDT INFUSION 30 with HANOVER INFUSION NURSE 1  
[place] ([address]) [address]  
[phone] Go to [facility], [facility] 3, [address] [facility], [place], [address] Tuesday August 01, 2017  3:00 PM EDT INFUSION 30 with HANOVER INFUSION NURSE 1  
[place] ([address]) [address]  
[phone] Go to [facility], [facility] 3, [address] [facility], [place], [address] Wednesday August 02, 2017  3:00 PM EDT INFUSION 30 with HANOVER INFUSION NURSE 1  
[place] ([address]) [address]  
[phone] Go to [facility], [facility] 3, [address] [facility] [place], [address] Thursday August 03, 2017  3:00 PM EDT [facility]  
[place] ([address]) [address]  
[phone] Go to [facility], [facility] 3, [address] [facility], [place] [address] Friday August 04, 2017  3:00 PM EDT INFUSION 30 with HANOVER INFUSION NURSE 1  
[place] ([address]) [address]  
[phone] Go to [facility], M[facility] 3, [address] [facility], [place], [address] Saturday August 05, 2017 11:00 AM EDT INFUSION 30 with RM 102A- CHAIR [facility] - [address] ([address]) [address] [phone]  
[phone] Go to [address], ground floor. Sunday August 06, 2017 11:00 AM EDT INFUSION 30 with RM 102A- CHAIR [facility] - [address] ([address]) [address] [phone] Go to [address], ground floor. Monday August 07, 2017  3:00 PM EDT INFUSION 30 with HANOVER INFUSION NURSE 1  
[place] ([address]) [address]  
[phone] Go to [facility], M[facility] 3, [address] [facility], [place], [address] Tuesday August 08, 2017  3:00 PM EDT INFUSION 30 with HANOVER INFUSION NURSE 1  
[place] ([address]) [address]  
[phone] Go to [facility], M[facility] 3, [address] [facility], [place], [address] Wednesday August 09, 2017  3:00 PM EDT INFUSION 30 with HANOVER INFUSION NURSE 1  
[place] ([address]) [address]  
[phone] Go to [facility], [facility] 3, [address], [place], [address] Thursday August 10, 2017  3:00 PM EDT INFUSION 30 with HANOVER INFUSION NURSE 1  
[place] ([facility]) [address]  
[phone] Go to [facility], [facility] 3, [address], [place], [address] Friday August 11, 2017  3:00 PM EDT INFUSION 30 with HANOVER INFUSION NURSE 1  
[place] ([facility]) [address]  
[phone] Go to [facility], [facility] 3, [address], [place], [address] Saturday August 12, 2017 11:00 AM EDT INFUSION 30 with RM 102A- CHAIR [facility] - [address] ([address]) [facility] [address] [phone] Go to [address], ground floor. Sunday August 13, 2017 11:00 AM EDT INFUSION 30 with RM 102A- CHAIR [facility] - [address] ([address]) [address] [facility] [address] [phone] Go to [address], ground floor. Monday August 14, 2017  3:00 PM EDT INFUSION 30 with HANOVER INFUSION NURSE 1  
[place] ([facility]) [address]  
[phone] Go to [facility], [facility] 3, [address], [place], [address] Tuesday August 15, 2017  3:00 PM EDT INFUSION 30 with HANOVER INFUSION NURSE 1  
[place] ([facility]) [address]  
[phone] Go to [facility], [facility] 3, [address], [place], [address] [facility] Current Discharge Medication List  
  
 CONTINUE these medications which have NOT CHANGED Dose & Instructions Dispensing Information Comments Morning Noon Evening Bedtime  
 amLODIPine 5 mg tablet Commonly known as:  Adele Butterfield Your last dose was: Your next dose is:    
   
   
 Dose:  5 mg Take 5 mg by mouth daily. Refills:  0  
     
   
   
   
  
 ascorbic acid (vitamin C) 250 mg tablet Commonly known as:  VITAMIN C Your last dose was: Your next dose is:    
   
   
 Dose:  250 mg Take 250 mg by mouth. Refills:  0  
     
   
   
   
  
 aspirin 81 mg chewable tablet Your last dose was: Your next dose is:    
   
   
 Dose:  81 mg Take 81 mg by mouth daily. Refills:  0  
     
   
   
   
  
 gabapentin 300 mg capsule Commonly known as:  NEURONTIN Your last dose was: Your next dose is:    
   
   
 Dose:  300 mg Take 300 mg by mouth three (3) times daily. Refills:  0 HYDROcodone-acetaminophen 5-325 mg per tablet Commonly known as:  Erika Martini Your last dose was: Your next dose is:    
   
   
 Dose:  1-2 Tab Take 1-2 Tabs by mouth every four (4) hours as needed for Pain. Max Daily Amount: 12 Tabs. Quantity:  20 Tab Refills:  0 IRON (FERROUS SULFATE) PO Your last dose was: Your next dose is:    
   
   
 Dose:  65 mg Take 65 mg by mouth. Refills:  0  
     
   
   
   
  
 LANTUS 100 unit/mL injection Generic drug:  insulin glargine Your last dose was: Your next dose is:    
   
   
 by SubCUTAneous route nightly. By SS. Usually is < 20 units. Refills:  0  
     
   
   
   
  
 lisinopril-hydroCHLOROthiazide 20-12.5 mg per tablet Commonly known as:  Karmen Castellanos Your last dose was: Your next dose is:    
   
   
 Dose:  1 Tab Take 1 Tab by mouth two (2) times a day. Refills:  0  
     
   
   
   
  
 losartan 100 mg tablet Commonly known as:  COZAAR Your last dose was: Your next dose is:    
   
   
 Dose:  100 mg Take 100 mg by mouth two (2) times a day. Indications: HYPERTENSION Refills:  0  
     
   
   
   
  
 meloxicam 15 mg tablet Commonly known as:  MOBIC Your last dose was: Your next dose is:    
   
   
 Dose:  15 mg Take 15 mg by mouth daily. Refills:  0  
     
   
   
   
  
 metFORMIN 500 mg Tg24 24 hour tablet Commonly known asJacquiline Najjar ER Your last dose was: Your next dose is:    
   
   
 Dose:  1 Tab Take 1 Tab by mouth two (2) times a day. Refills:  0  
     
   
   
   
  
 nadolol 40 mg tablet Commonly known as:  CORGARD Your last dose was: Your next dose is:    
   
   
 Dose:  40 mg Take 40 mg by mouth daily. Refills:  0 Niaspan 500 mg tablet Generic drug:  niacin ER Your last dose was: Your next dose is: Take  by mouth nightly. Refills:  0 NovoLIN N 100 unit/mL injection Generic drug:  insulin NPH Your last dose was: Your next dose is:    
   
   
 Dose:  20 Units 20 Units by SubCUTAneous route daily (with breakfast). Indications: type 2 diabetes mellitus Refills:  0 NovoLOG 100 unit/mL injection Generic drug:  insulin aspart Your last dose was: Your next dose is:    
   
   
 Dose:  10 Units 10 Units by SubCUTAneous route daily (with dinner). Indications: type 2 diabetes mellitus Refills:  0  
     
   
   
   
  
 omeprazole 20 mg capsule Commonly known as:  PRILOSEC Your last dose was: Your next dose is:    
   
   
 Dose:  20 mg Take 20 mg by mouth daily. Refills:  0  
     
   
   
   
  
 simvastatin 10 mg tablet Commonly known as:  ZOCOR Your last dose was: Your next dose is:    
   
   
 Dose:  10 mg Take 10 mg by mouth every morning. Refills:  0  
     
   
   
   
  
 tamsulosin 0.4 mg capsule Commonly known as:  FLOMAX Your last dose was: Your next dose is:    
   
   
 Dose:  0.4 mg Take 0.4 mg by mouth nightly. Refills:  0  
     
   
   
   
  
 traMADol 50 mg tablet Commonly known as:  ULTRAM  
   
Your last dose was: Your next dose is:    
   
   
 Dose:  50 mg Take 50 mg by mouth four (4) times daily. Refills:  0 Discharge Instructions Dr Cassidy Edmond Discharge Instructions: 1. Please change your dressing on your left hand daily. Soak the finger daily in warm soapy water for 15-20 minutes. Please keep the wound bandaged and dry. If you have any questions or problems, please call our office at [phone]. 
  
2. Please elevate the operative extremity to the level of the heart to keep swelling at a minimum. You may get up to move around, but when seated please keep the extremity elevated as much as possible. This will decrease swelling and pain. 
  
3. You may do activities as tolerated with your left hand.  
  
4. You may ice your Arm 5 times a day for 20 minutes at a time. 
  
5. Take your antibiotics as prescribed by the infectious disease doctor at the infusion center. 
  
6. If you experience any worsening redness, increased pain, increased swelling not relieved by elevation, drainage, fever, or chills, please call the office at [phone], and ask for Craig Mancini. After general anesthesia or intravenous sedation, for 24 hours or while taking prescription Narcotics: · Limit your activities · Do not drive and operate hazardous machinery · Do not make important personal or business decisions · Do  not drink alcoholic beverages · If you have not urinated within 8 hours after discharge, please contact your surgeon on call.  
 
Report the following to your surgeon: 
· Excessive pain, swelling, redness or odor of or around the surgical area · Temperature over 100.5 · Nausea and vomiting lasting longer than 4 hours or if unable to take medications · Any signs of decreased circulation or nerve impairment to extremity: change in color, persistent  numbness, tingling, coldness or increase pain · Any questions Discharge Orders None Introducing [facility]! Radha Dickens introduces [employer] patient portal. Now you can access parts of your medical record, email your doctor's office, and request medication refills online. 1. In your internet browser, go to [email or web address] 2. Click on the First Time User? Click Here link in the Sign In box. You will see the New Member Sign Up page. 3. Enter your [employer] Access Code exactly as it appears below. You will not need to use this code after youve completed the sign-up process. If you do not sign up before the expiration date, you must request a new code. · [employer] Access Code: [email or web address] Expires: 8/20/2017 10:06 AM 
 
4. Enter the last four digits of your Social Security Number (xxxx) and Date of Birth (mm/dd/yyyy) as indicated and click Submit. You will be taken to the next sign-up page. 5. Create a [employer] ID. This will be your [employer] login ID and cannot be changed, so think of one that is secure and easy to remember. 6. Create a [employer] password. You can change your password at any time. 7. Enter your Password Reset Question and Answer. This can be used at a later time if you forget your password. 8. Enter your e-mail address. You will receive e-mail notification when new information is available in [address] 9. Click Sign Up. You can now view and download portions of your medical record. 10. Click the Download Summary menu link to download a portable copy of your medical information.  
 
If you have questions, please visit the Frequently Asked Questions section of the [employer]. Remember, MyChart is NOT to be used for urgent needs. For medical emergencies, dial 911. Now available from your iPhone and Android! General Information Please provide this summary of care documentation to your next provider. Patient Signature:  ____________________________________________________________ Date:  ____________________________________________________________  
  
Edwin Ala Provider Signature:  ____________________________________________________________ Date:  ____________________________________________________________

## 2017-07-26 NOTE — ANESTHESIA PREPROCEDURE EVALUATION
Anesthetic History   No history of anesthetic complications            Review of Systems / Medical History  Patient summary reviewed, nursing notes reviewed and pertinent labs reviewed    Pulmonary  Within defined limits                 Neuro/Psych   Within defined limits           Cardiovascular    Hypertension: well controlled          PAD    Exercise tolerance: >4 METS     GI/Hepatic/Renal     GERD    Renal disease: CRI       Endo/Other    Diabetes: type 1    Arthritis and anemia     Other Findings            Physical Exam    Airway  Mallampati: II  TM Distance: > 6 cm  Neck ROM: normal range of motion   Mouth opening: Normal     Cardiovascular  Regular rate and rhythm,  S1 and S2 normal,  no murmur, click, rub, or gallop             Dental    Dentition: Poor dentition     Pulmonary  Breath sounds clear to auscultation               Abdominal  GI exam deferred       Other Findings            Anesthetic Plan    ASA: 3  Anesthesia type: general          Induction: Intravenous  Anesthetic plan and risks discussed with: Patient and Spouse

## 2017-07-27 PROBLEM — E11.9 CONTROLLED TYPE 2 DIABETES MELLITUS WITHOUT COMPLICATION, WITH LONG-TERM CURRENT USE OF INSULIN (HCC): Status: ACTIVE | Noted: 2017-01-01

## 2017-07-27 PROBLEM — Z79.4 CONTROLLED TYPE 2 DIABETES MELLITUS WITHOUT COMPLICATION, WITH LONG-TERM CURRENT USE OF INSULIN (HCC): Status: ACTIVE | Noted: 2017-01-01

## 2017-07-27 PROBLEM — I70.90 ASVD (ARTERIOSCLEROTIC VASCULAR DISEASE): Status: ACTIVE | Noted: 2017-01-01

## 2017-07-27 PROBLEM — E78.2 MIXED HYPERLIPIDEMIA: Status: ACTIVE | Noted: 2017-01-01

## 2017-07-27 PROBLEM — I10 ESSENTIAL HYPERTENSION: Status: ACTIVE | Noted: 2017-01-01

## 2017-07-27 NOTE — PROGRESS NOTES
Outpatient Infusion Center Progress Note    1500- Pt admit to [facility] for Daptomycin ambulatory in stable condition. Assessment completed. No new concerns voiced. Pt's BP elevated, pt states he is due for his afternoon dose of BP medication and will take it when he gets home. Single lumen PICC line flushed w/o issue and positive blood return noted. Labs drawn per order and sent to lab for processing. Please see CC for pending results. Visit Vitals    /62 (BP 1 Location: Left arm, BP Patient Position: Sitting)    Pulse 60    Temp 97.8 °F (36.6 °C)    Resp 18    SpO2 100%       Medications:  Daptomycin 500 mg IVP  NS flush  Heparin    1515- Pt tolerated treatment well. PICC flushed with NS and heparin and capped. D/c home ambulatory in no distress with spouse.  Pt aware of next appointment scheduled for 7/28 at 3 PM.

## 2017-07-27 NOTE — OP NOTES
[address] [facility], [address]   OP NOTE       Name:  Catia Kang   MR#:  [id number]   :  1940   Account #:  [de-identified]    Surgery Date:  2017   Date of Adm:  2017       PREOPERATIVE DIAGNOSIS: Left index finger flexor tenosynovitis. POSTOPERATIVE DIAGNOSIS: Left index finger flexor tenosynovitis. PROCEDURE PERFORMED: Incision and drainage, left index finger   flexor sheath with excision of flexor tendons. SURGEON: Zackary Gao. Venkat Lockhart MD    ANESTHESIA: General.    ESTIMATED BLOOD LOSS: Minimal.    SPECIMENS REMOVED: Cultures, left index finger. FINDINGS: Necrotic tissue within the flexor sheath of the digit. COMPLICATIONS: None. IMPLANTS: None. INDICATIONS FOR PROCEDURE: This is a 66-year-old male who cut   his finger with a saw, resulting in a flexor tendon injury. He underwent   surgical repair. He was doing well for a period of time. He since   developed a significant infection. He was admitted last week for   debridement and IV antibiotics. He was just discharged on Monday. He   is receiving IV antibiotics via the infusion center daily. He presented to   my office today with worsening erythema and swelling of the digit. He   had some purulent drainage. He was indicated therefore for surgical   debridement. I discussed the risks, benefits, potential complications   and alternatives to surgery, and he gave informed consent to proceed. We discussed at length that if he is unable to clear his infection after   this surgical debridement, then I would recommend amputation of the   digit. He is in agreement with that recommendation. DESCRIPTION OF PROCEDURE: The patient was identified in the   preoperative holding area. Informed consent was obtained. The   operative site was marked. He was then transported to the OR and   placed supine on the operating table. All bony prominences were well-  padded.  A tourniquet was applied to the upper brachium. After   induction of general anesthesia, the left upper extremity was sterilely   prepped and draped in the usual fashion. Surgical time-out was held,   the operative site was confirmed. Preoperative antibiotics were not   given. After gravity exsanguination, the tourniquet was elevated to 250   mmHg. His prior incisions were opened. These were extended out   distally in a Brunner's fashion to the tip of the digit distally. The flexor   sheath was opened. He was found to have a significant amount of   necrotic tissue within the sheath. The entire sheath was excised out to   the tip of the digit. The flexor tendons were excised. Significant amount   of necrotic material was also debrided off of the volar aspect of the   middle phalanx and the distal phalanx. Significant necrotic material   within the subcutaneous tissues was also debrided. Debridement was   carried out sharply with a 15 blade down to the level of bone. The   wound was then thoroughly irrigated with normal saline with bacitracin. At this point, the tourniquet was let down and cap refill returned to the   tip of the digit. The wound was again thoroughly irrigated. The skin was   very loosely approximated with 4-0 nylon sutures. Sterile dressings   were applied. He was transferred to the PACU in stable condition   without complication.         Merlin Howell MD      [address] [place] [address] / [facility]   D:  07/26/2017   19:03   T:  07/27/2017   10:48   Job #:  [id number]

## 2017-07-27 NOTE — TELEPHONE ENCOUNTER
Called patient to f/up regarding his trip back to [facility] yesterday. Said his wound was getting red again and Dr. Grace Doctor decided to do an I & D to clean out the infection. Antibiotic infusion is to continue but patient has to soak the wound and clean it well daily. Sees Dr. Grace Doctor again 7-28-17 and also has f/up with Dr. Kassi Vicente 7-28-17.

## 2017-07-27 NOTE — DISCHARGE INSTRUCTIONS
Dr Ruthie Barker Discharge Instructions:    1. Please change your dressing on your left hand daily. Soak the finger daily in warm soapy water for 15-20 minutes. Please keep the wound bandaged and dry. If you have any questions or problems, please call our office at [phone].     2. Please elevate the operative extremity to the level of the heart to keep swelling at a minimum. You may get up to move around, but when seated please keep the extremity elevated as much as possible. This will decrease swelling and pain.     3. You may do activities as tolerated with your left hand.      4. You may ice your Arm 5 times a day for 20 minutes at a time.     5. Take your antibiotics as prescribed by the infectious disease doctor at the infusion center.     6. If you experience any worsening redness, increased pain, increased swelling not relieved by elevation, drainage, fever, or chills, please call the office at [phone], and ask for Craig Mancini. After general anesthesia or intravenous sedation, for 24 hours or while taking prescription Narcotics:  · Limit your activities  · Do not drive and operate hazardous machinery  · Do not make important personal or business decisions  · Do  not drink alcoholic beverages  · If you have not urinated within 8 hours after discharge, please contact your surgeon on call.     Report the following to your surgeon:  · Excessive pain, swelling, redness or odor of or around the surgical area  · Temperature over 100.5  · Nausea and vomiting lasting longer than 4 hours or if unable to take medications  · Any signs of decreased circulation or nerve impairment to extremity: change in color, persistent  numbness, tingling, coldness or increase pain  · Any questions

## 2017-07-28 PROBLEM — K22.70 BARRETT'S ESOPHAGUS: Status: ACTIVE | Noted: 2017-01-01

## 2017-07-28 PROBLEM — N40.0 HYPERTROPHY (BENIGN) OF PROSTATE: Status: ACTIVE | Noted: 2017-01-01

## 2017-07-28 PROBLEM — I65.29 CAROTID STENOSIS: Status: ACTIVE | Noted: 2017-01-01

## 2017-07-28 PROBLEM — Z12.5 PROSTATE CANCER SCREENING: Status: ACTIVE | Noted: 2017-01-01

## 2017-07-28 PROBLEM — E11.40 DIABETIC NEUROPATHY (HCC): Status: ACTIVE | Noted: 2017-01-01

## 2017-07-28 PROBLEM — Z79.899 ON STATIN THERAPY: Status: ACTIVE | Noted: 2017-01-01

## 2017-07-28 PROBLEM — L72.3 SEBACEOUS CYST: Status: ACTIVE | Noted: 2017-01-01

## 2017-07-28 NOTE — PROGRESS NOTES
Hospital Follow Up       HPI:  Violeta Escalante is a 68y.o. year old male who is here for a follow up visit for hospitalization transition of care. He was last seen by me on 6/26/2017 when he came in for his evaluation of his multiple medical problems. he was subsequent hospitalized at [facility] through 724 due to an infection of his left hand which had to be debrided and treated with IV antibiotics    Discharged on: 7/24/2017 he did have repeat outpatient debridement of his infection 7/26/2017    Diagnosis in hospital: Infection left index finger requiring incision and debridement    Complications in hospital:  None    Medication changes: The only medication was addition of daptomycin which is infused IV once daily    Discharge Summary reviewed. Today 7/28/2017      He reports the following: He was admitted with treatment as noted initial incision and drainage on 721 after that he was able to actually bend the finger and had good flexion. He was discharged on July 24. He had repeat incision and drainage on July 26. Since then he has no flexion ability of the left index finger. He currently notes no fevers chills. Pain is controlled. Blood sugars have been extremely erratic during this infection or hospitalization. He was treated with sliding scale in the hospital.  Blood sugars ranged from the 70s to up as high as 500 or greater. Blood pressure was also erratic during hospitalization. He denies any headaches chest pain shortness of breath or cardiovascular complaints. There are no neurologic complaints. He has had no significant hypoglycemic symptoms.       Visit Vitals    /60 (BP 1 Location: Left arm, BP Patient Position: At rest)    Pulse (!) 58    Temp 97.9 °F (36.6 °C) (Oral)    Resp 18    Ht 5' 9\" (1.753 m)    Wt 193 lb 6.4 oz (87.7 kg)    SpO2 98%    BMI 28.56 kg/m2       Historical Data    Past Medical History:   Diagnosis Date    Allergic rhinitis     Anemia     ASVD (arteriosclerotic vascular disease)     Alfonso's esophagus 7/28/2017    Carotid stenosis 7/28/2017    Chronic kidney disease     Diabetes ([facility]     glucose runs 70 - 160's at home    Diabetic neuropathy ([facility] 7/28/2017    Diverticulosis     DJD (degenerative joint disease)     ED (erectile dysfunction)     Enlarged prostate     JUDY (generalized anxiety disorder)     GERD (gastroesophageal reflux disease)     controlled with med; alfonso's esophagus    Hiatal hernia     Hypercholesterolemia     Hypertension     Hypertrophy (benign) of prostate 7/28/2017    Ill-defined condition     peripheral vascular disease    Lung nodule     Neuropathy ([address]     On statin therapy 7/28/2017    Prostate cancer screening 7/28/2017    PVD (peripheral vascular disease) ([facility]     Sebaceous cyst 7/28/2017    Spinal stenosis        Past Surgical History:   Procedure Laterality Date    CARDIAC SURG PROCEDURE UNLIST      CATH-2008    COLONOSCOPY N/A 6/15/2016    COLONOSCOPY performed by Emily [facility]domo [facility] at [facility]  6/15/2016         HX CATARACT REMOVAL      BILATERAL    HX ORTHOPAEDIC  1977    BONE CYST REM,ANU FROM RIGHT LEG    HX ORTHOPAEDIC  5-, 7-22-17, 7-26-17    left hand index finger    HX OTHER SURGICAL Left 06/2016    carotid endarectomy    NEUROLOGICAL PROCEDURE UNLISTED  2011    Back: spinal stenosis, pinched nerve repair    UPPER GI ENDOSCOPY,BIOPSY  6/15/2016            Outpatient Encounter Prescriptions as of 7/28/2017   Medication Sig Dispense Refill    HYDROcodone-acetaminophen (NORCO) 5-325 mg per tablet Take 1-2 Tabs by mouth every four (4) hours as needed for Pain. Max Daily Amount: 12 Tabs. 20 Tab 0    insulin glargine (LANTUS) 100 unit/mL injection by SubCUTAneous route nightly. By SS. Usually is < 20 units.  omeprazole (PRILOSEC) 20 mg capsule Take 20 mg by mouth daily.       insulin NPH (NOVOLIN N) 100 unit/mL injection 20 Units by SubCUTAneous route daily (with breakfast). Indications: type 2 diabetes mellitus      insulin aspart (NOVOLOG) 100 unit/mL injection 10 Units by SubCUTAneous route daily (with dinner). Indications: type 2 diabetes mellitus      metFORMIN (GLUMETZA ER) 500 mg TG24 24 hour tablet Take 1 Tab by mouth two (2) times a day.  lisinopril-hydrochlorothiazide (PRINZIDE, ZESTORETIC) 20-12.5 mg per tablet Take 1 Tab by mouth two (2) times a day.  amLODIPine (NORVASC) 5 mg tablet Take 5 mg by mouth daily.  ascorbic acid (VITAMIN C) 250 mg tablet Take 250 mg by mouth three (3) times daily.  IRON, FERROUS SULFATE, PO Take 65 mg by mouth.  traMADol (ULTRAM) 50 mg tablet Take 50 mg by mouth four (4) times daily.  losartan (COZAAR) 100 mg tablet Take 100 mg by mouth two (2) times a day. Indications: HYPERTENSION      aspirin 81 mg chewable tablet Take 81 mg by mouth daily.  meloxicam (MOBIC) 15 mg tablet Take 15 mg by mouth daily.  tamsulosin (FLOMAX) 0.4 mg capsule Take 0.4 mg by mouth nightly.  gabapentin (NEURONTIN) 300 mg capsule Take 300 mg by mouth three (3) times daily.  niacin ER (NIASPAN) 500 mg tablet Take  by mouth nightly.  nadolol (CORGARD) 40 mg tablet Take 40 mg by mouth daily.  simvastatin (ZOCOR) 10 mg tablet Take 10 mg by mouth every morning.        Facility-Administered Encounter Medications as of 2017   Medication Dose Route Frequency Provider Last Rate Last Dose    sodium chloride (NS) flush 10-40 mL  10-40 mL IntraVENous PRN Carter Situ, MD   10 mL at 17 1513    heparin (porcine) pf 500 Units  500 Units IntraVENous PRN Carter Situ, MD   500 Units at 17 1514    [] sodium chloride (NS) flush 10-40 mL  10-40 mL IntraVENous PRN Carter Situ, MD   10 mL at 17 1507    [] heparin (porcine) pf 500 Units  500 Units IntraVENous PRN Carter Situ, MD   500 Units at 17 1507    [COMPLETED] DAPTOmycin (CUBICIN) 500 mg in water, bacteriostatic (WATER) 10 mL IV syringe RF formulation  500 mg IntraVENous ONCE Veronika Redmond MD   500 mg at 07/27/17 1509    DAPTOmycin (CUBICIN) 500 mg in water, bacteriostatic (WATER) 10 mL IV syringe RF formulation  500 mg IntraVENous ONCE Veronika Redmond MD            No Known Allergies     Social History     Social History    Marital status:      Spouse name: N/A    Number of children: N/A    Years of education: N/A     Occupational History    Not on file.      Social History Main Topics    Smoking status: Former Smoker     Quit date: 7/7/1996    Smokeless tobacco: Never Used    Alcohol use No      Comment: rarely    Drug use: No    Sexual activity: Yes     Other Topics Concern    Not on file     Social History Narrative      REVIEW OF SYSTEMS:  General: negative for - chills or fever  ENT: negative for - headaches, nasal congestion or tinnitus  Eyes: no blurred or visual changes  Neck: No stiffness or swollen nodes  Respiratory: negative for - cough, hemoptysis, shortness of breath or wheezing  Cardiovascular : negative for - chest pain, edema, palpitations or shortness of breath  Gastrointestinal: negative for - abdominal pain, blood in stools, heartburn or nausea/vomiting  Genito-Urinary: no dysuria, trouble voiding, or hematuria  Musculoskeletal: negative for - gait disturbance, joint pain, joint stiffness or joint swelling inability to flex left second finger  Neurological: no TIA or stroke symptoms  Hematologic: no bruises, no bleeding  Lymphatic: no swollen glands  Integument: no lumps, mole changes, nail changes or rash  Endocrine:no malaise/lethargy poly uria or polydipsia or unexpected weight changes      Visit Vitals    /60 (BP 1 Location: Left arm, BP Patient Position: At rest)    Pulse (!) 58    Temp 97.9 °F (36.6 °C) (Oral)    Resp 18    Ht 5' 9\" (1.753 m)    Wt 193 lb 6.4 oz (87.7 kg)    SpO2 98%    BMI 28.56 kg/m2     CONSTITUTIONAL: well , well nourished, appears age appropriate  HEAD: normocephalic, atraumatic  EYES: sclera anicteric, PERRL, EOMI  ENMT:moist mucous membranes, pharynx clear          Nares: w/o erythema or edema  NECK: supple. Thyroid normal, No JVD or bruits  RESPIRATORY: Chest: clear to ascultation and percussion   CARDIOVASCULAR: Heart: regular rate and rhythm no murmurs, rubs or gallops, PMI not displaced, no thrill  GASTROINTESTINAL: Abdomen: non distended, soft, non-tender, bowel sounds normal  HEMATOLOGIC: no petechiae or purpura  LYMPHATIC: no lymphadenopathy  MUSCULOSKELETAL: Extremities: no edema or active synovitis, pulse 1+ left index finger extension permanent with inability to flex  BACK; no point or CVAT  INTEGUMENT: No unusual rashes or suspicious skin lesions noted. Nails appear normal.  Left index finger dressed this is not removed at the present time  NEUROLOGIC: non-focal exam   MENTAL STATUS: alert and oriented, appropriate affect   PSYCHIATRIC: normal affect    ASSESSMENT:   1. Infection of left hand    2. ASVD (arteriosclerotic vascular disease)    3. Essential hypertension    4. Controlled type 2 diabetes mellitus without complication, with long-term current use of insulin ([facility])      Impression #1 infection left index finger he will continue with the daptomycin  2. Hypertension blood pressure somewhat erratic little high today I am not can make a change in his medicine. 3.  Diabetes blood sugars have been erratic I think that is related to the infection probably somewhat affected by dietary changes with n.p.o. for surgery and things of that sort. We will continue his current treatment which he has covering with sliding scale. 4.  ASVD he seems to have good circulation to his left hand  I will recheck him again myself in about 3 weeks when she is completed antibiotic course sooner should they be a problem he will notify me if he has problems in the interim.   I did check labs today to include a CBC sed rate and a BMP    PLAN:  .  Orders Placed This Encounter    AMB POC BASIC METABOLIC PANEL    AMB POC COMPLETE CBC,AUTOMATED ENTER    AMB POC SEDIMENTATION RATE, ERYTHROCYTE; NON AUTO (SED RATE)         ATTENTION:   This medical record was transcribed using an electronic medical records system. Although proofread, it may and can contain electronic and spelling errors. Other human spelling and other errors may be present. Corrections may be executed at a later time. Please feel free to contact us for any clarifications as needed. Follow-up Disposition:  Return in about 3 weeks (around 8/18/2017). Alia Churchill MD    Orders Placed This Encounter    AMB POC BASIC METABOLIC PANEL    AMB POC COMPLETE CBC,AUTOMATED ENTER    AMB POC SEDIMENTATION RATE, ERYTHROCYTE; NON AUTO (SED RATE)          No results found for any visits on 07/28/17. I have reviewed the patient's medical history in detail and updated the computerized patient record. We had a prolonged discussion about these complex clinical issues and went over the various important aspects to consider. All questions were answered. Advised him to call back or return to office if symptoms do not improve, change in nature, or persist.    He was given an after visit summary or informed of [employer] Access which includes patient instructions, diagnoses, current medications, & vitals. He expressed understanding with the diagnosis and plan.

## 2017-07-28 NOTE — PROGRESS NOTES
Hospital f/up. 1. Have you been to the ER, urgent care clinic since your last visit? Hospitalized since your last visit? Most recent hand surgery was 7-26-16 at [facility] by Dr. Negar Delgado. I & D done. Continues with infusion thearpy and now does daily soaking and cleaning. 2. Have you seen or consulted any other health care providers outside of the [address] [place] [address] since your last visit? Include any pap smears or colon screening.

## 2017-07-28 NOTE — PROGRESS NOTES
Kidney function is stable white blood count is normal, hemoglobin is a little bit low but not low enough to be of concern, sed rate is still markedly elevated consistent with significant ongoing infection but he is continued on antibiotics so that is good

## 2017-07-28 NOTE — MR AVS SNAPSHOT
Visit Information Date & Time Provider Department Dept. Phone Encounter #  
 7/28/2017 10:30 AM MD Ricarda Pham 26 [phone] [id number] Follow-up Instructions Return in about 3 weeks (around 8/18/2017). Your Appointments 8/21/2017  1:40 PM  
FOLLOW UP 10 with MD JOSH Pham [facility] ([address]) Appt Note: 3 week follow up [address] [phone] [address] [facility] [address]  
  
    
 9/28/2017  9:40 AM  
Follow Up with MD Ricarda Pham 26 ([address]) Appt Note: [address] [phone] [address] [facility] [address] Upcoming Health Maintenance Date Due  
 LIPID PANEL Q1 1940 FOOT EXAM Q1 9/14/1950 MICROALBUMIN Q1 9/14/1950 EYE EXAM RETINAL OR DILATED Q1 9/14/1950 ZOSTER VACCINE AGE 60> 7/14/2000 GLAUCOMA SCREENING Q2Y 9/14/2005 Pneumococcal 65+ Low/Medium Risk (1 of 2 - PCV13) 9/14/2005 MEDICARE YEARLY EXAM 9/14/2005 INFLUENZA AGE 9 TO ADULT 8/1/2017 HEMOGLOBIN A1C Q6M 1/21/2018 DTaP/Tdap/Td series (2 - Td) 5/22/2027 Allergies as of 7/28/2017  Review Complete On: 7/28/2017 By: Blessing Rodriguez MD  
 No Known Allergies Current Immunizations  Reviewed on 7/27/2017 Name Date Influenza Vaccine 10/11/2016, 10/30/2015 Pneumococcal Conjugate (PCV-13) 8/14/2015 Pneumococcal Polysaccharide (PPSV-23) 1/1/2006 Tdap 5/22/2017  1:11 PM  
  
 Not reviewed this visit You Were Diagnosed With   
  
 Codes Comments Infection of left hand    -  Primary ICD-10-CM: L08.9 ICD-9-CM: 686.9 ASVD (arteriosclerotic vascular disease)     ICD-10-CM: I70.90 ICD-9-CM: 440.9  Essential hypertension     ICD-10-CM: I10 
 ICD-9-CM: 401.9 Controlled type 2 diabetes mellitus without complication, with long-term current use of insulin (HCC)     ICD-10-CM: E11.9, Z79.4 ICD-9-CM: 250.00, V58.67 Vitals BP Pulse Temp Resp Height(growth percentile) Weight(growth percentile) 158/60 (BP 1 Location: Left arm, BP Patient Position: At rest) (!) 58 97.9 °F (36.6 °C) (Oral) 18 5' 9\" (1.753 m) 193 lb 6.4 oz (87.7 kg) SpO2 BMI Smoking Status 98% 28.56 kg/m2 Former Smoker BMI and BSA Data Body Mass Index Body Surface Area 28.56 kg/m 2 2.07 m 2 Preferred Pharmacy Pharmacy Name Phone [facility] [address], [address] [place] [address] Avenue [phone] Your Updated Medication List  
  
   
This list is accurate as of: 7/28/17 11:25 AM.  Always use your most recent med list. amLODIPine 5 mg tablet Commonly known as:  Dontae Rings Take 5 mg by mouth daily. ascorbic acid (vitamin C) 250 mg tablet Commonly known as:  VITAMIN C Take 250 mg by mouth three (3) times daily. aspirin 81 mg chewable tablet Take 81 mg by mouth daily. gabapentin 300 mg capsule Commonly known as:  NEURONTIN Take 300 mg by mouth three (3) times daily. HYDROcodone-acetaminophen 5-325 mg per tablet Commonly known as:  Ruffus Homme Take 1-2 Tabs by mouth every four (4) hours as needed for Pain. Max Daily Amount: 12 Tabs. IRON (FERROUS SULFATE) PO Take 65 mg by mouth. LANTUS 100 unit/mL injection Generic drug:  insulin glargine  
by SubCUTAneous route nightly. By SS. Usually is < 20 units. lisinopril-hydroCHLOROthiazide 20-12.5 mg per tablet Commonly known as:  Anatoly Sinning Take 1 Tab by mouth two (2) times a day. losartan 100 mg tablet Commonly known as:  COZAAR Take 100 mg by mouth two (2) times a day. Indications: HYPERTENSION  
  
 meloxicam 15 mg tablet Commonly known as:  MOBIC Take 15 mg by mouth daily. metFORMIN 500 mg Tg24 24 hour tablet Commonly known asJannine Jordan ER Take 1 Tab by mouth two (2) times a day. nadolol 40 mg tablet Commonly known as:  CORGARD Take 40 mg by mouth daily. Niaspan 500 mg tablet Generic drug:  niacin ER Take  by mouth nightly. NovoLIN N 100 unit/mL injection Generic drug:  insulin NPH  
20 Units by SubCUTAneous route daily (with breakfast). Indications: type 2 diabetes mellitus NovoLOG 100 unit/mL injection Generic drug:  insulin aspart 10 Units by SubCUTAneous route daily (with dinner). Indications: type 2 diabetes mellitus  
  
 omeprazole 20 mg capsule Commonly known as:  PRILOSEC Take 20 mg by mouth daily. simvastatin 10 mg tablet Commonly known as:  ZOCOR Take 10 mg by mouth every morning. tamsulosin 0.4 mg capsule Commonly known as:  FLOMAX Take 0.4 mg by mouth nightly. traMADol 50 mg tablet Commonly known as:  ULTRAM  
Take 50 mg by mouth four (4) times daily. We Performed the Following AMB POC BASIC METABOLIC PANEL [90622 CPT(R)] AMB POC COMPLETE CBC,AUTOMATED ENTER [id number] CPT(R)] AMB POC SEDIMENTATION RATE, ERYTHROCYTE; NON AUTO [52976 CPT(R)] Follow-up Instructions Return in about 3 weeks (around 8/18/2017). To-Do List   
 07/28/2017  3:00 PM  
  Appointment with [address] [facility] [address] at [facility] ([phone])  
  
 07/29/2017 11:00 AM  
  Appointment with [facility] ([phone])  
  
 07/30/2017 11:00 AM  
  Appointment with [facility] ([phone])  
  
 07/31/2017  3:00 PM  
  Appointment with [address] at [facility] ([phone])  
  
 08/01/2017  3:00 PM  
  Appointment with [address] at [facility] ([phone])  
  
 08/02/2017  3:00 PM  
  Appointment with [address] at [facility] ([phone]) 08/03/2017 3:00 PM  
  Appointment with [address] [facility] [address] at [facility] ([phone])  
  
 08/04/2017 3:00 PM  
  Appointment with [address] at [facility] ([phone])  
  
 08/05/2017 11:00 AM  
  Appointment with [facility] ([phone])  
  
 08/06/2017 11:00 AM  
  Appointment with 109 [facility] ([phone])  
  
 08/07/2017 3:00 PM  
  Appointment with [address] at [facility] ([phone])  
  
 08/08/2017 3:00 PM  
  Appointment with [address] at [facility] ([phone])  
  
 08/09/2017 3:00 PM  
  Appointment with [address] at [facility] ([phone])  
  
 08/10/2017 3:00 PM  
  Appointment with [address] at [facility] ([phone])  
  
 08/11/2017 3:00 PM  
  Appointment with [address] at [facility] ([phone])  
  
 08/12/2017 11:00 AM  
  Appointment with 109 [facility] ([phone])  
  
 08/13/2017 11:00 AM  
  Appointment with 109 [facility] ([phone])  
  
 08/14/2017 3:00 PM  
  Appointment with [address] at [facility] ([phone])  
  
 08/15/2017 3:00 PM  
  Appointment with [address] [facility] [address] at [facility] ([phone]) Introducing [facility] introduces [employer] patient portal. Now you can access parts of your medical record, email your doctor's office, and request medication refills online. 1. In your internet browser, go to [email or web address] 2. Click on the First Time User? Click Here link in the Sign In box. You will see the New Member Sign Up page. 3. Enter your [employer] Access Code exactly as it appears below. You will not need to use this code after youve completed the sign-up process.  If you do not sign up before the expiration date, you must request a new code. · [employer] Access Code: [email or web address] Expires: 8/20/2017 10:06 AM 
 
4. Enter the last four digits of your Social Security Number (xxxx) and Date of Birth (mm/dd/yyyy) as indicated and click Submit. You will be taken to the next sign-up page. 5. Create a [employer] ID. This will be your [employer] login ID and cannot be changed, so think of one that is secure and easy to remember. 6. Create a [employer] password. You can change your password at any time. 7. Enter your Password Reset Question and Answer. This can be used at a later time if you forget your password. 8. Enter your e-mail address. You will receive e-mail notification when new information is available in [address] 9. Click Sign Up. You can now view and download portions of your medical record. 10. Click the Download Summary menu link to download a portable copy of your medical information. If you have questions, please visit the Frequently Asked Questions section of the [employer] website. Remember, [employer] is NOT to be used for urgent needs. For medical emergencies, dial 911. Now available from your iPhone and Android! Please provide this summary of care documentation to your next provider. Your primary care clinician is listed as Girma. If you have any questions after today's visit, please call [phone].

## 2017-07-29 NOTE — PROGRESS NOTES
Outpatient Infusion Center Short Visit Progress Note    2500 Pt admit to [facility] for daily Daptomycin ambulatory in stable condition. Assessment completed. No new concerns voiced. Single lumen PICC flushed with positive blood return. Patient Vitals for the past 12 hrs:   Temp Pulse Resp BP   07/29/17 1044 96.8 °F (36 °C) 62 18 154/51     Medications:  NS flush  Daptomycin 500mg IVP  Heparin    1050 Pt tolerated treatment well. D/c home ambulatory in no distress. Pt aware of next appointment scheduled for 7/30/17.

## 2017-07-30 NOTE — PROGRESS NOTES
Outpatient Infusion Center Short Visit Progress Note    5654 Pt admit to [facility] for daily Daptomycin ambulatory in stable condition. Assessment completed. No new concerns voiced. Single lumen PICC flushed with positive blood return. Medication given without complication. Visit Vitals    /61    Pulse 62    Temp 98 °F (36.7 °C)    Resp 18     Medications:  NS flush  Daptomycin 500mg IVP  Heparin    1105 Pt tolerated treatment well. D/c home ambulatory in no distress. Pt aware of next appointment scheduled for tomorrow at [facility].

## 2017-08-02 NOTE — PROGRESS NOTES
5 Pt arrived at [facility] ambulatory and in no distress for Daptomycin . Assessment unchanged, no new complaints voiced. R PICC line dsg CDI. PICC positive for blood return. Visit Vitals    /60 (BP 1 Location: Left arm, BP Patient Position: At rest)    Pulse 67    Temp 98.5 °F (36.9 °C)    Resp 18    SpO2 98%       Medications received:  Daptomycin 500 mg IVP  NS Flush  Heparin Flush    1510 Tolerated treatment well, no adverse reaction noted. PICC flushed per policy and capped. D/Cd from [facility] ambulatory and in no distress accompanied by self.   Next appt 8/3/17

## 2017-08-05 NOTE — PROGRESS NOTES
Pt arrived to  [facility] ambulatory in no acute distress at 1050  for Dapto.  Assessment unremarkable. R arm PICC accessed without issue and positive blood return noted.      Visit Vitals    /62    Pulse 74    Temp 96.9 °F (36.1 °C)    Resp 18    SpO2 98%     The following medications administered:  Dapto 500mg IVP    Pt tolerated treatment well. PICC flushed per policy and new green cap placed.  Pt discharged ambulatory in no acute distress at 1100, accompanied by self. Next appointment 8/6/17 at 1000.

## 2017-08-07 NOTE — PROGRESS NOTES
Outpatient Infusion Center Progress Note    3954- Pt admit to [facility] for Daptomycin ambulatory in stable condition. Assessment completed. No new concerns voiced. Single lumen PICC flushed with positive blood return noted. Labs drawn per order and sent to lab. PICC dressing changed per protocol using sterile technique. Visit Vitals    /60 (BP 1 Location: Left arm, BP Patient Position: Sitting)    Pulse 60    Temp 98 °F (36.7 °C)    Resp 18    SpO2 99%       Medications:  NS flush  Daptomycin 500 mg IVP  Heparin    Recent Results (from the past 12 hour(s))   CBC WITH AUTOMATED DIFF    Collection Time: 08/07/17  2:56 PM   Result Value Ref Range    WBC 6.2 4.1 - 11.1 K/uL    RBC 3.39 (L) 4.10 - 5.70 M/uL    HGB 9.5 (L) 12.1 - 17.0 g/dL    HCT 29.6 (L) 36.6 - 50.3 %    MCV 87.3 80.0 - 99.0 FL    MCH 28.0 26.0 - 34.0 PG    MCHC 32.1 30.0 - 36.5 g/dL    RDW 15.4 (H) 11.5 - 14.5 %    PLATELET 453 584 - 221 K/uL    NEUTROPHILS 72 32 - 75 %    LYMPHOCYTES 21 12 - 49 %    MONOCYTES 3 (L) 5 - 13 %    EOSINOPHILS 3 0 - 7 %    BASOPHILS 1 0 - 1 %    ABS. NEUTROPHILS 4.4 1.8 - 8.0 K/UL    ABS. LYMPHOCYTES 1.3 0.8 - 3.5 K/UL    ABS. MONOCYTES 0.2 0.0 - 1.0 K/UL    ABS. EOSINOPHILS 0.2 0.0 - 0.4 K/UL    ABS. BASOPHILS 0.1 0.0 - 0.1 K/UL   METABOLIC PANEL, BASIC    Collection Time: 08/07/17  2:56 PM   Result Value Ref Range    Sodium 140 136 - 145 mmol/L    Potassium 4.4 3.5 - 5.1 mmol/L    Chloride 106 97 - 108 mmol/L    CO2 27 21 - 32 mmol/L    Anion gap 7 5 - 15 mmol/L    Glucose 200 (H) 65 - 100 mg/dL    BUN 22 (H) 6 - 20 MG/DL    Creatinine 1.15 0.70 - 1.30 MG/DL    BUN/Creatinine ratio 19 12 - 20      GFR est AA >60 >60 ml/min/1.73m2    GFR est non-AA >60 >60 ml/min/1.73m2    Calcium 7.7 (L) 8.5 - 10.1 MG/DL   CK    Collection Time: 08/07/17  2:56 PM   Result Value Ref Range     39 - 308 U/L       1510- Pt tolerated treatment well. D/c home ambulatory in no distress.  Pt aware of next appointment scheduled for 8/8/17.

## 2017-08-08 NOTE — IP AVS SNAPSHOT
[address] 
[phone] Patient: Alicia Shafer MRN: [id number] M:[birth year] You are allergic to the following No active allergies Recent Documentation Height Weight BMI Smoking Status 1.753 m 87.5 kg 28.5 kg/m2 Former Smoker Emergency Contacts Name Discharge Info Relation Home Work Mobile Neisha Harrington DISCHARGE CAREGIVER [3] Spouse [3] [phone] About your hospitalization You were admitted on:  August 8, 2017 You last received care in the:  [address] [place] [address] PACU You were discharged on:  August 8, 2017 Unit phone number:  [phone] Why you were hospitalized Your primary diagnosis was:  Not on File Providers Seen During Your Hospitalizations Provider Role Specialty Primary office phone Ham Nur MD Attending Provider Orthopedic Surgery [phone] Your Primary Care Physician (PCP) Primary Care Physician Office Phone Office Fax [facility] [phone] Follow-up Information Follow up With Details Comments Contact Info Ham Nur MD Follow up in 1 week(s)  [address] [facility] [address] [place] [address] 
[phone] Alia Churchill MD   [address] 
[phone] Your Appointments Tuesday August 08, 2017  3:00 PM EDT INFUSION 30 with LUIGI INFUSION NURSE 1  
[place] ([address]) [address]  
[phone] Go to [facility], [facility], [address] Wednesday August 09, 2017  3:00 PM EDT INFUSION 30 with LUIGI INFUSION NURSE 1  
[place] ([address]) [address]  
[phone] Go to [facility], [facility], [address] [facility], [place], [address] Thursday August 10, 2017  3:00 PM EDT INFUSION 30 with HANOVER INFUSION NURSE 1  
[place] ([address]) [address]  
[phone] Go to [facility], [facility], [address] [facility], [place], [address] Friday August 11, 2017  3:00 PM EDT INFUSION 30 with HANOVER INFUSION NURSE 1  
[place] ([address]) [address]  
[phone] Go to [facility], [facility], [address] [facility], [facility] [address] Saturday August 12, 2017 11:00 AM EDT INFUSION 30 with RM 102A- CHAIR [facility] - [address] ([address]) [address] [facility] [address] [phone]  
[phone] Go to [address], ground floor. Sunday August 13, 2017 11:00 AM EDT INFUSION 30 with RM 102A- CHAIR [facility] - [address] ([address]) [address] [facility] [address] [phone] Go to [address], ground floor. Monday August 14, 2017  3:00 PM EDT INFUSION 30 with HANOVER INFUSION NURSE 1  
[place] ([address]) [address]  
[phone] Go to [facility], [facility], [address] [facility], [place], [address] Tuesday August 15, 2017  3:00 PM EDT INFUSION 30 with HANOVER INFUSION NURSE 1  
[place] ([address]) [address]  
[phone] Go to [facility], [facility], [facility] [facility] [place], [address] [place] [address]  Monday August 21, 2017  1:40 PM EDT  
 FOLLOW UP 10 with MD JOSH Phan [facility] ([facility]) [address] [id number] [phone] Current Discharge Medication List  
  
CONTINUE these medications which have NOT CHANGED Dose & Instructions Dispensing Information Comments Morning Noon Evening Bedtime  
 amLODIPine 5 mg tablet Commonly known as:  Job Dub Your last dose was: Your next dose is:    
   
   
 Dose:  5 mg Take 5 mg by mouth daily. Refills:  0  
     
   
   
   
  
 ascorbic acid (vitamin C) 250 mg tablet Commonly known as:  VITAMIN C Your last dose was: Your next dose is:    
   
   
 Dose:  250 mg Take 250 mg by mouth three (3) times daily. Refills:  0  
     
   
   
   
  
 aspirin 81 mg chewable tablet Your last dose was: Your next dose is:    
   
   
 Dose:  81 mg Take 81 mg by mouth daily. Refills:  0  
     
   
   
   
  
 gabapentin 300 mg capsule Commonly known as:  NEURONTIN Your last dose was: Your next dose is:    
   
   
 Dose:  300 mg Take 300 mg by mouth three (3) times daily. Refills:  0 HYDROcodone-acetaminophen 5-325 mg per tablet Commonly known as:  Fredrica Garrett Your last dose was: Your next dose is:    
   
   
 Dose:  1-2 Tab Take 1-2 Tabs by mouth every four (4) hours as needed for Pain. Max Daily Amount: 12 Tabs. Quantity:  20 Tab Refills:  0 IRON (FERROUS SULFATE) PO Your last dose was: Your next dose is:    
   
   
 Dose:  65 mg Take 65 mg by mouth. Refills:  0  
     
   
   
   
  
 LANTUS 100 unit/mL injection Generic drug:  insulin glargine Your last dose was: Your next dose is:    
   
   
 by SubCUTAneous route nightly. By SS. Usually is < 20 units. Refills:  0 lisinopril-hydroCHLOROthiazide 20-12.5 mg per tablet Commonly known as:  Heath Meres Your last dose was: Your next dose is:    
   
   
 Dose:  1 Tab Take 1 Tab by mouth two (2) times a day. Refills:  0  
     
   
   
   
  
 losartan 100 mg tablet Commonly known as:  COZAAR Your last dose was: Your next dose is:    
   
   
 Dose:  100 mg Take 100 mg by mouth two (2) times a day. Indications: HYPERTENSION Refills:  0  
     
   
   
   
  
 meloxicam 15 mg tablet Commonly known as:  MOBIC Your last dose was: Your next dose is:    
   
   
 Dose:  15 mg Take 15 mg by mouth daily. Refills:  0  
     
   
   
   
  
 metFORMIN 500 mg Tg24 24 hour tablet Commonly known asRudean Spear ER Your last dose was: Your next dose is:    
   
   
 Dose:  1 Tab Take 1 Tab by mouth two (2) times a day. Refills:  0  
     
   
   
   
  
 nadolol 40 mg tablet Commonly known as:  CORGARD Your last dose was: Your next dose is:    
   
   
 Dose:  40 mg Take 40 mg by mouth daily. Refills:  0 Niaspan 500 mg tablet Generic drug:  niacin ER Your last dose was: Your next dose is: Take  by mouth nightly. Refills:  0 NovoLIN N 100 unit/mL injection Generic drug:  insulin NPH Your last dose was: Your next dose is:    
   
   
 Dose:  20 Units 20 Units by SubCUTAneous route daily (with breakfast). Indications: type 2 diabetes mellitus Refills:  0 NovoLOG 100 unit/mL injection Generic drug:  insulin aspart Your last dose was: Your next dose is:    
   
   
 Dose:  10 Units 10 Units by SubCUTAneous route daily (with dinner). Indications: type 2 diabetes mellitus Refills:  0  
     
   
   
   
  
 omeprazole 20 mg capsule Commonly known as:  PRILOSEC Your last dose was: Your next dose is:    
   
   
 Dose:  20 mg Take 20 mg by mouth daily. Refills:  0  
     
   
   
   
  
 simvastatin 10 mg tablet Commonly known as:  ZOCOR Your last dose was: Your next dose is:    
   
   
 Dose:  10 mg Take 10 mg by mouth every morning. Refills:  0  
     
   
   
   
  
 tamsulosin 0.4 mg capsule Commonly known as:  FLOMAX Your last dose was: Your next dose is:    
   
   
 Dose:  0.4 mg Take 0.4 mg by mouth nightly. Refills:  0  
     
   
   
   
  
 traMADol 50 mg tablet Commonly known as:  ULTRAM  
   
Your last dose was: Your next dose is:    
   
   
 Dose:  50 mg Take 50 mg by mouth four (4) times daily. Refills:  0 Where to Get Your Medications Information on where to get these meds will be given to you by the nurse or doctor. ! Ask your nurse or doctor about these medications HYDROcodone-acetaminophen 5-325 mg per tablet Discharge Instructions Dr. Franklyn Aquino Upper Extremity Postoperative Instructions 1. Please maintain the dressing and /or splint placed at surgery until your follow up appointment where it will be removed. Please keep the dressing clean and dry. If you have any questions or problems, please call our office at [phone]. 
 
2. Please elevate the operative extremity to the level of the heart to keep swelling at a minimum. You may get up to move around, but when seated please keep the extremity elevated as much as possible. This will decrease swelling and pain. 3. You may use your left hand for light activities as tolerated. 4. You may ice your hand 5 times a day for 20 minutes at a time. 5.  Please continue to receive your antibiotics (Daptomycin) from the infusion center daily. 6. You had a nerve block during your surgery. Expect this to wear off around 8-12 hours after you received it.   You should start taking your pain medication before the feeling begins to come back into your hand. 7. A prescription for pain medication is provided. The key to pain control is staying ahead of the pain. For the first 48-72 hours after your surgery, you may want to take your pain medication on a regular schedule. After that, you may only need it on an as needed basis. 8. If you experience any worsening redness, increased pain, increased swelling not relieved by elevation, drainage, fever, or chills, please call the office at [phone], and ask for Craig Mancini. Please Follow-up at my office [address] [facility], [address] in 1 week unless otherwise directed. 
______________________________________________________________________ Anesthesia Discharge Instructions After general anesthesia or intervenous sedation, for 24 hours or while taking prescription Narcotics: · Limit your activities · Do not drive or operate hazardous machinery · If you have not urinated within 8 hours after discharge, please contact your surgeon on call. · Do not make important personal or business decisions · Do not drink alcoholic beverages Report the following to your surgeon: 
· Excessive pain, swelling, redness or odor of or around the surgical area · Temperature over 100.5 degrees · Nausea and vomiting lasting longer than 4 hours or if unable to take medication · Any signs of decreased circulation or nerve impairment to extremity:  Change in color, persistent numbness, tingling, coldness or increased pain. · Any questions Discharge Instructions Attachments/References MEFS - HYDROCODONE/ACETAMINOPHEN (VICODIN, [address], LORTAB) - (BY MOUTH) (ENGLISH) Discharge Orders None Introducing [facility]! [facility] introduces [employer] patient portal. Now you can access parts of your medical record, email your doctor's office, and request medication refills online.    
 
1. In your internet browser, go to [email or web address] 2. Click on the First Time User? Click Here link in the Sign In box. You will see the New Member Sign Up page. 3. Enter your [email or web address] Access Code exactly as it appears below. You will not need to use this code after youve completed the sign-up process. If you do not sign up before the expiration date, you must request a new code. · [email or web address] Access Code: [email or web address] Expires: 8/20/2017 10:06 AM 
 
4. Enter the last four digits of your Social Security Number (xxxx) and Date of Birth (mm/dd/yyyy) as indicated and click Submit. You will be taken to the next sign-up page. 5. Create a [email or web address] ID. This will be your [email or web address] login ID and cannot be changed, so think of one that is secure and easy to remember. 6. Create a [email or web address] password. You can change your password at any time. 7. Enter your Password Reset Question and Answer. This can be used at a later time if you forget your password. 8. Enter your e-mail address. You will receive e-mail notification when new information is available in [address] 9. Click Sign Up. You can now view and download portions of your medical record. 10. Click the Download Summary menu link to download a portable copy of your medical information. If you have questions, please visit the Frequently Asked Questions section of the [email or web address] website. Remember, [email or web address] is NOT to be used for urgent needs. For medical emergencies, dial 911. Now available from your iPhone and Android! General Information Please provide this summary of care documentation to your next provider. Patient Signature:  ____________________________________________________________ Date:  ____________________________________________________________  
  
Sissy Lapine Provider Signature:  ____________________________________________________________ Date:  ____________________________________________________________ More Information Hydrocodone/Acetaminophen (Vicodin, Norco, Lortab) - (By mouth) Why this medicine is used:  
Treats pain. Contact a nurse or doctor right away if you have: · Blistering, peeling, red skin rash · Fast or slow heartbeat, shallow breathing, blue lips, fingernails, or skin · Anxiety, restlessness, muscle spasms, twitching, seeing or hearing things that are not there · Dark urine or pale stools, yellow skin or eyes · Extreme weakness, sweating, seizures, cold or clammy skin · Lightheadedness, dizziness, fainting, fever, sweating Common side effects: 
· Constipation, nausea, vomiting, loss of appetite, stomach pain · Tiredness or sleepiness © 2017 [address] Information is for End User's use only and may not be sold, redistributed or otherwise used for commercial purposes.

## 2017-08-08 NOTE — ANESTHESIA POSTPROCEDURE EVALUATION
Post-Anesthesia Evaluation and Assessment    Patient: Corinna Briones MRN: [id number]  SSN: [id number]    YOB: 1940  Age: 68 y.o. Sex: male       Cardiovascular Function/Vital Signs  Visit Vitals    /60    Pulse 63    Temp 36.5 °C (97.7 °F)    Resp 14    Ht 5' 9\" (1.753 m)    Wt 87.5 kg (193 lb)    SpO2 100%    BMI 28.5 kg/m2       Patient is status post general anesthesia for Procedure(s):  LEFT INDEX FINGER AMPUTATION . Nausea/Vomiting: None    Postoperative hydration reviewed and adequate. Pain:  Pain Scale 1: Numeric (0 - 10) (08/08/17 1153)  Pain Intensity 1: 0 (08/08/17 1153)   Managed    Neurological Status:   Neuro (WDL): Within Defined Limits (08/08/17 1153)  Neuro  LUE Motor Response: Purposeful (08/08/17 1153)   At baseline    Mental Status and Level of Consciousness: Arousable    Pulmonary Status:   O2 Device: Nasal cannula (08/08/17 1153)   Adequate oxygenation and airway patent    Complications related to anesthesia: None    Post-anesthesia assessment completed.  No concerns    Signed By: Rambo Casas MD     August 8, 2017

## 2017-08-08 NOTE — ROUTINE PROCESS
Patient: Garth Munoz MRN: [id number]  SSN: [id number]   YOB: 1940  Age: 68 y.o. Sex: male     Patient is status post Procedure(s):  LEFT INDEX FINGER AMPUTATION .     Surgeon(s) and Role:     * Roseanna Greenfield MD - Primary          PICC Single Lumen 70/58/82 Right;Basilic (Active)          Peripheral IV 07/20/17 Right Wrist (Active)                           Dressing/Packing:  Wound Hand Left;Dorsal-DRESSING TYPE: 4 x 4;ABD pad;Compression dressing;Cast padding;Xeroform (08/08/17 1100)  Splint/Cast:  ]

## 2017-08-08 NOTE — DISCHARGE INSTRUCTIONS
Dr. Maryjo Cid Upper Extremity Postoperative Instructions      1. Please maintain the dressing and /or splint placed at surgery until your follow up appointment where it will be removed. Please keep the dressing clean and dry. If you have any questions or problems, please call our office at [phone].    2. Please elevate the operative extremity to the level of the heart to keep swelling at a minimum. You may get up to move around, but when seated please keep the extremity elevated as much as possible. This will decrease swelling and pain. 3. You may use your left hand for light activities as tolerated. 4. You may ice your hand 5 times a day for 20 minutes at a time. 5.  Please continue to receive your antibiotics (Daptomycin) from the infusion center daily. 6. You had a nerve block during your surgery. Expect this to wear off around 8-12 hours after you received it. You should start taking your pain medication before the feeling begins to come back into your hand. 7. A prescription for pain medication is provided. The key to pain control is staying ahead of the pain. For the first 48-72 hours after your surgery, you may want to take your pain medication on a regular schedule. After that, you may only need it on an as needed basis. 8. If you experience any worsening redness, increased pain, increased swelling not relieved by elevation, drainage, fever, or chills, please call the office at [phone], and ask for Craig Mancini.     Please Follow-up at my office [address] [facility], [address] in 1 week unless otherwise directed.  ______________________________________________________________________    Anesthesia Discharge Instructions    After general anesthesia or intervenous sedation, for 24 hours or while taking prescription Narcotics:  · Limit your activities  · Do not drive or operate hazardous machinery  · If you have not urinated within 8 hours after discharge, please contact your surgeon on call. · Do not make important personal or business decisions  · Do not drink alcoholic beverages    Report the following to your surgeon:  · Excessive pain, swelling, redness or odor of or around the surgical area  · Temperature over 100.5 degrees  · Nausea and vomiting lasting longer than 4 hours or if unable to take medication  · Any signs of decreased circulation or nerve impairment to extremity:  Change in color, persistent numbness, tingling, coldness or increased pain.   · Any questions

## 2017-08-08 NOTE — BRIEF OP NOTE
BRIEF OPERATIVE NOTE    Date of Procedure: 8/8/2017   Preoperative Diagnosis: LEFT INDEX FINGER INFECTION   Postoperative Diagnosis: LEFT INDEX FINGER INFECTION     Procedure(s):  LEFT INDEX FINGER AMPUTATION   Surgeon(s) and Role:     * Jamie White MD - Primary         Assistant Staff:       Surgical Staff:  Circ-1: Trixie Gonsales RN  Scrub Tech-1: Fransisca Ji  Scrub RN-Relief: Gal Perera  Event Time In   Incision Start 1101   Incision Close 1134     Anesthesia: General   Estimated Blood Loss: min  Specimens:   ID Type Source Tests Collected by Time Destination   1 : left index finger Fresh Finger  Jamie White MD 8/8/2017 1114 Pathology      Findings: necrosis of tip of finger. Significant atherosclerosis.      Complications: none  Implants: * No implants in log *

## 2017-08-08 NOTE — INTERVAL H&P NOTE
H&P Update:  Timmy Fairchild was seen and examined. He has been treated for a left index finger flexor tenosynovitis. This developed after he had a flexor tendon laceration from a saw. He has been on daptomycin as an outpatient. He has had 2 debridements. His last debridement was very aggressive. The flexor tendons were resected. He continues to have difficulty with the digit. He has some necrosis at the tip of the digit. He has persistent swelling and erythema. He has absent sensibility in the digit. He has some drainage proximally. We have discussed this at length. My recommendation is for ray amputation. This will aide him in clearing his infection but will also give him a more functional hand. He is in agreement. He has given informed consent to proceed.     Signed By: Nasir Villalpando MD     August 8, 2017 9:53 AM

## 2017-08-08 NOTE — ANESTHESIA PREPROCEDURE EVALUATION
Anesthetic History   No history of anesthetic complications            Review of Systems / Medical History  Patient summary reviewed, nursing notes reviewed and pertinent labs reviewed    Pulmonary  Within defined limits                 Neuro/Psych   Within defined limits           Cardiovascular    Hypertension: well controlled                   GI/Hepatic/Renal     GERD: well controlled           Endo/Other    Diabetes: well controlled, type 2         Other Findings            Physical Exam    Airway  Mallampati: II  TM Distance: > 6 cm  Neck ROM: normal range of motion   Mouth opening: Normal     Cardiovascular  Regular rate and rhythm,  S1 and S2 normal,  no murmur, click, rub, or gallop             Dental  No notable dental hx       Pulmonary  Breath sounds clear to auscultation               Abdominal  GI exam deferred       Other Findings            Anesthetic Plan    ASA: 2  Anesthesia type: general          Induction: Intravenous  Anesthetic plan and risks discussed with: Patient

## 2017-08-08 NOTE — OP NOTES
[address]   [address] [facility], [address] [place] [address]   OP NOTE       Name:  Naima Li   MR#:  [id number]   :  1940   Account #:  [de-identified]    Surgery Date:  2017   Date of Adm:  2017       PREOPERATIVE DIAGNOSIS: Left index finger flexor tenosynovitis. POSTOPERATIVE DIAGNOSIS: Left index finger flexor tenosynovitis. PROCEDURES PERFORMED: Left index finger amputation. SURGEON: Tony Mckay. Baltazar Rosa MD    ANESTHESIA: General.    ESTIMATED BLOOD LOSS: Minimal.    SPECIMENS REMOVED: Left index finger. COMPLICATIONS: None. IMPLANTS: None. INDICATIONS FOR PROCEDURE: This is a 72-year-old gentleman   who lacerated his left hand with a saw, resulting in a flexor tendon   laceration. He had surgery for repair of his tendons, but subsequently   developed a significant infection. He has been treated for his infection   with multiple aggressive debridements and IV antibiotics. He now has   developed persistent infection of the digit, with necrosis of the tip of the   digit. He has a nonfunctional, insensate digit, which has persistent   infection, and is therefore indicated for amputation. I discussed this at   length with the patient and his wife, and he has given informed consent   to proceed with a ray amputation of the left index finger. DESCRIPTION OF PROCEDURE: The patient was identified in the   preoperative holding area. Informed consent was obtained. The   operative site was marked. He was then transported to OR and placed   supine on the operating table. General anesthesia was established. A   wrist block was then performed of the median, radial and ulnar nerves   with 30 mL of 0.5% Marcaine. The left upper extremity was then   sterilely prepped and draped in the usual fashion. Surgical time-out   was held and the operative site was confirmed. Preoperative antibiotics   were given. After gravity exsanguination, the tourniquet was elevated   to 250 mmHg. A longitudinal incision was made over the index   metacarpal dorsally. This was carried out around the base of the digit   to volarly. His prior incisions were all included with the amputation. Dorsally, subcutaneous dissection was performed. The extensor   tendon was transected proximally. The metacarpal was easily   identified and was dissected free of all soft tissues at its base, being   careful to protect the wrist extensors. An oblique osteotomy was then   performed with a saw. The intrinsic muscles of the hand were then   sharply divided off of the metacarpal. Attention was then turned,   volarly. On the ulnar aspect of the incision, the digital artery and nerve   to the middle finger were identified and protected throughout the   remainder of the case. Distal to the bifurcation, the artery and nerve   were transected with bipolar cautery. At this point, the metacarpal and   the entire digit were completely amputated. The wound was inspected. The flexor tendons were identified in the palm proximal to the flexor   sheath. These were retracted out of the wound and transected as far   as possible proximally. Some fibrinous material within the flexor tunnel   in the palm was debrided with a rongeur. Bleeding was easily   controlled. The wound was then thoroughly irrigated with normal saline   with bacitracin. The first dorsal interosseous muscles were then closed   with a 2-0 Prolene. The skin was then closed with 4-0 nylon sutures. Sterile dressings were applied. The tourniquet was released and brisk   cap refill returned to the digits. He was awakened from general   anesthesia and transferred to the PACU in stable condition without   complication.         Reyna Granados MD      [address] [place] [address] / Beny Segal   D:  08/08/2017   11:46   T:  08/08/2017   12:15   Job #:  [id number]

## 2017-08-08 NOTE — H&P (VIEW-ONLY)
Chief complaint:  Left hand pain and swelling    History of present illness:  20-year-old male who is status post left index finger flexor tendon repairs after a saw injury. Surgery was May 25th. Michaelyn Daily He presented to my office recently with pain and swelling. Cultures were taken. These were positive for Staph aureus and E coli. He has been on p.o. Bactrim without relief. He has gotten worse. He presented to the office today with significant drainage. Worsening erythema and warmth. He has failed outpatient management is therefore indicated for admission for IV antibiotics and surgical debridement. No current facility-administered medications on file prior to encounter. Current Outpatient Prescriptions on File Prior to Encounter   Medication Sig Dispense Refill    HYDROcodone-acetaminophen (NORCO) 5-325 mg per tablet Take 1-2 Tabs by mouth every four (4) hours as needed for Pain. Max Daily Amount: 12 Tabs. 30 Tab 0    omeprazole (PRILOSEC) 20 mg capsule Take 20 mg by mouth daily.  insulin NPH (NOVOLIN N) 100 unit/mL injection 20 Units by SubCUTAneous route once. Indications: type 2 diabetes mellitus      insulin aspart (NOVOLOG) 100 unit/mL injection 10 Units by SubCUTAneous route once. Indications: type 2 diabetes mellitus      metFORMIN (GLUMETZA ER) 500 mg TG24 24 hour tablet Take 1 Tab by mouth two (2) times a day.  lisinopril-hydrochlorothiazide (PRINZIDE, ZESTORETIC) 20-12.5 mg per tablet Take 1 Tab by mouth two (2) times a day.  amLODIPine (NORVASC) 5 mg tablet Take 5 mg by mouth daily.  ascorbic acid (VITAMIN C) 250 mg tablet Take 250 mg by mouth.  IRON, FERROUS SULFATE, PO Take 65 mg by mouth.  traMADol (ULTRAM) 50 mg tablet Take 50 mg by mouth four (4) times daily.  losartan (COZAAR) 100 mg tablet Take 100 mg by mouth two (2) times a day. Indications: HYPERTENSION      aspirin 81 mg chewable tablet Take 81 mg by mouth daily.       meloxicam (MOBIC) 15 mg tablet Take 15 mg by mouth daily.  INSULIN GLARGINE,HUM. REC. ANLOG (LANTUS SC) by SubCUTAneous route. STATES SLIDING SCALE AT BEDTIME      tamsulosin (FLOMAX) 0.4 mg capsule Take 0.4 mg by mouth nightly.  gabapentin (NEURONTIN) 300 mg capsule Take 300 mg by mouth three (3) times daily.  niacin ER (NIASPAN) 500 mg tablet Take  by mouth nightly.  nadolol (CORGARD) 40 mg tablet Take 40 mg by mouth daily.  simvastatin (ZOCOR) 10 mg tablet Take 10 mg by mouth every morning. Past Medical History:   Diagnosis Date    Allergic rhinitis     Anemia     ASVD (arteriosclerotic vascular disease)     Chronic kidney disease     Diabetes ([facility] [address]     glucose runs 79 - 160's at home    Diverticulosis     DJD (degenerative joint disease)     ED (erectile dysfunction)     Enlarged prostate     JUDY (generalized anxiety disorder)     GERD (gastroesophageal reflux disease)     controlled with med; alfonso's esophagus    Hiatal hernia     Hypercholesterolemia     Hypertension     Ill-defined condition     peripheral vascular disease    Lung nodule     Neuropathy (HCC)     PVD (peripheral vascular disease) ([facility] [address]     Spinal stenosis        No Known Allergies    ROS neg    Social History     Social History    Marital status:      Spouse name: N/A    Number of children: N/A    Years of education: N/A     Occupational History    Not on file. Social History Main Topics    Smoking status: Former Smoker     Quit date: 7/7/1996    Smokeless tobacco: Never Used    Alcohol use No    Drug use: No    Sexual activity: Not on file     Other Topics Concern    Not on file     Social History Narrative     PE:    He is alert and oriented. He is in no acute distress. Regular rate and rhythm. Clear to auscultation bilaterally. His left hand is severely swollen. He has significant erythema. He has purulent material draining from his wound volarly. Intact sensibility.   Brisk cap refill. X-rays:  None    Labs:  Pending    Assessment:  Left hand index finger infection    Plan:  He will be admitted for IV antibiotics. NPO for surgical debridement later tonight. He has given informed consent to proceed.

## 2017-08-09 NOTE — DISCHARGE INSTRUCTIONS
OUTPATIENT INFUSION CENTER    DISCHARGE INSTRUCTIONS FOR:  REMOVAL OF PICC LINE    Now that your PICC Line has been removed, please follow the instructions below regarding your site care: You will be kept resting in a supine position for 30--60 minutes immediately after the PICC has been removed. Avoid heavy lifting or excessive use of the extremity for 24 hours. If drainage or bleeding is present, apply direct pressure until it has stopped. If bleeding persists, continue pressure and seek emergency medical care. The area under the dressing must be kept clean and dry for 2 days. Do not submerge the area in water for 2 days. You may shower, but cover the dressing with clear plastic wrap and apply tape around the edges. Remove the plastic wrap after showering. After 2 days, the dressing may be removed. Before removing the dressing, wash hands thoroughly with soap and water. Inspect the site. Gently wash with warm soapy water. Pat dry and re-cover with a band aid until a scab forms. Report to your physician any of the following:    Chills and fever;  Swelling, redness, pain or if site is warm to the touch; Any pus or unusual drainage from the site; Any questions or concerns.     Dionne Sanabria, Signature: ______________________________ 8/9/2017  Amber Rivera RN

## 2017-08-09 NOTE — PROGRESS NOTES
[facility] Short Visit Note:    9929 Pt arrived to [facility] ambulatory and in no distress for PICC removal.  Single lumen PICC intact to right upper arm. Left hand bruised, slightly swollen, and bandaged from left index finger amputation surgery yesterday, pain 3/10. No new complaints voiced. Patient Vitals for the past 12 hrs:   Temp Pulse Resp BP   08/09/17 1530 - (!) 58 18 142/53   08/09/17 1458 97.6 °F (36.4 °C) 60 18 175/67     PICC line removed per policy. Gauze and tegaderm dressing applied to site with coban on top. Pt observed on unit in supine position post removal for 30 minutes. 1530 BP stable, no bleeding noted. Pt tolerated procedure well. Discharged from [facility] ambulatory and in no acute distress accompanied by spouse. No further appointments scheduled at this time.

## 2017-08-15 PROBLEM — L02.519 HAND ABSCESS: Status: ACTIVE | Noted: 2017-01-01

## 2017-08-15 NOTE — PROGRESS NOTES
Spoke with Dr. Tl Gutierrez, as patient's BG was low. Verbal with readback, treated per protocol and held Metformin and insulin.

## 2017-08-15 NOTE — PROGRESS NOTES
received call from lab (Ms. Lizeth Anderson) on critical BS results of 44, BS per glucometer 54. Nurse [facility] informed. Pt was treated with juice, will retake BS.

## 2017-08-15 NOTE — H&P
Patient ID: Violeta Escalante is a 68 y.o. male.     Chief Complaint: Post-op of the Left Hand     Returns 1 week status post left index finger amputation. This was done for infection. He had a saw injury to the flexor tendon. He had this repaired. He developed an infection. After multiple aggressive debridements and IV antibiotics he had persistent evidence of infection. Digit was therefore amputated last week. He reports he has had some bleeding. No fevers or chills. Pain is well controlled. Unfortunately he was taken off of his IV antibiotics without my consent. He has not been on antibiotics since Wednesday of last week. He reports no fevers or chills. He reports drainage from the wound. No current facility-administered medications on file prior to encounter. Current Outpatient Prescriptions on File Prior to Encounter   Medication Sig Dispense Refill    HYDROcodone-acetaminophen (NORCO) 5-325 mg per tablet Take 1-2 Tabs by mouth every four (4) hours as needed for Pain. Max Daily Amount: 12 Tabs. 20 Tab 0    insulin glargine (LANTUS) 100 unit/mL injection by SubCUTAneous route nightly. By SS. Usually is < 20 units.  omeprazole (PRILOSEC) 20 mg capsule Take 20 mg by mouth daily.  insulin NPH (NOVOLIN N) 100 unit/mL injection 20 Units by SubCUTAneous route daily (with breakfast). Indications: type 2 diabetes mellitus      insulin aspart (NOVOLOG) 100 unit/mL injection 10 Units by SubCUTAneous route daily (with dinner). Indications: type 2 diabetes mellitus      metFORMIN (GLUMETZA ER) 500 mg TG24 24 hour tablet Take 1 Tab by mouth two (2) times a day.  lisinopril-hydrochlorothiazide (PRINZIDE, ZESTORETIC) 20-12.5 mg per tablet Take 1 Tab by mouth two (2) times a day.  amLODIPine (NORVASC) 5 mg tablet Take 5 mg by mouth daily.  ascorbic acid (VITAMIN C) 250 mg tablet Take 250 mg by mouth three (3) times daily.  IRON, FERROUS SULFATE, PO Take 65 mg by mouth.       traMADol (ULTRAM) 50 mg tablet Take 50 mg by mouth four (4) times daily.  losartan (COZAAR) 100 mg tablet Take 100 mg by mouth two (2) times a day. Indications: HYPERTENSION      aspirin 81 mg chewable tablet Take 81 mg by mouth daily.  meloxicam (MOBIC) 15 mg tablet Take 15 mg by mouth daily.  tamsulosin (FLOMAX) 0.4 mg capsule Take 0.4 mg by mouth nightly.  gabapentin (NEURONTIN) 300 mg capsule Take 300 mg by mouth three (3) times daily.  niacin ER (NIASPAN) 500 mg tablet Take  by mouth nightly.  nadolol (CORGARD) 40 mg tablet Take 40 mg by mouth daily.  simvastatin (ZOCOR) 10 mg tablet Take 10 mg by mouth every morning. Past Medical History:   Diagnosis Date    Allergic rhinitis     Anemia     ASVD (arteriosclerotic vascular disease)     Alfonso's esophagus 7/28/2017    Carotid stenosis 7/28/2017    Chronic kidney disease     Diabetes ([address]     glucose runs 70 - 160's at home    Diabetic neuropathy ([facility] [address] 7/28/2017    Diverticulosis     DJD (degenerative joint disease)     ED (erectile dysfunction)     Enlarged prostate     JUDY (generalized anxiety disorder)     GERD (gastroesophageal reflux disease)     controlled with med; alfonso's esophagus    Hiatal hernia     Hypercholesterolemia     Hypertension     Hypertrophy (benign) of prostate 7/28/2017    Ill-defined condition     peripheral vascular disease    Lung nodule     Neuropathy ([address]     On statin therapy 7/28/2017    Prostate cancer screening 7/28/2017    PVD (peripheral vascular disease) ([facility] [address]     Sebaceous cyst 7/28/2017    Spinal stenosis        ROS neg    Social History     Social History    Marital status:      Spouse name: N/A    Number of children: N/A    Years of education: N/A     Occupational History    Not on file.      Social History Main Topics    Smoking status: Former Smoker     Quit date: 7/7/1996    Smokeless tobacco: Never Used    Alcohol use No      Comment: rarely  Drug use: No    Sexual activity: Yes     Other Topics Concern    Not on file     Social History Narrative       No Known Allergies         Objective:   Constitutional:  No acute distress. Well nourished. Well developed. Eyes:  Sclera are nonicteric. Respiratory:  No labored breathing. Cardiovascular:  No marked edema. Skin:  No marked skin ulcers. Neurological:  No marked sensory loss noted. Psychiatric: Alert and oriented x3. Musculoskeletal   Unfortunately he has developed a significant cellulitis of the hand. He has purulent drainage from the wound. He has intact sensibility in the remainder of the digits. He has brisk cap refill in the remainder of the digits.     Several of the sutures removed and purulent drainage was evacuated. Cultures were taken. The wound was then soaked in a Betadine solution. The wound was then packed with iodoform packing. .     Radiographs:        No imaging obtained       Assessment:      1. Amputation finger, subsequent encounter    2. Flexor tenosynovitis of finger    3. Finger infection            Plan: We will admitted to the hospital. IV antibiotics. Plan for surgical debridement tomorrow. Id consult. We discussed possibility that he may lose the hand.

## 2017-08-15 NOTE — PROGRESS NOTES
Day #1 of Vancomycin  Indication:  Postop infection s/p left index finger amputation  Current regimen:  Vancomycin 1750mg IV x1 given today at 15:45  Abx regimen:  Vancomycin/Zosyn    ID Following ?: NO - consult pending  Frequency of BMP:  daily  Recent Labs      08/15/17   1444   WBC  8.4   CREA  1.25   BUN  24*   Afebrile    Cultures:   7/20 left hand wound positive for light alpha Strep + light MSSA  7/25 left index finger positive for moderate microaerophilic Strep + rare MSSA    Goal trough = 10 - 15 mcg/mL    Recent trough history (date/time/level/dose/action taken):  none    Plan:  Continue with Vancomycin 1250mg IV q16h.

## 2017-08-15 NOTE — PROGRESS NOTES
Primary Nurse Tricia Patrick, RN and Merly Watkins RN performed a dual skin assessment on this patient No impairment noted  Rocco score is 23

## 2017-08-15 NOTE — PROGRESS NOTES
Bedside shift change report given to Sriram Rodriguez (oncoming nurse) by Alfonso Kelly (offgoing nurse). Report included the following information SBAR, Kardex, ED Summary and Med Rec Status.

## 2017-08-15 NOTE — IP AVS SNAPSHOT
[address] 
[phone] Patient: Shelley Alvarez MRN: [id number] DOREEN:5[birth year]58[birth year]881 You are allergic to the following No active allergies Recent Documentation Height Weight BMI Smoking Status 1.753 m 86.2 kg 28.06 kg/m2 Former Smoker Unresulted Labs Order Current Status SUSCEPT, AER + ANAER REFL Preliminary result Emergency Contacts Name Discharge Info Relation Home Work Mobile Neisha Harrington DISCHARGE CAREGIVER [3] Spouse [3] [phone] About your hospitalization You were admitted on:  August 15, 2017 You last received care in the:  [address] [facility] You were discharged on:  August 21, 2017 Unit phone number:  [phone] Why you were hospitalized Your primary diagnosis was:  Not on File Your diagnoses also included:  Hand Abscess Providers Seen During Your Hospitalizations Provider Role Specialty Primary office phone Jennie Díaz MD Attending Provider Orthopedic Surgery [phone] Your Primary Care Physician (PCP) Primary Care Physician Office Phone Office Fax Lora Blanco [phone] Follow-up Information Follow up With Details Comments Contact Info Jennie Díaz MD In 2 days  [address] [facility] [address] [facility] [address] 
[phone] Jeremy Harrison MD On 8/23/2017 For discharge follow up at 10:20AM  [address] [facility] 
[phone] Your Appointments Tuesday August 22, 2017  3:00 PM EDT INFUSION 60 with CHAIR 2 [address] [place] [address] ([address]) [address]  
[phone] Go to [facility], [facility], [facility], [address]  Wednesday August 23, 2017 10:20 AM EDT  
 Office Visit with MD Ricarda Hooker [address] ([facility]) [address] [address] [phone] Thursday September 28, 2017  9:40 AM EDT Follow Up with MD JUDY [facility] ([facility]) [place] [id number] [phone] Current Discharge Medication List  
  
START taking these medications Dose & Instructions Dispensing Information Comments Morning Noon Evening Bedtime L. acidoph & paracasei- S therm- Bifido 8 billion cell Cap cap Commonly known as:  LETICIA-Q/RISAQUAD Your last dose was: Your next dose is:    
   
   
 Dose:  1 Cap Take 1 Cap by mouth daily. Quantity:  21 Cap Refills:  1 CONTINUE these medications which have NOT CHANGED Dose & Instructions Dispensing Information Comments Morning Noon Evening Bedtime  
 amLODIPine 5 mg tablet Commonly known as:  Dontae Rings Your last dose was: Your next dose is:    
   
   
 Dose:  5 mg Take 5 mg by mouth daily. Refills:  0  
     
   
   
   
  
 ascorbic acid (vitamin C) 250 mg tablet Commonly known as:  VITAMIN C Your last dose was: Your next dose is:    
   
   
 Dose:  250 mg Take 250 mg by mouth three (3) times daily. Refills:  0  
     
   
   
   
  
 aspirin 81 mg chewable tablet Your last dose was: Your next dose is:    
   
   
 Dose:  81 mg Take 81 mg by mouth daily. Refills:  0  
     
   
   
   
  
 gabapentin 300 mg capsule Commonly known as:  NEURONTIN Your last dose was: Your next dose is:    
   
   
 Dose:  300 mg Take 300 mg by mouth three (3) times daily. Refills:  0 HYDROcodone-acetaminophen 5-325 mg per tablet Commonly known as:  Ruffus Homme Your last dose was: Your next dose is:    
   
   
 Dose:  1-2 Tab Take 1-2 Tabs by mouth every four (4) hours as needed for Pain. Max Daily Amount: 12 Tabs. Quantity:  30 Tab Refills:  0 IRON (FERROUS SULFATE) PO Your last dose was: Your next dose is:    
   
   
 Dose:  65 mg Take 65 mg by mouth. Refills:  0  
     
   
   
   
  
 LANTUS 100 unit/mL injection Generic drug:  insulin glargine Your last dose was: Your next dose is:    
   
   
 by SubCUTAneous route nightly. By SS. Usually is < 20 units. Refills:  0  
     
   
   
   
  
 lisinopril-hydroCHLOROthiazide 20-12.5 mg per tablet Commonly known as:  James Flores Your last dose was: Your next dose is:    
   
   
 Dose:  1 Tab Take 1 Tab by mouth two (2) times a day. Refills:  0  
     
   
   
   
  
 losartan 100 mg tablet Commonly known as:  COZAAR Your last dose was: Your next dose is:    
   
   
 Dose:  100 mg Take 100 mg by mouth two (2) times a day. Indications: HYPERTENSION Refills:  0  
     
   
   
   
  
 meloxicam 15 mg tablet Commonly known as:  MOBIC Your last dose was: Your next dose is:    
   
   
 Dose:  15 mg Take 15 mg by mouth daily. Refills:  0  
     
   
   
   
  
 metFORMIN 500 mg Tg24 24 hour tablet Commonly known asCreta Covert ER Your last dose was: Your next dose is:    
   
   
 Dose:  1 Tab Take 1 Tab by mouth two (2) times a day. Refills:  0  
     
   
   
   
  
 nadolol 40 mg tablet Commonly known as:  CORGARD Your last dose was: Your next dose is:    
   
   
 Dose:  40 mg Take 40 mg by mouth daily. Refills:  0 Niaspan 500 mg tablet Generic drug:  niacin ER Your last dose was: Your next dose is: Take  by mouth nightly. Refills:  0 NovoLIN N 100 unit/mL injection Generic drug:  insulin NPH Your last dose was: Your next dose is:    
   
   
 Dose:  20 Units 20 Units by SubCUTAneous route daily (with breakfast). Indications: type 2 diabetes mellitus Refills:  0 NovoLOG 100 unit/mL injection Generic drug:  insulin aspart Your last dose was: Your next dose is:    
   
   
 Dose:  10 Units 10 Units by SubCUTAneous route daily (with dinner). Indications: type 2 diabetes mellitus Refills:  0  
     
   
   
   
  
 omeprazole 20 mg capsule Commonly known as:  PRILOSEC Your last dose was: Your next dose is:    
   
   
 Dose:  20 mg Take 20 mg by mouth daily. Refills:  0  
     
   
   
   
  
 simvastatin 10 mg tablet Commonly known as:  ZOCOR Your last dose was: Your next dose is:    
   
   
 Dose:  10 mg Take 10 mg by mouth every morning. Refills:  0  
     
   
   
   
  
 tamsulosin 0.4 mg capsule Commonly known as:  FLOMAX Your last dose was: Your next dose is:    
   
   
 Dose:  0.4 mg Take 0.4 mg by mouth nightly. Refills:  0 STOP taking these medications   
 traMADol 50 mg tablet Commonly known as:  ULTRAM  
   
  
  
  
Where to Get Your Medications Information on where to get these meds will be given to you by the nurse or doctor. ! Ask your nurse or doctor about these medications HYDROcodone-acetaminophen 5-325 mg per tablet L. acidoph & paracasei- S therm- Bifido 8 billion cell Cap cap Discharge Instructions Dr. Ellsworth Lowers Postoperative Instructions 1. Please have the wound dressing changed daily. Wet to dry dressing should be changed daily. Otherwise keep the wound dry.   If you have any questions or problems with your dressing, please call our office at [phone]. 
2. Please elevate the operative extremity to the level of the heart to keep swelling at a minimum. You may get up to move around, but when seated please keep the extremity elevated as much as possible. This will decrease swelling and postoperative pain. You may do activities as tolerated. 3. Take your antibiotics as prescribed by the infectious disease doctor. 4. A prescription for pain medication is provided. Take it on an as needed basis. 5. Signs and symptoms of worsening infection include: redness, increased pain, increased swelling not relieved by elevation, drainage, fever, or chills, If you develop any of these symptoms, call the office at [phone]. 6. Please Follow-up at my office 2 days after discharge. Discharge Orders None Introducing [facility]! July García introduces [employer] patient portal. Now you can access parts of your medical record, email your doctor's office, and request medication refills online. 1. In your internet browser, go to [email or web address] 2. Click on the First Time User? Click Here link in the Sign In box. You will see the New Member Sign Up page. 3. Enter your [employer] Access Code exactly as it appears below. You will not need to use this code after youve completed the sign-up process. If you do not sign up before the expiration date, you must request a new code. · [employer] Access Code: [email or web address] Expires: 11/19/2017  1:07 PM 
 
4. Enter the last four digits of your Social Security Number (xxxx) and Date of Birth (mm/dd/yyyy) as indicated and click Submit. You will be taken to the next sign-up page. 5. Create a [employer] ID. This will be your [employer] login ID and cannot be changed, so think of one that is secure and easy to remember. 6. Create a [employer] password. You can change your password at any time. 7. Enter your Password Reset Question and Answer. This can be used at a later time if you forget your password. 8. Enter your e-mail address.  You will receive e-mail notification when new information is available in [employer]. 9. Click Sign Up. You can now view and download portions of your medical record. 10. Click the Download Summary menu link to download a portable copy of your medical information. If you have questions, please visit the Frequently Asked Questions section of the [employer] website. Remember, [employer] is NOT to be used for urgent needs. For medical emergencies, dial 911. Now available from your iPhone and Android! General Information Please provide this summary of care documentation to your next provider. Patient Signature:  ____________________________________________________________ Date:  ____________________________________________________________  
  
Nannette Cr Provider Signature:  ____________________________________________________________ Date:  ____________________________________________________________

## 2017-08-15 NOTE — IP AVS SNAPSHOT
[address] 
[phone] Patient: Corinna Briones MRN: [id number] WTZ:6/77/[id number] Current Discharge Medication List  
  
START taking these medications Dose & Instructions Dispensing Information Comments Morning Noon Evening Bedtime L. acidoph & paracasei- S therm- Bifido 8 billion cell Cap cap Commonly known as:  LETICIA-Q/RISAQUAD Your last dose was: Your next dose is:    
   
   
 Dose:  1 Cap Take 1 Cap by mouth daily. Quantity:  21 Cap Refills:  1 CONTINUE these medications which have NOT CHANGED Dose & Instructions Dispensing Information Comments Morning Noon Evening Bedtime  
 amLODIPine 5 mg tablet Commonly known as:  Patricia Kathy Your last dose was: Your next dose is:    
   
   
 Dose:  5 mg Take 5 mg by mouth daily. Refills:  0  
     
   
   
   
  
 ascorbic acid (vitamin C) 250 mg tablet Commonly known as:  VITAMIN C Your last dose was: Your next dose is:    
   
   
 Dose:  250 mg Take 250 mg by mouth three (3) times daily. Refills:  0  
     
   
   
   
  
 aspirin 81 mg chewable tablet Your last dose was: Your next dose is:    
   
   
 Dose:  81 mg Take 81 mg by mouth daily. Refills:  0  
     
   
   
   
  
 gabapentin 300 mg capsule Commonly known as:  NEURONTIN Your last dose was: Your next dose is:    
   
   
 Dose:  300 mg Take 300 mg by mouth three (3) times daily. Refills:  0 HYDROcodone-acetaminophen 5-325 mg per tablet Commonly known as:  [place] No Your last dose was: Your next dose is:    
   
   
 Dose:  1-2 Tab Take 1-2 Tabs by mouth every four (4) hours as needed for Pain. Max Daily Amount: 12 Tabs. Quantity:  30 Tab Refills:  0 IRON (FERROUS SULFATE) PO Your last dose was: Your next dose is:    
   
   
 Dose:  65 mg Take 65 mg by mouth. Refills:  0  
     
   
   
   
  
 LANTUS 100 unit/mL injection Generic drug:  insulin glargine Your last dose was: Your next dose is:    
   
   
 by SubCUTAneous route nightly. By SS. Usually is < 20 units. Refills:  0  
     
   
   
   
  
 lisinopril-hydroCHLOROthiazide 20-12.5 mg per tablet Commonly known as:  Heydi Mohs Your last dose was: Your next dose is:    
   
   
 Dose:  1 Tab Take 1 Tab by mouth two (2) times a day. Refills:  0  
     
   
   
   
  
 losartan 100 mg tablet Commonly known as:  COZAAR Your last dose was: Your next dose is:    
   
   
 Dose:  100 mg Take 100 mg by mouth two (2) times a day. Indications: HYPERTENSION Refills:  0  
     
   
   
   
  
 meloxicam 15 mg tablet Commonly known as:  MOBIC Your last dose was: Your next dose is:    
   
   
 Dose:  15 mg Take 15 mg by mouth daily. Refills:  0  
     
   
   
   
  
 metFORMIN 500 mg Tg24 24 hour tablet Commonly known asCarin Chough ER Your last dose was: Your next dose is:    
   
   
 Dose:  1 Tab Take 1 Tab by mouth two (2) times a day. Refills:  0  
     
   
   
   
  
 nadolol 40 mg tablet Commonly known as:  CORGARD Your last dose was: Your next dose is:    
   
   
 Dose:  40 mg Take 40 mg by mouth daily. Refills:  0 Niaspan 500 mg tablet Generic drug:  niacin ER Your last dose was: Your next dose is: Take  by mouth nightly. Refills:  0 NovoLIN N 100 unit/mL injection Generic drug:  insulin NPH Your last dose was: Your next dose is:    
   
   
 Dose:  20 Units 20 Units by SubCUTAneous route daily (with breakfast). Indications: type 2 diabetes mellitus Refills:  0 NovoLOG 100 unit/mL injection Generic drug:  insulin aspart Your last dose was: Your next dose is:    
   
   
 Dose:  10 Units 10 Units by SubCUTAneous route daily (with dinner). Indications: type 2 diabetes mellitus Refills:  0  
     
   
   
   
  
 omeprazole 20 mg capsule Commonly known as:  PRILOSEC Your last dose was: Your next dose is:    
   
   
 Dose:  20 mg Take 20 mg by mouth daily. Refills:  0  
     
   
   
   
  
 simvastatin 10 mg tablet Commonly known as:  ZOCOR Your last dose was: Your next dose is:    
   
   
 Dose:  10 mg Take 10 mg by mouth every morning. Refills:  0  
     
   
   
   
  
 tamsulosin 0.4 mg capsule Commonly known as:  FLOMAX Your last dose was: Your next dose is:    
   
   
 Dose:  0.4 mg Take 0.4 mg by mouth nightly. Refills:  0 STOP taking these medications   
 traMADol 50 mg tablet Commonly known as:  ULTRAM  
   
  
  
  
Where to Get Your Medications Information on where to get these meds will be given to you by the nurse or doctor. ! Ask your nurse or doctor about these medications HYDROcodone-acetaminophen 5-325 mg per tablet L. acidoph & paracasei- S therm- Bifido 8 billion cell Cap cap

## 2017-08-15 NOTE — CONSULTS
Infectious Disease Consult    Today's Date: 8/15/2017   Admit Date: 8/15/2017    Impression:   · Left hand infection complicated by atherosclerosis, etc  · Recent finger amputation for post-traumatic infection  · Probable recrudescence of previous infection     Plan:   · IV antibiotic therapy  · Follow up cultures  · OR prn  · Agree with plans to keep in house for a time    Anti-infectives:     Inpat Anti-Infectives     Start     Ordered Stop    08/15/17 1500  piperacillin-tazobactam (ZOSYN) 3.375 g in 0.9% sodium chloride (MBP/ADV) 100 mL  3.375 g,   IntraVENous,   EVERY 8 HOURS      08/15/17 1421 --    08/15/17 1500  vancomycin (VANCOCIN) 1750 mg in  ml infusion  1,750 mg,   IntraVENous,   ONCE      08/15/17 1442 08/16/17 0259    08/15/17 1413  vancomycin - pharmacy to dose  Other,   RX DOSING/MONITORING      08/15/17 1413 --          Subjective:   Date of Consultation:  August 15, 2017  Referring Physician: Dr Cárdenas Done    Patient is a 68 y.o. male admitted in July for saw injury to left index finger. Wound was infected with MSSA and strep. Treated in the outpatient setting with IV daptomycin as the patient declined SNF admission. Finger was amputated about a week ago and IV antibiotic therapy d/c'd at that time. Admitted now with left hand infection. Wound opened in the outpatient setting and cultures sent. For formal exploration tomorrow.      Patient Active Problem List   Diagnosis Code    Lumbar stenosis with neurogenic claudication M48.06    Diverticulosis K57.90    Colitis, nonspecific K52.9    Carotid arterial disease (HCC) I77.9    Infection of left hand L08.9    Essential hypertension I10    Controlled type 2 diabetes mellitus without complication, with long-term current use of insulin (HCC) E11.9, Z79.4    Mixed hyperlipidemia E78.2    ASVD (arteriosclerotic vascular disease) I70.90    Martínez's esophagus K22.70    Carotid stenosis I65.29    Diabetic neuropathy (HCC) E11.40    On statin therapy Z79.899    Hypertrophy (benign) of prostate N40.0    Sebaceous cyst L72.3    Prostate cancer screening Z12.5    Hand abscess L02.519     Past Medical History:   Diagnosis Date    Allergic rhinitis     Anemia     ASVD (arteriosclerotic vascular disease)     Alfonso's esophagus 7/28/2017    Carotid stenosis 7/28/2017    Chronic kidney disease     Diabetes ([address]     glucose runs 70 - 160's at home    Diabetic neuropathy ([facility] [address] 7/28/2017    Diverticulosis     DJD (degenerative joint disease)     ED (erectile dysfunction)     Enlarged prostate     JUDY (generalized anxiety disorder)     GERD (gastroesophageal reflux disease)     controlled with med; alfonso's esophagus    Hiatal hernia     Hypercholesterolemia     Hypertension     Hypertrophy (benign) of prostate 7/28/2017    Ill-defined condition     peripheral vascular disease    Lung nodule     Neuropathy ([address]     On statin therapy 7/28/2017    Prostate cancer screening 7/28/2017    PVD (peripheral vascular disease) ([address]     Sebaceous cyst 7/28/2017    Spinal stenosis       Family History   Problem Relation Age of Onset    Diabetes Mother     Cancer Father      LUNG    Heart Disease Father       Social History   Substance Use Topics    Smoking status: Former Smoker     Quit date: 7/7/1996    Smokeless tobacco: Never Used    Alcohol use No      Comment: rarely     Past Surgical History:   Procedure Laterality Date    CARDIAC SURG PROCEDURE UNLIST      CATH-2008    COLONOSCOPY N/A 6/15/2016    COLONOSCOPY [facility]  6/15/2016         HX CATARACT REMOVAL      BILATERAL    HX ORTHOPAEDIC  1977    BONE CYST REM,ANU FROM RIGHT LEG    HX ORTHOPAEDIC  5-, 7-22-17, 7-26-17    left hand index finger    HX OTHER SURGICAL Left 06/2016    carotid endarectomy    NEUROLOGICAL PROCEDURE UNLISTED  2011    Back: spinal stenosis, pinched nerve repair    UPPER GI ENDOSCOPY,BIOPSY  6/15/2016           Prior to Admission medications    Medication Sig Start Date End Date Taking? Authorizing Provider   HYDROcodone-acetaminophen (NORCO) 5-325 mg per tablet Take 1-2 Tabs by mouth every four (4) hours as needed for Pain. Max Daily Amount: 12 Tabs. 8/8/17  Yes Maxi Tong MD   insulin glargine (LANTUS) 100 unit/mL injection by SubCUTAneous route nightly. By SS. Usually is < 20 units. Yes Historical Provider   omeprazole (PRILOSEC) 20 mg capsule Take 20 mg by mouth daily. Yes Historical Provider   insulin NPH (NOVOLIN N) 100 unit/mL injection 20 Units by SubCUTAneous route daily (with breakfast). Indications: type 2 diabetes mellitus   Yes Historical Provider   insulin aspart (NOVOLOG) 100 unit/mL injection 10 Units by SubCUTAneous route daily (with dinner). Indications: type 2 diabetes mellitus   Yes Historical Provider   metFORMIN (GLUMETZA ER) 500 mg TG24 24 hour tablet Take 1 Tab by mouth two (2) times a day. Yes Historical Provider   lisinopril-hydrochlorothiazide (PRINZIDE, ZESTORETIC) 20-12.5 mg per tablet Take 1 Tab by mouth two (2) times a day. Yes Historical Provider   amLODIPine (NORVASC) 5 mg tablet Take 5 mg by mouth daily. Yes Historical Provider   ascorbic acid (VITAMIN C) 250 mg tablet Take 250 mg by mouth three (3) times daily. Yes Historical Provider   IRON, FERROUS SULFATE, PO Take 65 mg by mouth. Yes Historical Provider   traMADol (ULTRAM) 50 mg tablet Take 50 mg by mouth four (4) times daily. Yes Historical Provider   losartan (COZAAR) 100 mg tablet Take 100 mg by mouth two (2) times a day. Indications: HYPERTENSION   Yes Historical Provider   aspirin 81 mg chewable tablet Take 81 mg by mouth daily. Yes Historical Provider   meloxicam (MOBIC) 15 mg tablet Take 15 mg by mouth daily. Yes Historical Provider   tamsulosin (FLOMAX) 0.4 mg capsule Take 0.4 mg by mouth nightly.    Yes Historical Provider   gabapentin (NEURONTIN) 300 mg capsule Take 300 mg by mouth three (3) times daily. Yes Historical Provider   niacin ER (NIASPAN) 500 mg tablet Take  by mouth nightly. Yes Historical Provider   nadolol (CORGARD) 40 mg tablet Take 40 mg by mouth daily. Yes Historical Provider   simvastatin (ZOCOR) 10 mg tablet Take 10 mg by mouth every morning. Yes Historical Provider       No Known Allergies     Review of Systems:  A comprehensive review of systems was negative. Objective:     Visit Vitals    /61 (BP 1 Location: Right arm, BP Patient Position: At rest)    Pulse 67    Temp 98.2 °F (36.8 °C)    Resp 16    Ht 5' 9\" (1.753 m)    Wt 86.2 kg (190 lb)    SpO2 98%    BMI 28.06 kg/m2     Temp (24hrs), Av.2 °F (36.8 °C), Min:98.2 °F (36.8 °C), Max:98.2 °F (36.8 °C)       Lines:  Peripheral IV:            Physical Exam:  Lungs:  clear to auscultation bilaterally  Heart:  regular rate and rhythm  Abdomen:  soft, non-tender. Bowel sounds normal. No masses,  no organomegaly  Wound dressed, no proximal cellulitis, good N/V function in remaining digits, some bruising of skin. Data Review:     CBC:  Recent Labs      08/15/17   1444   WBC  8.4   HGB  10.0*   HCT  30.9*   PLT  285       BMP:  Recent Labs      08/15/17   1444   CREA  1.25   BUN  24*   NA  140   K  3.9   CL  106   CO2  27   AGAP  7   GLU  44*       LFTS:  No results for input(s): TBILI, ALT, SGOT, AP, TP, ALB in the last 72 hours.     Microbiology:     All Micro Results     None          Imaging:   None     Signed By: Sid Martini MD     August 15, 2017

## 2017-08-16 NOTE — PROGRESS NOTES
ID Progress Note  2017    Subjective:     Doing well status post OR--operative findings noted    Objective:     Antibiotics:  1. Vancomycin   2. Zosyn       Vitals:   Visit Vitals    /68 (BP 1 Location: Right arm, BP Patient Position: At rest)    Pulse 76    Temp 98 °F (36.7 °C)    Resp 16    Ht 5' 9\" (1.753 m)    Wt 86.2 kg (190 lb)    SpO2 97%    BMI 28.06 kg/m2        Tmax:  Temp (24hrs), Av.4 °F (36.9 °C), Min:98 °F (36.7 °C), Max:98.6 °F (37 °C)      Exam:  Left hand dressed, sensation/mobility currently intact    Labs:      Recent Labs      17   0213  08/15/17   1444   WBC   --   8.4   HGB   --   10.0*   PLT   --   285   BUN  23*  24*   CREA  1.21  1.25       Cultures:     Lab Results   Component Value Date/Time    Specimen Description: STOOL ASPIRATE 10/23/2013 09:15 AM    Specimen Description: STOOL ASPIRATE 10/23/2013 09:15 AM    Specimen Description: STOOL ASPIRATE 10/23/2013 09:15 AM     Lab Results   Component Value Date/Time    Culture result: PENDING 2017 10:04 AM    Culture result: NO ANAEROBES ISOLATED 2017 06:39 PM    Culture result: MODERATE MICROAEROPHILIC STREPTOCOCCUS  06:39 PM    Culture result: RARE STAPHYLOCOCCUS AUREUS 2017 06:39 PM       Radiology:     Line/Insert Date:           Assessment:     1. Left hand infection  2. Status post multiple trips to the OR--I&D of abscess today with some bone debridement    Objective:     1. Continue current therapy/wound care  2. Follow up cultures  3.  Will need multiple weeks of antibiotic therapy    Sid Martini MD

## 2017-08-16 NOTE — PROGRESS NOTES
Dr. Destini Duncan told me to hold his sliding scale for the night. BS was 272. Dr. Destini Duncan also said to place 2mg of IV morphine every four hours PRN since patient is NPO.    0553  Took patient's blood sugar twice using both glucometers and no result is showing up in the results review. Dana Hayes RN double checked the results on the glucometer using the patient's MRN number with me.  so patient received 5 units of Humalog. Bedside shift change report given to ADRIEL Trejo (oncoming nurse) by [employer], RN (offgoing nurse). Report included the following information SBAR, Kardex and Recent Results.

## 2017-08-16 NOTE — CONSULTS
Consult Note    Patient: Kenney Birch               Sex: male          DOA: 8/15/2017       YOB: 1940      Age:  68 y.o.        LOS:  LOS: 0 days        HPI:     Kenney Birch is a 68 y.o. male who is admitted for post left finger amputation drainage. The patient originally injured his left index finger and had multiple debridements and IV antibiotics due to infections. His finger was amputated one week ago and patient now reports drainage from the wound site. Patients medical history includes DM and hypertension. Hospitalist on consult for medical management. Patient seen and evaluated. He has no complaints at this time.   Past Medical History:   Diagnosis Date    Allergic rhinitis     Anemia     ASVD (arteriosclerotic vascular disease)     Alfonso's esophagus 7/28/2017    Carotid stenosis 7/28/2017    Chronic kidney disease     Diabetes ([address]     glucose runs 70 - 160's at home    Diabetic neuropathy ([address] 7/28/2017    Diverticulosis     DJD (degenerative joint disease)     ED (erectile dysfunction)     Enlarged prostate     JUDY (generalized anxiety disorder)     GERD (gastroesophageal reflux disease)     controlled with med; alfonso's esophagus    Hiatal hernia     Hypercholesterolemia     Hypertension     Hypertrophy (benign) of prostate 7/28/2017    Ill-defined condition     peripheral vascular disease    Lung nodule     Neuropathy ([address]     On statin therapy 7/28/2017    Prostate cancer screening 7/28/2017    PVD (peripheral vascular disease) ([address]     Sebaceous cyst 7/28/2017    Spinal stenosis        Past Surgical History:   Procedure Laterality Date    CARDIAC SURG PROCEDURE UNLIST      CATH-2008    COLONOSCOPY N/A 6/15/2016    COLONOSCOPY performed by Kandace Fragoso [facility]  6/15/2016         HX CATARACT REMOVAL      BILATERAL    HX ORTHOPAEDIC  1977    BONE CYST REM,ANU FROM RIGHT LEG    HX ORTHOPAEDIC 5-, 7-22-17, 7-26-17    left hand index finger    HX OTHER SURGICAL Left 06/2016    carotid endarectomy    NEUROLOGICAL PROCEDURE UNLISTED  2011    Back: spinal stenosis, pinched nerve repair    UPPER GI ENDOSCOPY,BIOPSY  6/15/2016            Social History     Social History    Marital status:      Spouse name: N/A    Number of children: N/A    Years of education: N/A     Occupational History    Not on file. Social History Main Topics    Smoking status: Former Smoker     Quit date: 7/7/1996    Smokeless tobacco: Never Used    Alcohol use No      Comment: rarely    Drug use: No    Sexual activity: Yes     Other Topics Concern    Not on file     Social History Narrative       Family History   Problem Relation Age of Onset    Diabetes Mother     Cancer Father      LUNG    Heart Disease Father        No Known Allergies    Review of Systems:  Positive in bold. CONST: Fever, weight loss, fatigue or chills  GI: Nausea, vomiting, abdominal pain, change in bowel habits, hematochezia, melena, and GERD   INTEG: Dermatitis, abnormal moles  HEENT: Recent changes in vision, vertigo, epistaxis, dysphagia, hoarseness  CV: Chest pain, palpitations, HTN, edema and varicosities  RESP: Cough, shortness of breath, wheezing, hemoptysis, snoring.    : Hematuria, dysuria, frequency, urgency, retention, incontinence   MS: Weakness, joint pain and arthritis  ENDO: Diabetes, thyroid disease, polyuria, polydipsia, polyphagia, poor wound healing, heat intolerance, cold intolerance  LYMPH/HEME: Anemia, bruising and history of blood transfusions  NEURO: Dizziness, headache, fainting, seizures and stroke  PSYCH: Anxiety , depression    Physical Exam:      Visit Vitals    /59 (BP 1 Location: Right arm, BP Patient Position: At rest)    Pulse (!) 56    Temp 98.4 °F (36.9 °C)    Resp 16    Ht 5' 9\" (1.753 m)    Wt 86.2 kg (190 lb)    SpO2 98%    BMI 28.06 kg/m2       Physical Exam:  Gen:  No distress, alert, awake and oriented x 4  HEENT:  Normal cephalic atraumatic, extra-occular movements are intact. Neck:  Supple, No JVD  Lungs:  Clear bilaterally, no wheeze, no rales, normal effort  Cardiovascular:  Regular Rate and Rhythm, normal S1 and S2, + audible murmur. Capillary refill: < 3 seconds. Abdomen:  Soft, non tender, non distended, normal bowel sounds, no guarding. Extremities:  Well perfused, no cyanosis, no edema. Left hand bandaged, clean and dry,  S/p amputated index finger. Peripheral pulse:2+  Neurological:  Awake and alert, CN's are intact, normal strength throughout extremities  Skin:  No rashes or moles. Turgor and color normal  Mental Status:  Normal thought process, does not appear anxious      Laboratory Studies: All lab results for the last 24 hours reviewed. Assessment/Plan     Active Problems:    Hand abscess (8/15/2017)        PLAN:      Infectious: finger infection with plans for debridement in the morning,.  ID consulted. Please see surgical note/orders   Continue IV antibiotics    CV: HTN- blood pressure controlled. Continue antihypertensive medication as per home. Hold for SBP less than 110   Monitor vitals as per unit     Heme:  DVT prophylaxis   Bilateral lower extremity compression devices    Metabolic/Endo: DM,   FS A3O with sliding scale coverage   Keep NPO    GI: Nausea, vomiting, diarrhea, abdominal pain   Zofran as needed. Keep NPO    Misc:  FULL CODE   Physical therapy to evaluate and treat   Fall precautions   Ambulate with assistance. Monitor intake and output   Monitor vitals as per unit   Replace electrolytes as needed. Follow up am labs.           Mauri Reyez MD

## 2017-08-16 NOTE — PROGRESS NOTES
Patient did not have mechanical DVT prophylaxis ordered. Spoke with Dr. Efrain Izquierdo, on call for Dr. Guy Cuenca. Telephone with readback, ordered SCD's.

## 2017-08-16 NOTE — PROGRESS NOTES
Problem: Falls - Risk of  Goal: *Absence of Falls  Document Chau Fall Risk and appropriate interventions in the flowsheet.    Outcome: Progressing Towards Goal  Fall Risk Interventions:  Mobility Interventions: Assess mobility with egress test, OT consult for ADLs, Communicate number of staff needed for ambulation/transfer, PT Consult for mobility concerns, Utilize walker, cane, or other assitive device           Medication Interventions: Assess postural VS orthostatic hypotension, Teach patient to arise slowly, Patient to call before getting OOB     Elimination Interventions: Call light in reach, Patient to call for help with toileting needs, Toilet paper/wipes in reach, Urinal in reach

## 2017-08-16 NOTE — ROUTINE PROCESS
Patient: Kiara Faith MRN: [id number]  SSN: [id number]   YOB: 1940  Age: 68 y.o. Sex: male     Patient is status post Procedure(s):  LEFT HAND INCISION AND DRAINAGE. Surgeon(s) and Role:     * Arlene Rm MD - Primary    Local/Dose/Irrigation:  See mar                  Peripheral IV 07/20/17 Right Wrist (Active)       Peripheral IV 08/16/17 Right Forearm (Active)   Site Assessment Clean, dry, & intact 8/16/2017  9:30 AM   Phlebitis Assessment 0 8/16/2017  9:30 AM   Dressing Status Clean, dry, & intact 8/16/2017  9:30 AM   Dressing Type Transparent 8/16/2017  9:30 AM   Hub Color/Line Status Pink 8/16/2017  9:30 AM            Airway - Endotracheal Tube 08/16/17 (Active)                   Dressing/Packing:  Wound Hand Left-DRESSING TYPE: Elastic bandage (08/16/17 0042)  Wound Hand Left-DRESSING TYPE: 4 x 4;Cast padding;Elastic bandage; Xeroform;ABD pad (IODOFORM PACKING) (08/16/17 0900)  Splint/Cast:  ]    Other:

## 2017-08-16 NOTE — BRIEF OP NOTE
BRIEF OPERATIVE NOTE    Date of Procedure: 8/16/2017   Preoperative Diagnosis: LEFT HAND INFECTION   Postoperative Diagnosis: LEFT HAND INFECTION     Procedure(s):  LEFT HAND INCISION AND DRAINAGE  Surgeon(s) and Role:     * Triston Strong MD - Primary         Assistant Staff:       Surgical Staff:  Circ-1: Milind Martinez RN  Circ-Relief: Huang Wheatley RN  Scrub RN-1: Afua Ponce RN  Event Time In   Incision Start 4236   Incision Close 1008     Anesthesia: General   Estimated Blood Loss: min  Specimens:   ID Type Source Tests Collected by Time Destination   1 : LEFT HAND   AEROBIC/ANAEROBIC Panda Perrin MD 8/16/2017 1000 Microbiology      Findings: purulence in wound   Complications: none  Implants: * No implants in log *

## 2017-08-16 NOTE — PROGRESS NOTES
Hospitalist Progress Note  Meera Metzger MD  Office: [phone]        Date of Service:  2017  NAME:  Shilo Almodovar  :  2202  MRN:  [id number]      Admission Summary:   Shilo Almodovar is a 68 y.o. male who is admitted for post left finger amputation drainage. The patient originally injured his left index finger and had multiple debridements and IV antibiotics due to infections. His finger was amputated one week ago     Interval history / Subjective:     S/P debridement in OR today     Assessment & Plan:     1. 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 HTN - resume home medications    3. DM - missed a morning dose d/w RN cont coverage for Today once resume diet restart all home medications as scheduled     4. HL- on statin     Code status: Full  DVT prophylaxis: SCD    Care Plan discussed with: Patient/Family and Nurse  Disposition: Home w/Family and TBD     Hospital Problems  Date Reviewed: 2017          Codes Class Noted POA    Hand abscess ICD-10-CM: L02.519  ICD-9-CM: 682.4  8/15/2017 Unknown                Review of Systems:   A comprehensive review of systems was negative. Vital Signs:    Last 24hrs VS reviewed since prior progress note. Most recent are:  Visit Vitals    /66 (BP 1 Location: Right arm, BP Patient Position: At rest)    Pulse 72    Temp 98 °F (36.7 °C)    Resp 16    Ht 5' 9\" (1.753 m)    Wt 86.2 kg (190 lb)    SpO2 98%    BMI 28.06 kg/m2         Intake/Output Summary (Last 24 hours) at 17 1443  Last data filed at 17 1235   Gross per 24 hour   Intake              738 ml   Output             1650 ml   Net             -912 ml        Physical Examination:             Constitutional:  No acute distress, cooperative, pleasant    ENT:  Oral mucous moist, oropharynx benign. Neck supple,    Resp:  CTA bilaterally.  No wheezing/rhonchi/rales. No accessory muscle use   CV:  Regular rhythm, normal rate, no murmurs, gallops, rubs    GI:  Soft, non distended, non tender. normoactive bowel sounds, no hepatosplenomegaly     Musculoskeletal:  No edema, warm, 2+ pulses throughout    Neurologic:  Moves all extremities. AAOx3, CN II-XII reviewed     Psych:  Good insight, Not anxious nor agitated. Data Review:    I personally reviewed  Image and LABS      Labs:     Recent Labs      08/15/17   1444   WBC  8.4   HGB  10.0*   HCT  30.9*   PLT  285     Recent Labs      08/16/17   0213  08/15/17   1444   NA  138  140   K  4.1  3.9   CL  106  106   CO2  25  27   BUN  23*  24*   CREA  1.21  1.25   GLU  236*  44*   CA  7.8*  8.4*     No results for input(s): SGOT, GPT, ALT, AP, TBIL, TBILI, TP, ALB, GLOB, GGT, AML, LPSE in the last 72 hours. No lab exists for component: AMYP, HLPSE  No results for input(s): INR, PTP, APTT in the last 72 hours. No lab exists for component: INREXT   No results for input(s): FE, TIBC, PSAT, FERR in the last 72 hours. No results found for: FOL, RBCF   No results for input(s): PH, PCO2, PO2 in the last 72 hours. No results for input(s): CPK, CKNDX, TROIQ in the last 72 hours.     No lab exists for component: CPKMB  No results found for: CHOL, CHOLX, CHLST, CHOLV, HDL, LDL, LDLC, DLDLP, TGLX, TRIGL, TRIGP, CHHD, CHHDX  Lab Results   Component Value Date/Time    Glucose (POC) 230 08/16/2017 12:09 PM    Glucose (POC) 188 08/16/2017 10:45 AM    Glucose (POC) 191 08/16/2017 08:57 AM    Glucose (POC) 274 08/16/2017 05:39 AM    Glucose (POC) 245 08/16/2017 05:13 AM     Lab Results   Component Value Date/Time    Color YELLOW/STRAW 06/22/2016 09:17 AM    Appearance CLEAR 06/22/2016 09:17 AM    Specific gravity 1.019 06/22/2016 09:17 AM    pH (UA) 5.5 06/22/2016 09:17 AM    Protein 100 06/22/2016 09:17 AM    Glucose NEGATIVE  06/22/2016 09:17 AM    Ketone NEGATIVE  06/22/2016 09:17 AM    Bilirubin NEGATIVE 06/22/2016 09:17 AM    Urobilinogen 0.2 06/22/2016 09:17 AM    Nitrites NEGATIVE  06/22/2016 09:17 AM    Leukocyte Esterase NEGATIVE  06/22/2016 09:17 AM    Epithelial cells FEW 06/22/2016 09:17 AM    Bacteria NEGATIVE  06/22/2016 09:17 AM    WBC 0-4 06/22/2016 09:17 AM    RBC 0-5 06/22/2016 09:17 AM         Medications Reviewed:     Current Facility-Administered Medications   Medication Dose Route Frequency    morphine injection 2 mg  2 mg IntraVENous Q4H PRN    ketorolac (TORADOL) injection 15 mg  15 mg IntraVENous Q6H    [START ON 8/17/2017] lisinopril/hydroCHLOROthiazide(PRINZIDE/ZESTORETIC) 20/12.5 mg   Oral DAILY    amLODIPine (NORVASC) tablet 5 mg  5 mg Oral DAILY    ascorbic acid (vitamin C) (VITAMIN C) tablet 250 mg  250 mg Oral TID    aspirin chewable tablet 81 mg  81 mg Oral DAILY    gabapentin (NEURONTIN) capsule 300 mg  300 mg Oral TID    HYDROcodone-acetaminophen (NORCO) 5-325 mg per tablet 1-2 Tab  1-2 Tab Oral Q4H PRN    insulin lispro (HUMALOG) injection 10 Units  10 Units SubCUTAneous DAILY WITH DINNER    tamsulosin (FLOMAX) capsule 0.4 mg  0.4 mg Oral PCD    simvastatin (ZOCOR) tablet 10 mg  10 mg Oral QHS    pantoprazole (PROTONIX) tablet 40 mg  40 mg Oral ACB    niacin SR (NIACIN SR) capsule 500 mg  500 mg Oral QHS    nadolol (CORGARD) tablet 40 mg  40 mg Oral DAILY    metFORMIN ER (GLUCOPHAGE XR) tablet 500 mg  500 mg Oral BID WITH MEALS    losartan (COZAAR) tablet 100 mg  100 mg Oral BID    ferrous sulfate tablet 325 mg  325 mg Oral DAILY WITH BREAKFAST    insulin NPH (NOVOLIN N, HUMULIN N) injection 20 Units  20 Units SubCUTAneous DAILY WITH BREAKFAST    insulin glargine (LANTUS) injection 15 Units  15 Units SubCUTAneous QHS    0.9% sodium chloride infusion  75 mL/hr IntraVENous CONTINUOUS    piperacillin-tazobactam (ZOSYN) 3.375 g in 0.9% sodium chloride (MBP/ADV) 100 mL  3.375 g IntraVENous Q8H    vancomycin - pharmacy to dose   Other Rx Dosing/Monitoring    vancomycin (VANCOCIN) 1250 mg in  ml infusion  1,250 mg IntraVENous Q16H    glucose chewable tablet 16 g  4 Tab Oral PRN    glucagon (GLUCAGEN) injection 1 mg  1 mg IntraMUSCular PRN    insulin lispro (HUMALOG) injection   SubCUTAneous Q6H    dextrose 10 % infusion 125-250 mL  125-250 mL IntraVENous PRN     ______________________________________________________________________  EXPECTED LENGTH OF STAY: 3d 9h  ACTUAL LENGTH OF STAY:          1                 Keny Ram MD

## 2017-08-16 NOTE — PROGRESS NOTES
Bedside shift change report given to Víctor Dempsey (oncoming nurse) by Sridhar Mo (offgoing nurse). Report included the following information SBAR, Kardex, OR Summary, Intake/Output, MAR and Recent Results.

## 2017-08-16 NOTE — PROGRESS NOTES
Primary Nurse Mark Rodriguez RN and Kevon Soria RN performed a dual skin assessment on this patient No impairment noted  Rocco score is 20

## 2017-08-16 NOTE — PROGRESS NOTES
Patient's BG = 397. Spoke with Dr. Tianna Morales. Verbal with readback, ordered one time dose of Humalog 7 units. 2222 - Patient's BP = 109/59 and HR = 56. Spoke with Dr. Ynes Alberts. Holding 2200 dose of Losartan.

## 2017-08-16 NOTE — INTERVAL H&P NOTE
H&P Update:  Penelope Diggs was seen and examined. Patient identified by surgeon; surgical site was confirmed by patient and surgeon.     Signed By: Candice Beltre MD     August 16, 2017 9:16 AM

## 2017-08-16 NOTE — PROGRESS NOTES
Bedside and Verbal shift change report given to [employer], RN (oncoming nurse) by Uche Rodriguez RN (offgoing nurse). Report included the following information SBAR, Kardex, Procedure Summary, Intake/Output, MAR and Recent Results.

## 2017-08-16 NOTE — OP NOTES
[address] [place] [address], [address] [place] [address] Ave   OP NOTE       Name:  Dilma Mcclure   MR#:  [id number]   :  1940   Account #:  [de-identified]    Surgery Date:  2017   Date of Adm:  08/15/2017       PREOPERATIVE DIAGNOSIS: Left hand abscess. POSTOPERATIVE DIAGNOSIS: Left hand abscess. PROCEDURES PERFORMED: Incision and drainage, left hand. SURGEON: Lori Stanton. Julio Swan MD    ANESTHESIA: General.    ESTIMATED BLOOD LOSS: Minimal.    SPECIMENS REMOVED: Cultures left hand. IMPLANTS: None. COMPLICATIONS: None. INDICATIONS FOR PROCEDURE: This is a 70-year-old male who   unfortunately has now a longstanding history of a left hand infection. This   all started when he lacerated the hand with a table saw. He underwent   a flexor tendon repair. He had a chronic slowly healing wound. He developed a significant infection. He   underwent multiple serial debridements and IV abx without control of his infection. He therefore underwent an amputation last   week of the index finger. Plan was for him to remain on IV antibiotics;   however, unfortunately he was taken off of his antibiotics last week. He   presented to my office yesterday with worsening infection in the hand. significant cellulitis. He has purulent drainage from the wound. His   sutures were removed in the office yesterday and his abscess at the   area of the surgery was decompressed. Cultures were taken in the   office. He has been admitted to the hospital for broad-spectrum IV   antibiotics and plan is for more aggressive debridement in the   operating room today. I discussed the risks, benefits, potential   complications and alternatives of surgery with him, and he gave   informed consent to proceed. We did discuss the possibility that he   may lose a further portion of the hand, should we not be able to control   his infection.     DESCRIPTION OF PROCEDURE: The patient was identified in the   preoperative holding area. Informed consent was obtained. The   operative site was marked. He was then transported to the OR and   placed supine on the operating table. After induction of general   anesthesia, the left upper extremity was sterilely prepped and draped   in the usual fashion. The remainder of his sutures were removed. His   large radial based skin flap was opened. A significant amount of   purulent material was evacuated. A 15 blade was used to sharply   debride the skin, subcutaneous tissues and muscle. There was a   significant amount of necrosis of the first dorsal interosseous muscle. This was debrided. The skin edges were debrided. The bone was   debrided at the base of the second metacarpal. The wound was then   thoroughly irrigated with several liters of normal saline with bacitracin. The wound was then lightly closed volarly. It was left completely open   dorsally. The wound was then packed with a significant amount of   iodoform packing. A median nerve block was performed with 0.5% Marcaine. Sterile dressings were applied. He was transferred   to the PACU in stable condition without complication.        Zay Marshall MD      [address] [place] [address] / [facility]   D:  08/16/2017   10:25   T:  08/16/2017   13:40   Job #:  [id number]

## 2017-08-16 NOTE — PROGRESS NOTES
Patient back from ambulatory surgery at 1230.  received 3u SS. Paged Team 7 about 20U SC of NPH that was missed at 0830 AM. Instructed to stick with SS today and return to normal schedule tomorrow.

## 2017-08-16 NOTE — DIABETES MGMT
DTC Progress Note    Recommendations/ Comments: Variable BG's. Hypoglycemia yesterday - BG 54 mg/dL at 1522. It then ryan to 397 mg/dL at bedtime.  mg/dL today    Noted the following medications have been ordered:  Lantus 15 units daily  NPH 20 units at breakfast  Lispro 10 units at supper  Lispro normal correction scale     DTC will continue ot follow     Chart reviewed on Monica Barros. Patient is a 68 y.o. male with known DM on Lantus - usually < 20 units daily, NPH 20 units at breakfast, Novolog 10 units at dinner and Metformin 500 mg BID at home. s/p amputation L index finger last week. I&D this morning    A1c:   Lab Results   Component Value Date/Time    Hemoglobin A1c 8.1 08/15/2017 11:43 PM    Hemoglobin A1c 7.1 07/21/2017 05:04 AM       Recent Glucose Results: Lab Results   Component Value Date/Time     (H) 08/16/2017 02:13 AM    GLU 44 (LL) 08/15/2017 02:44 PM    GLUCPOC 188 (H) 08/16/2017 10:45 AM    GLUCPOC 191 (H) 08/16/2017 08:57 AM    GLUCPOC 274 (H) 08/16/2017 05:39 AM        Lab Results   Component Value Date/Time    Creatinine 1.21 08/16/2017 02:13 AM     Estimated Creatinine Clearance: 56.5 mL/min (based on Cr of 1.21).     Active Orders   Diet    DIET NPO        PO intake: Patient Vitals for the past 72 hrs:   % Diet Eaten   08/16/17 0856 0 %       Current hospital DM medication:    Lantus 15 units daily  NPH 20 units at breakfast  Lispro 10 units at supper  Lispro normal correction scale    Thank you  Abena Soriano RN, CDE

## 2017-08-16 NOTE — PERIOP NOTES
TRANSFER - OUT REPORT:    Verbal report given to GHANSHYAM MOSS Shelbiana  on Adenfroy Fairchild  being transferred to [address](unit) for ordered procedure       Report consisted of patients Situation, Background, Assessment and   Recommendations(SBAR). Information from the following report(s) Procedure Summary, Intake/Output and MAR was reviewed with the receiving nurse. Lines:   Peripheral IV 07/20/17 Right Wrist (Active)       Peripheral IV 08/16/17 Right Forearm (Active)   Site Assessment Clean, dry, & intact 8/16/2017  9:30 AM   Phlebitis Assessment 0 8/16/2017  9:30 AM   Dressing Status Clean, dry, & intact 8/16/2017  9:30 AM   Dressing Type Transparent 8/16/2017  9:30 AM   Hub Color/Line Status Pink 8/16/2017  9:30 AM        Opportunity for questions and clarification was provided.       Patient transported with:   Registered Nurse

## 2017-08-16 NOTE — ANESTHESIA PREPROCEDURE EVALUATION
Anesthetic History   No history of anesthetic complications            Review of Systems / Medical History  Patient summary reviewed, nursing notes reviewed and pertinent labs reviewed    Pulmonary  Within defined limits                 Neuro/Psych   Within defined limits          Comments: Hx carotid stenosis s/p endarectomy Cardiovascular    Hypertension: well controlled          PAD      Comments: P   GI/Hepatic/Renal     GERD: well controlled           Endo/Other    Diabetes: well controlled, type 2, using insulin    Arthritis     Other Findings              Physical Exam    Airway  Mallampati: II  TM Distance: > 6 cm  Neck ROM: normal range of motion   Mouth opening: Normal     Cardiovascular  Regular rate and rhythm,  S1 and S2 normal,  no murmur, click, rub, or gallop             Dental    Dentition: Poor dentition     Pulmonary  Breath sounds clear to auscultation               Abdominal  GI exam deferred       Other Findings            Anesthetic Plan    ASA: 3  Anesthesia type: general          Induction: Intravenous  Anesthetic plan and risks discussed with: Patient

## 2017-08-17 NOTE — PROGRESS NOTES
POD#1 I&D left hand  Pain controlled    afvss  NAD  Dressing changed. Packing removed. Erythema improved. No purulence. Large, deep wound in 1st webspace. Wound was repacked aggressively.   Neuro intact    cx microaerophilic strep    Plan:  IV abx  Daily packing changes  Pain control

## 2017-08-17 NOTE — PROGRESS NOTES
Hospitalist Progress Note  Jonatan Fitzpatrick MD  Office: [phone]        Date of Service:  2017  NAME:  Zohreh Dove  :    MRN:  [id number]      Admission Summary:   Zohreh Dove is a 68 y.o. male who is admitted for post left finger amputation drainage. The patient originally injured his left index finger and had multiple debridements and IV antibiotics due to infections. His finger was amputated one week ago     Interval history / Subjective:     S/P debridement in OR today  Will need long term IV abx per ID     Assessment & Plan:     1. 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 HTN - resume home medications    3. DM - Resume diet restart all home medications as scheduled     4. HL- on statin     Code status: Full  DVT prophylaxis: SCD    Care Plan discussed with: Patient/Family and Nurse  Disposition: Home w/Family and TBD   Will sign off pl call with questions     Hospital Problems  Date Reviewed: 2017          Codes Class Noted POA    Hand abscess ICD-10-CM: L02.519  ICD-9-CM: 682.4  8/15/2017 Unknown                Review of Systems:   A comprehensive review of systems was negative. Vital Signs:    Last 24hrs VS reviewed since prior progress note. Most recent are:  Visit Vitals    /75    Pulse 90    Temp 98.3 °F (36.8 °C)    Resp 15    Ht 5' 9\" (1.753 m)    Wt 86.2 kg (190 lb)    SpO2 99%    BMI 28.06 kg/m2         Intake/Output Summary (Last 24 hours) at 17 1145  Last data filed at 17 0817   Gross per 24 hour   Intake             1016 ml   Output                0 ml   Net             1016 ml        Physical Examination:             Constitutional:  No acute distress, cooperative, pleasant    ENT:  Oral mucous moist, oropharynx benign. Neck supple,    Resp:  CTA bilaterally. No wheezing/rhonchi/rales.  No accessory muscle use   CV: Regular rhythm, normal rate, no murmurs, gallops, rubs    GI:  Soft, non distended, non tender. normoactive bowel sounds, no hepatosplenomegaly     Musculoskeletal:  No edema, warm, 2+ pulses throughout    Neurologic:  Moves all extremities. AAOx3, CN II-XII reviewed     Psych:  Good insight, Not anxious nor agitated. Data Review:    I personally reviewed  Image and LABS      Labs:     Recent Labs      08/15/17   1444   WBC  8.4   HGB  10.0*   HCT  30.9*   PLT  285     Recent Labs      08/17/17   0306  08/16/17   0213  08/15/17   1444   NA  142  138  140   K  3.9  4.1  3.9   CL  110*  106  106   CO2  25  25  27   BUN  20  23*  24*   CREA  1.24  1.21  1.25   GLU  197*  236*  44*   CA  7.6*  7.8*  8.4*     No results for input(s): SGOT, GPT, ALT, AP, TBIL, TBILI, TP, ALB, GLOB, GGT, AML, LPSE in the last 72 hours. No lab exists for component: AMYP, HLPSE  No results for input(s): INR, PTP, APTT in the last 72 hours. No lab exists for component: INREXT, INREXT   No results for input(s): FE, TIBC, PSAT, FERR in the last 72 hours. No results found for: FOL, RBCF   No results for input(s): PH, PCO2, PO2 in the last 72 hours. No results for input(s): CPK, CKNDX, TROIQ in the last 72 hours.     No lab exists for component: CPKMB  No results found for: CHOL, CHOLX, CHLST, CHOLV, HDL, LDL, LDLC, DLDLP, TGLX, TRIGL, TRIGP, CHHD, CHHDX  Lab Results   Component Value Date/Time    Glucose (POC) 263 08/17/2017 11:36 AM    Glucose (POC) 211 08/17/2017 05:01 AM    Glucose (POC) 269 08/17/2017 12:42 AM    Glucose (POC) 277 08/16/2017 07:18 PM    Glucose (POC) 230 08/16/2017 12:09 PM     Lab Results   Component Value Date/Time    Color YELLOW/STRAW 06/22/2016 09:17 AM    Appearance CLEAR 06/22/2016 09:17 AM    Specific gravity 1.019 06/22/2016 09:17 AM    pH (UA) 5.5 06/22/2016 09:17 AM    Protein 100 06/22/2016 09:17 AM    Glucose NEGATIVE  06/22/2016 09:17 AM    Ketone NEGATIVE  06/22/2016 09:17 AM    Bilirubin NEGATIVE  06/22/2016 09:17 AM    Urobilinogen 0.2 06/22/2016 09:17 AM    Nitrites NEGATIVE  06/22/2016 09:17 AM    Leukocyte Esterase NEGATIVE  06/22/2016 09:17 AM    Epithelial cells FEW 06/22/2016 09:17 AM    Bacteria NEGATIVE  06/22/2016 09:17 AM    WBC 0-4 06/22/2016 09:17 AM    RBC 0-5 06/22/2016 09:17 AM         Medications Reviewed:     Current Facility-Administered Medications   Medication Dose Route Frequency    [START ON 8/18/2017] Vancomycin trough 8/18/17 @ 0800    Other ONCE    morphine injection 2 mg  2 mg IntraVENous Q4H PRN    lisinopril/hydroCHLOROthiazide(PRINZIDE/ZESTORETIC) 20/12.5 mg   Oral DAILY    amLODIPine (NORVASC) tablet 5 mg  5 mg Oral DAILY    ascorbic acid (vitamin C) (VITAMIN C) tablet 250 mg  250 mg Oral TID    aspirin chewable tablet 81 mg  81 mg Oral DAILY    gabapentin (NEURONTIN) capsule 300 mg  300 mg Oral TID    HYDROcodone-acetaminophen (NORCO) 5-325 mg per tablet 1-2 Tab  1-2 Tab Oral Q4H PRN    insulin lispro (HUMALOG) injection 10 Units  10 Units SubCUTAneous DAILY WITH DINNER    tamsulosin (FLOMAX) capsule 0.4 mg  0.4 mg Oral PCD    simvastatin (ZOCOR) tablet 10 mg  10 mg Oral QHS    pantoprazole (PROTONIX) tablet 40 mg  40 mg Oral ACB    niacin SR (NIACIN SR) capsule 500 mg  500 mg Oral QHS    nadolol (CORGARD) tablet 40 mg  40 mg Oral DAILY    metFORMIN ER (GLUCOPHAGE XR) tablet 500 mg  500 mg Oral BID WITH MEALS    losartan (COZAAR) tablet 100 mg  100 mg Oral BID    ferrous sulfate tablet 325 mg  325 mg Oral DAILY WITH BREAKFAST    insulin NPH (NOVOLIN N, HUMULIN N) injection 20 Units  20 Units SubCUTAneous DAILY WITH BREAKFAST    insulin glargine (LANTUS) injection 15 Units  15 Units SubCUTAneous QHS    0.9% sodium chloride infusion  75 mL/hr IntraVENous CONTINUOUS    piperacillin-tazobactam (ZOSYN) 3.375 g in 0.9% sodium chloride (MBP/ADV) 100 mL  3.375 g IntraVENous Q8H    vancomycin - pharmacy to dose   Other Rx Dosing/Monitoring    vancomycin (VANCOCIN) 1250 mg in  ml infusion  1,250 mg IntraVENous Q16H    glucose chewable tablet 16 g  4 Tab Oral PRN    glucagon (GLUCAGEN) injection 1 mg  1 mg IntraMUSCular PRN    insulin lispro (HUMALOG) injection   SubCUTAneous Q6H    dextrose 10 % infusion 125-250 mL  125-250 mL IntraVENous PRN     ______________________________________________________________________  EXPECTED LENGTH OF STAY: 3d 9h  ACTUAL LENGTH OF STAY:          2                 Bronson Boogie MD

## 2017-08-17 NOTE — PROGRESS NOTES
Spiritual Care Partner Volunteer visited patient in [address] [place] [address] [address] on 8/17/17. Documented by:  Marcin Mcintosh M.Div.    Paging Service [phone])

## 2017-08-17 NOTE — PROGRESS NOTES
ID Progress Note  2017    Subjective:     Seems a bit better today    Objective:     Antibiotics:  1. Vancomycin   2. Zosyn       Vitals:   Visit Vitals    /65 (BP 1 Location: Right arm, BP Patient Position: At rest)    Pulse 72    Temp 97 °F (36.1 °C)    Resp 16    Ht 5' 9\" (1.753 m)    Wt 86.2 kg (190 lb)    SpO2 100%    BMI 28.06 kg/m2        Tmax:  Temp (24hrs), Av.9 °F (36.6 °C), Min:97 °F (36.1 °C), Max:98.4 °F (36.9 °C)      Exam:  Left hand dressed, sensation/mobility currently intact    Labs:      Recent Labs      17   0306  17   0213  08/15/17   1444   WBC   --    --   8.4   HGB   --    --   10.0*   PLT   --    --   285   BUN  20  23*  24*   CREA  1.24  1.21  1.25       Cultures:     Lab Results   Component Value Date/Time    Specimen Description: STOOL ASPIRATE 10/23/2013 09:15 AM    Specimen Description: STOOL ASPIRATE 10/23/2013 09:15 AM    Specimen Description: STOOL ASPIRATE 10/23/2013 09:15 AM     Lab Results   Component Value Date/Time    Culture result: LIGHT POSSIBLE MICROAEROPHILIC STREPTOCOCCUS  10:04 AM    Culture result: NO ANAEROBES ISOLATED 2017 06:39 PM    Culture result: MODERATE MICROAEROPHILIC STREPTOCOCCUS  06:39 PM    Culture result: RARE STAPHYLOCOCCUS AUREUS 2017 06:39 PM       Radiology:     Line/Insert Date:           Assessment:     1. Left hand infection  2. Status post multiple trips to the OR--I&D of abscess today with some bone debridement--strep from culture so far  3. Creatinine creeping up a bit    Objective:     1. Stop vancomycin in face of rising creatinine  2. Follow up cultures  3.  Will need multiple weeks of antibiotic therapy    Jacinto Van MD

## 2017-08-17 NOTE — PROGRESS NOTES
Bedside shift change report given to Majo Ortiz RN (oncoming nurse) by JANET Hickman RN (offgoing nurse). Report included the following information SBAR.

## 2017-08-17 NOTE — PROGRESS NOTES
Care Management Interventions  PCP Verified by CM: Yes (Dr. Zeus Gil)  Palliative Care Consult (Criteria: CHF and RRAT>21): No  Reason for No Palliative Care Consult: Other (see comment) (no orders/no needs)  Mode of Transport at Discharge: Self  Transition of Care Consult (CM Consult): [address]: No  Reason Outside [place]:  (hh not determined yet)  MyChart Signup: No  Discharge Durable Medical Equipment: No  Physical Therapy Consult: No  Occupational Therapy Consult: No  Speech Therapy Consult: No  Current Support Network: Lives with Spouse  Plan discussed with Pt/Family/Caregiver: Yes  Freedom of Choice Offered: Yes  Discharge Location  Discharge Placement: Home with home health    Chart reviewed. The patient is post op day#1 for I & D for Left hand abscess. The patient has had a recent IV regimen at [facility] that ended on 8/3 and the picc line was removed on 8/9. CRM will follow for discharge planning and ID plan.  WYATT

## 2017-08-17 NOTE — PROGRESS NOTES
Bedside shift change report given to ADRIEL Greenberg (oncoming nurse) by Judith Mantilla RN (offgoing nurse). Report included the following information SBAR, Kardex and Recent Results.

## 2017-08-17 NOTE — DIABETES MGMT
DTC Progress Note    Recommendations/ Comments: Chart reviewed for hyperglycemia. Patient has required 14 units of correction insulin in the last 24 hours. If appropriate, please consider increasing Lantus dose to 20 units. DTC will continue ot follow         Patient is a 68 y.o. male with known DM on Lantus - usually < 20 units daily, NPH 20 units at breakfast, Novolog 10 units at dinner and Metformin 500 mg BID at home. s/p amputation L index finger last week. I&D this morning    A1c:   Lab Results   Component Value Date/Time    Hemoglobin A1c 8.1 08/15/2017 11:43 PM    Hemoglobin A1c 7.1 07/21/2017 05:04 AM       Recent Glucose Results:   Lab Results   Component Value Date/Time     (H) 08/17/2017 03:06 AM    GLUCPOC 263 (H) 08/17/2017 11:36 AM    GLUCPOC 211 (H) 08/17/2017 05:01 AM    GLUCPOC 269 (H) 08/17/2017 12:42 AM        Lab Results   Component Value Date/Time    Creatinine 1.24 08/17/2017 03:06 AM     Estimated Creatinine Clearance: 55.1 mL/min (based on Cr of 1.24).     Active Orders   Diet    DIET DIABETIC CONSISTENT CARB Regular        PO intake:   Patient Vitals for the past 72 hrs:   % Diet Eaten   08/17/17 0817 100 %   08/16/17 1235 25 %   08/16/17 0856 0 %       Current hospital DM medication:    Lantus 15 units daily  NPH 20 units at breakfast  Lispro 10 units at supper  Lispro normal correction scale    Thank you  Linda Geronimo, MS, RN, CDE

## 2017-08-17 NOTE — PROGRESS NOTES
Bedside shift change report given to [employer], RN (oncoming nurse) by JANET Hawley RN (offgoing nurse). Report included the following information SBAR.

## 2017-08-18 NOTE — PROGRESS NOTES
Bedside and Verbal shift change report given to bc (oncoming nurse) by Milan William and Tank Muniz (offgoing nurse). Report included the following information SBAR.

## 2017-08-18 NOTE — PROGRESS NOTES
ID Progress Note  2017    Subjective:     Seems a bit better today--wound photos reviewed    Objective:     Antibiotics:  1. Vancomycin   2. Zosyn       Vitals:   Visit Vitals    /56 (BP 1 Location: Right arm, BP Patient Position: At rest)    Pulse 67    Temp 98.8 °F (37.1 °C)    Resp 16    Ht 5' 9\" (1.753 m)    Wt 86.2 kg (190 lb)    SpO2 99%    BMI 28.06 kg/m2        Tmax:  Temp (24hrs), Av °F (36.7 °C), Min:97.5 °F (36.4 °C), Max:98.8 °F (37.1 °C)      Exam:  Left hand dressed, sensation/mobility currently intact    Labs:      Recent Labs      17   0545  17   0306  17   0213   BUN  18  20  23*   CREA  1.04  1.24  1.21       Cultures:     Lab Results   Component Value Date/Time    Specimen Description: STOOL ASPIRATE 10/23/2013 09:15 AM    Specimen Description: STOOL ASPIRATE 10/23/2013 09:15 AM    Specimen Description: STOOL ASPIRATE 10/23/2013 09:15 AM     Lab Results   Component Value Date/Time    Culture result: NO ANAEROBES ISOLATED 2017 10:04 AM    Culture result:  2017 10:04 AM     HEAVY STREPTOCOCCUS INTERMEDIUS  ( MICROAEROPHILIC STREPTOCOCCUS )    Culture result: NO ANAEROBES ISOLATED 2017 06:39 PM    Culture result: MODERATE MICROAEROPHILIC STREPTOCOCCUS  06:39 PM    Culture result: RARE STAPHYLOCOCCUS AUREUS 2017 06:39 PM       Radiology:     Line/Insert Date:           Assessment:     1. Left hand infection  2. Status post multiple trips to the OR--I&D of abscess today with some bone debridement--strep from culture so far  3. Creatinine down some    Objective:     1. Continue Zosyn  2. Follow up cultures  3. Will need multiple weeks of antibiotic therapy  4.  Group will see intermittently over the weekend, call if he needs to be seen specifically    Jacinto Van MD

## 2017-08-18 NOTE — PROGRESS NOTES
Bedside shift change report given to Tabitha Gimenez RN (oncoming nurse) by JANET Covarrubias RN (offgoing nurse). Report included the following information SBAR.

## 2017-08-18 NOTE — PROGRESS NOTES
POD#2 I&D left hand  Pain controlled    afvss  NAD  Dressing changed. Packing removed. Minimal to no erythema. No purulence. Large, deep wound in 1st webspace. Wound was repacked aggressively.   Neuro intact                  cx microaerophilic strep    Plan:  IV abx - appreciate ID consult  Daily packing changes  Pain control        CDMP response:  Excisional debridment of skin, muscle and bone

## 2017-08-18 NOTE — CDMP QUERY
Based on your medical judgment of the documentation outlined below, can you please clarify the patient's noted \"Debridement\" procedure as?    =>NON EXCISIONAL DEBRIDEMENT (Minor removal of loose fragments with scissors, scraping, whirlpool, pulse lavage, mechanical irrigation, high pressure irrigation)     OR     =>EXCISIONAL DEBRIDEMENT (definite cutting away of tissue)  Specify the depth of tissue that was actually excised (skin, subcutaneous tissue, fascia, soft tissue, muscle, tendon, bone)    CURRENT DOCUMENTATION:    Op Report 8/16/17 -\"LEFT HAND INCISION AND DRAINAGE\", \"A 15 blade was used to sharply debride the skin, subcutaneous tissues and muscle. There was a   significant amount of necrosis of the first dorsal interosseous muscle. This was debrided. The skin edges were debrided. The bone was   debrided at the base of the second metacarpal.\"      Please clarify and document your clinical opinion in the progress notes and discharge summary including the definitive and/or presumptive diagnosis, (suspected or probable), related to the above clinical findings. Please include clinical findings supporting your diagnosis.   Thank [address] [place] [address] W. Dwight Edouard, [address] [place] [address]     [phone]

## 2017-08-19 NOTE — PROGRESS NOTES
ID Progress Note  2017    Subjective:     Afebrile. No complaints. Objective:     Antibiotics:  1. Zosyn       Vitals:   Visit Vitals    /65    Pulse 70    Temp 97.8 °F (36.6 °C)    Resp 16    Ht 5' 9\" (1.753 m)    Wt 86.2 kg (190 lb)    SpO2 99%    BMI 28.06 kg/m2        Tmax:  Temp (24hrs), Av.3 °F (36.8 °C), Min:97.8 °F (36.6 °C), Max:98.8 °F (37.1 °C)      Exam:  Left hand dressed,  Abdomen soft    Labs:      Recent Labs      17   0521  17   0545  17   0306   BUN  18  18  20   CREA  1.09  1.04  1.24       Cultures:     Lab Results   Component Value Date/Time    Specimen Description: STOOL ASPIRATE 10/23/2013 09:15 AM    Specimen Description: STOOL ASPIRATE 10/23/2013 09:15 AM    Specimen Description: STOOL ASPIRATE 10/23/2013 09:15 AM     Lab Results   Component Value Date/Time    Culture result: NO ANAEROBES ISOLATED 2017 10:04 AM    Culture result:  2017 10:04 AM     HEAVY STREPTOCOCCUS INTERMEDIUS  ( MICROAEROPHILIC STREPTOCOCCUS )    Culture result: NO ANAEROBES ISOLATED 2017 06:39 PM    Culture result: MODERATE MICROAEROPHILIC STREPTOCOCCUS  06:39 PM    Culture result: RARE STAPHYLOCOCCUS AUREUS 2017 06:39 PM       Radiology:     Line/Insert Date:           Assessment:     1. Left hand infection      Objective:     1. Continue Zosyn  2. Will ask micro to do sensitivieis.      Fany Escamilla MD

## 2017-08-19 NOTE — PROGRESS NOTES
46 - spoke with On call MD Dr Santiago Whipple about blood sugar (BG = 147) post dinner and insulin (Humalog 10 units, scheduled 5pm). MD advised to give 8 units of Humalog, Instead of 10u, for 5pm dose, and continue sliding scale at 6pm as ordered.

## 2017-08-19 NOTE — PROGRESS NOTES
POD#3 I&D left hand  Pain controlled    afvss  NAD  Dressing changed. no erythema. No purulence. Large, deep wound in 1st webspace. Wound was repacked aggressively. Neuro intact    cx microaerophilic strep    Plan:  IV abx - appreciate ID consult  Daily packing changes  Pain control    dispo planning.   Will need iv abx and nursing for daily dressing changes        CDMP response:  Excisional debridment of skin, muscle and bone

## 2017-08-19 NOTE — PROGRESS NOTES
Bedside and Verbal shift change report given to trace (oncoming nurse) by Dee Austin (offgoing nurse). Report included the following information SBAR.

## 2017-08-20 NOTE — PROGRESS NOTES
ID Progress Note  2017    Subjective:     Afebrile. No complaints. Objective:     Antibiotics:  1. Zosyn       Vitals:   Visit Vitals    /56    Pulse 64    Temp 97.7 °F (36.5 °C)    Resp 16    Ht 5' 9\" (1.753 m)    Wt 86.2 kg (190 lb)    SpO2 99%    BMI 28.06 kg/m2        Tmax:  Temp (24hrs), Av °F (36.7 °C), Min:97.7 °F (36.5 °C), Max:98.1 °F (36.7 °C)      Exam:  Left hand dressed,  Abdomen soft    Labs:      Recent Labs      17   0206  17   0521  17   0545   BUN  21*  18  18   CREA  1.10  1.09  1.04       Cultures:     Lab Results   Component Value Date/Time    Specimen Description: STOOL ASPIRATE 10/23/2013 09:15 AM    Specimen Description: STOOL ASPIRATE 10/23/2013 09:15 AM    Specimen Description: STOOL ASPIRATE 10/23/2013 09:15 AM     Lab Results   Component Value Date/Time    Culture result: NO ANAEROBES ISOLATED 2017 10:04 AM    Culture result:  2017 10:04 AM     HEAVY STREPTOCOCCUS INTERMEDIUS  ( MICROAEROPHILIC STREPTOCOCCUS )    Culture result:  2017 10:04 AM     DR. SANTOS HAS REQUESTED SUSCEPTIBILITIES. Jose G Goldstein 17 DB    Culture result:  2017 10:04 AM     AN ISOLATE HAS BEEN SENT TO OUR REFERENCE LAB FOR SUSCEPTIBILITIES. PLEASE REFER TO REFERENCE ACCESSION NUMBER [id number], FOR SUBSEQUENT RESULT         Radiology:     Line/Insert Date:           Assessment:     1. Left hand infection      Objective:     1. Continue Zosyn  2. micro has sent the isolate for sensitivities.      America Hayes MD

## 2017-08-20 NOTE — PROGRESS NOTES
POD#4 I&D left hand  Pain controlled    afvss  NAD  Dressing changed. no erythema. No purulence. Large, deep wound in 1st webspace. Wound was repacked aggressively. Neuro intact    cx microaerophilic strep    Plan:  IV abx - appreciate ID consult  Daily wet to dry packing changes  Pain control    dispo planning.   Will need iv abx and nursing for daily dressing changes

## 2017-08-20 NOTE — PROGRESS NOTES
Bedside shift change report given to Vikas Young (oncoming nurse) by Kris Mary (offgoing nurse). Report included the following information SBAR.

## 2017-08-20 NOTE — PROGRESS NOTES
Problem: Falls - Risk of  Goal: *Absence of Falls  Document Chau Fall Risk and appropriate interventions in the flowsheet.    Outcome: Progressing Towards Goal  Fall Risk Interventions:  Mobility Interventions: Patient to call before getting OOB           Medication Interventions: Patient to call before getting OOB     Elimination Interventions: Patient to call for help with toileting needs

## 2017-08-21 NOTE — PROGRESS NOTES
Bedside shift change report given to González Oliva (oncoming nurse) by Diana Landa (offgoing nurse). Report included the following information SBAR and MAR.

## 2017-08-21 NOTE — PROCEDURES
PICC Placement Note    PRE-PROCEDURE VERIFICATION  Correct Procedure: yes  Correct Site:  yes  Temperature: Temp: 98.2 °F (36.8 °C), Temperature Source: Temp Source: Oral  Recent Labs      08/21/17   0209   BUN  24*   CREA  1.22       Allergies: Review of patient's allergies indicates no known allergies. Education materials, including PICC Booklet and written information regarding central catheter related bloodstream infection and prevention given to patient. See Patient Education activity for further details. PROCEDURE DETAIL  A double lumen power injectable PICC line was placed for Home IV Antibiotic Therapy. The following documentation is in addition to the PICC properties in the lines/airways flowsheet :  Lot #: [id number]  Xylocaine 1% used intradermally:  yes  Total Catheter Length: 39 (cm)  Internal Catheter Length: 39 (cm)  Vein Selection for PICC:right basilic  Central Line Bundle followed: yes  Complication Related to Insertion: none    The placement was verified by ECG technology. The PICC is on the right side and the tip overlies the superior vena cava. ECG verification documentation is on the patient's paper chart  Line is ready for immediate use. Report to Paula.     Cullen López, LORIN, RN, VA-BC   Vascular Access Team

## 2017-08-21 NOTE — DISCHARGE INSTRUCTIONS
Dr. Philip Moh Postoperative Instructions    1. Please have the wound dressing changed daily. Wet to dry dressing should be changed daily. Otherwise keep the wound dry. If you have any questions or problems with your dressing, please call our office at [phone].  2. Please elevate the operative extremity to the level of the heart to keep swelling at a minimum. You may get up to move around, but when seated please keep the extremity elevated as much as possible. This will decrease swelling and postoperative pain. You may do activities as tolerated. 3. Take your antibiotics as prescribed by the infectious disease doctor. 4. A prescription for pain medication is provided. Take it on an as needed basis. 5. Signs and symptoms of worsening infection include: redness, increased pain, increased swelling not relieved by elevation, drainage, fever, or chills, If you develop any of these symptoms, call the office at [phone]. 6. Please Follow-up at my office 2 days after discharge.

## 2017-08-21 NOTE — PROGRESS NOTES
I have reviewed discharge instructions and RX  with the patient. Pt will fill rx at his own pharmacy. The patient verbalized understanding. Opportunities for questions were provided. Vital signs are stable. Adequate I/Os. PIV taken out and pressure dressing applied. Pt being discharged with PICC line. All belongings are with the patient. Discharge via wheelchair with volunteer.

## 2017-08-21 NOTE — PROGRESS NOTES
ID Progress Note  2017    Subjective:     Seems a bit better today--wound photos reviewed    Objective:     Antibiotics:  1. Vancomycin   2. Zosyn       Vitals:   Visit Vitals    /70 (BP 1 Location: Right arm)    Pulse 78    Temp 98.3 °F (36.8 °C)    Resp 16    Ht 5' 9\" (1.753 m)    Wt 86.2 kg (190 lb)    SpO2 99%    BMI 28.06 kg/m2        Tmax:  Temp (24hrs), Av.8 °F (36.6 °C), Min:97.5 °F (36.4 °C), Max:98.3 °F (36.8 °C)      Exam:  Left hand dressed, sensation/mobility currently intact    Labs:      Recent Labs      17   0209  17   0206  17   0521   BUN  24*  21*  18   CREA  1.22  1.10  1.09       Cultures:     Lab Results   Component Value Date/Time    Specimen Description: STOOL ASPIRATE 10/23/2013 09:15 AM    Specimen Description: STOOL ASPIRATE 10/23/2013 09:15 AM    Specimen Description: STOOL ASPIRATE 10/23/2013 09:15 AM     Lab Results   Component Value Date/Time    Culture result: NO ANAEROBES ISOLATED 2017 10:04 AM    Culture result:  2017 10:04 AM     HEAVY STREPTOCOCCUS INTERMEDIUS  ( MICROAEROPHILIC STREPTOCOCCUS )    Culture result:  2017 10:04 AM     DR. SANTOS HAS REQUESTED SUSCEPTIBILITIES. Trung Byrnes 17 DB    Culture result:  2017 10:04 AM     AN ISOLATE HAS BEEN SENT TO OUR REFERENCE LAB FOR SUSCEPTIBILITIES. PLEASE REFER TO REFERENCE ACCESSION NUMBER [id number], FOR SUBSEQUENT RESULT         Radiology:     Line/Insert Date:           Assessment:     1. Left hand infection  2. Status post multiple trips to the OR--I&D of abscess today with some bone debridement--strep from culture so far  3. Creatinine down some    Objective:     1. Continue Zosyn  2. Follow up cultures  3. Will need multiple weeks of antibiotic therapy--change to ertapenem   4.  Infusion center orders on chart--wound care per Jordyn Witt MD

## 2017-08-21 NOTE — PROGRESS NOTES
POD#5 I&D left hand  Pain controlled; Complains of some diarrhea.    afvss  NAD  Dressing changed. no erythema. No purulence. Large, deep wound in 1st webspace. Wound has good granulation tissue at base. Mild fibrinous material. Wound was repacked aggressively. Neuro intact            cx microaerophilic strep    Plan:  IV abx - appreciate ID consult  Daily wet to dry packing changes  Pain control    dispo planning.   Will need iv abx and nursing for daily dressing changes

## 2017-08-21 NOTE — PROGRESS NOTES
Orders for [facility] IVabx and wound care noted. The patient will need a picc line and the IV orders from Dr. Martell Lopez. ARIANNA will follow. WYATT    The patient will need a PCP follow apt. With in 5 days. CRM sent request to [address] [place] [address] specialist. Jacque KELLER faxed the IV order from to [facility] [phone], and spoke with Paz Fofana. She will schedule the patient to start tomorrow and add the dates and times on the AVS. The patient will discharge later today. PIcc line pending. WYATT KELLER spoke with Kenya from [facility]. They are working on the schedule for [facility] during the week and [facility]'s on the weekend. The schedule will appear on the AVS by this afternoon. Start of care tomorrow. Picc line pending.  WYATT

## 2017-08-22 NOTE — PROGRESS NOTES
1510 Pt arrived at [facility] ambulatory and in no distress for invanz. Assessment completed, pt has wound with dressing over right finger amputation site, pt is being set up for wound care through home health. Right DL PICC line placed yesterday, dressing clean and intact. Both lumens flush easily with blood return      Visit Vitals    /48 (BP 1 Location: Left arm, BP Patient Position: Sitting)    Pulse (!) 53    Temp 97.1 °F (36.2 °C)    Resp 18    SpO2 99%       Medications received:  NS @ KVO  invanz 1 gram IV over 30 min    Red and blue lumens flushed with NS and heparin and capped/ secured for tomorrow    1555 Tolerated treatment well, no adverse reaction noted. D/Cd from [facility] ambulatory and in no distress.   Next appt tomorrow

## 2017-08-22 NOTE — DISCHARGE SUMMARY
ORTHO Discharge Summary      Patient ID:  Corinna Briones  [id number]  56 y.o.  1940    Admit date: 8/15/2017    Discharge date and time: 8/21/2017  4:25 PM     Admitting Physician: Myron Wilcox MD     Discharge Physician: Danica Moody    Admission Diagnoses: Left Hand Infection  Hand abscess  LEFT HAND INFECTION     Discharge Diagnoses: Active Problems:    Hand abscess (8/15/2017)        Surgeon: Danica Moody                            Perioperative Antibiotics:Vanc and Zosyn      Postoperative Pain Management:  Hydrocodone    DVT Prophylaxis:  SCD    Post Op complications: none    67 yo male readmitted for left hand infection. Underwent I&D. IV abx. Daily dressing changes. Stable for discharge with close follow up. Discharged to: Home    Discharge instructions:  Dr. Yazmin Laureano Postoperative Instructions     1. Please have the wound dressing changed daily. Wet to dry dressing should be changed daily. Otherwise keep the wound dry. If you have any questions or problems with your dressing, please call our office at [phone].  2. Please elevate the operative extremity to the level of the heart to keep swelling at a minimum. You may get up to move around, but when seated please keep the extremity elevated as much as possible. This will decrease swelling and postoperative pain. You may do activities as tolerated. 3. Take your antibiotics as prescribed by the infectious disease doctor. 4. A prescription for pain medication is provided. Take it on an as needed basis. 5. Signs and symptoms of worsening infection include: redness, increased pain, increased swelling not relieved by elevation, drainage, fever, or chills, If you develop any of these symptoms, call the office at [phone].   6. Please Follow-up at my office 2 days after discharge.          Signed:  Myron Wilcox MD  8/22/2017  9:21 AM

## 2017-08-23 NOTE — MR AVS SNAPSHOT
Visit Information Date & Time Provider Department Dept. Phone Encounter #  
 8/23/2017 10:20 AM MD Ricarda Pham 26 [phone] [id number] Your Appointments 9/28/2017  9:40 AM  
Follow Up with MD Ricarda Pham 26 ([address]) Appt Note: [address] [facility] [address] [phone] [address] [address] Upcoming Health Maintenance Date Due  
 LIPID PANEL Q1 1940 FOOT EXAM Q1 9/[birth year]/1950 MICROALBUMIN Q1 9/14/1950 EYE EXAM RETINAL OR DILATED Q1 9/14/1950 ZOSTER VACCINE AGE 60> 7/14/2000 GLAUCOMA SCREENING Q2Y 9/14/2005 MEDICARE YEARLY EXAM 9/14/2005 INFLUENZA AGE 9 TO ADULT 8/1/2017 HEMOGLOBIN A1C Q6M 2/15/2018 DTaP/Tdap/Td series (2 - Td) 5/22/2027 Allergies as of 8/23/2017  Review Complete On: 8/23/2017 By: Georgina Sharpe RN No Known Allergies Current Immunizations  Reviewed on 8/22/2017 Name Date Influenza Vaccine 10/11/2016, 10/30/2015 Pneumococcal Conjugate (PCV-13) 8/14/2015 Pneumococcal Polysaccharide (PPSV-23) 1/1/2006 Tdap 5/22/2017  1:11 PM  
  
 Not reviewed this visit Vitals BP Pulse Temp Resp Height(growth percentile) Weight(growth percentile) 126/62 (BP 1 Location: Right arm, BP Patient Position: Sitting) (!) 56 98.2 °F (36.8 °C) (Oral) 18 5' 9\" (1.753 m) 192 lb 9.6 oz (87.4 kg) SpO2 BMI Smoking Status 98% 28.44 kg/m2 Former Smoker BMI and BSA Data Body Mass Index Body Surface Area  
 28.44 kg/m 2 2.06 m 2 Preferred Pharmacy Pharmacy Name Phone [facility] [address], [address] [place] [address] Avenue [phone] Your Updated Medication List  
  
   
This list is accurate as of: 8/23/17 11:23 AM.  Always use your most recent med list. amLODIPine 5 mg tablet Commonly known as:  Marco Lott Take 5 mg by mouth daily. ascorbic acid (vitamin C) 250 mg tablet Commonly known as:  VITAMIN C Take 250 mg by mouth three (3) times daily. aspirin 81 mg chewable tablet Take 81 mg by mouth daily. gabapentin 300 mg capsule Commonly known as:  NEURONTIN Take 300 mg by mouth three (3) times daily. HYDROcodone-acetaminophen 5-325 mg per tablet Commonly known as:  Brodie Lawson Take 1-2 Tabs by mouth every four (4) hours as needed for Pain. Max Daily Amount: 12 Tabs. IRON (FERROUS SULFATE) PO Take 65 mg by mouth daily. L. acidoph & paracasei- S therm- Bifido 8 billion cell Cap cap Commonly known as:  LETICIA-Q/RISAQUAD Take 1 Cap by mouth daily. LANTUS 100 unit/mL injection Generic drug:  insulin glargine  
by SubCUTAneous route nightly. By SS. Usually is < 20 units. lisinopril-hydroCHLOROthiazide 20-12.5 mg per tablet Commonly known as:  Ephrata Sabins Take 2 Tabs by mouth daily. losartan 100 mg tablet Commonly known as:  COZAAR Take 100 mg by mouth two (2) times a day. Indications: HYPERTENSION  
  
 meloxicam 15 mg tablet Commonly known as:  MOBIC Take 15 mg by mouth daily. metFORMIN 500 mg Tg24 24 hour tablet Commonly known asAdalberto Bodo ER Take 1 Tab by mouth two (2) times a day. nadolol 40 mg tablet Commonly known as:  CORGARD Take 40 mg by mouth daily. Niaspan 500 mg tablet Generic drug:  niacin ER Take  by mouth nightly. NovoLIN N 100 unit/mL injection Generic drug:  insulin NPH  
20 Units by SubCUTAneous route daily (with breakfast). Indications: type 2 diabetes mellitus NovoLOG 100 unit/mL injection Generic drug:  insulin aspart 10 Units by SubCUTAneous route daily (with dinner). Indications: type 2 diabetes mellitus  
  
 omeprazole 20 mg capsule Commonly known as:  PRILOSEC Take 20 mg by mouth daily. simvastatin 10 mg tablet Commonly known as:  ZOCOR Take 10 mg by mouth every morning. tamsulosin 0.4 mg capsule Commonly known as:  FLOMAX Take 0.4 mg by mouth nightly. To-Do List   
 08/23/2017  3:00 PM  
  Appointment with [address] [facility] ([phone])  
  
 08/24/2017  3:00 PM  
  Appointment with [address] [facility] ([phone])  
  
 08/25/2017  3:00 PM  
  Appointment with [facility] ([phone])  
  
 08/26/2017 11:00 AM  
  Appointment with [facility] [address] ([phone])  
  
 08/27/2017 11:00 AM  
  Appointment with [facility] [address] ([phone])  
  
 08/28/2017 3:00 PM  
  Appointment with [facility] ([phone])  
  
 08/29/2017 3:00 PM  
  Appointment with [address] [facility] ([phone])  
  
 08/30/2017 3:00 PM  
  Appointment with [address] [facility] ([phone])  
  
 08/31/2017 3:00 PM  
  Appointment with [facility] ([phone])  
  
 09/01/2017 3:00 PM  
  Appointment with [facility] ([phone])  
  
 09/02/2017 10:00 AM  
  Appointment with [facility] [address] ([phone])  
  
 09/03/2017 11:00 AM  
  Appointment with [facility] [address] ([phone])  
  
 09/05/2017 3:00 PM  
  Appointment with [facility] ([phone]) Introducing [facility] SERVICES! [facility] introduces [employer] patient portal. Now you can access parts of your medical record, email your doctor's office, and request medication refills online. 1. In your internet browser, go to [email or web address] 2. Click on the First Time User? Click Here link in the Sign In box. You will see the New Member Sign Up page. 3. Enter your [employer] Access Code exactly as it appears below. You will not need to use this code after youve completed the sign-up process. If you do not sign up before the expiration date, you must request a new code. · [employer] Access Code: [email or web address] Expires: 11/19/2017  1:07 PM 
 
4. Enter the last four digits of your Social Security Number (xxxx) and Date of Birth (mm/dd/yyyy) as indicated and click Submit. You will be taken to the next sign-up page. 5. Create a Larky ID. This will be your Larky login ID and cannot be changed, so think of one that is secure and easy to remember. 6. Create a [employer] password. You can change your password at any time. 7. Enter your Password Reset Question and Answer. This can be used at a later time if you forget your password. 8. Enter your e-mail address. You will receive e-mail notification when new information is available in [address] 9. Click Sign Up. You can now view and download portions of your medical record. 10. Click the Download Summary menu link to download a portable copy of your medical information. If you have questions, please visit the Frequently Asked Questions section of the [employer] website. Remember, [employer] is NOT to be used for urgent needs. For medical emergencies, dial 911. Now available from your iPhone and Android! Please provide this summary of care documentation to your next provider. Your primary care clinician is listed as Girma. If you have any questions after today's visit, please call [phone].

## 2017-08-23 NOTE — PROGRESS NOTES
Transitions Of Care [facility] follow up for left hand)       HPI:  Tori Moreno is a 68y.o. year old male who is here for a follow up visit for hospitalization transition of care. He was last seen by me on 7/28/2017. Discharged on: 8/21/2017 from [facility] with initial day of this hospitalization 8/15/2017 under the care of Dr. Mayra Milner    Diagnosis in hospital: Infection left hand with severe involvement of left second finger    Complications in hospital: No complications from hospitalization but due to infection left second finger was amputated    Medication changes: Addition of ertapenem    Discharge Summary reviewed. Today 8/23/2017      He reports the following: As noted he was hospitalized 8/15 through 8/21/17. He is getting the ertapenem given IV once daily. He continues with daily wound care. He says that the pain is controlled. He does note that when the wound care is being done the wound appears to be clean him. His blood sugars were labile during hospitalization but it been much better since he is discharged home. He is noted no fevers or chills. There are no cardiovascular complaints. He has had no GI complaints from antibiotics. There are no other complaints on complete review of systems.       Visit Vitals    /62 (BP 1 Location: Right arm, BP Patient Position: Sitting)    Pulse (!) 56    Temp 98.2 °F (36.8 °C) (Oral)    Resp 18    Ht 5' 9\" (1.753 m)    Wt 192 lb 9.6 oz (87.4 kg)    SpO2 98%    BMI 28.44 kg/m2       Historical Data    Past Medical History:   Diagnosis Date    Allergic rhinitis     Anemia     ASVD (arteriosclerotic vascular disease)     Martínez's esophagus 7/28/2017    Carotid stenosis 7/28/2017    Chronic kidney disease     Diabetes ([address]     glucose runs 70 - 160's at home    Diabetic neuropathy ([facility] 7/28/2017    Diverticulosis     DJD (degenerative joint disease)     ED (erectile dysfunction)     Enlarged prostate     JUDY (generalized anxiety disorder)     GERD (gastroesophageal reflux disease)     controlled with med; alfonso's esophagus    Hiatal hernia     Hypercholesterolemia     Hypertension     Hypertrophy (benign) of prostate 7/28/2017    Ill-defined condition     peripheral vascular disease    Lung nodule     [facility]     On statin therapy 7/28/2017    Prostate cancer screening 7/28/2017    PVD (peripheral vascular disease) ([facility]     Sebaceous cyst 7/28/2017    Spinal stenosis        Past Surgical History:   Procedure Laterality Date    CARDIAC SURG PROCEDURE UNLIST      CATH-2008    COLONOSCOPY N/A 6/15/2016    COL[facility] by [facility]al[facility] at [facility]  6/15/2016         HX CATARACT REMOVAL      BILATERAL    HX ORTHOPAEDIC  1977    BONE CYST REM,ANU FROM RIGHT LEG    HX ORTHOPAEDIC  5-, 7-22-17, 7-26-17    left hand index finger    HX OTHER SURGICAL Left 06/2016    carotid endarectomy    NEUROLOGICAL PROCEDURE UNLISTED  2011    Back: spinal stenosis, pinched nerve repair    UPPER GI ENDOSCOPY,BIOPSY  6/15/2016            Outpatient Encounter Prescriptions as of 8/23/2017   Medication Sig Dispense Refill    HYDROcodone-acetaminophen (NORCO) 5-325 mg per tablet Take 1-2 Tabs by mouth every four (4) hours as needed for Pain. Max Daily Amount: 12 Tabs. 30 Tab 0    L. acidoph & paracasei- S therm- Bifido (LETICIA-Q/RISAQUAD) 8 billion cell cap cap Take 1 Cap by mouth daily. 21 Cap 1    insulin glargine (LANTUS) 100 unit/mL injection by SubCUTAneous route nightly. By SS. Usually is < 20 units.  omeprazole (PRILOSEC) 20 mg capsule Take 20 mg by mouth daily.  insulin NPH (NOVOLIN N) 100 unit/mL injection 20 Units by SubCUTAneous route daily (with breakfast). Indications: type 2 diabetes mellitus      insulin aspart (NOVOLOG) 100 unit/mL injection 10 Units by SubCUTAneous route daily (with dinner).  Indications: type 2 diabetes mellitus      metFORMIN (GLUMETZA ER) 500 mg TG24 24 hour tablet Take 1 Tab by mouth two (2) times a day.  lisinopril-hydrochlorothiazide (PRINZIDE, ZESTORETIC) 20-12.5 mg per tablet Take 2 Tabs by mouth daily.  amLODIPine (NORVASC) 5 mg tablet Take 5 mg by mouth daily.  ascorbic acid (VITAMIN C) 250 mg tablet Take 250 mg by mouth three (3) times daily.  IRON, FERROUS SULFATE, PO Take 65 mg by mouth daily.  losartan (COZAAR) 100 mg tablet Take 100 mg by mouth two (2) times a day. Indications: HYPERTENSION      aspirin 81 mg chewable tablet Take 81 mg by mouth daily.  meloxicam (MOBIC) 15 mg tablet Take 15 mg by mouth daily.  tamsulosin (FLOMAX) 0.4 mg capsule Take 0.4 mg by mouth nightly.  gabapentin (NEURONTIN) 300 mg capsule Take 300 mg by mouth three (3) times daily.  niacin ER (NIASPAN) 500 mg tablet Take  by mouth nightly.  simvastatin (ZOCOR) 10 mg tablet Take 10 mg by mouth every morning.  nadolol (CORGARD) 40 mg tablet Take 40 mg by mouth daily. Facility-Administered Encounter Medications as of 8/23/2017   Medication Dose Route Frequency Provider Last Rate Last Dose    sodium chloride (NS) flush 10-40 mL  10-40 mL IntraVENous PRN Jose Esteves MD   10 mL at 08/22/17 1554    heparin (porcine) pf 500 Units  500 Units IntraVENous PRN Jose Esteves MD   500 Units at 08/22/17 1554    ertapenem (INVANZ) 1 g in 0.9% sodium chloride (MBP/ADV) 50 mL  1 g IntraVENous ONCE Jose Esteves MD        [COMPLETED] 0.9% sodium chloride infusion  50 mL/hr IntraVENous ONCE Jose Esteves MD   Stopped at 08/22/17 1555    [COMPLETED] ertapenem [facility]) 1 g in 0.9% sodium chloride (MBP/ADV) 50 mL  1 g IntraVENous ONCE Jose Esteves MD   Stopped at 08/22/17 1553        No Known Allergies     Social History     Social History    Marital status:      Spouse name: N/A    Number of children: N/A    Years of education: N/A     Occupational History    Not on file.      Social History Main Topics    Smoking status: Former Smoker     Quit date: 7/7/1996    Smokeless tobacco: Never Used    Alcohol use No      Comment: rarely    Drug use: No    Sexual activity: Yes     Other Topics Concern    Not on file     Social History Narrative      REVIEW OF SYSTEMS:  General: negative for - chills or fever  ENT: negative for - headaches, nasal congestion or tinnitus  Eyes: no blurred or visual changes  Neck: No stiffness or swollen nodes  Respiratory: negative for - cough, hemoptysis, shortness of breath or wheezing  Cardiovascular : negative for - chest pain, edema, palpitations or shortness of breath  Gastrointestinal: negative for - abdominal pain, blood in stools, heartburn or nausea/vomiting  Genito-Urinary: no dysuria, trouble voiding, or hematuria  Musculoskeletal: negative for - gait disturbance, joint pain, joint stiffness or joint swelling. Left hand status post surgery and bandaged with pain controlled  Neurological: no TIA or stroke symptoms  Hematologic: no bruises, no bleeding  Lymphatic: no swollen glands  Integument: no lumps, mole changes, nail changes or rash  Endocrine:no malaise/lethargy poly uria or polydipsia or unexpected weight changes      Visit Vitals    /62 (BP 1 Location: Right arm, BP Patient Position: Sitting)    Pulse (!) 56    Temp 98.2 °F (36.8 °C) (Oral)    Resp 18    Ht 5' 9\" (1.753 m)    Wt 192 lb 9.6 oz (87.4 kg)    SpO2 98%    BMI 28.44 kg/m2     CONSTITUTIONAL: well , well nourished, appears age appropriate  HEAD: normocephalic, atraumatic  EYES: sclera anicteric, PERRL, EOMI  ENMT:moist mucous membranes, pharynx clear          Nares: w/o erythema or edema  NECK: supple.  Thyroid normal, No JVD or bruits  RESPIRATORY: Chest: clear to ascultation and percussion   CARDIOVASCULAR: Heart: regular rate and rhythm no murmurs, rubs or gallops, PMI not displaced, no thrill  GASTROINTESTINAL: Abdomen: non distended, soft, non-tender, bowel sounds normal  HEMATOLOGIC: no petechiae or purpura  LYMPHATIC: no lymphadenopathy  MUSCULOSKELETAL: Extremities: no edema or active synovitis, pulse 1+. Left hand wrapped status post amputation left second finger. The other 4 fingers appear to be normal.  BACK; no point or CVAT  INTEGUMENT: No unusual rashes or suspicious skin lesions noted. Nails appear normal.  NEUROLOGIC: non-focal exam   MENTAL STATUS: alert and oriented, appropriate affect   PSYCHIATRIC: normal affect      ASSESSMENT:   1. Infection of left hand    2. Controlled type 2 diabetes mellitus without complication, with long-term current use of insulin ([address]    3. Essential hypertension      1   Infection of the left hand appears to be currently under control the plan is to give 6 weeks of IV ertapenem  2. Continue to follow blood sugars and will adjust medication if necessary  3. Hypertension appears to be controlled  I will recheck him his prior scheduled appointment with a follow-up sooner should they be a problem. PLAN:  . No orders of the defined types were placed in this encounter. ATTENTION:   This medical record was transcribed using an electronic medical records system. Although proofread, it may and can contain electronic and spelling errors. Other human spelling and other errors may be present. Corrections may be executed at a later time. Please feel free to contact us for any clarifications as needed. Follow-up Disposition: Not on File      Oneyda Blackwood MD  No orders of the defined types were placed in this encounter. No results found for any visits on 08/23/17. I have reviewed the patient's medical history in detail and updated the computerized patient record. We had a prolonged discussion about these complex clinical issues and went over the various important aspects to consider. All questions were answered.      Advised him to call back or return to office if symptoms do not improve, change in nature, or persist.    He was given an after visit summary or informed of [employer] Access which includes patient instructions, diagnoses, current medications, & vitals. He expressed understanding with the diagnosis and plan.

## 2017-08-23 NOTE — PROGRESS NOTES
Outpatient Infusion Center Progress Note    1500 Pt admit to [facility] for Diego Otto ambulatory in stable condition. Assessment completed. No new concerns voiced. Visit Vitals    /45 (BP 1 Location: Right arm, BP Patient Position: Sitting)    Pulse (!) 53    Temp 97.1 °F (36.2 °C)    Resp 18     Double lumen PICC flushed well in both ports with positive blood return in both ports. Medications:  Invanz 1 GM - infused over 30 minutes (purple port used for infusion today)    1610 Pt tolerated treatment well. PICC line flushed per policy, positive blood return noted at conclusion in both ports. End caps were capped with alcohol caps, secured with net dressing on right arm. D/c home ambulatory in no distress. Pt aware of next appointment scheduled for 08/24/17.

## 2017-08-23 NOTE — PROGRESS NOTES
1. Have you been to the ER, urgent care clinic since your last visit? Hospitalized since your last visit? Yes    2. Have you seen or consulted any other health care providers outside of the [address] [place] [address] since your last visit? Include any pap smears or colon screening. Yes. Surgery at [facility] by Dr. Alesha Milner on left hand.

## 2017-08-24 NOTE — PROGRESS NOTES
[facility] Short Visit Note:    5828 Pt arrived to [facility] ambulatory and in no distress for Invanz. Dual lumen PICC intact to right upper arm, flushed with positive blood return noted. No new complaints voiced. CBC with diff and BMP drawn from PICC per orders. Visit Vitals    /46 (BP 1 Location: Right arm, BP Patient Position: Sitting)    Pulse (!) 49    Temp 98 °F (36.7 °C)    Resp 18    SpO2 100%     Recent Results (from the past 12 hour(s))   CBC WITH AUTOMATED DIFF    Collection Time: 08/24/17  3:19 PM   Result Value Ref Range    WBC 5.0 4.1 - 11.1 K/uL    RBC 2.67 (L) 4.10 - 5.70 M/uL    HGB 7.3 (L) 12.1 - 17.0 g/dL    HCT 23.0 (L) 36.6 - 50.3 %    MCV 86.1 80.0 - 99.0 FL    MCH 27.3 26.0 - 34.0 PG    MCHC 31.7 30.0 - 36.5 g/dL    RDW 15.4 (H) 11.5 - 14.5 %    PLATELET 997 631 - 177 K/uL    NEUTROPHILS 66 32 - 75 %    LYMPHOCYTES 21 12 - 49 %    MONOCYTES 9 5 - 13 %    EOSINOPHILS 3 0 - 7 %    BASOPHILS 1 0 - 1 %    ABS. NEUTROPHILS 3.3 1.8 - 8.0 K/UL    ABS. LYMPHOCYTES 1.1 0.8 - 3.5 K/UL    ABS. MONOCYTES 0.5 0.0 - 1.0 K/UL    ABS. EOSINOPHILS 0.2 0.0 - 0.4 K/UL    ABS. BASOPHILS 0.0 0.0 - 0.1 K/UL   METABOLIC PANEL, BASIC    Collection Time: 08/24/17  3:19 PM   Result Value Ref Range    Sodium 139 136 - 145 mmol/L    Potassium 4.0 3.5 - 5.1 mmol/L    Chloride 105 97 - 108 mmol/L    CO2 27 21 - 32 mmol/L    Anion gap 7 5 - 15 mmol/L    Glucose 306 (H) 65 - 100 mg/dL    BUN 33 (H) 6 - 20 MG/DL    Creatinine 1.21 0.70 - 1.30 MG/DL    BUN/Creatinine ratio 27 (H) 12 - 20      GFR est AA >60 >60 ml/min/1.73m2    GFR est non-AA 58 (L) >60 ml/min/1.73m2    Calcium 7.7 (L) 8.5 - 10.1 MG/DL       Medications received:  NS @ KVO  Invanz 1000mg IV over 30 minutes    1555 Pt tolerated treatment well, no adverse reaction noted. PICC flushed per protocol and end cap applied. Discharged from [facility] ambulatory and in no acute distress accompanied by spouse. Next appt 8/25/17 @ 1500.

## 2017-08-25 NOTE — PROGRESS NOTES
[facility] VISIT NOTE  0599  Pt arrived at [facility] and in no distress for Invanz. Assessment completed, pt without complaints. Right PICC double lumen,clean, dry, intact. Flushed and positive blood return noted in both lumens. Medications received:  Invanz IV  Patient Vitals for the past 12 hrs:   Temp Pulse Resp BP SpO2   08/25/17 1510 - - - - 100 %   08/25/17 1507 97.7 °F (36.5 °C) 63 18 157/53 -     Tolerated treatment well, no adverse reaction noted. Flushed per protocol. Positive blood return noted in both lumens. Green caps placed on both lumens. Netting placed over PICC site. 1605  D/C'd from [facility] and in no distress accompanied by self.  Next appointment is 8/26/17 at 1100am.

## 2017-08-26 NOTE — PROGRESS NOTES
[facility] Progress Note    1100 Pt admit to [facility] for Pulliam Cava ambulatory in stable condition. Assessment completed. No new concerns voiced. Visit Vitals    /57    Pulse (!) 55    Temp 96.9 °F (36.1 °C)    Resp 18     Double lumen PICC flushed well in both ports with positive blood return in both ports. Medications:  Invanz IV    1150 Pt tolerated treatment well. PICC line flushed per policy, positive blood return noted at conclusion in both ports. End caps were capped with alcohol caps, secured with net dressing on right arm. D/c home ambulatory in no distress.  Pt aware of next appointment scheduled for tomorrow at [facility]

## 2017-08-27 NOTE — PROGRESS NOTES
1040 Pt admit to [facility] for 1 [facility] ambulatory in stable condition. Assessment completed. No new concerns voiced. PICC with positive blood return. Normal Saline started at [facility] P[facility]    Visit Vitals    /51    Pulse 60    Temp 96.9 °F (36.1 °C)    Resp 18       Medications:  Normal Saline KVO  Invanz    1120 Pt tolerated treatment well. PICC maintained positive blood return throughout treatment, flushed with positive blood return at conclusion and heparinized. D/c home ambulatory in no distress. Pt aware of next appointment scheduled for 8/28/17.

## 2017-08-28 NOTE — PROGRESS NOTES
1505 Pt arrived at [facility] ambulatory and in no distress for Invanz. Assessment unchanged, no new complaints voiced. PICC line dsg changed per order/ policy. Both Lumens of PICC flush well and positive for blood return. Labs drawn per order. Visit Vitals    /51 (BP 1 Location: Left arm, BP Patient Position: Sitting)    Pulse 67    Temp 97.3 °F (36.3 °C)    Resp 18    SpO2 100%     Labs:   Recent Results (from the past 12 hour(s))   SED RATE (ESR)    Collection Time: 08/28/17  3:11 PM   Result Value Ref Range    Sed rate, automated 89 (H) 0 - 20 mm/hr   CBC WITH AUTOMATED DIFF    Collection Time: 08/28/17  3:11 PM   Result Value Ref Range    WBC 5.8 4.1 - 11.1 K/uL    RBC 2.60 (L) 4.10 - 5.70 M/uL    HGB 7.4 (L) 12.1 - 17.0 g/dL    HCT 22.7 (L) 36.6 - 50.3 %    MCV 87.3 80.0 - 99.0 FL    MCH 28.5 26.0 - 34.0 PG    MCHC 32.6 30.0 - 36.5 g/dL    RDW 16.7 (H) 11.5 - 14.5 %    PLATELET 804 756 - 028 K/uL    NEUTROPHILS 70 32 - 75 %    LYMPHOCYTES 19 12 - 49 %    MONOCYTES 7 5 - 13 %    EOSINOPHILS 3 0 - 7 %    BASOPHILS 1 0 - 1 %    ABS. NEUTROPHILS 4.1 1.8 - 8.0 K/UL    ABS. LYMPHOCYTES 1.1 0.8 - 3.5 K/UL    ABS. MONOCYTES 0.4 0.0 - 1.0 K/UL    ABS. EOSINOPHILS 0.2 0.0 - 0.4 K/UL    ABS. BASOPHILS 0.1 0.0 - 0.1 K/UL   METABOLIC PANEL, COMPREHENSIVE    Collection Time: 08/28/17  3:11 PM   Result Value Ref Range    Sodium 142 136 - 145 mmol/L    Potassium 4.4 3.5 - 5.1 mmol/L    Chloride 108 97 - 108 mmol/L    CO2 28 21 - 32 mmol/L    Anion gap 6 5 - 15 mmol/L    Glucose 248 (H) 65 - 100 mg/dL    BUN 28 (H) 6 - 20 MG/DL    Creatinine 1.15 0.70 - 1.30 MG/DL    BUN/Creatinine ratio 24 (H) 12 - 20      GFR est AA >60 >60 ml/min/1.73m2    GFR est non-AA >60 >60 ml/min/1.73m2    Calcium 7.6 (L) 8.5 - 10.1 MG/DL    Bilirubin, total 0.2 0.2 - 1.0 MG/DL    ALT (SGPT) 13 12 - 78 U/L    AST (SGOT) 18 15 - 37 U/L    Alk.  phosphatase 86 45 - 117 U/L    Protein, total 5.5 (L) 6.4 - 8.2 g/dL    Albumin 2.0 (L) 3.5 - 5.0 g/dL    Globulin 3.5 2.0 - 4.0 g/dL    A-G Ratio 0.6 (L) 1.1 - 2.2         Medications received:  NS KVO  Invanz 1 gram IV over 30 min  NS Flush  Heparin Flush    1610 Tolerated treatment well, no adverse reaction noted. All labs and note faxed to physician as requested. D/Cd from [facility] ambulatory and in no distress accompanied by self.   Next appt 8/29/17

## 2017-08-29 NOTE — PROGRESS NOTES
Pt arrived to [facility] ambulatory in no acute distress at 1450 for Invanz.  Assessment unremarkable. R arm PICC flushed without issue and positive blood return noted in each lumen. Visit Vitals    /60 (BP 1 Location: Left arm, BP Patient Position: Sitting)    Pulse 64    Temp 97.7 °F (36.5 °C)    Resp 18     The following medications administered:[email or web address] 1g IV over 30 minutes    Pt tolerated treatment well.  PICC flushed per policy and new caps placed.  Pt discharged ambulatory in no acute distress at 1540, accompanied by self. Next appointment 8/30/17 at 1500.

## 2017-08-31 PROBLEM — I63.9 CVA (CEREBRAL VASCULAR ACCIDENT) (HCC): Status: ACTIVE | Noted: 2017-01-01

## 2017-08-31 NOTE — ED PROVIDER NOTES
HPI Comments: Lamont Beckett is a 68 y.o. male with PMhx significant for DM, HTN and CKD who presents via EMS to [facility] ED with cc of sudden onset confusion since approximately 1100 this morning. Pt reports that he was speaking incoherently with his wife and daughter. Pt's wife describes that he was confused and thought she was his daughter. As a result she stated they called EMS and upon their arrival she states hey were informed the pt's BG was 121. Pt reports recent amputation to his left index finger, noting that he is currently receiving IV abx through his PICC line. He adds that he's being followed for this by in home care. He specifically denies any fevers, chills, nausea, vomiting, chest pain, shortness of breath, headache, rash, diarrhea, sweating or weight loss. SHx: (-) tobacco use; (-) EtOH use; (-) illicit drug use    PCP: Padmini Douglas MD    There are no other complaints, changes or physical findings at this time. Written by SUSIE Velasquez, as dictated by Baljit Reyez. Rosalina Roy MD.          The history is provided by the patient. No  was used.         Past Medical History:   Diagnosis Date    Allergic rhinitis     Anemia     ASVD (arteriosclerotic vascular disease)     Alfonso's esophagus 7/28/2017    Carotid stenosis 7/28/2017    Chronic kidney disease     Diabetes ([address]     glucose runs 70 - 160's at home    Diabetic neuropathy ([address] 7/28/2017    Diverticulosis     DJD (degenerative joint disease)     ED (erectile dysfunction)     Enlarged prostate     JUDY (generalized anxiety disorder)     GERD (gastroesophageal reflux disease)     controlled with med; alfonso's esophagus    Hiatal hernia     Hypercholesterolemia     Hypertension     Hypertrophy (benign) of prostate 7/28/2017    Ill-defined condition     peripheral vascular disease    Lung nodule     Neuropathy ([address]     On statin therapy 7/28/2017    Prostate cancer screening 7/28/2017    P[facility]peripheral vascular disease) [facility]     Sebaceous cyst 7/28/2017    Spinal stenosis        Past Surgical History:   Procedure Laterality Date    CARDIAC SURG PROCEDURE UNLIST      CATH-2008    COLONOSCOPY N/A 6/15/2016    COLONOSCOPY performed by Jesús Cedeno [facility] at [facility]  6/15/2016         HX CATARACT REMOVAL      BILATERAL    HX ORTHOPAEDIC  1977    BONE CYST REM,ANU FROM RIGHT LEG    HX ORTHOPAEDIC  5-, 7-22-17, 7-26-17    left hand index finger    HX OTHER SURGICAL Left 06/2016    carotid endarectomy    NEUROLOGICAL PROCEDURE UNLISTED  2011    Back: spinal stenosis, pinched nerve repair    UPPER GI ENDOSCOPY,BIOPSY  6/15/2016              Family History:   Problem Relation Age of Onset    Diabetes Mother     Cancer Father      LUNG    Heart Disease Father        Social History     Social History    Marital status:      Spouse name: N/A    Number of children: N/A    Years of education: N/A     Occupational History    Not on file. Social History Main Topics    Smoking status: Former Smoker     Quit date: 7/7/1996    Smokeless tobacco: Never Used    Alcohol use No      Comment: rarely    Drug use: No    Sexual activity: Yes     Other Topics Concern    Not on file     Social History Narrative         ALLERGIES: Review of patient's allergies indicates no known allergies. Review of Systems   Constitutional: Negative. Negative for activity change, appetite change, chills, fatigue, fever and unexpected weight change. HENT: Negative. Negative for congestion, hearing loss, rhinorrhea, sneezing and voice change. Eyes: Negative. Negative for pain and visual disturbance. Respiratory: Negative. Negative for apnea, cough, choking, chest tightness and shortness of breath. Cardiovascular: Negative. Negative for chest pain and palpitations. Gastrointestinal: Negative.   Negative for abdominal distention, abdominal pain, blood in stool, diarrhea, nausea and vomiting. Genitourinary: Negative. Negative for difficulty urinating, flank pain, frequency and urgency. No discharge   Musculoskeletal: Negative. Negative for arthralgias, back pain, myalgias and neck stiffness. Skin: Negative. Negative for color change and rash. Neurological: Negative. Negative for dizziness, seizures, syncope, speech difficulty, weakness, numbness and headaches. Hematological: Negative for adenopathy. Psychiatric/Behavioral: Positive for confusion. Negative for agitation, behavioral problems, dysphoric mood and suicidal ideas. The patient is not nervous/anxious. All other systems reviewed and are negative. Patient Vitals for the past 12 hrs:   Temp Pulse Resp BP SpO2   08/31/17 1330 - (!) 57 12 - 100 %   08/31/17 1329 - - - 164/48 100 %   08/31/17 1300 - - - 161/48 100 %   08/31/17 1219 97.6 °F (36.4 °C) 70 16 164/46 100 %       Physical Exam   Constitutional: He is oriented to person, place, and time. He appears well-developed and well-nourished. No distress. HENT:   Head: Normocephalic and atraumatic. Mouth/Throat: Oropharynx is clear and moist. No oropharyngeal exudate. Eyes: Conjunctivae and EOM are normal. Pupils are equal, round, and reactive to light. Right eye exhibits no discharge. Left eye exhibits no discharge. Neck: Normal range of motion. Neck supple. Cardiovascular: Normal rate, regular rhythm and intact distal pulses. Exam reveals no gallop and no friction rub. 3/6 ejection murmur    Pulmonary/Chest: Effort normal and breath sounds normal. No respiratory distress. He has no wheezes. He has no rales. He exhibits no tenderness. Abdominal: Soft. Bowel sounds are normal. He exhibits no distension and no mass. There is no tenderness. There is no rebound and no guarding. Musculoskeletal: Normal range of motion. He exhibits no edema. PICC line in RUE. Amputated 2nd metacarpal of the left hand.    Lymphadenopathy: He has no cervical adenopathy. Neurological: He is alert and oriented to person, place, and time. No cranial nerve deficit. Coordination normal.   Mild confusion   Skin: Skin is warm and dry. No rash noted. No erythema. Psychiatric: He has a normal mood and affect. Nursing note and vitals reviewed. MDM  Number of Diagnoses or Management Options  Cerebrovascular accident (CVA), unspecified mechanism [facility]):   Diagnosis management comments: DDx: UTI, renal insufficiency, electrolyte abnormality          Amount and/or Complexity of Data Reviewed  Clinical lab tests: ordered and reviewed  Tests in the radiology section of CPT®: ordered and reviewed  Obtain history from someone other than the patient: yes (Pt's wife )  Review and summarize past medical records: yes  Discuss the patient with other providers: yes (hospitalist)    Patient Progress  Patient progress: stable    ED Course       Procedures  Chief Complaint   Patient presents with    Altered mental status       2:22 PM  The patients presenting problems have been discussed, and they are in agreement with the care plan formulated and outlined with them. I have encouraged them to ask questions as they arise throughout their visit.     MEDICATIONS GIVEN:  Medications   sodium chloride (NS) flush 5-10 mL (not administered)   sodium chloride (NS) flush 5-10 mL (not administered)   sodium chloride 0.9 % bolus infusion 500 mL (500 mL IntraVENous New Bag 8/31/17 1411)   aspirin (ASPIRIN) tablet 325 mg (325 mg Oral Given 8/31/17 1406)       LABS REVIEWED:  Recent Results (from the past 24 hour(s))   CBC WITH AUTOMATED DIFF    Collection Time: 08/31/17  1:14 PM   Result Value Ref Range    WBC 7.7 4.1 - 11.1 K/uL    RBC 3.00 (L) 4.10 - 5.70 M/uL    HGB 8.3 (L) 12.1 - 17.0 g/dL    HCT 25.8 (L) 36.6 - 50.3 %    MCV 86.0 80.0 - 99.0 FL    MCH 27.7 26.0 - 34.0 PG    MCHC 32.2 30.0 - 36.5 g/dL    RDW 17.1 (H) 11.5 - 14.5 %    PLATELET 460 146 - 565 K/uL NEUTROPHILS 82 (H) 32 - 75 %    LYMPHOCYTES 13 12 - 49 %    MONOCYTES 5 5 - 13 %    EOSINOPHILS 0 0 - 7 %    BASOPHILS 0 0 - 1 %    ABS. NEUTROPHILS 6.3 1.8 - 8.0 K/UL    ABS. LYMPHOCYTES 1.0 0.8 - 3.5 K/UL    ABS. MONOCYTES 0.4 0.0 - 1.0 K/UL    ABS. EOSINOPHILS 0.0 0.0 - 0.4 K/UL    ABS. BASOPHILS 0.0 0.0 - 0.1 K/UL   METABOLIC PANEL, COMPREHENSIVE    Collection Time: 08/31/17  1:14 PM   Result Value Ref Range    Sodium 142 136 - 145 mmol/L    Potassium 4.0 3.5 - 5.1 mmol/L    Chloride 108 97 - 108 mmol/L    CO2 27 21 - 32 mmol/L    Anion gap 7 5 - 15 mmol/L    Glucose 158 (H) 65 - 100 mg/dL    BUN 33 (H) 6 - 20 MG/DL    Creatinine 1.10 0.70 - 1.30 MG/DL    BUN/Creatinine ratio 30 (H) 12 - 20      GFR est AA >60 >60 ml/min/1.73m2    GFR est non-AA >60 >60 ml/min/1.73m2    Calcium 8.1 (L) 8.5 - 10.1 MG/DL    Bilirubin, total 0.2 0.2 - 1.0 MG/DL    ALT (SGPT) 11 (L) 12 - 78 U/L    AST (SGOT) 17 15 - 37 U/L    Alk.  phosphatase 94 45 - 117 U/L    Protein, total 5.8 (L) 6.4 - 8.2 g/dL    Albumin 2.1 (L) 3.5 - 5.0 g/dL    Globulin 3.7 2.0 - 4.0 g/dL    A-G Ratio 0.6 (L) 1.1 - 2.2     MAGNESIUM    Collection Time: 08/31/17  1:14 PM   Result Value Ref Range    Magnesium 2.0 1.6 - 2.4 mg/dL   PROTHROMBIN TIME + INR    Collection Time: 08/31/17  1:14 PM   Result Value Ref Range    INR 1.1 0.9 - 1.1      Prothrombin time 10.6 9.0 - 11.1 sec   URINALYSIS W/MICROSCOPIC    Collection Time: 08/31/17  1:14 PM   Result Value Ref Range    Color YELLOW/STRAW      Appearance CLEAR CLEAR      Specific gravity 1.018 1.003 - 1.030      pH (UA) 6.0 5.0 - 8.0      Protein 300 (A) NEG mg/dL    Glucose 250 (A) NEG mg/dL    Ketone NEGATIVE  NEG mg/dL    Bilirubin NEGATIVE  NEG      Blood NEGATIVE  NEG      Urobilinogen 0.2 0.2 - 1.0 EU/dL    Nitrites NEGATIVE  NEG      Leukocyte Esterase NEGATIVE  NEG      WBC 0-4 0 - 4 /hpf    RBC 0-5 0 - 5 /hpf    Epithelial cells FEW FEW /lpf    Bacteria NEGATIVE  NEG /hpf    Hyaline cast 2-5 0 - 5 /lpf VITAL SIGNS:  Patient Vitals for the past 12 hrs:   Temp Pulse Resp BP SpO2   08/31/17 1330 - (!) 57 12 - 100 %   08/31/17 1329 - - - 164/48 100 %   08/31/17 1300 - - - 161/48 100 %   08/31/17 1219 97.6 °F (36.4 °C) 70 16 164/46 100 %       RADIOLOGY RESULTS:  The following have been ordered and reviewed:  CT Results  (Last 48 hours)               08/31/17 1324  CT HEAD WO CONT Final result    Impression:  IMPRESSION:    1. Left parieto-occipital infarct. No hemorrhage. 2. Extensive paranasal sinus disease. Narrative:  EXAM:  CT HEAD WITHOUT CONTRAST   INDICATION: Altered mental status. COMPARISON: None. CONTRAST: None. TECHNIQUE: Unenhanced CT of the head was performed using 5 mm images. Brain and   bone windows were generated. Sagittal and coronal reformations were generated. CT dose reduction was achieved through use of a standardized protocol tailored   for this examination and automatic exposure control for dose modulation. CT dose   reduction was achieved through use of a standardized protocol tailored for this   examination and automatic exposure control for dose modulation. FINDINGS:   The ventricles and sulci are enlarged in a generalized fashion consistent with   volume loss. There is a low-attenuation left parieto-occipital infarct. There   is no intracranial hemorrhage. There is no extra-axial collection, mass, mass   effect or midline shift. The basilar cisterns are open. 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 There is extensive opacification   of the visualized paranasal sinuses. CONSULTATIONS:   CONSULT NOTE:   2:43 PM  Farhad Stoddard MD spoke with Dr. Sobia Moody,   Specialty: Hospitalist  Discussed pt's hx, disposition, and available diagnostic and imaging results. Reviewed care plans. Consultant will evaluate pt for admission. Written by SUSIE Quijanoibsabra, as dictated by Luciana Walker.  Bryan Stoddard, [id number]  [facility] NOTES:  2:23 PM  Pt reports that he will determine if he is willing to be admitted after discussing his insurance coverage with case management. Written by Edison Simental, ED Scribe, as dictated by Vita Matt. Vance Torres MD.    2:32 PM  Case management states that the pt has met criteria for admission to the hospital.  Written by Edison Simental, ED Scribe, as dictated by Vita Matt. Vance Torres MD.     DIAGNOSIS:    1. Cerebrovascular accident (CVA), unspecified mechanism ([address]        PLAN: Admit to Hospital    ED COURSE: The patients hospital course has been uncomplicated. 2:45 PM  Lanita Schaumann Harvey's  results have been reviewed with him. He has been counseled regarding his diagnosis. He verbally conveys understanding and agreement of the signs, symptoms, diagnosis, treatment and prognosis and additionally agrees to follow up as recommended with Dr. Alec King MD in 24 - 48 hours. He also agrees with the care-plan and conveys that all of his questions have been answered. I have also put together some discharge instructions for him that include: 1) educational information regarding their diagnosis, 2) how to care for their diagnosis at home, as well a 3) list of reasons why they would want to return to the ED prior to their follow-up appointment, should their condition change. Attestation: This is note is prepared by Edison Simental, acting as Scribe for Vita Matt. Vance Torres, [address] [facility] Vance Torres MD The scribe's documentation has been prepared under my direction and personally reviewed by me in its entirety. I confirm that the note above accurately reflects all work, treatment, procedures, and medical decision making performed by me.

## 2017-08-31 NOTE — PROGRESS NOTES
Primary Nurse Jb Patel RN and Lucia Delcid RN performed a dual skin assessment on this patient No impairment noted  Rocco score is 21    Patient has recent left index finger amputation site, being followed with home health for dressing changes.

## 2017-08-31 NOTE — ROUTINE PROCESS

## 2017-08-31 NOTE — ED TRIAGE NOTES
Pt arrives by EMS from home , pt's spouse states that the pt was \"not acting right\" this morning/was confused about who the wife was. Pt had recent left index finger amputation at [facility] , currently on IV antibiotic's via PICC line. Blood sugar 128 en route. Pt is AAOx3 at time of triage.

## 2017-08-31 NOTE — PROGRESS NOTES
Pharmacy Clarification of the Prior to Admission Medication Regimen Retrospective to the Admission Medication Reconciliation    The patient was interviewed regarding clarification of the prior to admission medication regimen. Wife was present in room and obtained permission from patient to discuss drug regimen with visitor(s) present. Patient was questioned regarding use of any other inhalers, topical products, over the counter medications, herbal medications, vitamin products or ophthalmic/nasal/otic medication use. Information Obtained From: Patient, Medication List, RX Query    Recommendations/Findings:    The following amendments were made to the patient's active medication list on file at [facility]:     1) Additions:    ascorbic acid, vitamin C, (VITAMIN C) 500 mg tablet   lisinopril-hydroCHLOROthiazide (PRINZIDE, ZESTORETIC) 20-12.5 mg per tablet    2) Removals:    Ascorbic acid 250 mg    3) Changes:   insulin glargine (LANTUS) 100 unit/mL injection (Old regimen: SQ QHS /New regimen: 9-12 units SQ QHS)   Ferrous sulfate (IRON) (Old regimen: 65 mg daily /New regimen: 65 mg daily with dinner)   L. acidoph & paracasei- S therm- Bifido (LETICIA-Q/RISAQUAD) 8 billion cell cap cap (Old regimen: 1 cap daily /New regimen: 1 cap TID with meals)   nicotinic acid (NIACIN) 500 mg (Old regimen: take by mouth nightly /New regimen: 500 mg QHS)   omeprazole (PRILOSEC) 20 mg capsule (Old regimen: 20 mg daily /New regimen: 20 mg QHS)   simvastatin (ZOCOR) 10 mg tablet (Old regimen: 10 mg daily /New regimen: 10 mg QHS)   tamsulosin (FLOMAX) 0.4 mg capsule (Old regimen: 0.4 mg QHS /New regimen: 0.4 mg daily)   traMADol (ULTRAM) 50 mg tablet (Old regimen: 50 mg Q8H /New regimen:  mg QHS PRN)    4) Pertinent Pharmacy Findings:   Identified High Alert Medication Information  o Current IV antibiotics:   - Name: ertapenem 1 gram 500 mg IVPB  - Dosing Instructions: 1 g IV every 24 hours  - Indication: Infection on hand from surgery  - Today would be 1 week and 1 day of therapy  - Last dose administered: 2017     PTA medication list was corrected to the following:     Prior to Admission Medications   Prescriptions Last Dose Informant Patient Reported? Taking? L. acidoph & paracasei- S therm- Bifido (LETICIA-Q/RISAQUAD) 8 billion cell cap cap 2017 at Unknown time Self Yes Yes   Sig: Take 1 Cap by mouth three (3) times daily (with meals). amLODIPine (NORVASC) 5 mg tablet 2017 at Unknown time Self Yes Yes   Sig: Take 5 mg by mouth daily. ascorbic acid, vitamin C, (VITAMIN C) 500 mg tablet 2017 at Unknown time Self Yes Yes   Sig: Take 250 mg by mouth three (3) times daily. aspirin 81 mg chewable tablet 2017 at Unknown time Self Yes Yes   Sig: Take 81 mg by mouth nightly. ertapenem 1 gram 500 mg IVPB 2017 at Unknown time Self Yes Yes   Si g by IntraVENous route every twenty-four (24) hours. ferrous sulfate (IRON) 325 mg (65 mg iron) tablet 2017 at Unknown time Self Yes Yes   Sig: Take 65 mg by mouth daily (with dinner). gabapentin (NEURONTIN) 300 mg capsule 2017 at Unknown time Self Yes Yes   Sig: Take 300 mg by mouth three (3) times daily. insulin NPH (NOVOLIN N) 100 unit/mL injection 2017 at Unknown time Self Yes Yes   Si Units by SubCUTAneous route daily (with breakfast). Indications: type 2 diabetes mellitus   insulin aspart (NOVOLOG) 100 unit/mL injection 2017 at Unknown time Self Yes Yes   Sig: 10 Units by SubCUTAneous route daily (with dinner). Indications: type 2 diabetes mellitus   insulin glargine (LANTUS) 100 unit/mL injection 2017 at Unknown time Self Yes Yes   Si-12 Units by SubCUTAneous route nightly. Patient uses sliding scale, but cannot remember at this time   lisinopril-hydroCHLOROthiazide (PRINZIDE, ZESTORETIC) 20-12.5 mg per tablet 2017 at Unknown time Self Yes Yes   Sig: Take 2 Tabs by mouth daily (with breakfast).    losartan (COZAAR) 100 mg tablet 8/31/2017 at Unknown time Self Yes Yes   Sig: Take 100 mg by mouth two (2) times a day. Indications: HYPERTENSION   meloxicam (MOBIC) 15 mg tablet 8/31/2017 at Unknown time Self Yes Yes   Sig: Take 15 mg by mouth daily. metFORMIN (GLUMETZA ER) 500 mg TG24 24 hour tablet 8/31/2017 at Unknown time Self Yes Yes   Sig: Take 1 Tab by mouth two (2) times a day. nadolol (CORGARD) 40 mg tablet 8/31/2017 at Unknown time Self Yes Yes   Sig: Take 40 mg by mouth daily. nicotinic acid (NIACIN) 500 mg tablet 8/30/2017 at Unknown time Self Yes Yes   Sig: Take 500 mg by mouth nightly. omeprazole (PRILOSEC) 20 mg capsule 8/30/2017 at Unknown time Self Yes Yes   Sig: Take 20 mg by mouth nightly. simvastatin (ZOCOR) 10 mg tablet 8/30/2017 at Unknown time Self Yes Yes   Sig: Take 10 mg by mouth nightly. tamsulosin (FLOMAX) 0.4 mg capsule 8/31/2017 at Unknown time Self Yes Yes   Sig: Take 0.4 mg by mouth daily. traMADol (ULTRAM) 50 mg tablet 8/30/2017 at Unknown time Self Yes Yes   Sig: Take  mg by mouth nightly as needed (pain/sleep).       Facility-Administered Medications: None          Thank you,  Artie Da Silva  Medication History Pharmacy Technician

## 2017-08-31 NOTE — ROUTINE PROCESS
TRANSFER - OUT REPORT:    Verbal report given to Patricia Sunshine RN on Kiara Faith  being transferred to [facility] [phone] for routine progression of care       Report consisted of patients Situation, Background, Assessment and   Recommendations(SBAR). Information from the following report(s) SBAR, ED Summary, MAR, Recent Results, Med Rec Status and Cardiac Rhythm NSR was reviewed with the receiving nurse. Lines:   PICC Double Lumen 95/99/64 Right;Basilic (Active)       Peripheral IV 08/31/17 Left Wrist (Active)   Site Assessment Clean, dry, & intact 8/31/2017  1:18 PM   Phlebitis Assessment 0 8/31/2017  1:18 PM   Infiltration Assessment 0 8/31/2017  1:18 PM   Dressing Status Clean, dry, & intact 8/31/2017  1:18 PM   Dressing Type 4 X 4;Transparent 8/31/2017  1:18 PM   Hub Color/Line Status Pink;Flushed 8/31/2017  1:18 PM   Action Taken Catheter retaped 8/31/2017  1:18 PM        Opportunity for questions and clarification was provided.       Patient transported with:   transport staff

## 2017-08-31 NOTE — IP AVS SNAPSHOT
[address] [facility] [phone] [phone] Patient: Lamont Beckett MRN: [id number] [facility]:4/39/2572 You are allergic to the following No active allergies Recent Documentation Height Weight BMI Smoking Status 1.778 m 86 kg 28 kg/m2 Former Smoker Emergency Contacts Name Discharge Info Relation Home Work Mobile Neisha Harrington DISCHARGE CAREGIVER [3] Spouse [3] [phone] About your hospitalization You were admitted on:  August 31, 2017 You last received care in the:  [facility] 3 NEUROSCIENCE TELEMETRY You were discharged on:  September 3, 2017 Unit phone number:  [phone] Why you were hospitalized Your primary diagnosis was:  Cva (Cerebral Vascular Accident) (Hcc) Your diagnoses also included:  Mixed Hyperlipidemia, Diabetic Neuropathy (Hcc), Controlled Type 2 Diabetes Mellitus Without Complication, With Long-Term Current Use Of Insulin (Hcc), On Statin Therapy, Essential Hypertension, Infection Of Left Hand, Asvd (Arteriosclerotic Vascular Disease), 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 [phone] Call in 5 days AT HOME CARE  This is your home health provider  [address] [place] [address] [phone] Your Appointments Monday September 04, 2017 11:00 AM EDT INFUSION 60 with RM 102B - CHAIR [facility] - [address] ([address]) [address] [place] [address] [id number]  
[phone] Go to [address] [facility], ground floor. Tuesday September 05, 2017  3:00 PM EDT [facility]  
[place] ([address]) [address]  
[phone] Go to [facility], [facility], [address] [facility], [place] [address] Wednesday September 06, 2017  3:00 PM EDT [facility]  
[place] ([address]) [address]  
[phone] Go to [facility], [address], [address] [facility], [place], [address] Thursday September 07, 2017  3:00 PM EDT [facility]  
[place] ([address]) [address]  
[phone] Go to [facility], [address], [address] [facility], [place], [address] Friday September 08, 2017  3:00 PM EDT [facility]  
[place] ([address]) [address]  
[phone] Go to [facility], [address], [address] [facility], [place], [address] Thursday September 28, 2017  9:40 AM EDT Follow Up with [facility] ([facility]) [address] [address] [phone] Current Discharge Medication List  
  
START taking these medications Dose & Instructions Dispensing Information Comments Morning Noon Evening Bedtime  
 clopidogrel 75 mg Tab Commonly known as:  PLAVIX Your last dose was: Your next dose is:    
   
   
 Dose:  75 mg Take 1 Tab by mouth daily. Quantity:  30 Tab Refills:  prn CONTINUE these medications which have CHANGED Dose & Instructions Dispensing Information Comments Morning Noon Evening Bedtime  
 ascorbic acid (vitamin C) 250 mg tablet Commonly known as:  VITAMIN C What changed:  Another medication with the same name was removed. Continue taking this medication, and follow the directions you see here. Your last dose was: Your next dose is:    
   
   
 Dose:  250 mg Take 1 Tab by mouth three (3) times daily. Quantity:  90 Tab Refills:  prn  
     
   
   
   
  
 ertapenem 1 gram 1 g IVPB What changed:  Another medication with the same name was removed. Continue taking this medication, and follow the directions you see here. Your last dose was: Your next dose is:    
   
   
 Dose:  1 g  
1 g by IntraVENous route every twenty-four (24) hours. Refills:  0  
     
   
   
   
  
 ferrous sulfate 325 mg (65 mg iron) tablet Commonly known as:  Iron What changed:   
- how much to take - when to take this - Another medication with the same name was removed. Continue taking this medication, and follow the directions you see here. Your last dose was: Your next dose is:    
   
   
 Dose:  65 mg Take 1 Tab by mouth three (3) times daily (with meals). Quantity:  90 Tab Refills:  prn  
     
   
   
   
  
 L. acidoph & paracasei- S therm- Bifido 8 billion cell Cap cap Commonly known as:  LETICIA-Q/RISAQUAD What changed:  Another medication with the same name was removed. Continue taking this medication, and follow the directions you see here. Your last dose was: Your next dose is:    
   
   
 Dose:  1 Cap Take 1 Cap by mouth three (3) times daily (with meals). Refills:  0  
     
   
   
   
  
 nicotinic acid 500 mg tablet Commonly known as:  NIACIN What changed:  Another medication with the same name was removed. Continue taking this medication, and follow the directions you see here. Your last dose was: Your next dose is:    
   
   
 Dose:  500 mg Take 500 mg by mouth nightly. Refills:  0 CONTINUE these medications which have NOT CHANGED Dose & Instructions Dispensing Information Comments Morning Noon Evening Bedtime  
 amLODIPine 5 mg tablet Commonly known as:  Italo Botello Your last dose was: Your next dose is:    
   
   
 Dose:  5 mg Take 5 mg by mouth daily. Refills:  0  
     
   
   
   
  
 aspirin 81 mg chewable tablet Your last dose was: Your next dose is:    
   
   
 Dose:  81 mg Take 81 mg by mouth nightly. Refills:  0  
     
   
   
   
  
 gabapentin 300 mg capsule Commonly known as:  NEURONTIN Your last dose was: Your next dose is:    
   
   
 Dose:  300 mg Take 300 mg by mouth three (3) times daily. Refills:  0  
     
   
   
   
  
 LANTUS 100 unit/mL injection Generic drug:  insulin glargine Your last dose was: Your next dose is:    
   
   
 Dose:  9-12 Units 9-12 Units by SubCUTAneous route nightly. Patient uses sliding scale, but cannot remember at this time Refills:  0  
     
   
   
   
  
 lisinopril-hydroCHLOROthiazide 20-12.5 mg per tablet Commonly known as:  Heath Quintero Your last dose was: Your next dose is:    
   
   
 Dose:  2 Tab Take 2 Tabs by mouth daily (with breakfast). Refills:  0  
     
   
   
   
  
 losartan 100 mg tablet Commonly known as:  COZAAR Your last dose was: Your next dose is:    
   
   
 Dose:  100 mg Take 100 mg by mouth two (2) times a day.  Indications: HYPERTENSION  
 Refills:  0  
     
   
   
   
  
 meloxicam 15 mg tablet Commonly known as:  MOBIC Your last dose was: Your next dose is:    
   
   
 Dose:  15 mg Take 15 mg by mouth daily. Refills:  0  
     
   
   
   
  
 metFORMIN 500 mg Tg24 24 hour tablet Commonly known asPierrette Ripton ER Your last dose was: Your next dose is:    
   
   
 Dose:  1 Tab Take 1 Tab by mouth two (2) times a day. Refills:  0  
     
   
   
   
  
 nadolol 40 mg tablet Commonly known as:  CORGARD Your last dose was: Your next dose is:    
   
   
 Dose:  40 mg Take 40 mg by mouth daily. Refills:  0 NovoLIN N 100 unit/mL injection Generic drug:  insulin NPH Your last dose was: Your next dose is:    
   
   
 Dose:  20 Units 20 Units by SubCUTAneous route daily (with breakfast). Indications: type 2 diabetes mellitus Refills:  0 NovoLOG 100 unit/mL injection Generic drug:  insulin aspart Your last dose was: Your next dose is:    
   
   
 Dose:  10 Units 10 Units by SubCUTAneous route daily (with dinner). Indications: type 2 diabetes mellitus Refills:  0  
     
   
   
   
  
 omeprazole 20 mg capsule Commonly known as:  PRILOSEC Your last dose was: Your next dose is:    
   
   
 Dose:  20 mg Take 20 mg by mouth nightly. Refills:  0  
     
   
   
   
  
 simvastatin 10 mg tablet Commonly known as:  ZOCOR Your last dose was: Your next dose is:    
   
   
 Dose:  10 mg Take 10 mg by mouth nightly. Refills:  0  
     
   
   
   
  
 tamsulosin 0.4 mg capsule Commonly known as:  FLOMAX Your last dose was: Your next dose is:    
   
   
 Dose:  0.4 mg Take 0.4 mg by mouth daily. Refills:  0  
     
   
   
   
  
 traMADol 50 mg tablet Commonly known as:  ULTRAM  
   
Your last dose was: Your next dose is: Dose:   mg Take  mg by mouth nightly as needed (pain/sleep). Refills:  0 Where to Get Your Medications These medications were sent to [address] [place] [address] [place], [address]  [address] [facility], [address] [place] [address] Phone:  [phone]  
  ascorbic acid (vitamin C) 250 mg tablet  
 clopidogrel 75 mg Tab  
 ferrous sulfate 325 mg (65 mg iron) tablet Discharge Instructions [address], [address] [place], [place]. [address] 
[phone] Patient Discharge Instructions Chris Mora / [id number] : 1940 Admitted 2017 Discharged: 9/3/2017 Principal Problem: 
  CVA (cerebral vascular accident) ([address] (2017) Active Problems: 
  Essential hypertension (2017) Controlled type 2 diabetes mellitus without complication, with long-term current use of insulin ([address] (2017) Mixed hyperlipidemia (2017) ASVD (arteriosclerotic vascular disease) (2017) Overview: Carotid Endarectomy 2017 Infection of left hand (2017) Overview: Debridement on several occasions 2017. Amputation of left second  
    finger. Diabetic neuropathy ([address] (2017) On statin therapy (2017) Thrombotic stroke involving right middle cerebral artery ([address] (2017) Stenosis of both internal carotid arteries (2017) No Known Allergies · It is important that you take the medication exactly as they are prescribed. · Do not take other medications without consulting your doctor. What to do at Next Level of Care Recommended diet: Diabetic Recommended activity: usual 
 
Other: DO NOT RESUME METFORMIN UNTIL 2017 Information obtained by : 
I understand that if any problems occur once I am at home I am to contact my physician. I understand and acknowledge receipt of the instructions indicated above. Physician's or R.N.'s Signature                                                                  Date/Time Patient or Representative Signature                                                          Date/Time Discharge Orders None Introducing [facility]! Jhonathan Julio introduces [employer] patient portal. Now you can access parts of your medical record, email your doctor's office, and request medication refills online. 1. In your internet browser, go to [email or web address] 2. Click on the First Time User? Click Here link in the Sign In box. You will see the New Member Sign Up page. 3. Enter your [employer] Access Code exactly as it appears below. You will not need to use this code after youve completed the sign-up process. If you do not sign up before the expiration date, you must request a new code. · [employer] Access Code: [email or web address] Expires: 11/19/2017  1:07 PM 
 
4. Enter the last four digits of your Social Security Number (xxxx) and Date of Birth (mm/dd/yyyy) as indicated and click Submit. You will be taken to the next sign-up page. 5. Create a [employer] ID. This will be your [employer] login ID and cannot be changed, so think of one that is secure and easy to remember. 6. Create a [employer] password. You can change your password at any time. 7. Enter your Password Reset Question and Answer. This can be used at a later time if you forget your password. 8. Enter your e-mail address. You will receive e-mail notification when new information is available in [address] 9. Click Sign Up. You can now view and download portions of your medical record. 10. Click the Download Summary menu link to download a portable copy of your medical information. If you have questions, please visit the Frequently Asked Questions section of the [email or web address] website. Remember, [email or web address] is NOT to be used for urgent needs. For medical emergencies, dial 911. Now available from your iPhone and Android! General Information Please provide this summary of care documentation to your next provider. Patient Signature:  ____________________________________________________________ Date:  ____________________________________________________________  
  
[facility] Provider Signature:  ____________________________________________________________ Date:  ____________________________________________________________

## 2017-08-31 NOTE — H&P
Hospitalist Admission Note    NAME: Jerzy Gardiner   :  [id number]   MRN:  [id number]     Date/Time:  2017 3:25 PM    Patient PCP: Danna Mcmullen MD  ________________________________________________________________________    My assessment of this patient's clinical condition and my plan of care is as follows. Note: pt admitted under Dr Kareem Acevedo's Service, I have informed his office about the admission. Assessment / Plan:  Acute Encephalopathy POA   Left parieto-occipital infarct on CT scan  Admit to neurotele  No motor deficit, out of TPA window  Check MRI, MRA brain, carotid dopplers, 2D echo  Switch ASA to plavix  Continue statins  inhouse neurology consult  Encephalopathy seems to be resolved in ED    Recent Infection of left hand   Continue Invanz and Esperanza Q    Hypertension   Permissive HTN  Continue BB, norvasc and ARBs  PRN IV hydralazine    Type 2 diabetes mellitus   Hold metformin  Continue Lantus at 8 units QHS  SSI    Hyperlipidemia   Continue statins    Diabetic neuropathy   Continue neurontin    Code Status: DNR/DNI as per patient's own wishes  Surrogate Decision Maker: wife is POA    DVT Prophylaxis: Lovenox    Baseline: functional        Subjective:   CHIEF COMPLAINT: altered mental status    HISTORY OF PRESENT ILLNESS:     Naima Pacheco is a 68 y.o.  male who presents with altered mental status. As per wife, pt noted to be confused since morning and he though his she is her daughter rather than wife. Pt didn't remember what happened but currently AAAx3. Pt denies any focal weakness, headaches, dizziness, fever, chills, nausea, vomiting, diarrhea, chest pain, problems urination. In ED pt noted to have Left parieto-occipital infarct on CT head. We were asked to admit for work up and evaluation of the above problems.      Past Medical History:   Diagnosis Date    Allergic rhinitis     Anemia     ASVD (arteriosclerotic vascular disease)     Martínez's esophagus 7/28/2017    Carotid stenosis 7/28/2017    Chronic kidney disease     Diabetes ([facility]     glucose runs 70 - 160's at home    Diabetic neuropathy ([facility] 7/28/2017    Diverticulosis     DJD (degenerative joint disease)     ED (erectile dysfunction)     Enlarged prostate     JUDY (generalized anxiety disorder)     GERD (gastroesophageal reflux disease)     controlled with med; alfonso's esophagus    Hiatal hernia     Hypercholesterolemia     Hypertension     Hypertrophy (benign) of prostate 7/28/2017    Ill-defined condition     peripheral vascular disease    Lung nodule     Neuropathy ([address]     On statin therapy 7/28/2017    Prostate cancer screening 7/28/2017    PVD (peripheral vascular disease) ([facility]     Sebaceous cyst 7/28/2017    Spinal stenosis         Past Surgical History:   Procedure Laterality Date    CARDIAC SURG PROCEDURE UNLIST      CATH-2008    COLONOSCOPY N/A 6/15/2016    COL[facility]COPY performed by [facility] at [facility]  6/15/2016         HX CATARACT REMOVAL      BILATERAL    HX ORTHOPAEDIC  1977    BONE CYST REM,ANU FROM RIGHT LEG    HX ORTHOPAEDIC  5-, 7-22-17, 7-26-17    left hand index finger    HX OTHER SURGICAL Left 06/2016    carotid endarectomy    NEUROLOGICAL PROCEDURE UNLISTED  2011    Back: spinal stenosis, pinched nerve repair    UPPER GI ENDOSCOPY,BIOPSY  6/15/2016            Social History   Substance Use Topics    Smoking status: Former Smoker     Quit date: 7/7/1996    Smokeless tobacco: Never Used    Alcohol use No      Comment: rarely        Family History   Problem Relation Age of Onset    Diabetes Mother     Cancer Father      LUNG    Heart Disease Father      No Known Allergies     Prior to Admission medications    Medication Sig Start Date End Date Taking? Authorizing Provider   traMADol (ULTRAM) 50 mg tablet Take 50 mg by mouth every eight (8) hours as needed for Pain.    Yes Historical Provider ertapenem 1 gram 500 mg IVPB 1 g by IntraVENous route every twenty-four (24) hours. Yes Historical Provider   L. acidoph & paracasei- S therm- Bifido (LETICIA-Q/RISAQUAD) 8 billion cell cap cap Take 1 Cap by mouth daily. 8/21/17   Roseanna Greenfield MD   insulin glargine (LANTUS) 100 unit/mL injection 8-10 Units by SubCUTAneous route nightly. By SS. Usually is < 20 units. Historical Provider   omeprazole (PRILOSEC) 20 mg capsule Take 20 mg by mouth daily. Historical Provider   insulin NPH (NOVOLIN N) 100 unit/mL injection 20 Units by SubCUTAneous route daily (with breakfast). Indications: type 2 diabetes mellitus    Historical Provider   insulin aspart (NOVOLOG) 100 unit/mL injection 10 Units by SubCUTAneous route daily (with dinner). Indications: type 2 diabetes mellitus    Historical Provider   metFORMIN (GLUMETZA ER) 500 mg TG24 24 hour tablet Take 1 Tab by mouth two (2) times a day. Historical Provider   lisinopril-hydrochlorothiazide (PRINZIDE, ZESTORETIC) 20-12.5 mg per tablet Take 2 Tabs by mouth daily. Historical Provider   amLODIPine (NORVASC) 5 mg tablet Take 5 mg by mouth daily. Historical Provider   ascorbic acid (VITAMIN C) 250 mg tablet Take 250 mg by mouth three (3) times daily. Historical Provider   IRON, FERROUS SULFATE, PO Take 65 mg by mouth daily. Historical Provider   losartan (COZAAR) 100 mg tablet Take 100 mg by mouth two (2) times a day. Indications: HYPERTENSION    Historical Provider   aspirin 81 mg chewable tablet Take 81 mg by mouth daily. Historical Provider   meloxicam (MOBIC) 15 mg tablet Take 15 mg by mouth daily. Historical Provider   tamsulosin (FLOMAX) 0.4 mg capsule Take 0.4 mg by mouth nightly. Historical Provider   gabapentin (NEURONTIN) 300 mg capsule Take 300 mg by mouth three (3) times daily. Historical Provider   niacin ER (NIASPAN) 500 mg tablet Take  by mouth nightly.     Historical Provider   nadolol (CORGARD) 40 mg tablet Take 40 mg by mouth daily. Historical Provider   simvastatin (ZOCOR) 10 mg tablet Take 10 mg by mouth every morning. Historical Provider       REVIEW OF SYSTEMS:     I am not able to complete the review of systems because: The patient is intubated and sedated    The patient has altered mental status due to his acute medical problems    The patient has baseline aphasia from prior stroke(s)    The patient has baseline dementia and is not reliable historian    The patient is in acute medical distress and unable to provide information           Total of 12 systems reviewed as follows:       POSITIVE= underlined text  Negative = text not underlined  General:  fever, chills, sweats, generalized weakness, weight loss/gain,      loss of appetite   Eyes:    blurred vision, eye pain, loss of vision, double vision  ENT:    rhinorrhea, pharyngitis   Respiratory:   cough, sputum production, SOB, [place], wheezing, pleuritic pain   Cardiology:   chest pain, palpitations, orthopnea, PND, edema, syncope   Gastrointestinal:  abdominal pain , N/V, diarrhea, dysphagia, constipation, bleeding   Genitourinary:  frequency, urgency, dysuria, hematuria, incontinence   Muskuloskeletal :  arthralgia, myalgia, back pain  Hematology:  easy bruising, nose or gum bleeding, lymphadenopathy   Dermatological: rash, ulceration, pruritis, color change / jaundice  Endocrine:   hot flashes or polydipsia   Neurological:  headache, dizziness, confusion, focal weakness, paresthesia,     Speech difficulties, memory loss, gait difficulty  Psychological: Feelings of anxiety, depression, agitation    Objective:   VITALS:    Visit Vitals    /41    Pulse 67    Temp 97.6 °F (36.4 °C)    Resp 12    Ht 5' 10\" (1.778 m)    Wt 87.1 kg (192 lb)    SpO2 100%    BMI 27.55 kg/m2       PHYSICAL EXAM:    General:    Alert, cooperative, no distress, appears stated age.      HEENT: Atraumatic, anicteric sclerae, pink conjunctivae     No oral ulcers, mucosa moist, throat clear, dentition fair  Neck:  Supple, symmetrical,  thyroid: non tender  Lungs:   Clear to auscultation bilaterally. No Wheezing or Rhonchi. No rales. Chest wall:  No tenderness  No Accessory muscle use. Heart:   Regular  rhythm,  No  murmur   No edema  Abdomen:   Soft, non-tender. Not distended. Bowel sounds normal  Extremities: No cyanosis. No clubbing,      Skin turgor normal, Capillary refill normal, Radial dial pulse 2+  Skin:     Not pale. Not Jaundiced  No rashes   Psych:  Fair insight. Not depressed. Not anxious or agitated. Neurologic: EOMs intact. No facial asymmetry. No aphasia or slurred speech. Symmetrical strength, Sensation grossly intact. Alert and oriented X 3.     _______________________________________________________________________  Care Plan discussed with:    Comments   Patient y    Family  y Wife at bedside   RN y    Care Manager                    Consultant:  barbara RICHARDS physician   _______________________________________________________________________  Expected  Disposition:   Home with Family y   HH/PT/OT/RN    SNF/LTC    VICKI    ________________________________________________________________________  TOTAL TIME: 61 Minutes    Critical Care Provided     Minutes non procedure based      Comments    y Reviewed previous records   >50% of visit spent in counseling and coordination of care y Discussion with patient and/family and questions answered       ________________________________________________________________________  Signed: Adriana Antonio MD    Procedures: see electronic medical records for all procedures/Xrays and details which were not copied into this note but were reviewed prior to creation of Plan.     LAB DATA REVIEWED:    Recent Results (from the past 24 hour(s))   CBC WITH AUTOMATED DIFF    Collection Time: 08/31/17  1:14 PM   Result Value Ref Range    WBC 7.7 4.1 - 11.1 K/uL    RBC 3.00 (L) 4.10 - 5.70 M/uL    HGB 8.3 (L) 12.1 - 17.0 g/dL    HCT 25.8 (L) 36.6 - 50.3 % MCV 86.0 80.0 - 99.0 FL    MCH 27.7 26.0 - 34.0 PG    MCHC 32.2 30.0 - 36.5 g/dL    RDW 17.1 (H) 11.5 - 14.5 %    PLATELET 671 212 - 830 K/uL    NEUTROPHILS 82 (H) 32 - 75 %    LYMPHOCYTES 13 12 - 49 %    MONOCYTES 5 5 - 13 %    EOSINOPHILS 0 0 - 7 %    BASOPHILS 0 0 - 1 %    ABS. NEUTROPHILS 6.3 1.8 - 8.0 K/UL    ABS. LYMPHOCYTES 1.0 0.8 - 3.5 K/UL    ABS. MONOCYTES 0.4 0.0 - 1.0 K/UL    ABS. EOSINOPHILS 0.0 0.0 - 0.4 K/UL    ABS. BASOPHILS 0.0 0.0 - 0.1 K/UL   METABOLIC PANEL, COMPREHENSIVE    Collection Time: 08/31/17  1:14 PM   Result Value Ref Range    Sodium 142 136 - 145 mmol/L    Potassium 4.0 3.5 - 5.1 mmol/L    Chloride 108 97 - 108 mmol/L    CO2 27 21 - 32 mmol/L    Anion gap 7 5 - 15 mmol/L    Glucose 158 (H) 65 - 100 mg/dL    BUN 33 (H) 6 - 20 MG/DL    Creatinine 1.10 0.70 - 1.30 MG/DL    BUN/Creatinine ratio 30 (H) 12 - 20      GFR est AA >60 >60 ml/min/1.73m2    GFR est non-AA >60 >60 ml/min/1.73m2    Calcium 8.1 (L) 8.5 - 10.1 MG/DL    Bilirubin, total 0.2 0.2 - 1.0 MG/DL    ALT (SGPT) 11 (L) 12 - 78 U/L    AST (SGOT) 17 15 - 37 U/L    Alk.  phosphatase 94 45 - 117 U/L    Protein, total 5.8 (L) 6.4 - 8.2 g/dL    Albumin 2.1 (L) 3.5 - 5.0 g/dL    Globulin 3.7 2.0 - 4.0 g/dL    A-G Ratio 0.6 (L) 1.1 - 2.2     MAGNESIUM    Collection Time: 08/31/17  1:14 PM   Result Value Ref Range    Magnesium 2.0 1.6 - 2.4 mg/dL   PROTHROMBIN TIME + INR    Collection Time: 08/31/17  1:14 PM   Result Value Ref Range    INR 1.1 0.9 - 1.1      Prothrombin time 10.6 9.0 - 11.1 sec   URINALYSIS W/MICROSCOPIC    Collection Time: 08/31/17  1:14 PM   Result Value Ref Range    Color YELLOW/STRAW      Appearance CLEAR CLEAR      Specific gravity 1.018 1.003 - 1.030      pH (UA) 6.0 5.0 - 8.0      Protein 300 (A) NEG mg/dL    Glucose 250 (A) NEG mg/dL    Ketone NEGATIVE  NEG mg/dL    Bilirubin NEGATIVE  NEG      Blood NEGATIVE  NEG      Urobilinogen 0.2 0.2 - 1.0 EU/dL    Nitrites NEGATIVE  NEG      Leukocyte Esterase NEGATIVE NEG      WBC 0-4 0 - 4 /hpf    RBC 0-5 0 - 5 /hpf    Epithelial cells FEW FEW /lpf    Bacteria NEGATIVE  NEG /hpf    Hyaline cast 2-5 0 - 5 /lpf

## 2017-09-01 PROBLEM — I63.311 THROMBOTIC STROKE INVOLVING RIGHT MIDDLE CEREBRAL ARTERY (HCC): Status: ACTIVE | Noted: 2017-01-01

## 2017-09-01 PROBLEM — I65.23 STENOSIS OF BOTH INTERNAL CAROTID ARTERIES: Status: ACTIVE | Noted: 2017-01-01

## 2017-09-01 NOTE — PROGRESS NOTES
PT note:    Orders received and acknowledged. Chart reviewed and cleared by nursing. On first attempt, patient sitting EOB eating breakfast, declining desire to get to the chair to eat. On second attempt, patient going off the floor for MRI. Will follow up for PT evaluation.      Niko Perry, PT, DPT

## 2017-09-01 NOTE — PROGRESS NOTES
Problem: Neurolinguistics Impaired (Adult)  Goal: *Acute Goals and Plan of Care (Insert Text)  Speech pathology goals  Initiated 9/1/2017  1. Patient will complete math calculations with 80% accuracy given no cues within 7 days  2. Patient will recall 3/3 part message after 5 minutes given no cues within 7 days  3. Patient will recall salient features of oral read paragraphs 50-65 words in length to 80% accuracy given no cues within 7 days  SPEECH LANGUAGE PATHOLOGY EVALUATION  Patient: Radha Desir (9 y.o. male)  Date: 9/1/2017  Primary Diagnosis: CVA (cerebral vascular accident) [facility])        Precautions:          ASSESSMENT :  Based on the objective data described below, the patient presents with mild-moderate integrated language impairment characterized by decreased verbal recall, attention, and calculation. Suspect calculation deficits are related to decreased attention. Patient with poor insight/awareness into deficits, reporting, \"I'm not doing speech therapy and I'm not doing rehab! \" Patient agreeable to try 1-2 sessions of SLP treatment at next level of care. Patient would benefit from SLP treatment to improve communication of medical, safety, and social wants/needs in a safe and timely manner. Patient will benefit from skilled intervention to address the above impairments. Patients rehabilitation potential is considered to be Fair  Factors which may influence rehabilitation potential include:   [ ]              None noted  [X]              Mental ability/status  [X]              Medical condition  [ ]              Home/family situation and support systems  [ ]              Safety awareness  [ ]              Pain tolerance/management  [ ]              Other:        PLAN :  Recommendations and Planned Interventions:  -SLP treatment to improve integrated language  Frequency/Duration: Patient will be followed by speech-language pathology 4 times a week to address goals. Discharge Recommendations:  To Be Determined       SUBJECTIVE:   Patient stated I'm not doing speech therapy and I'm not doing rehab!      OBJECTIVE:       Past Medical History:   Diagnosis Date    Allergic rhinitis      Anemia      ASVD (arteriosclerotic vascular disease)      Alfonso's esophagus 7/28/2017    Carotid stenosis 7/28/2017    Chronic kidney disease      Diabetes ([address]       glucose runs 70 - 160's at home    Diabetic neuropathy ([address] 7/28/2017    Diverticulosis      DJD (degenerative joint disease)      ED (erectile dysfunction)      Enlarged prostate      JUDY (generalized anxiety disorder)      GERD (gastroesophageal reflux disease)       controlled with med; alfonso's esophagus    Hiatal hernia      Hypercholesterolemia      Hypertension      Hypertrophy (benign) of prostate 7/28/2017    Ill-defined condition       peripheral vascular disease    Lung nodule      Neuropathy ([address]      On statin therapy 7/28/2017    Prostate cancer screening 7/28/2017    PVD (peripheral vascular disease) ([address]      Sebaceous cyst 7/28/2017    Spinal stenosis       Past Surgical History:   Procedure Laterality Date    CARDIAC SURG PROCEDURE UNLIST         CATH-2008    COLONOSCOPY N/A 6/15/2016     COLONOSCOPY performed by Marcia Turner MD at [address]   6/15/2016          HX CATARACT REMOVAL         BILATERAL    HX ORTHOPAEDIC   1977     BONE CYST REM,ANU FROM RIGHT LEG    HX ORTHOPAEDIC   5-, 7-22-17, 7-26-17     left hand index finger    HX OTHER SURGICAL Left 06/2016     carotid endarectomy    NEUROLOGICAL PROCEDURE UNLISTED   2011     Back: spinal stenosis, pinched nerve repair    UPPER GI ENDOSCOPY,BIOPSY   6/15/2016           Prior Level of Function/Home Situation:   Home Situation  Home Environment: Private residence  One/Two Story Residence: Two story  Living Alone: No  Support Systems: Family member(s), Child(jessica)  Patient Expects to be Discharged to[de-identified] Private residence  Current DME Used/Available at Home: Blood pressure cuff, Cane, quad, Glucometer  Mental Status:  Neurologic State: Alert  Orientation Level: Oriented X4  Cognition: Follows commands, Decreased attention/concentration  Perception: Appears intact  Perseveration: No perseveration noted  Safety/Judgement: Awareness of environment, Decreased insight into deficits  Motor Speech:  Oral-Motor Structure/Motor Speech  Labial: No impairment  Dentition: Natural  Oral Hygiene: moist oral mucosa  Lingual: No impairment  Velum: No impairment  Mandible: No impairment     Neuro-Linguistics:  Attention: digit repetition with 6/6  Verbal recall: recalled 0/4 words after 5 minute independently, 1/4 given category prompt, and 2/4 given field of 3 choices  Verbal reasonin/8, one answer concrete  Verbal problem solvin/6   Verbal organization: named 12 items in 60 seconds in divergent naming task                                 Voice:                 Vocal Quality: No impairment                                   G Codes: In compliance with CMSs Claims Based Outcome Reporting, the following G-code set was chosen for this patient based the use of the NOMS functional outcome to quantify this patient's level of memory impairment. Using the NOMS, the patient was determined to be at level 4 for memory which correlates with the CK= 40-59% level of severity. Based on the objective assessment provided within this note, the current, goal, and discharge g-codes are as follows:     Memory   Memory Current Status CK= 40-59%   Memory Goal Status CJ= 20-39%     NOMS:   The NOMS functional outcome measure was used to quantify this patient's level of memory impairment. Based on the NOMS, the patient was determined to be at level 4 for memory function.              NOMS Memory:  Level 1 (CN): Unable to recall any information regardless of cues  Level 2 (CM): Constant max cues or external aids to recall personal info  Level 3 (CL): Max cues or use external aids for simple routine and personal info. Level 4 (CK): Min cues to recall/use aids for simple info. Max cues to recall/ use aids for complex/novel info and plan/follow through on simple future events    Level 5 (CJ): Min cues to recall/use aids for complex/novel info and to plan/follow through complex future events  Level 6 (CI): Recalls or uses aids/strategies for complex info & planning future events. Min cues for breakdowns   Level 7 ([address] [place] [address]): Independent with recall/use of aids/strategies. STEVE. (2003). National Outcomes Measurement System (NOMS): Adult Speech-Language Pathology User's Guide. Pain:           After treatment:   [ ]              Patient left in no apparent distress sitting up in chair  [X]              Patient left in no apparent distress in bed  [X]              Call bell left within reach  [X]              Nursing notified  [X]              Caregiver present  [ ]              Bed alarm activated      COMMUNICATION/EDUCATION:   The patients plan of care including recommendations and planned interventions was discussed with: Occupational Therapist, Registered Nurse and Physician. Patient was educated regarding His deficit(s) of integrated language impairment as this relates to His diagnosis of ?CVA. He demonstrated Guarded understanding as evidenced by poor insight into deficits. Wife and daughter verbalized understanding. [X]  Patient/family have participated as able in goal setting and plan of care. [X]  Patient/family agree to work toward stated goals and plan of care. [ ]  Patient understands intent and goals of therapy, but is neutral about his/her participation. [ ]  Patient is unable to participate in goal setting and plan of care.      Thank you for this referral.  Richie Lew, DULCE  Time Calculation: 17 mins

## 2017-09-01 NOTE — PROGRESS NOTES
Bedside shift change report given to Bri (oncoming nurse) by Frantz Paulson (offgoing nurse). Report included the following information SBAR, Kardex and MAR. Zone Phone:   [phone]      Significant changes during shift:  Admitted. Patient HR drop to 30s with irregular rhythm.  Took stat EKG for cardiologist to assess- cardiologist stated was fine to wait to consult until AM.        Patient Yamileth Sensing  68 y.o.  8/31/2017 12:09 PM by Tanya Ahn MD. Paolo Swanson was admitted from Home    Problem List    Patient Active Problem List    Diagnosis Date Noted    CVA (cerebral vascular accident) ([address] 08/31/2017    Hand abscess 08/15/2017    Alfonso's esophagus 07/28/2017    Carotid stenosis 07/28/2017    Diabetic neuropathy ([address] 07/28/2017    On statin therapy 07/28/2017    Hypertrophy (benign) of prostate 07/28/2017    Prostate cancer screening 07/28/2017    Essential hypertension 07/27/2017    Controlled type 2 diabetes mellitus without complication, with long-term current use of insulin ([address] 07/27/2017    Mixed hyperlipidemia 07/27/2017    ASVD (arteriosclerotic vascular disease) 07/27/2017    Infection of left hand 07/20/2017    Carotid arterial disease ([address] 06/29/2016    Diverticulosis 10/23/2013    Colitis, nonspecific 10/23/2013    Lumbar stenosis with neurogenic claudication 07/12/2011     Past Medical History:   Diagnosis Date    Allergic rhinitis     Anemia     ASVD (arteriosclerotic vascular disease)     Alfonso's esophagus 7/28/2017    Carotid stenosis 7/28/2017    Chronic kidney disease     Diabetes ([address]     glucose runs 70 - 160's at home    Diabetic neuropathy ([address] 7/28/2017    Diverticulosis     DJD (degenerative joint disease)     ED (erectile dysfunction)     Enlarged prostate     JUDY (generalized anxiety disorder)     GERD (gastroesophageal reflux disease)     controlled with med; alfonso's esophagus    Hiatal hernia     Hypercholesterolemia     Hypertension     Hypertrophy (benign) of prostate 7/28/2017    Ill-defined condition     peripheral vascular disease    Lung nodule     [facility]     On statin therapy 7/28/2017    Prostate cancer screening 7/28/2017    [facility]     Sebaceous cyst 7/28/2017    Spinal stenosis          Core Measures:    CVA: Yes Yes  CHF:No No  PNA:No No    Post Op Surgical (If Applicable):     Number times ambulated in hallway past shift:  0  Number of times OOB to chair past shift:   0    Activity Status:    OOB to Chair No  Ambulated this shift No   Bed Rest No    Supplemental O2: (If Applicable)    NC No  NRB No      LINES AND DRAINS:      PICC LINE? Yes Placement date: 8/21. Reason Medically Necessary: long term antibiotic use        DVT prophylaxis:    DVT prophylaxis Med- yes lovenox  DVT prophylaxis SCD or KEITH- No     Wounds: (If Applicable)    Wounds- No    Location     Patient Safety:    Falls Score Total Score: 2  Safety Level_______  Bed Alarm On? Yes  Sitter? No    Plan for upcoming shift: monitor HR, neuro and cardio consult.  MRI        Discharge Plan: No just admitted    Active Consults:  IP CONSULT TO HOSPITALIST  IP CONSULT TO NEUROLOGY  IP CONSULT TO CARDIOLOGY

## 2017-09-01 NOTE — WOUND CARE
Wound Care consult for POA left index finger amputation site. Patient is a 69 y/o CM admitted with Acute Encephalopathy POA Left parieto-occipital infarct  And pmhx: left finger hand infection, DM. Patient had Iodorm packing and dry dressing in wound. Unfortunately we do not have Iodoform packing available in this hospital.  WOUND POA CONDITIONS    Wound Hand Left (Active)   Number of days:16       Wound Hand Left (Active)   Number of days:16       Wound Finger Left (Active)   DRESSING STATUS Removed 9/1/2017  3:52 PM   DRESSING TYPE Moist to dry;Packing 9/1/2017  3:52 PM   Incision site well approximated? No 9/1/2017  3:52 PM   Non-Pressure Injury Full thickness (subcut/muscle) 9/1/2017  3:52 PM   Wound Length (cm) 5 cm 9/1/2017  3:52 PM   Wound Width (cm) 1.5 cm 9/1/2017  3:52 PM   Wound Depth (cm) 1 9/1/2017  3:52 PM   Wound Surface area (cm^3) 7.5 cm^2 9/1/2017  3:52 PM   Condition of Base Pink;Bone exposed 9/1/2017  3:52 PM   Condition of Edges Open 9/1/2017  3:52 PM   Tissue Type Pink; Other (comment) 9/1/2017  3:52 PM   Tissue Type Percent Pink 90 9/1/2017  3:52 PM   Tissue Type Percent Other (comment) 10 9/1/2017  3:52 PM   Drainage Amount  Scant 9/1/2017  3:52 PM   Drainage Color Serosanguinous 9/1/2017  3:52 PM   Wound Odor None 9/1/2017  3:52 PM   Periwound Skin Condition Intact 9/1/2017  3:52 PM   Cleansing and Cleansing Agents  Normal saline 9/1/2017  3:52 PM   Dressing Type Applied Wound gel;Xeroform;Alginate;Dry dressing 9/1/2017  3:52 PM   Procedure Tolerated Well 9/1/2017  3:52 PM   Number of days:1           Recommend while inpatient - Resume [facility] for wound care once discharged:  Left hand wound: daily cleanse with NS, wipe wound clean. Apply Carrysen wound gel to wound, cover with a strip of Alginate rope. Cover with dry 4x4's, secure with neli and then wrap Coban to hold in place.   López Gamboa RN, CWON, zone ph# [phone]

## 2017-09-01 NOTE — PROGRESS NOTES
[facility]  Preliminary Report:  Carotid Duplex Scan    Right:  Moderate plaque noted in the right carotid system. Right ICA velocities suggest 50-69% diameter reduction. Right vertebral artery flow is antegrade. Left:  Moderate plaque noted in the left carotid system. Left ICA velocities suggest 50-69% diameter reduction. Left vertebral artery flow is antegrade. Final report to follow.

## 2017-09-01 NOTE — PROGRESS NOTES
Spiritual Care Partner Volunteer visited patient on 9/1/2017. Documented by:  Visit by: Rev. Flores Duran.  Benson Keys MA, [facility]    Lead  Profession Development & Advancement

## 2017-09-01 NOTE — PROGRESS NOTES
Speech Pathology bedside swallow evaluation/discharge  Patient: Noni Salcedo (27 y.o. male)  Date: 9/1/2017  Primary Diagnosis: CVA (cerebral vascular accident) [facility])        Precautions:        ASSESSMENT :  Based on the objective data described below, the patient presents with an intact oropharyngeal swallow. Patient with timely/complete mastication, timely swallow initiation, functional hyolaryngeal elevation/excursion, and no overt s/s aspiration observed. Integrated language evaluation also completed this date, see separate note. Skilled therapy provided by a speech-language pathologist is not indicated at this time. PLAN :  Recommendations:  --Continue regular/thin liquid diet  Discharge Recommendations: To Be Determined     SUBJECTIVE:   Patient stated I'm not going to eat what you tell me or what this hospital tells me, I'm going to eat country food.  Patient alert, cooperative given encouragement. Oriented x4.     OBJECTIVE:     Past Medical History:   Diagnosis Date    Allergic rhinitis     Anemia     ASVD (arteriosclerotic vascular disease)     Alfonso's esophagus 7/28/2017    Carotid stenosis 7/28/2017    Chronic kidney disease     Diabetes ([address]     glucose runs 70 - 160's at home    Diabetic neuropathy ([address] 7/28/2017    Diverticulosis     DJD (degenerative joint disease)     ED (erectile dysfunction)     Enlarged prostate     JUDY (generalized anxiety disorder)     GERD (gastroesophageal reflux disease)     controlled with med; alfonso's esophagus    Hiatal hernia     Hypercholesterolemia     Hypertension     Hypertrophy (benign) of prostate 7/28/2017    Ill-defined condition     peripheral vascular disease    Lung nodule     Neuropathy ([address]     On statin therapy 7/28/2017    Prostate cancer screening 7/28/2017    PVD (peripheral vascular disease) ([address]     Sebaceous cyst 7/28/2017    Spinal stenosis      Past Surgical History:   Procedure Laterality Date    CARDIAC SURG PROCEDURE UNLIST      CATH-2008    COLONOSCOPY N/A 6/15/2016    COLONOSCOPY [facility]ic [facility]z [facility]  6/15/2016         HX CATARACT REMOVAL      BILATERAL    HX ORTHOPAEDIC  1977    BONE CYST REM,ANU FROM RIGHT LEG    HX ORTHOPAEDIC  5-, 7-22-17, 7-26-17    left hand index finger    HX OTHER SURGICAL Left 06/2016    carotid endarectomy    NEUROLOGICAL PROCEDURE UNLISTED  2011    Back: spinal stenosis, pinched nerve repair    UPPER GI ENDOSCOPY,BIOPSY  6/15/2016          Prior Level of Function/Home Situation:   Home Situation  Home Environment: Private residence  One/Two Story Residence: Two story  Living Alone: No  Support Systems: Family member(s), Child(jessica)  Patient Expects to be Discharged to[de-identified] Private residence  Current DME Used/Available at Home: Blood pressure cuff, Cane, quad, Glucometer  Diet prior to admission: regular/thin  Current Diet:  Regular/thin   Cognitive and Communication Status:  Neurologic State: Alert  Orientation Level: Oriented X4  Cognition: Follows commands, Decreased attention/concentration  Perception: Appears intact  Perseveration: No perseveration noted  Safety/Judgement: Awareness of environment, Decreased insight into deficits  Oral Assessment:  Oral Assessment  Labial: No impairment  Dentition: Natural  Oral Hygiene: moist oral mucosa  Lingual: No impairment  Velum: No impairment  Mandible: No impairment  P.O. Trials:  Patient Position: upright in bed  Vocal quality prior to P.O.: No impairment  Consistency Presented: Ice chips; Thin liquid;Puree; Solid  How Presented: Self-fed/presented;Cup/sip;Cup/gulp; Spoon;Straw;Successive swallows     Bolus Acceptance: No impairment  Bolus Formation/Control: No impairment     Propulsion: No impairment  Oral Residue: None  Initiation of Swallow: No impairment  Laryngeal Elevation: Functional  Aspiration Signs/Symptoms: None  Pharyngeal Phase Characteristics: No impairment, issues, or problems   Effective Modifications: None  Cues for Modifications: None       Oral Phase Severity: No impairment  Pharyngeal Phase Severity : No impairment  NOMS:   The NOMS functional outcome measure was used to quantify this patient's level of swallowing impairment. Based on the NOMS, the patient was determined to be at level 7 for swallow function     G Codes: In compliance with CMSs Claims Based Outcome Reporting, the following G-code set was chosen for this patient based the use of the NOMS functional outcome to quantify this patient's level of swallowing impairment. Using the NOMS, the patient was determined to be at level 7 for swallow function which correlates with the CH= 0% level of severity. Based on the objective assessment provided within this note, the current, goal, and discharge g-codes are as follows:    Swallow  Swallowing:   Swallow Current Status CH= 0%   Swallow Goal Status CH= 0%   Swallow D/C Status CH= 0%        NOMS Swallowing Levels:  Level 1 (CN): NPO  Level 2 (CM): NPO but takes consistency in therapy  Level 3 (CL): Takes less than 50% of nutrition p.o. and continues with nonoral feedings; and/or safe with mod cues; and/or max diet restriction  Level 4 (CK): Safe swallow but needs mod cues; and/or mod diet restriction; and/or still requires some nonoral feeding/supplements  Level 5 (CJ): Safe swallow with min diet restriction; and/or needs min cues  Level 6 (CI): Independent with p.o.; rare cues; usually self cues; may need to avoid some foods or needs extra time  Level 7 ([address] [place] [address]): Independent for all p.o.  STEVE. (2003). National Outcomes Measurement System (NOMS): Adult Speech-Language Pathology User's Guide.        Pain:         After treatment:   [] Patient left in no apparent distress sitting up in chair  [x] Patient left in no apparent distress in bed  [x] Call bell left within reach  [x] Nursing notified  [x] Caregiver present  [] Bed alarm activated    COMMUNICATION/EDUCATION:   The patients plan of care including findings, recommendations, and recommended diet changes were discussed with: Registered Nurse. Patient was educated regarding His deficit(s) of functional swallow as this relates to His diagnosis of ?CVA. He demonstrated Good understanding as evidenced by verbalizing understanding. Wife and daughter also verbalized understanding. [x] Patient/family have participated as able and agree with findings and recommendations. [] Patient is unable to participate in plan of care at this time.     Thank you for this referral.  Cash Pepe SLP  Time Calculation: 10 mins

## 2017-09-01 NOTE — PROGRESS NOTES
Occupational Therapy  Orders received and medical record reviewed. Pt remains off the floor at this time. Will defer and continue to follow.

## 2017-09-01 NOTE — PROGRESS NOTES
Problem: Falls - Risk of  Goal: *Absence of Falls  Document Chau Fall Risk and appropriate interventions in the flowsheet. Outcome: Progressing Towards Goal  Patient is alert and oriented X4, has gripper socks on, bed in low position, call bell within reach, and patient knows to call for assistance when needed.

## 2017-09-01 NOTE — PROGRESS NOTES
Telemetry called reporting a drop in HR to 36 and potential 3rd degree heart block. On call doctor paged. Dr. Nehal Mcneil ordered a stat EKG, cardiology consut and discontinued his beta blockers. Dr. Mahendra Trevino paged from cardiology. Dr. Mahendra Trevino examined the EKG and stated the consult could wait until morning. No further orders at this time. Patient alert and oriented and resting comfortably throughout.

## 2017-09-01 NOTE — PROGRESS NOTES
Bedside shift change report given to Abigail  RN (oncoming nurse) by Toni Rhoades RN (offgoing nurse).  Report included the following information SBAR, Kardex and MAR.     Zone Phone:   [phone]        Significant changes during shift:  rhythm varies, wound care came to perform amputation care, MRI positive for a stroke,         Patient Information     Prabhakar Partida  68 y.o.  8/31/2017 12:09 PM by Darrel Leiva MD. Prabhakar Partida was admitted from Home     Problem List          Patient Active Problem List     Diagnosis Date Noted    CVA (cerebral vascular accident) ([address] 08/31/2017    Hand abscess 08/15/2017    Alfonso's esophagus 07/28/2017    Carotid stenosis 07/28/2017    Diabetic neuropathy ([address] 07/28/2017    On statin therapy 07/28/2017    Hypertrophy (benign) of prostate 07/28/2017    Prostate cancer screening 07/28/2017    Essential hypertension 07/27/2017    Controlled type 2 diabetes mellitus without complication, with long-term current use of insulin ([address] 07/27/2017    Mixed hyperlipidemia 07/27/2017    ASVD (arteriosclerotic vascular disease) 07/27/2017    Infection of left hand 07/20/2017    Carotid arterial disease ([address] 06/29/2016    Diverticulosis 10/23/2013    Colitis, nonspecific 10/23/2013    Lumbar stenosis with neurogenic claudication 07/12/2011           Past Medical History:   Diagnosis Date    Allergic rhinitis      Anemia      ASVD (arteriosclerotic vascular disease)      Alfonso's esophagus 7/28/2017    Carotid stenosis 7/28/2017    Chronic kidney disease      Diabetes ([address]       glucose runs 70 - 160's at home    Diabetic neuropathy ([address] 7/28/2017    Diverticulosis      DJD (degenerative joint disease)      ED (erectile dysfunction)      Enlarged prostate      JUDY (generalized anxiety disorder)      GERD (gastroesophageal reflux disease)       controlled with med; alfonso's esophagus    Hiatal hernia      Hypercholesterolemia      Hypertension      Hypertrophy (benign) of prostate 7/28/2017    Ill-defined condition       peripheral vascular disease    Lung nodule      Neuropathy (HCC)      On statin therapy 7/28/2017    Prostate cancer screening 7/28/2017    [facility]      Sebaceous cyst 7/28/2017    Spinal stenosis              Core Measures:     CVA: Yes Yes  CHF:No No  PNA:No No     Post Op Surgical (If Applicable):      Number times ambulated in hallway past shift:  0  Number of times OOB to chair past shift:   0     Activity Status:     OOB to Chair No  Ambulated this shift No   Bed Rest No     Supplemental O2: (If Applicable)     NC No  NRB No        LINES AND DRAINS:        PICC LINE? Yes Placement date: 8/21. Reason Medically Necessary: long term antibiotic use           DVT prophylaxis:     DVT prophylaxis Med- yes lovenox  DVT prophylaxis SCD or KEITH- No      Wounds: (If Applicable)     Wounds- No     Location      Patient Safety:     Falls Score Total Score: 2  Safety Level_______  Bed Alarm On? Yes  Sitter?  No     Plan for upcoming shift: monitor HR           Discharge Plan: No just admitted     Active Consults:  IP CONSULT TO HOSPITALIST  IP CONSULT TO NEUROLOGY  IP CONSULT TO CARDIOLOGY

## 2017-09-01 NOTE — PROGRESS NOTES
PROGRESS NOTE    NAME:  Kiara Faith   :      MRN:   [id number]     Date/Time:  2017 7:08 AM  Subjective:   History:  Chart reviewed and patient seen and examined this AM and D/W his nurse and all events noted. He presented to the ER yesterday with confusion and was admitted for a CVA confirmed by CT Head. He had no focal weakness or speech impediment as he was told although he can't remember the episode of confusion. He denies any current confusion and has no other neurologic c/o. He denies any cardio/respiratory c/o. There are no GI/  c/o. He has no other c/o on complete 14 point ROS. He has had recent several surgical procedures of L hand due to laceration with subsequent abcess and eventual loss of 2nd finger and remains on antibiotics and wound care daily.         Medications reviewed:  Current Facility-Administered Medications   Medication Dose Route Frequency    sodium chloride (NS) flush 5-10 mL  5-10 mL IntraVENous Q8H    sodium chloride (NS) flush 5-10 mL  5-10 mL IntraVENous PRN    ertapenem (INVANZ) 1 g in 0.9% sodium chloride 50 mL IVPB  1 g IntraVENous Q24H    amLODIPine (NORVASC) tablet 5 mg  5 mg Oral DAILY    gabapentin (NEURONTIN) capsule 300 mg  300 mg Oral TID    insulin glargine (LANTUS) injection 8 Units  8 Units SubCUTAneous QHS    ferrous sulfate tablet 325 mg  325 mg Oral ACD    lactobac ac& pc-s.therm-b.anim (LETICIA Q/RISAQUAD)  1 Cap Oral DAILY    valsartan (DIOVAN) tablet 160 mg  160 mg Oral DAILY    insulin lispro (HUMALOG) injection   SubCUTAneous AC&HS    glucose chewable tablet 16 g  4 Tab Oral PRN    glucagon (GLUCAGEN) injection 1 mg  1 mg IntraMUSCular PRN    hydrALAZINE (APRESOLINE) 20 mg/mL injection 10 mg  10 mg IntraVENous Q6H PRN    sodium chloride (NS) flush 5-10 mL  5-10 mL IntraVENous Q8H    sodium chloride (NS) flush 5-10 mL  5-10 mL IntraVENous PRN    acetaminophen (TYLENOL) tablet 650 mg  650 mg Oral Q4H PRN    Or    acetaminophen (TYLENOL) solution 650 mg  650 mg Per NG tube Q4H PRN    Or    acetaminophen (TYLENOL) suppository 650 mg  650 mg Rectal Q4H PRN    clopidogrel (PLAVIX) tablet 75 mg  75 mg Oral DAILY    enoxaparin (LOVENOX) injection 40 mg  40 mg SubCUTAneous Q24H    0.9% sodium chloride infusion  25 mL/hr IntraVENous PRN    heparin (porcine) pf 500 Units  500 Units IntraVENous PRN    sodium chloride (NS) flush 10-40 mL  10-40 mL IntraVENous PRN    dextrose 10% infusion 125-250 mL  125-250 mL IntraVENous PRN    tamsulosin (FLOMAX) capsule 0.4 mg  0.4 mg Oral DAILY    pravastatin (PRAVACHOL) tablet 20 mg  20 mg Oral QHS    pantoprazole (PROTONIX) tablet 40 mg  40 mg Oral QHS     Facility-Administered Medications Ordered in Other Encounters   Medication Dose Route Frequency    sodium chloride (NS) flush 10-40 mL  10-40 mL IntraVENous PRN        Objective:   Vitals:  Visit Vitals    /73    Pulse 62    Temp 97.8 °F (36.6 °C)    Resp 16    Ht 5' 10\" (1.778 m)    Wt 189 lb 9.6 oz (86 kg)    SpO2 97%    BMI 28 kg/m2      O2 Device: Room air Temp (24hrs), Av.8 °F (36.6 °C), Min:97.6 °F (36.4 °C), Max:97.8 °F (36.6 °C)      Last 24hr Input/Output:    Intake/Output Summary (Last 24 hours) at 17 0708  Last data filed at 17 0352   Gross per 24 hour   Intake                0 ml   Output              475 ml   Net             -475 ml        PHYSICAL EXAM:  General:     Alert, cooperative, no distress, appears stated age. Head:    Normocephalic, without obvious abnormality, atraumatic. Eyes:    Conjunctivae/corneas clear. PERRLA  Nose:   Nares normal. No drainage or sinus tenderness. Throat:     Lips, mucosa, and tongue normal.  No Thrush  Neck:   Supple, symmetrical,  no adenopathy, thyroid: non tender     no L carotid bruit but harsh R carotid Bruit,  no JVD. Back:     Symmetric,  No CVA tenderness. Lungs:    Clear to auscultation bilaterally. No Wheezing or Rhonchi. No rales.   Heart:    Regular rate and rhythm,  no murmur, rub or gallop. Abdomen:    Soft, non-tender. Not distended. Bowel sounds normal. No masses  Extremities:  Extremities normal except L hand bandaged, atraumatic, No cyanosis. No edema. No clubbing  Lymph nodes:  Cervical, supraclavicular normal.  Neurologic:  Normal strength, Alert and oriented X 3. Skin:                 No rash      Lab Data Reviewed:    Recent Results (from the past 24 hour(s))   CBC WITH AUTOMATED DIFF    Collection Time: 08/31/17  1:14 PM   Result Value Ref Range    WBC 7.7 4.1 - 11.1 K/uL    RBC 3.00 (L) 4.10 - 5.70 M/uL    HGB 8.3 (L) 12.1 - 17.0 g/dL    HCT 25.8 (L) 36.6 - 50.3 %    MCV 86.0 80.0 - 99.0 FL    MCH 27.7 26.0 - 34.0 PG    MCHC 32.2 30.0 - 36.5 g/dL    RDW 17.1 (H) 11.5 - 14.5 %    PLATELET 564 062 - 137 K/uL    NEUTROPHILS 82 (H) 32 - 75 %    LYMPHOCYTES 13 12 - 49 %    MONOCYTES 5 5 - 13 %    EOSINOPHILS 0 0 - 7 %    BASOPHILS 0 0 - 1 %    ABS. NEUTROPHILS 6.3 1.8 - 8.0 K/UL    ABS. LYMPHOCYTES 1.0 0.8 - 3.5 K/UL    ABS. MONOCYTES 0.4 0.0 - 1.0 K/UL    ABS. EOSINOPHILS 0.0 0.0 - 0.4 K/UL    ABS. BASOPHILS 0.0 0.0 - 0.1 K/UL   METABOLIC PANEL, COMPREHENSIVE    Collection Time: 08/31/17  1:14 PM   Result Value Ref Range    Sodium 142 136 - 145 mmol/L    Potassium 4.0 3.5 - 5.1 mmol/L    Chloride 108 97 - 108 mmol/L    CO2 27 21 - 32 mmol/L    Anion gap 7 5 - 15 mmol/L    Glucose 158 (H) 65 - 100 mg/dL    BUN 33 (H) 6 - 20 MG/DL    Creatinine 1.10 0.70 - 1.30 MG/DL    BUN/Creatinine ratio 30 (H) 12 - 20      GFR est AA >60 >60 ml/min/1.73m2    GFR est non-AA >60 >60 ml/min/1.73m2    Calcium 8.1 (L) 8.5 - 10.1 MG/DL    Bilirubin, total 0.2 0.2 - 1.0 MG/DL    ALT (SGPT) 11 (L) 12 - 78 U/L    AST (SGOT) 17 15 - 37 U/L    Alk.  phosphatase 94 45 - 117 U/L    Protein, total 5.8 (L) 6.4 - 8.2 g/dL    Albumin 2.1 (L) 3.5 - 5.0 g/dL    Globulin 3.7 2.0 - 4.0 g/dL    A-G Ratio 0.6 (L) 1.1 - 2.2     MAGNESIUM    Collection Time: 08/31/17  1:14 PM   Result Value Ref Range    Magnesium 2.0 1.6 - 2.4 mg/dL   PROTHROMBIN TIME + INR    Collection Time: 08/31/17  1:14 PM   Result Value Ref Range    INR 1.1 0.9 - 1.1      Prothrombin time 10.6 9.0 - 11.1 sec   URINALYSIS W/MICROSCOPIC    Collection Time: 08/31/17  1:14 PM   Result Value Ref Range    Color YELLOW/STRAW      Appearance CLEAR CLEAR      Specific gravity 1.018 1.003 - 1.030      pH (UA) 6.0 5.0 - 8.0      Protein 300 (A) NEG mg/dL    Glucose 250 (A) NEG mg/dL    Ketone NEGATIVE  NEG mg/dL    Bilirubin NEGATIVE  NEG      Blood NEGATIVE  NEG      Urobilinogen 0.2 0.2 - 1.0 EU/dL    Nitrites NEGATIVE  NEG      Leukocyte Esterase NEGATIVE  NEG      WBC 0-4 0 - 4 /hpf    RBC 0-5 0 - 5 /hpf    Epithelial cells FEW FEW /lpf    Bacteria NEGATIVE  NEG /hpf    Hyaline cast 2-5 0 - 5 /lpf   CBC WITH AUTOMATED DIFF    Collection Time: 08/31/17  8:51 PM   Result Value Ref Range    WBC 6.0 4.1 - 11.1 K/uL    RBC 2.56 (L) 4.10 - 5.70 M/uL    HGB 7.2 (L) 12.1 - 17.0 g/dL    HCT 22.1 (L) 36.6 - 50.3 %    MCV 86.3 80.0 - 99.0 FL    MCH 28.1 26.0 - 34.0 PG    MCHC 32.6 30.0 - 36.5 g/dL    RDW 17.2 (H) 11.5 - 14.5 %    PLATELET 149 199 - 458 K/uL    NEUTROPHILS 67 32 - 75 %    LYMPHOCYTES 21 12 - 49 %    MONOCYTES 9 5 - 13 %    EOSINOPHILS 2 0 - 7 %    BASOPHILS 1 0 - 1 %    ABS. NEUTROPHILS 4.0 1.8 - 8.0 K/UL    ABS. LYMPHOCYTES 1.3 0.8 - 3.5 K/UL    ABS. MONOCYTES 0.5 0.0 - 1.0 K/UL    ABS. EOSINOPHILS 0.1 0.0 - 0.4 K/UL    ABS.  BASOPHILS 0.1 0.0 - 0.1 K/UL   METABOLIC PANEL, BASIC    Collection Time: 08/31/17  8:51 PM   Result Value Ref Range    Sodium 141 136 - 145 mmol/L    Potassium 4.2 3.5 - 5.1 mmol/L    Chloride 109 (H) 97 - 108 mmol/L    CO2 27 21 - 32 mmol/L    Anion gap 5 5 - 15 mmol/L    Glucose 189 (H) 65 - 100 mg/dL    BUN 30 (H) 6 - 20 MG/DL    Creatinine 1.20 0.70 - 1.30 MG/DL    BUN/Creatinine ratio 25 (H) 12 - 20      GFR est AA >60 >60 ml/min/1.73m2    GFR est non-AA 59 (L) >60 ml/min/1.73m2    Calcium 7.8 (L) 8.5 - 10.1 MG/DL   GLUCOSE, POC    Collection Time: 08/31/17  9:23 PM   Result Value Ref Range    Glucose (POC) 214 (H) 65 - 100 mg/dL    Performed by Ilana Haley (S0N)    LIPID PANEL    Collection Time: 09/01/17  3:31 AM   Result Value Ref Range    LIPID PROFILE          Cholesterol, total 108 <200 MG/DL    Triglyceride 89 <150 MG/DL    HDL Cholesterol 48 MG/DL    LDL, calculated 42.2 0 - 100 MG/DL    VLDL, calculated 17.8 MG/DL    CHOL/HDL Ratio 2.3 0 - 5.0     HEMOGLOBIN A1C WITH EAG    Collection Time: 09/01/17  3:31 AM   Result Value Ref Range    Hemoglobin A1c 6.9 (H) 4.2 - 6.3 %    Est. average glucose 151 mg/dL   GLUCOSE, POC    Collection Time: 09/01/17  6:24 AM   Result Value Ref Range    Glucose (POC) 101 (H) 65 - 100 mg/dL    Performed by Ilana Solis)          Assessment/Plan:     Principal Problem:    CVA (cerebral vascular accident) ([address] (8/31/2017)    Active Problems:    Essential hypertension (7/27/2017)      Controlled type 2 diabetes mellitus without complication, with long-term current use of insulin ([address] (7/27/2017)      Mixed hyperlipidemia (7/27/2017)      ASVD (arteriosclerotic vascular disease) (7/27/2017)      Overview: Carotid Endarectomy 2017      Infection of left hand (7/20/2017)      Overview: Debridement on several occasions 8/2017. Amputation of left second       finger. Diabetic neuropathy ([address] (7/28/2017)      On statin therapy (7/28/2017)       ___________________________________________________  PLAN:    1. Continue ASA and Plavix which was added as on ASA at the time of this event  2. Carotid Dopplers  3.  Echocardiogram  4. For DM follow BS and treat with SSI  5. Continue his daily Lantus  6. Hold Norvasc if BP remains low, continue Diovan  7. Continue Ivanz for hand infection and continue wound care  8. Continue Pravachol for Hyperlipidemia  9. Gabapentin for his diabetic neuropathy  10. Lovenox for DVT prevention  11.  Continue Iron but increase to TID and add Vitamin C and follow Hgb now 7.2 from 8.3 on admission  12.  Other treatment as ordered    40 minutes spent in direct care of this complex patient today        ___________________________________________________    Attending Physician: Ignacio Mcmahon MD

## 2017-09-01 NOTE — PROGRESS NOTES
Pt is a 68 y.o  male admitted with a CVA. Pt is a Level 3 Readmission. Pt's recent admissions were at [facility], 7/20-7/24/17 for a Left Hand Infection, 8/15-8/21/17 with a Left Hand Infection and Left Hand Abscess. Prior to admission, pt was receiving outpt IV antibiotics from [address] [place] [address] and was receiving home health skilled nursing visits for daily wound care from At [address] [place] [address]. CM met with pt's wife and daughter while pt was resting in bed. Demographic information verified and all is correct. Pt lives with his wife in a 1 story home with a few steps to the entrance. Pt has a walker and walking stick at home. Prior to admission, pt was independent with his ADL's and IADL's. Pt drives and follows up with his PCP regularly. Pt had inpt rehab in the past with [address] [place] [address] and recently outpt PT with [facility]. Preferred pharmacy is [facility] in [place]. Pt's wife would like [facility] [address] [place] [address] if home health is ordered. CM will sent a referral to At [address] [place] [address] via [employer]. FOC form completed and filed on pt's chart. CM consult noted and will continue to follow pt for discharge planning needs. Care Management Interventions  PCP Verified by CM: Yes (Dr. Shasta Jaime)  Mode of Transport at Discharge:  Other (see comment) (wife or daughter can transport by car)  Transition of Care Consult (CM Consult): Discharge Planning  Discharge Durable Medical Equipment: No (has a walker and walking stick )  Physical Therapy Consult: Yes  Occupational Therapy Consult: Yes  Speech Therapy Consult: Yes  Current Support Network: Lives with Spouse, Own Home (lives with his wife in a 1 story home with a few steps to the entrance)  Confirm Follow Up Transport: Family  Discharge Location  Discharge Placement: [address] [place], [address] [facility]

## 2017-09-01 NOTE — PROGRESS NOTES
Occupational Therapy  Attempting to initiate OT Evaluation. Pt has been with SLP and neurology. Pt is now eating his lunch seated EOB and the alarm sounding; per his daughter (who was out of his room seeking assist for the alarm)  --\"he's cranky today. \"  Will defer at this time and continue to follow.

## 2017-09-01 NOTE — PROGRESS NOTES
Stroke Education provided to patient and the following topics were discussed    1. Patients personal risk factors for stroke are hypertension, smoking and diabetes mellitus    2. Warning signs of Stroke:        * Sudden numbness or weakness of the face, arm or leg, especially on one side of          The body            * Sudden confusion, trouble speaking or understanding        * Sudden trouble seeing in one or both eyes        * Sudden trouble walking, dizziness, loss of balance or coordination        * Sudden severe headache with no known cause      3. Importance of activation Emergency Medical Services ( 9-1-1 ) immediately if experience any warning signs of stroke. 4. Be sure and schedule a follow-up appointment with your primary care doctor or any specialists as instructed. 5. You must take medicine every day to treat your risk factors for stroke. Be sure to take your medicines exactly as your doctor tells you: no more, no less. Know what your medicines are for , what they do. Anti-thrombotics /anticoagulants can help prevent strokes. You are taking the following medicine(s)  asprin     6. Smoking and second-hand smoke greatly increase your risk of stroke, cardiovascular disease and death. Smoking history : stopped 24 years ago      7. Information provided was [facility] Stroke Education Binder, Stroke Handouts or Verbal Education    8. Documentation of teaching completed in Patient Education Activity and on Care Plan with teaching response noted?   yes

## 2017-09-01 NOTE — PROGRESS NOTES
Problem: Self Care Deficits Care Plan (Adult)  Goal: *Acute Goals and Plan of Care (Insert Text)  Occupational Therapy Goals  Initiated 9/1/2017  1. Patient will perform grooming in standing with supervision/set-up within 7 day(s). 2. Patient will perform bathing with supervision/set-up within 7 day(s). 3. Patient will perform upper body dressing and lower body dressing with supervision/set-up within 7 day(s). 4. Patient will perform toilet transfers with supervision/set-up within 7 day(s). 5. Patient will perform all aspects of toileting with supervision/set-up within 7 day(s). 6. Patient will participate in upper extremity therapeutic exercise/activities with supervision/set-up for 10 minutes within 7 day(s). 7. Patient will perform simple home management with Superivsion within 7 day(s). 8. Patient will perfrom standing adls for at least 10 minutes within 7 days. OCCUPATIONAL THERAPY EVALUATION  Patient: Tia Moya (1 y.o. male)  Date: 9/1/2017  Primary Diagnosis: CVA (cerebral vascular accident) [facility])        Precautions: fall         ASSESSMENT :  Based on the objective data described below, the patient presents with cognitive deficits per medical record, (would benefit from [facility] REHABILITATION testing), impaired vision in left field, decreased insight, decreased balance, generalized weakness and endurance impairing independence and safety during adls and functional mobility. Pt had high BG levels during tx session and had just completed wound care for L hand infection/recent L index finger amputation (original injury in 5/2017). His L hand was bandaged and generally non functional and sore at this time. Pt is functioning below his baseline for adls and is set up to minimal assistance levels for self care and is CGA for functional mobility. He is unsteady and needs constant 1:1 support for ambulation.   Pt's wife was present and per medical record, she has dementia?--combined with pt's decreased insight into deficits and cognitive impairment, it may not be a safe situation for the couple and may need  24 hour assistance from family/paid caregiver if he goes home. Per medical record, pt is adamant re: returning home at discharge. If that is the case, then recommend [facility] and 24 hour assistance-not to be provided by his wife. Pt may benefit from short term intensive rehab at discharge to maximize safety during adls and mobility. Will follow to determine best discharge plan. Barthel Index 60/100  Fugl caputo 65/66  Patient will benefit from skilled intervention to address the above impairments. Patients rehabilitation potential is considered to be Good  Factors which may influence rehabilitation potential include:   [ ]             None noted  [X]             Mental ability/status  [ ]             Medical condition  [X]             Home/family situation and support systems  [ ]             Safety awareness  [ ]             Pain tolerance/management  [ ]             Other:        PLAN :  Recommendations and Planned Interventions:  [X]               Self Care Training                  [X]        Therapeutic Activities  [X]               Functional Mobility Training    [X]        Cognitive Retraining  [X]               Therapeutic Exercises           [X]        Endurance Activities  [X]               Balance Training                   [ ]        Neuromuscular Re-Education  [X]               Visual/Perceptual Training     [X]   Home Safety Training  [X]               Patient Education                 [X]        Family Training/Education  [ ]               Other (comment):     Frequency/Duration: Patient will be followed by occupational therapy 5 times a week to address goals.   Discharge Recommendations: Rehab vs. Home with  therapies and 24 hour assistance from someone other than pt's wife due to dementia (per chart)  Further Equipment Recommendations for Discharge: tbd       SUBJECTIVE:   Patient stated I've got an eye doctor appointment next week.       OBJECTIVE DATA SUMMARY:   HISTORY:   Past Medical History:   Diagnosis Date    Allergic rhinitis      Anemia      ASVD (arteriosclerotic vascular disease)      Alfonso's esophagus 7/28/2017    Carotid stenosis 7/28/2017    Chronic kidney disease      Diabetes ([facility]       glucose runs 70 - 160's at home    Diabetic neuropathy ([facility] 7/28/2017    Diverticulosis      DJD (degenerative joint disease)      ED (erectile dysfunction)      Enlarged prostate      JUDY (generalized anxiety disorder)      GERD (gastroesophageal reflux disease)       controlled with med; alfonso's esophagus    Hiatal hernia      Hypercholesterolemia      Hypertension      Hypertrophy (benign) of prostate 7/28/2017    Ill-defined condition       peripheral vascular disease    Lung nodule      Neuropathy ([facility]      On statin therapy 7/28/2017    Prostate cancer screening 7/28/2017    PVD (peripheral vascular disease) ([facility]      Sebaceous cyst 7/28/2017    Spinal stenosis       Past Surgical History:   Procedure Laterality Date    CARDIAC SURG PROCEDURE UNLIST         CATH-2008    COLONOSCOPY N/A 6/15/2016     COL[facility]   6/15/2016          HX CATARACT REMOVAL         BILATERAL    HX ORTHOPAEDIC   1977     BONE CYST REM,ANU FROM RIGHT LEG    HX ORTHOPAEDIC   5-, 7-22-17, 7-26-17     left hand index finger    HX OTHER SURGICAL Left 06/2016     carotid endarectomy    NEUROLOGICAL PROCEDURE UNLISTED   2011     Back: spinal stenosis, pinched nerve repair    UPPER GI ENDOSCOPY,BIOPSY   6/15/2016              Prior Level of Function/Home Situation: Pt reports independence with some assistance from his wife since he injured his L hand. Pt had enjoyed independence in adls, IADLs, lawn care etc. DRove and performed lawn care for others.   Expanded or extensive additional review of patient history:  In May 2017 pt had saw injury involving L index finger (that is now amputated) and subsequent hand infection. Now on IV antibiotics that he receives at infusion center     [address]: Private residence  One/Two Story Residence: Two story  Living Alone: No  Support Systems: Family member(s), Child(jessica)  Patient Expects to be Discharged to[de-identified] Private residence  Current DME Used/Available at Home: Blood pressure cuff, Grab bars, Shower chair  Tub or Shower Type: Tub/Shower combination (does not shower due to L hand amputation infect/PICC line)  [X]  Right hand dominant             [ ]  Left hand dominant     EXAMINATION OF PERFORMANCE DEFICITS:  Cognitive/Behavioral Status:  Neurologic State: Alert  Orientation Level: Oriented X4  Cognition: Decreased attention/concentration; Follows commands  Perception: Cues to attend left visual field  Perseveration: No perseveration noted  Safety/Judgement: Awareness of environment;Decreased awareness of need for assistance;Decreased awareness of need for safety;Decreased insight into deficits; Fall prevention;Home safety     Skin: impaired LUE--bandaged     Edema: none observed     Hearing: Auditory  Auditory Impairment: None     Vision/Perceptual:    Tracking: Requires cues, head turns, or add eye shifts to track    Saccades: Additional head turns occurred during testing         Visual Fields: Difficulty detecting stimulus  in left lateral quadrant; Difficulty detecting stimulus in left lower quadrant; Difficulty detecting stimulus in left upper quadrant (minus approx 25 degrees L field.)  Diplopia: No    Acuity: Impaired near vision (wears reading glasses, reports difficulty reading at baselin)    Corrective Lenses: Reading glasses (has eye MD appointment soon)     Range of Motion:  BUEs:  Within funcitonal limits except for bandaged and sore L hand                             Strength:  RUE within functional limits  Unable to test LUE due to bandage/soreness                    Coordination:     Fine Motor Skills-Upper: Left Impaired;Right Intact (recent L index finger amputation and hand infection)    Gross Motor Skills-Upper: Left Intact; Right Intact     Tone & Sensation: Tone is normal  LLE-pt reports that his LLE does not feel right  Pt has decreased insight into deficits                             Balance:  Sitting: Intact  Standing: Impaired  Standing - Static: Fair  Standing - Dynamic : Fair (needs CGA   /BG running high this tx session )     Functional Mobility and Transfers for ADLs:  Bed Mobility:  Supine to Sit: Supervision  Sit to Supine: Supervision  Scooting: Supervision     Transfers:  Sit to Stand: Contact guard assistance  Stand to Sit: Contact guard assistance  Toilet Transfer : Contact guard assistance     ADL Assessment:  Feeding: Setup     Oral Facial Hygiene/Grooming: Setup;Minimum assistance     Bathing: Minimum assistance     Upper Body Dressing: Setup     Lower Body Dressing: Minimum assistance     Toileting: Supervision;Stand by assistance                 ADL Intervention and task modifications:     Pt performed bed mobiltiy, ambulation in room and bathroom, toilet transfer all with CGA. Pt is generally unsteady. Educated on safety and to call for nurses assistance                                      Cognitive Retraining  Safety/Judgement: Awareness of environment;Decreased awareness of need for assistance;Decreased awareness of need for safety;Decreased insight into deficits; Fall prevention;Home safety        Functional Measure:  Fugl-Gaston Assessment of Motor Recovery after Stroke:       Reflex Activity  Flexors/Biceps/Fingers: Can be elicited  Extensors/Triceps: Can be elicited  Reflex Subtotal: 4     Volitional Movement Within Synergies  Shoulder Retraction: Full  Shoulder Elevation: Full  Shoulder Abduction (90 degrees): Full  Shoulder External Rotation: Full  Elbow Flexion: Full  Forearm Supination: Full  Shoulder Adduction/Internal Rotation: Full  Elbow Extension: Full  Forearm Pronation: Full  Subtotal: 18     Volitional Movement Mixing Synergies  Hand to Lumbar Spine: Full  Shoulder Flexion (0-90 degrees): Full  Pronation-Supination: Full  Subtotal: 6     Volitional Movement With Little or No Synergy  Shoulder Abduction (0-90 degrees): Full  Shoulder Flexion ( degrees): Full  Pronation/Supination: Full  Subtotal : 6     Normal Reflex Activity  Biceps, Triceps, Finger Flexors: Full  Subtotal : 2     Upper Extremity Total   Upper Extremity Total: 36     Wrist  Stability at 15 Degree Dorsiflexion: Full  Repeated Dorsiflexion/ Volar Flexion: Full  Stability at 15 Degree Dorsiflexion: Full  Repeated Dorsiflexion/ Volar Flexion: Full  Circumduction: Partial  Wrist Total: 9     Hand  Mass Flexion: Full  Mass Extension: Full  Grasp A: Full  Grasp B: Full  Grasp C: Full  Grasp D: Full  Grasp E: Full  Hand Total: 14     Coordination/Speed  Tremor: None  Dysmetria: None  Time: <1s (used thumb on L hand which is sore from his wound/ wound car)  Coordination/Speed Total : 6     Total A-D  Total A-D (Motor Function): 65/66         Percentage of impairment CH  0% CI  1-19% CJ  20-39% CK  40-59% CL  60-79% CM  80-99% CN  100%   Fugl-Gaston score: 0-66 66 53-65 39-52 26-38 13-25 1-12    0      This is a reliable/valid measure of arm function after a neurological event. It has established value to characterize functional status and for measuring spontaneous and therapy-induced recovery; tests proximal and distal motor functions. Fugl-Gaston Assessment  UE scores recorded between five and 30 days post neurologic event can be used to predict UE recovery at six months post neurologic event. Severe = 0-21 points   Moderately Severe = 22-33 points   Moderate = 34-47 points   Mild = 48-66 points  RAQUEL Gilbert, MICHEL Goodman, & KAREN Cleveland (1992). Measurement of motor recovery after stroke:  Outcome assessment and sample size requirements. Stroke, 23, pp. 6356-1447.   --------------------------------------------------------------------------------------------------------------------------------------------------------------------  MCID:  Stroke:   Lee Saldaña al, 2001; n = 171; mean age 79 (5) years; assessed within 16 (12) days of stroke, Acute Stroke)  FMA Motor Scores from Admission to Discharge   · 10 point increase in FMA Upper Extremity = 1.5 change in discharge FIM  · 10 point increase in FMA Lower Extremity = 1.9 change in discharge FIM  MDC:   Stroke:   Gilda Puente et al, 2008, n = 14, mean age = 59.9 (14.6) years, assessed on average 14 (6.5) months post stroke, Chronic Stroke)   · FMA = 5.2 points for the Upper Extremity portion of the assessment      Barthel Index:      Bathin  Bladder: 10  Bowels: 10  Groomin  Dressin  Feeding: 10  Mobility: 0  Stairs: 0  Toilet Use: 10  Transfer (Bed to Chair and Back): 10  Total: 60         Barthel and G-code impairment scale:  Percentage of impairment CH  0% CI  1-19% CJ  20-39% CK  40-59% CL  60-79% CM  80-99% CN  100%   Barthel Score 0-100 100 99-80 79-60 59-40 20-39 1-19    0   Barthel Score 0-20 20 17-19 13-16 9-12 5-8 1-4 0      The Barthel ADL Index: Guidelines  1. The index should be used as a record of what a patient does, not as a record of what a patient could do. 2. The main aim is to establish degree of independence from any help, physical or verbal, however minor and for whatever reason. 3. The need for supervision renders the patient not independent. 4. A patient's performance should be established using the best available evidence. Asking the patient, friends/relatives and nurses are the usual sources, but direct observation and common sense are also important. However direct testing is not needed. 5. Usually the patient's performance over the preceding 24-48 hours is important, but occasionally longer periods will be relevant.   6. Middle categories imply that the patient supplies over 50 per cent of the effort. 7. Use of aids to be independent is allowed. Dann Serrano., Barthel, D.W. (0314). Functional evaluation: the Barthel Index. [address] (14)2. AFSANEH Varela, Claire Conrad., Goyo Fox., Alejandra, [facility] (1999). Measuring the change indisability after inpatient rehabilitation; comparison of the responsiveness of the Barthel Index and Functional [place] Measure. Journal of Neurology, Neurosurgery, and Psychiatry, 66(4), 249-170. ISIDRO Anderson, ITALO Causey, & Hadley Martel M.A. (2004.) Assessment of post-stroke quality of life in cost-effectiveness studies: The usefulness of the Barthel Index and the EuroQoL-5D. Quality of Life Research, 13, 244-42      G codes: In compliance with CMSs Claims Based Outcome Reporting, the following G-code set was chosen for this patient based on their primary functional limitation being treated: The outcome measure chosen to determine the severity of the functional limitation was the Barthel Index with a score of 60/100 which was correlated with the impairment scale. · Self Care:               - CURRENT STATUS:    CJ - 20%-39% impaired, limited or restricted               - GOAL STATUS:           CI - 1%-19% impaired, limited or restricted               - D/C STATUS:                       ---------------To be determined---------------      Occupational Therapy Evaluation Charge Determination   History Examination Decision-Making   LOW Complexity : Brief history review  MEDIUM Complexity : 3-5 performance deficits relating to physical, cognitive , or psychosocial skils that result in activity limitations and / or participation restrictions MEDIUM Complexity : Patient may present with comorbidities that affect occupational performnce.  Miniml to moderate modification of tasks or assistance (eg, physical or verbal ) with assesment(s) is necessary to enable patient to complete evaluation       Based on the above components, the patient evaluation is determined to be of the following complexity level: LOW   Pain:   discomfort of LUE/hand, but did not complain of pain                 Activity Tolerance:   Good. No complaints,  Pt wished to return to bed to receive his antibiotics  After treatment:   [ ] Patient left in no apparent distress sitting up in chair  [X] Patient left in no apparent distress in bed  [X] Call bell left within reach  [X] Nursing notified  [X] Caregiver present  [X] Bed alarm activated      COMMUNICATION/EDUCATION:   The patients plan of care was discussed with: Registered Nurse and Certified Nursing Assistant/Patient Care Technician. [ ] Home safety education was provided and the patient/caregiver indicated understanding. [X] Patient/family have participated as able in goal setting and plan of care. [X] Patient/family agree to work toward stated goals and plan of care. [ ] Patient understands intent and goals of therapy, but is neutral about his/her participation. [ ] Patient is unable to participate in goal setting and plan of care. This patients plan of care is appropriate for delegation to [facility].      Thank you for this referral.  Michaela Wilkins OTR/L  Time Calculation: 39 mins

## 2017-09-01 NOTE — PROGRESS NOTES
Called by RN that tele called that his HR dropped to 30 with 3rd degree block but now pt HR went back up and is at 59. Will hold nadolol and consult cardiology. I have told RN to review with cardiology oncall and let them decide if consult can wait or they need to intervene now.

## 2017-09-01 NOTE — PROGRESS NOTES
Speech pathology note  Patient currently off the floor for testing. Will follow up later for swallowing/SLP evaluation as patient available. Thank you.     Markell Hurtado., CCC-SLP

## 2017-09-01 NOTE — CONSULTS
[address]   [address] [place] [address] Road       Name:  Rob Awan   MR#:  [id number]   :  1940   Account #:  [de-identified]    Date of Consultation:  2017   Date of Adm:  2017       REQUESTING PHYSICIAN: Julio Pereira MD    REASON FOR CONSULTATION: Evaluate bradycardia. CHIEF COMPLAINT: The patient voices no chief complaint at this   time. HISTORY OF PRESENT ILLNESS: The patient is a 68-year-old man   without prior cardiac history, but with a history of hypertension, type 2   diabetes mellitus and peripheral vascular disease. He was admitted   yesterday with confusion. He has undergone workup including a CT   scan of the head which showed evidence of a left parietooccipital   infarct. On telemetry, the patient was noted to have periods of transient   bradycardia and I was asked to see the patient for evaluation. The   patient has undergone an echocardiogram and MRI today and the   results are pending. He is not particularly physically active. He denies   recent syncope or dizziness. He did have some syncopal episodes   several years ago, apparently due to hypoglycemia. He denies   palpitations. He denies angina or shortness of breath. He does have   claudication and is followed by Dr. Rosalino Stewart. On telemetry overnight, he a   period of possible transient third-degree heart block, but has had no   associated symptoms. PAST MEDICAL HISTORY: As noted above, peripheral vascular   disease, type 2 diabetes with neuropathy, chronic renal insufficiency,   gastroesophageal reflux, BPH. PAST SURGICAL HISTORY: Bilateral cataract surgery, left carotid   endarterectomy, recent left hand surgery and index finger amputation   after a traumatic injury and subsequent staph infection. CURRENT MEDICATIONS:   1. Ertapenem. 2. Lantus insulin. 3. Humalog insulin. 4. Valsartan. 5. Norvasc. 6. Pravastatin. 7. Plavix. 8. Neurontin. 9. Lovenox. 10. Flomax. 11. Vitamin C.   12. Iron sulfate. 13. Esperanza-Q.   14. Protonix. SOCIAL HISTORY: The patient is a former smoker and does not   abuse alcohol. He is  and lives locally. FAMILY HISTORY: Positive for heart disease. REVIEW OF SYSTEMS   As noted above, otherwise noncontributory. PHYSICAL EXAMINATION   GENERAL: Reveals an elderly white male in no acute distress. VITAL SIGNS: Blood pressure 164/52, pulse 63, respirations 16,   temperature 97.8. HEENT: Pupils are equal. Oropharynx with moist oral mucosa. NECK: Supple. No masses or thyromegaly. No cervical or   supraclavicular adenopathy. There is a right carotid bruit. No JVD. CHEST: Clear. No wheezes or crackles. SKIN: Warm and dry. BACK: No scoliosis. CARDIAC: Regular rate and rhythm, 1-0/4 systolic murmur, no gallop   or diastolic murmur heard. ABDOMEN: Soft, nontender, no masses or organomegaly. Bowel   sounds positive. EXTREMITIES: No cyanosis or clubbing. Trace pedal edema. Distal   pulses not palpable in the feet. NEUROLOGIC: No obvious gross motor deficits. LABORATORY DATA: Hemoglobin 7.2, BUN 30, creatinine 1.2, LDL   42. EKG: Sinus bradycardia, rate of 55 beats per minute, normal axis,   nonspecific ST-T changes. Review of telemetry shows one brief   episode of possible transient third-degree block. IMPRESSION:   1. Possible transient atrioventricular block as noted above. 2. Recent left parieto-occipital cerebrovascular accident. 3. Hypertension. 4. Type 2 diabetes. 5. Dyslipidemia. 6. Peripheral vascular disease with claudication. 7. Prior left carotid endarterectomy. 8. Mild renal insufficiency. 9. Recent left hand trauma with postoperative infection. 10. Anemia. PLAN: At this point, I do not see any conclusive evidence of high-grade   heart block. I will continue the patient on telemetry and we might also   consider some outpatient monitoring.  We will review the echocardiogram. No additional recommendations at this time. Thank you for this consult.         Suzie Polanco MD      [address] [place] [address] / CD   D:  09/01/2017   11:51   T:  09/01/2017   12:17   Job #:  [id number]

## 2017-09-01 NOTE — CONSULTS
IP CONSULT TO NEUROLOGY  Consult performed by: Michael Daniels  Consult ordered by: Philly Ramirez AGE 68 y.o. MRN [id number]  1940     REQUESTING PHYSICIAN: Lester Kumar MD      CHIEF COMPLAINT:  Altered mental status     This is a 68 y.o. male with amedical history of diabetes and hypertension admitted for an acute episode of confusion starting 11 AM on . Initial imaging studies unremarkable and blood sugar was not significantly out range. ASSESSMENT AND PLAN     1. Cerebrovascular accident (CVA), unspecified mechanism ([facility]  Right parietal stroke  · Hold antihypertensives for two days  · May use 10 mg of labetelol IV for systolic BP above 085 or diastolic of 237 (hold for heart rate of 50 use nicardipine @ 5mg iv/hr)  · Begin antihypertensives 2017 with the eventual goal of a systolic BP of 162 or below. · Imaging: MRI, carotid dopplers, transthoracic echocardiogram  · Labs:B12 and folate, HGBA1C, TSH and lipid profile   · Consults: speech therapy, physical therapy and occupational therapy. · Start:75mg Plavix daily   · Educated on stroke risk factors, treatment and prevention    A1C 6.9; LDL 42.2, MRA no flow limiting stenosis;   TSH pending, Echocardiogram pending. 2. ASVD (arteriosclerotic vascular disease)      3. Controlled type 2 diabetes mellitus without complication, with long-term current use of insulin (HCC)  Controlled on current insulin    4. Essential hypertension  Hold antihypertensives until tommorrow    5. Mixed hyperlipidemia  Optimally controlled. 7. Diabetic polyneuropathy associated with type 2 diabetes mellitus ([facility]  Fall precautions            ALLERGIES:  Review of patient's allergies indicates no known allergies.      Current Facility-Administered Medications   Medication Dose Route Frequency    pravastatin (PRAVACHOL) tablet 40 mg  40 mg Oral QHS    ferrous sulfate tablet 325 mg  325 mg Oral TID WITH MEALS    ascorbic acid (vitamin C) (VITAMIN C) tablet 250 mg  250 mg Oral TID    sodium chloride (NS) flush 5-10 mL  5-10 mL IntraVENous Q8H    sodium chloride (NS) flush 5-10 mL  5-10 mL IntraVENous PRN    ertapenem (INVANZ) 1 g in 0.9% sodium chloride 50 mL IVPB  1 g IntraVENous Q24H    amLODIPine (NORVASC) tablet 5 mg  5 mg Oral DAILY    gabapentin (NEURONTIN) capsule 300 mg  300 mg Oral TID    insulin glargine (LANTUS) injection 8 Units  8 Units SubCUTAneous QHS    lactobac ac& pc-s.therm-b.anim (LETICIA Q/RISAQUAD)  1 Cap Oral DAILY    valsartan (DIOVAN) tablet 160 mg  160 mg Oral DAILY    insulin lispro (HUMALOG) injection   SubCUTAneous AC&HS    glucose chewable tablet 16 g  4 Tab Oral PRN    glucagon (GLUCAGEN) injection 1 mg  1 mg IntraMUSCular PRN    hydrALAZINE (APRESOLINE) 20 mg/mL injection 10 mg  10 mg IntraVENous Q6H PRN    sodium chloride (NS) flush 5-10 mL  5-10 mL IntraVENous Q8H    sodium chloride (NS) flush 5-10 mL  5-10 mL IntraVENous PRN    acetaminophen (TYLENOL) tablet 650 mg  650 mg Oral Q4H PRN    Or    acetaminophen (TYLENOL) solution 650 mg  650 mg Per NG tube Q4H PRN    Or    acetaminophen (TYLENOL) suppository 650 mg  650 mg Rectal Q4H PRN    clopidogrel (PLAVIX) tablet 75 mg  75 mg Oral DAILY    enoxaparin (LOVENOX) injection 40 mg  40 mg SubCUTAneous Q24H    0.9% sodium chloride infusion  25 mL/hr IntraVENous PRN    heparin (porcine) pf 500 Units  500 Units IntraVENous PRN    sodium chloride (NS) flush 10-40 mL  10-40 mL IntraVENous PRN    dextrose 10% infusion 125-250 mL  125-250 mL IntraVENous PRN    tamsulosin (FLOMAX) capsule 0.4 mg  0.4 mg Oral DAILY    pantoprazole (PROTONIX) tablet 40 mg  40 mg Oral QHS     Facility-Administered Medications Ordered in Other Encounters   Medication Dose Route Frequency    sodium chloride (NS) flush 10-40 mL  10-40 mL IntraVENous PRN       Past Medical History:   Diagnosis Date    Allergic rhinitis     Anemia     ASVD (arteriosclerotic vascular disease)     Alfonso's esophagus 7/28/2017    Carotid stenosis 7/28/2017    Chronic kidney disease     Diabetes ([facility]     glucose runs 70 - 160's at home    Diabetic neuropathy ([facility] 7/28/2017    Diverticulosis     DJD (degenerative joint disease)     ED (erectile dysfunction)     Enlarged prostate     JUDY (generalized anxiety disorder)     GERD (gastroesophageal reflux disease)     controlled with med; alfonso's esophagus    Hiatal hernia     Hypercholesterolemia     Hypertension     Hypertrophy (benign) of prostate 7/28/2017    Ill-defined condition     peripheral vascular disease    Lung nodule     Neuropathy ([address]     On statin therapy 7/28/2017    Prostate cancer screening 7/28/2017    PVD (peripheral vascular disease) ([facility]     Sebaceous cyst 7/28/2017    Spinal stenosis        Social History   Substance Use Topics    Smoking status: Former Smoker     Quit date: 7/7/1996    Smokeless tobacco: Never Used    Alcohol use No      Comment: rarely       Family History   Problem Relation Age of Onset    Diabetes Mother     Cancer Father      LUNG    Heart Disease Father      Review of Systems   Constitutional: Negative for chills and fever. HENT: Negative for ear pain. Eyes: Negative for pain and discharge. Respiratory: Negative for cough and hemoptysis. Cardiovascular: Negative for chest pain and claudication. Gastrointestinal: Negative for constipation and diarrhea. Genitourinary: Negative for flank pain and hematuria. Musculoskeletal: Positive for joint pain and myalgias. Negative for back pain. Skin: Negative for itching and rash. Neurological: Positive for tingling, speech change and seizures. Negative for headaches. Endo/Heme/Allergies: Negative for environmental allergies. Does not bruise/bleed easily. Psychiatric/Behavioral: Negative for depression and hallucinations.            Visit Vitals    /53    Pulse 82    Temp 98.3 °F (36.8 °C)    Resp 16    Ht 5' 10\" (1.778 m)    Wt 189 lb 9.6 oz (86 kg)    SpO2 100%    BMI 28 kg/m2       General: Well developed, well nourished. Patient in no apparent distress   Head: Normocephalic, atraumatic, anicteric sclera   Neck Normal ROM, No thyromegally   Lungs:  Clear to auscultation bilaterally, No wheezes or rubs   Cardiac: Regular rate and rhythm with no murmurs. Abd: Bowel sounds were audible. No tenderness on palpation   Ext: No pedal edema   Skin: Supple no rash     NeurologicExam:  Mental Status: Alert and oriented to person place and time   Speech: Fluent no aphasia or dysarthria has difficulty with abstractions    Cranial Nerves:   II Intact visual fields. III, IV VI Extra ocular movements intact  V Facial sensation is normal.   VII Facial movement is symmetric. VIII Hearing intact no nystagmus    IX, X Normal gag, symmetric movement of palate and uvula  XI Symmetric shoulder shrug and head turn   XII Tongue midline with no atrophy   Motor:  Full and symmetric strength of upper and lower proximal and distal muscles. Normal bulk and tone. Reflexes:   subtly brisk on the left   Sensory:   Symmetric and intact with no perceived deficits modalities involving small or large fibers. Gait:  Gait is wide based    Tremor:   No tremor noted. Cerebellar:  Coordination intact. Neurovascular: No carotid bruits. No JVD         REVIEWED IMAGING:    MRI :    Results from Hospital Encounter encounter on 08/31/17   MRA BRAIN WO CONT   Narrative INDICATION:   CVA    COMPARISON:  Brain MRI of today, reported separately    TECHNIQUE:    3-D time-of-flight MRA of the brain was performed. Multiplanar reconstructions  were obtained. FINDINGS:  The vertebral arteries are codominant. The basilar artery and its branches are  patent. The posterior cerebral arteries have fetal origin bilaterally. . The  internal carotid, anterior cerebral, and middle cerebral arteries are patent. There is no flow-limiting intracranial stenosis. There is motion artifact on the  images which results in apparent irregularity of the intracranial vessels. No  aneurysms are demonstrated. Impression IMPRESSION:  Negative MRA of the head. No major vessel occlusion or stenosis. Peripheral branches not well evaluated by this technique. .        CT:    Results from Hospital Encounter encounter on 08/31/17   CT HEAD WO CONT   Narrative EXAM:  CT HEAD WITHOUT CONTRAST  INDICATION: Altered mental status. COMPARISON: None. CONTRAST: None. TECHNIQUE: Unenhanced CT of the head was performed using 5 mm images. Brain and  bone windows were generated. Sagittal and coronal reformations were generated. CT dose reduction was achieved through use of a standardized protocol tailored  for this examination and automatic exposure control for dose modulation. CT dose  reduction was achieved through use of a standardized protocol tailored for this  examination and automatic exposure control for dose modulation. FINDINGS:  The ventricles and sulci are enlarged in a generalized fashion consistent with  volume loss. There is a low-attenuation left parieto-occipital infarct. There  is no intracranial hemorrhage. There is no extra-axial collection, mass, mass  effect or midline shift. The basilar cisterns are open. 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 There is extensive opacification  of the visualized paranasal sinuses. Impression IMPRESSION:   1. Left parieto-occipital infarct. No hemorrhage. 2. Extensive paranasal sinus disease.           REVIEWED LABS:  Lab Results   Component Value Date/Time    WBC 6.0 08/31/2017 08:51 PM    HCT 22.1 08/31/2017 08:51 PM    HGB 7.2 08/31/2017 08:51 PM    PLATELET 061 81/60/8915 08:51 PM     Lab Results   Component Value Date/Time    Sodium 141 08/31/2017 08:51 PM    Potassium 4.2 08/31/2017 08:51 PM    Chloride 109 08/31/2017 08:51 PM    CO2 27 08/31/2017 08:51 PM    Glucose 189 08/31/2017 08:51 PM    BUN 30 08/31/2017 08:51 PM    Creatinine 1.20 08/31/2017 08:51 PM    Calcium 7.8 08/31/2017 08:51 PM       Lab Results   Component Value Date/Time    LDL, calculated 42.2 09/01/2017 03:31 AM     Lab Results   Component Value Date/Time    Hemoglobin A1c 6.9 09/01/2017 03:31 AM

## 2017-09-01 NOTE — PROCEDURES
[facility]  *** FINAL REPORT ***    Name: Juan Drew  MRN: [id number]    Inpatient  : 14 Sep 1940  HIS Order #: [id number]  [address] [facility] Visit #: [id number]  Date: 01 Sep 2017    TYPE OF TEST: Cerebrovascular Duplex    REASON FOR TEST  cva    Right Carotid:-             Proximal               Mid                 Distal  cm/s  Systolic  Diastolic  Systolic  Diastolic  Systolic  Diastolic  CCA:    490.6      17.0                           119.0      25.0  Bulb:  ECA:     57.0       7.0  ICA:     51.0      13.0      126.0      27.0       93.0      25.0  ICA/CCA:  0.4       0.5    ICA Stenosis: 50-69%    Right Vertebral:-  Finding: Antegrade  Sys:       45.0  Kim:        8.0    Right Subclavian: Normal    Left Carotid:-            Proximal                Mid                 Distal  cm/s  Systolic  Diastolic  Systolic  Diastolic  Systolic  Diastolic  CCA:     67.2      26.0                            80.0      23.0  Bulb:  ECA:    126.0      14.0  ICA:     65.0      28.0       54.0      21.0      164.0      44.0  ICA/CCA:  0.8       1.2    ICA Stenosis: 50-69%    Left Vertebral:-  Finding: Antegrade  Sys:      119.0  Kim:       21.0    Left Subclavian: Normal    INTERPRETATION/FINDINGS  PROCEDURE:  Carotid Duplex Examination using B-mode, color and  spectral Doppler of the extracranial cerebrovascular arteries. 1. Bilateral 50-69% stenosis of the internal carotid arteries. 2. No significant stenosis in the external carotid arteries  bilaterally. 3. Antegrade flow in both vertebral arteries. 4. Normal flow in both subclavian arteries. Plaque Morphology:  1. Heterogeneous plaque in the bulb and right ICA. 2.  Heterogeneous plaque in the left common carotid. ADDITIONAL COMMENTS    I have personally reviewed the data relevant to the interpretation of  this  study. TECHNOLOGIST: Ady Mercer. ESPINOZA Machado, EITANMS  Signed: 2017 10:29 AM    PHYSICIAN: Mack Restrepo MD  Signed: 2017 04:28 PM

## 2017-09-01 NOTE — PROGRESS NOTES
Spoke with Dr. Jovita Carey nurse in regards to patients blood sugar. Will give patient 12 units of insulin to fix glucose.

## 2017-09-02 NOTE — PROGRESS NOTES
Notified by tele pt had 5 beats v-tach. Pt in CT. Per CT department, pt is asymptomatic and will continue with CTA. Notified Dr. Garcia Reynolds. No orders. Paged Dr. America Rivas with cardiology, waiting to hear back. Will continue to monitor this pt.

## 2017-09-02 NOTE — PROGRESS NOTES
Problem: Mobility Impaired (Adult and Pediatric)  Goal: *Acute Goals and Plan of Care (Insert Text)  Physical Therapy Goals  Initiated 9/2/2017  1. Patient will transfer from bed to chair and chair to bed with independence using the least restrictive device within 7 day(s). 2. Patient will perform sit to stand with independence within 7 day(s). 3. Patient will ambulate with independence for 1000 feet with the least restrictive device within 7 day(s). 4. Patient will ascend/descend 12 stairs with single handrail(s) with independence within 7 day(s). 5. Patient will improve Daniels Balance score by 7 points within 7 days. PHYSICAL THERAPY EVALUATION- NEURO POPULATION     Patient: Corinna Briones (48 y.o. male)  Date: 9/2/2017  Primary Diagnosis: CVA (cerebral vascular accident) [facility])        Precautions:  Bed Alarm, DNR, Fall      ASSESSMENT :  Based on the objective data described below, the patient presents with + acute R parietal, temporal, L frontal CVA with impaired safety awareness, balance, functional mobility. Patient I PTA. Received in supine, agreeable, frustrated over admission to hospital. He denied any visual impairments as seen during OT evaluation and with formal testing appeared intact. Patient S-CGA at most for mobility, largely due to balance checks and hesitancy with ambulation without device. He refused trial of AD. With regards to formal testing, he is at a moderate risk of falls per Cardenas and below age matched cutoff scores for the FGA with majority of instability seen during single leg stance or narrow ALESSANDRA activity. In addition to BEFAST principle, patient educated on their specifiic CVA/TIA risk factors (including non-modifiable, and modifiable), as well as role of neuroplasticity principles on recovery via activity.  Patient educated on CVA/TIA effects to neurological, musculoskeletal systems with metaphor of construction site and detour around in order to emphasize importance of neuroplasticity on recovery in terms that patient is able to easily understand. He appeared apathetic to all teaching, and focused on desire to DC throughout. He will need 24/7 supervision on return home to decrease risk of falls and readmission, with note of spouse's ? Dementia complicating matters. It is likely that [place] therapy would be of benefit versus OP balance specific PT pending recovery ahead. Patient will benefit from skilled intervention to address the above impairments. Patients rehabilitation potential is considered to be Fair  Factors which may influence rehabilitation potential include:   [ ]           None noted  [X]           Mental ability/status  [ ]           Medical condition  [X]           Home/family situation and support systems  [X]           Safety awareness  [ ]           Pain tolerance/management  [ ]           Other:        PLAN :  Recommendations and Planned Interventions:  [X]             Bed Mobility Training             [ ]      Neuromuscular Re-Education  [X]             Transfer Training                   [ ]      Orthotic/Prosthetic Training  [X]             Gait Training                         [ ]      Modalities  [X]             Therapeutic Exercises           [ ]      Edema Management/Control  [X]             Therapeutic Activities            [X]      Patient and Family Training/Education  [ ]             Other (comment):  Frequency/Duration: Patient will be followed by physical therapy 5 times a week to address goals. Discharge Recommendations: Home Health and Outpatient  Further Equipment Recommendations for Discharge: refuses - owns a SPC, RW        SUBJECTIVE:   Patient stated I can't get any of this done right without my shoes! I mean, who doesn't walk without shoes!  Patient adamant that testing was affected by his socks; he then declined placing shoes on to assist.       OBJECTIVE DATA SUMMARY:   HISTORY:    Past Medical History:   Diagnosis Date    Allergic rhinitis      Anemia      ASVD (arteriosclerotic vascular disease)      Alfonso's esophagus 7/28/2017    Carotid stenosis 7/28/2017    Chronic kidney disease      Diabetes ([facility] [address]       glucose runs 70 - 160's at home    Diabetic neuropathy ([facility] [address] 7/28/2017    Diverticulosis      DJD (degenerative joint disease)      ED (erectile dysfunction)      Enlarged prostate      JUDY (generalized anxiety disorder)      GERD (gastroesophageal reflux disease)       controlled with med; alfonso's esophagus    Hiatal hernia      Hypercholesterolemia      Hypertension      Hypertrophy (benign) of prostate 7/28/2017    Ill-defined condition       peripheral vascular disease    Lung nodule      Neuropathy ([address]      On statin therapy 7/28/2017    Prostate cancer screening 7/28/2017    PVD (peripheral vascular disease) ([facility] [address]      Sebaceous cyst 7/28/2017    Spinal stenosis       Past Surgical History:   Procedure Laterality Date    CARDIAC SURG PROCEDURE UNLIST         CATH-2008    COLONOSCOPY N/A 6/15/2016     COL[facility]COPY performed by [facility] at [facility]   6/15/2016          HX CATARACT REMOVAL         BILATERAL    HX ORTHOPAEDIC   1977     BONE CYST REM,ANU FROM RIGHT LEG    HX ORTHOPAEDIC   5-, 7-22-17, 7-26-17     left hand index finger    HX OTHER SURGICAL Left 06/2016     carotid endarectomy    NEUROLOGICAL PROCEDURE UNLISTED   2011     Back: spinal stenosis, pinched nerve repair    UPPER GI ENDOSCOPY,BIOPSY   6/15/2016           Prior Level of Function/Home Situation: Independent, denies falls in the past year, drives, lives with spouse with ? Dementia per chart. Uses no AD. Enjoys going outdoors.    Personal factors and/or comorbidities impacting plan of care:      Home Situation  Home Environment: Private residence  # Steps to Enter: 2  Rails to Enter: Yes  Hand Rails : Bilateral  Wheelchair Ramp: No  One/Two Story Residence: One story  Living Alone: No  Support Systems: Family member(s), Child(jessica)  Patient Expects to be Discharged to[de-identified] Private residence  Current DME Used/Available at Home: Blood pressure cuff, Grab bars, Shower chair, Cane, straight, Walker, rolling  Tub or Shower Type: Tub/Shower combination (does not shower due to L hand amputation infect/PICC line)     EXAMINATION/PRESENTATION/DECISION MAKING:   Critical Behavior:  Neurologic State: Alert  Orientation Level: Oriented X4  Cognition: Appropriate decision making  Safety/Judgement: Awareness of environment  Hearing: Auditory  Auditory Impairment: None  Range Of Motion:  AROM: Within functional limits                       Strength:    Strength: Within functional limits                    Tone & Sensation:   Tone: Normal              Sensation: Intact (history of polyneuropathy but denies to LT )               Coordination:  Coordination: Within functional limits  Vision:      Functional Mobility:  Bed Mobility:  Rolling: Independent  Supine to Sit: Independent     Scooting: Independent  Transfers:  Sit to Stand: Supervision  Stand to Sit: Supervision                       Balance:   Sitting: Intact; Without support  Standing: Impaired; Without support  Standing - Static: Good  Standing - Dynamic : Fair  Ambulation/Gait Training:  Distance (ft): 300 Feet (ft)  Assistive Device: Gait belt  Ambulation - Level of Assistance: Supervision     Gait Description (WDL): Exceptions to WDL  Gait Abnormalities: Path deviations; Altered arm swing;Decreased step clearance              Speed/Katarzyna: Pace decreased (<100 feet/min)               VC's for directional control                    Functional Measure  Functional Gait Assessment:      Gait Level Surface: Mild impairment  Change In Gait Speed: Mild impairment  Gait With Horizontal Head Turns: Mild impairment  Gait With Vertical Head Turns: Mild impairment  Gait And Pivot Turn: Moderate impairment  Step Over Obstacle:  Moderate impairment  Gait With Narrow Base Of Support: Severe impairment  Gait With Eyes Closed: Mild impairment  Ambulating Backwards: Mild impairment  Steps: Moderate impairment  Score: 15      Functional Gait Assessment and G-code impairment scale:  Percentage of Impairment CH     0%    CI     1-19% CJ     20-39% CK     40-59% CL     60-79% CM     80-99% CN      100%   Functional Gait  Score 0-30 30 25-29 19-24 13-18 7-12 1-6 0          Maximum Score 30   £ 22/30 classifies fall risk in older adults and predicts unexplained falls in community-dwelling older adults  Age: \"Normal\" score:   Up to 60 >27/30   60-80 >24/30   Over [de-identified] >19/30   More Nicolas, N. \"Functional Gait Assessment: concurrent discriminative, and predictive validity in community-dwelling older adults. \" Physical Therapy 90 5 2010. Santos Balance Test:      Sitting to Standing: 3  Standing Unsupported: 4  Sitting with Back Unsupported: 4  Standing to Sitting: 3  Transfers: 3  Standing Unsupported with Eyes Closed: 4  Standing Unsupported with Feet Together: 4  Reach Forward with Outstretched Arm: 2   Object: 3  Turn to Look Over Shoulders: 4  Turn 360 Degrees: 2  Alternate Foot on Step/Stool: 0  Standing Unsupported One Foot in Front: 3  Stand on One Le  Total: 40             56=Maximum possible score;   0-20=High fall risk  21-40=Moderate fall risk   41-56=Low fall risk      Santos Balance Test and G-code impairment scale:  Percentage of Impairment CH     0%    CI     1-19% CJ     20-39% CK     40-59% CL     60-79% CM     80-99% CN      100%   Santos   Score 0-56 56 45-55 34-44 23-33 12-22 1-11 0         G codes: In compliance with CMSs Claims Based Outcome Reporting, the following G-code set was chosen for this patient based on their primary functional limitation being treated: The outcome measure chosen to determine the severity of the functional limitation was the santos with a score of 40/56 which was correlated with the impairment scale. · Mobility - Walking and Moving Around:               - CURRENT STATUS:    CJ - 20%-39% impaired, limited or restricted               - GOAL STATUS:           CI - 1%-19% impaired, limited or restricted               - D/C STATUS:                       ---------------To be determined---------------      Activity Tolerance: WNL     Please refer to the flowsheet for vital signs taken during this treatment. After treatment:   [X]     Patient left in no apparent distress sitting up in chair  [ ]     Patient left in no apparent distress in bed  [X]     Call bell left within reach  [X]     Nursing notified  [ ]     Caregiver present  [X]     Bed alarm activated      COMMUNICATION/EDUCATION:   The patients plan of care was discussed with: Registered Nurse. Patient was educated regarding His deficit(s) of above as this relates to His diagnosis of + CVA. He demonstrated Guarded understanding as evidenced by voiced frustration. Patient and/or family was verbally educated on the BE FAST acronym for signs/symptoms of CVA and TIA. BE FAST was written on patient's communication board  for visual education and reinforcement. All questions answered with patient indicating full understanding. [ ]  Fall prevention education was provided and the patient/caregiver indicated understanding. [ ]  Patient/family have participated as able in goal setting and plan of care. [ ]  Patient/family agree to work toward stated goals and plan of care. [X]  Patient understands intent and goals of therapy, but is neutral about his/her participation. [ ]  Patient is unable to participate in goal setting and plan of care.      Thank you for this referral.  Taylor Sic, PT, DPT    Time Calculation: 26 mins

## 2017-09-02 NOTE — PROGRESS NOTES
PROGRESS NOTE    NAME:  Noni Salcedo   :      MRN:   [id number]     Date/Time:  2017 9:54 AM  Subjective:   History:  Chart reviewed and patient seen and examined this AM and D/W his nurse and Dr. Dion Mixon (Vascular Surgery) and all events noted. He presented to the ER on  with confusion and was admitted for a CVA confirmed by CT Head and MRI has confirmed 2 R and 1 L acute CVAs with other lacunar infarcts. He had no focal weakness or speech impediment as he was told although he can't remember the episode of confusion. He denies any current confusion and has no other neurologic c/o. He denies any cardiac or respiratory c/o. There are no GI/  c/o. He has no other c/o on complete 14 point ROS. He has had recent several surgical procedures of L hand due to laceration with subsequent abcess and eventual loss of 2nd finger and remains on antibiotics and wound care daily.         Medications reviewed:  Current Facility-Administered Medications   Medication Dose Route Frequency    pravastatin (PRAVACHOL) tablet 40 mg  40 mg Oral QHS    ferrous sulfate tablet 325 mg  325 mg Oral TID WITH MEALS    ascorbic acid (vitamin C) (VITAMIN C) tablet 250 mg  250 mg Oral TID    sodium chloride (NS) flush 10-30 mL  10-30 mL InterCATHeter PRN    sodium chloride (NS) flush 10 mL  10 mL InterCATHeter Q24H    sodium chloride (NS) flush 10-40 mL  10-40 mL InterCATHeter Q8H    heparin (porcine) pf 300 Units  300 Units InterCATHeter PRN    meropenem (MERREM) 1 g in 0.9% sodium chloride (MBP/ADV) 50 mL  1 g IntraVENous Q8H    sodium chloride (NS) flush 5-10 mL  5-10 mL IntraVENous Q8H    sodium chloride (NS) flush 5-10 mL  5-10 mL IntraVENous PRN    amLODIPine (NORVASC) tablet 5 mg  5 mg Oral DAILY    gabapentin (NEURONTIN) capsule 300 mg  300 mg Oral TID    insulin glargine (LANTUS) injection 8 Units  8 Units SubCUTAneous QHS    lactobac ac& pc-s.therm-b.anim (LETICIA Q/RISAQUAD)  1 Cap Oral DAILY    valsartan (DIOVAN) tablet 160 mg  160 mg Oral DAILY    insulin lispro (HUMALOG) injection   SubCUTAneous AC&HS    glucose chewable tablet 16 g  4 Tab Oral PRN    glucagon (GLUCAGEN) injection 1 mg  1 mg IntraMUSCular PRN    hydrALAZINE (APRESOLINE) 20 mg/mL injection 10 mg  10 mg IntraVENous Q6H PRN    sodium chloride (NS) flush 5-10 mL  5-10 mL IntraVENous Q8H    sodium chloride (NS) flush 5-10 mL  5-10 mL IntraVENous PRN    acetaminophen (TYLENOL) tablet 650 mg  650 mg Oral Q4H PRN    Or    acetaminophen (TYLENOL) solution 650 mg  650 mg Per NG tube Q4H PRN    Or    acetaminophen (TYLENOL) suppository 650 mg  650 mg Rectal Q4H PRN    clopidogrel (PLAVIX) tablet 75 mg  75 mg Oral DAILY    enoxaparin (LOVENOX) injection 40 mg  40 mg SubCUTAneous Q24H    dextrose 10% infusion 125-250 mL  125-250 mL IntraVENous PRN    tamsulosin (FLOMAX) capsule 0.4 mg  0.4 mg Oral DAILY    pantoprazole (PROTONIX) tablet 40 mg  40 mg Oral QHS     Facility-Administered Medications Ordered in Other Encounters   Medication Dose Route Frequency    sodium chloride (NS) flush 10-40 mL  10-40 mL IntraVENous PRN        Objective:   Vitals:  Visit Vitals    /45 (BP 1 Location: Left arm, BP Patient Position: Sitting)    Pulse 72    Temp 97.4 °F (36.3 °C)    Resp 17    Ht 5' 10\" (1.778 m)    Wt 189 lb 9.6 oz (86 kg)    SpO2 98%    BMI 28 kg/m2      O2 Device: Room air Temp (24hrs), Av.2 °F (36.8 °C), Min:97.4 °F (36.3 °C), Max:98.9 °F (37.2 °C)      Last 24hr Input/Output:  No intake or output data in the 24 hours ending 17 0954     PHYSICAL EXAM:  General:     Alert, cooperative, no distress, appears stated age. Head:    Normocephalic, without obvious abnormality, atraumatic. Eyes:    Conjunctivae/corneas clear. PERRLA  Nose:   Nares normal. No drainage or sinus tenderness.   Throat:     Lips, mucosa, and tongue normal.  No Thrush  Neck:   Supple, symmetrical,  no adenopathy, thyroid: non tender no L carotid bruit but harsh R carotid Bruit,  no JVD. Back:     Symmetric,  No CVA tenderness. Lungs:    Clear to auscultation bilaterally. No Wheezing or Rhonchi. No rales. Heart:    Regular rate and rhythm,  no murmur, rub or gallop. Abdomen:    Soft, non-tender. Not distended. Bowel sounds normal. No masses  Extremities:  Extremities normal except L hand bandaged, atraumatic, No cyanosis. No edema. No clubbing  Lymph nodes:  Cervical, supraclavicular normal.  Neurologic:  Normal strength, Alert and oriented X 3. Skin:                 No rash      Lab Data Reviewed:    Recent Results (from the past 24 hour(s))   GLUCOSE, POC    Collection Time: 09/01/17 11:33 AM   Result Value Ref Range    Glucose (POC) 292 (H) 65 - 100 mg/dL    Performed by Teri Erwin (PCT)    GLUCOSE, POC    Collection Time: 09/01/17  4:03 PM   Result Value Ref Range    Glucose (POC) 405 (H) 65 - 100 mg/dL    Performed by Teri Erwin (PCT)    GLUCOSE, POC    Collection Time: 09/01/17  4:11 PM   Result Value Ref Range    Glucose (POC) 373 (H) 65 - 100 mg/dL    Performed by [facility] (PCT)    GLUCOSE, POC    Collection Time: 09/01/17  9:30 PM   Result Value Ref Range    Glucose (POC) 313 (H) 65 - 100 mg/dL    Performed by CHIKIS CHO    CBC WITH AUTOMATED DIFF    Collection Time: 09/02/17  3:10 AM   Result Value Ref Range    WBC 4.7 4.1 - 11.1 K/uL    RBC 2.71 (L) 4.10 - 5.70 M/uL    HGB 7.5 (L) 12.1 - 17.0 g/dL    HCT 23.3 (L) 36.6 - 50.3 %    MCV 86.0 80.0 - 99.0 FL    MCH 27.7 26.0 - 34.0 PG    MCHC 32.2 30.0 - 36.5 g/dL    RDW 17.4 (H) 11.5 - 14.5 %    PLATELET 690 234 - 011 K/uL    NEUTROPHILS 62 32 - 75 %    LYMPHOCYTES 24 12 - 49 %    MONOCYTES 12 5 - 13 %    EOSINOPHILS 1 0 - 7 %    BASOPHILS 1 0 - 1 %    ABS. NEUTROPHILS 3.0 1.8 - 8.0 K/UL    ABS. LYMPHOCYTES 1.1 0.8 - 3.5 K/UL    ABS. MONOCYTES 0.6 0.0 - 1.0 K/UL    ABS. EOSINOPHILS 0.1 0.0 - 0.4 K/UL    ABS.  BASOPHILS 0.0 0.0 - 0.1 K/UL   METABOLIC PANEL, BASIC Collection Time: 09/02/17  3:10 AM   Result Value Ref Range    Sodium 143 136 - 145 mmol/L    Potassium 3.7 3.5 - 5.1 mmol/L    Chloride 110 (H) 97 - 108 mmol/L    CO2 26 21 - 32 mmol/L    Anion gap 7 5 - 15 mmol/L    Glucose 200 (H) 65 - 100 mg/dL    BUN 23 (H) 6 - 20 MG/DL    Creatinine 0.97 0.70 - 1.30 MG/DL    BUN/Creatinine ratio 24 (H) 12 - 20      GFR est AA >60 >60 ml/min/1.73m2    GFR est non-AA >60 >60 ml/min/1.73m2    Calcium 7.7 (L) 8.5 - 10.1 MG/DL   GLUCOSE, POC    Collection Time: 09/02/17  6:29 AM   Result Value Ref Range    Glucose (POC) 202 (H) 65 - 100 mg/dL    Performed by Peggy Watkins          Assessment/Plan:     Principal Problem:    CVA (cerebral vascular accident) ([facility] [address] (8/31/2017)    Active Problems:    Essential hypertension (7/27/2017)      Controlled type 2 diabetes mellitus without complication, with long-term current use of insulin ([address] (7/27/2017)      Mixed hyperlipidemia (7/27/2017)      ASVD (arteriosclerotic vascular disease) (7/27/2017)      Overview: Carotid Endarectomy 2017      Infection of left hand (7/20/2017)      Overview: Debridement on several occasions 8/2017. Amputation of left second       finger. Diabetic neuropathy ([address] (7/28/2017)      On statin therapy (7/28/2017)      Thrombotic stroke involving right middle cerebral artery ([facility] [address] (9/1/2017)      Stenosis of both internal carotid arteries (9/1/2017)       ___________________________________________________  PLAN:    1. Continue ASA and Plavix which was added as on ASA at the time of this event  2. Carotid Dopplers, 50-69% bilateral, D/W Vascular Surgery this AM and will get CTA of head and nec(will likely need R CEA on 3-4 weeks)  3.  Echocardiogram - Global Hypokinesis with Efx 40-45%  4. For DM follow BS and treat with SSI, Increase amount of coverage  5. Continue his daily Lantus  6. Continue Norvasc and Diovan for HTN currently not controlled  7.   Continue Ivanz for hand infection and continue wound care  8. Continue Pravachol for Hyperlipidemia  9. Gabapentin for his diabetic neuropathy  10. Lovenox for DVT prevention  11. Continue Iron but increase to TID and add Vitamin C and follow Hgb now 7.5 from 8.3 on admission  12. MRI Acute infarcts R parietal (Largest), R temporal and L frontal  13. Continue Telemetry, currently NSR although some irregularity noted yesterday per his nurse  14.  Follow renal function (BUN 23/.97 this AM)    45 minutes spent in direct care of this complex patient today        ___________________________________________________    Attending Physician: Ignacio Mcmahon MD

## 2017-09-02 NOTE — PROGRESS NOTES
Cardiology Progress Note    Patient ID:  Patient: Zohra Perez  MRN: [id number]  Age: 68 y.o.  : 1940 3:03 PM  Admit Date: 2017    Assessment: 1. Nonsustained monomorphic VT, one episode, asymptomatic. Echo EF 40-45%. 2. Transient 2nd degree AV block including 2:1, lessening. 3. Recent left parieto-occipital cerebrovascular accident. 4. Old left anterior parietal cavitary lesion (neg PET ), stable on imaging compared to a year ago. 5. Bilateral moderate carotid artery disease by duplex. Prior left CEA. 6. Hypertensive heart disease with heart failure and CKD stage 2.  7. Type 2 diabetes mellitus. 8. Dyslipidemia. 9. Peripheral vascular disease with claudication. 10. Recent left hand trauma with postoperative infection. Still on antibiotic. 11. Anemia NOS. 12. Chronic ASA therapy as OP. 13. DNR. Plan:     1. No urgent need for pacemaker. 2. Not a candidate for a defibrillator. 3. Avoid rate-slowing medications including beta-blocker due to AV node disease. 4. On dual antiplatelet therapy, so far no Afib or flutter observed on tele. 5. Awaiting CT head and neck, specifically for further clarity on carotid disease. 6. Continue statin, recommend more aggressive dosing when OP. All questions answered for him and his wife. No changes to cardiac regimen today. []       High complexity decision making was performed in this patient at high risk for decompensation with multiple organ involvement. Subjective:     Zohra Perez denies chest pain, palpitations, near-syncope, syncope, orthopnea, paroxysmal nocturnal dyspnea. Review of Systems - No melena, hematemesis, nausea, vomiting, diarrhea, dysuria, gross hematuria, suprapubic or abdominal pain, rash, headache, subjective fever, chills, wheezing, pleurisy, hemoptysis, or epistaxis.     Objective:     Physical Exam:  Temp (24hrs), Av.1 °F (36.7 °C), Min:97.4 °F (36.3 °C), Max:98.9 °F (37.2 °C)    Patient Vitals for the past 8 hrs:   Pulse   17 1134 76   17 0805 72    Patient Vitals for the past 8 hrs:   Resp   17 1134 16   17 0805 17    Patient Vitals for the past 8 hrs:   BP   17 1134 166/47   17 0805 145/45      No intake or output data in the 24 hours ending 17 1503    Nondiaphoretic, not in acute distress. MMM, no jaundice, HEENT stable. Unlabored, clear to auscultation bilaterally, symmetric air movement. Regular rate and rhythm, no murmur, pericardial rub, knock, or gallop. No JVD or peripheral edema. Palpable radial and DP/PT pulses bilaterally. Abdomen soft, nontender, nondistended. No pulsatile masses or bruit. No cyanosis. Skin warm and dry. No ulcers or rash. Musculoskeletal exam stable. Awake, appropriate, neuro grossly nonfocal.  No tremor. Cardiographics and Studies, I personally reviewed:    Telemetry:  SR with first degree AV block, PAC's. Nonsustained VT x 1. ECG :  SR with second degree AV block, Mobitz 1. Narrow QRS. ECHO here:  LEFT VENTRICLE: The ventricle was dilated. Systolic function was mildly  reduced. Ejection fraction was estimated in the range of 40 % to 45 %. There was mild diffuse hypokinesis. RIGHT VENTRICLE: The size was normal. Systolic function was normal.    LEFT ATRIUM: The atrium was dilated. RIGHT ATRIUM: Size was normal.    MITRAL VALVE: Normal valve structure. DOPPLER: There was trivial  regurgitation. AORTIC VALVE: The valve was probably trileaflet. DOPPLER: There was no  regurgitation. TRICUSPID VALVE: Normal valve structure. DOPPLER: There was mild  regurgitation. PULMONIC VALVE: Not well visualized. AORTA: The root exhibited normal size. PERICARDIUM: There was no pericardial effusion. LAB Review:     No results for input(s): CPK, CKMB, CKNDX, TROIQ in the last 72 hours.     No lab exists for component: CPKMB  Lab Results   Component Value Date/Time    Cholesterol, total 108 09/01/2017 03:31 AM    HDL Cholesterol 48 09/01/2017 03:31 AM    LDL, calculated 42.2 09/01/2017 03:31 AM    Triglyceride 89 09/01/2017 03:31 AM    CHOL/HDL Ratio 2.3 09/01/2017 03:31 AM     Recent Labs      08/31/17   1314   INR  1.1   PTP  10.6      Recent Labs      09/02/17   0310  08/31/17   2051  08/31/17   1314   NA  143  141  142   K  3.7  4.2  4.0   CL  110*  109*  108   CO2  26  27  27   BUN  23*  30*  33*   CREA  0.97  1.20  1.10   GLU  200*  189*  158*   CA  7.7*  7.8*  8.1*   ALB   --    --   2.1*   WBC  4.7  6.0  7.7   HGB  7.5*  7.2*  8.3*   HCT  23.3*  22.1*  25.8*   PLT  249  264  297     Recent Labs      08/31/17   1314   SGOT  17   AP  94   TP  5.8*   ALB  2.1*   GLOB  3.7     No components found for: GLPOC  No results for input(s): PH, PCO2, PO2 in the last 72 hours.     Medications Reviewed:   No Known Allergies     Current Facility-Administered Medications   Medication Dose Route Frequency    pravastatin (PRAVACHOL) tablet 40 mg  40 mg Oral QHS    ferrous sulfate tablet 325 mg  325 mg Oral TID WITH MEALS    ascorbic acid (vitamin C) (VITAMIN C) tablet 250 mg  250 mg Oral TID    sodium chloride (NS) flush 10-30 mL  10-30 mL InterCATHeter PRN    sodium chloride (NS) flush 10 mL  10 mL InterCATHeter Q24H    sodium chloride (NS) flush 10-40 mL  10-40 mL InterCATHeter Q8H    heparin (porcine) pf 300 Units  300 Units InterCATHeter PRN    meropenem (MERREM) 1 g in 0.9% sodium chloride (MBP/ADV) 50 mL  1 g IntraVENous Q8H    sodium chloride (NS) flush 5-10 mL  5-10 mL IntraVENous Q8H    sodium chloride (NS) flush 5-10 mL  5-10 mL IntraVENous PRN    amLODIPine (NORVASC) tablet 5 mg  5 mg Oral DAILY    gabapentin (NEURONTIN) capsule 300 mg  300 mg Oral TID    insulin glargine (LANTUS) injection 8 Units  8 Units SubCUTAneous QHS    lactobac ac& pc-s.therm-b.anim (LETICIA Q/RISAQUAD)  1 Cap Oral DAILY    valsartan (DIOVAN) tablet 160 mg  160 mg Oral DAILY    insulin lispro (HUMALOG) injection   SubCUTAneous AC&HS    glucose chewable tablet 16 g  4 Tab Oral PRN    glucagon (GLUCAGEN) injection 1 mg  1 mg IntraMUSCular PRN    hydrALAZINE (APRESOLINE) 20 mg/mL injection 10 mg  10 mg IntraVENous Q6H PRN    sodium chloride (NS) flush 5-10 mL  5-10 mL IntraVENous Q8H    sodium chloride (NS) flush 5-10 mL  5-10 mL IntraVENous PRN    acetaminophen (TYLENOL) tablet 650 mg  650 mg Oral Q4H PRN    Or    acetaminophen (TYLENOL) solution 650 mg  650 mg Per NG tube Q4H PRN    Or    acetaminophen (TYLENOL) suppository 650 mg  650 mg Rectal Q4H PRN    clopidogrel (PLAVIX) tablet 75 mg  75 mg Oral DAILY    enoxaparin (LOVENOX) injection 40 mg  40 mg SubCUTAneous Q24H    dextrose 10% infusion 125-250 mL  125-250 mL IntraVENous PRN    tamsulosin (FLOMAX) capsule 0.4 mg  0.4 mg Oral DAILY    pantoprazole (PROTONIX) tablet 40 mg  40 mg Oral QHS     Facility-Administered Medications Ordered in Other Encounters   Medication Dose Route Frequency    sodium chloride (NS) flush 10-40 mL  10-40 mL IntraVENous PRN          Sandra Quinonez MD  9/2/2017

## 2017-09-02 NOTE — PROGRESS NOTES
Neurology Progress Note    Patient ID:  Tiff Vega  [id number]  68 y.o.  1940      CHIEF COMPLAINT: Altered mental status and stroke    Subjective:      Patient has complaints of slurred speech and altered mental status, an MRI scan suggests right parietal infarct, and he has a small infarct even in the left frontal region it looks more hypotensive or small vessel disease, and one other one in the right parietal area possibly associated with the first 1. Patient's carotid Doppler shows stenosis in the range of 50-69%, and patient needs a CTA and one has been ordered to see if this is surgical.. Discussed findings with the patient and his wife, they understand and agree to studies as defined. We will continue other therapy as planned for now.     Current Facility-Administered Medications   Medication Dose Route Frequency    pravastatin (PRAVACHOL) tablet 40 mg  40 mg Oral QHS    ferrous sulfate tablet 325 mg  325 mg Oral TID WITH MEALS    ascorbic acid (vitamin C) (VITAMIN C) tablet 250 mg  250 mg Oral TID    sodium chloride (NS) flush 10-30 mL  10-30 mL InterCATHeter PRN    sodium chloride (NS) flush 10 mL  10 mL InterCATHeter Q24H    sodium chloride (NS) flush 10-40 mL  10-40 mL InterCATHeter Q8H    heparin (porcine) pf 300 Units  300 Units InterCATHeter PRN    meropenem (MERREM) 1 g in 0.9% sodium chloride (MBP/ADV) 50 mL  1 g IntraVENous Q8H    sodium chloride (NS) flush 5-10 mL  5-10 mL IntraVENous Q8H    sodium chloride (NS) flush 5-10 mL  5-10 mL IntraVENous PRN    amLODIPine (NORVASC) tablet 5 mg  5 mg Oral DAILY    gabapentin (NEURONTIN) capsule 300 mg  300 mg Oral TID    insulin glargine (LANTUS) injection 8 Units  8 Units SubCUTAneous QHS    lactobac ac& pc-s.therm-b.anim (LETICIA Q/RISAQUAD)  1 Cap Oral DAILY    valsartan (DIOVAN) tablet 160 mg  160 mg Oral DAILY    insulin lispro (HUMALOG) injection   SubCUTAneous AC&HS    glucose chewable tablet 16 g  4 Tab Oral PRN    glucagon (GLUCAGEN) injection 1 mg  1 mg IntraMUSCular PRN    hydrALAZINE (APRESOLINE) 20 mg/mL injection 10 mg  10 mg IntraVENous Q6H PRN    sodium chloride (NS) flush 5-10 mL  5-10 mL IntraVENous Q8H    sodium chloride (NS) flush 5-10 mL  5-10 mL IntraVENous PRN    acetaminophen (TYLENOL) tablet 650 mg  650 mg Oral Q4H PRN    Or    acetaminophen (TYLENOL) solution 650 mg  650 mg Per NG tube Q4H PRN    Or    acetaminophen (TYLENOL) suppository 650 mg  650 mg Rectal Q4H PRN    clopidogrel (PLAVIX) tablet 75 mg  75 mg Oral DAILY    enoxaparin (LOVENOX) injection 40 mg  40 mg SubCUTAneous Q24H    dextrose 10% infusion 125-250 mL  125-250 mL IntraVENous PRN    tamsulosin (FLOMAX) capsule 0.4 mg  0.4 mg Oral DAILY    pantoprazole (PROTONIX) tablet 40 mg  40 mg Oral QHS     Facility-Administered Medications Ordered in Other Encounters   Medication Dose Route Frequency    sodium chloride (NS) flush 10-40 mL  10-40 mL IntraVENous PRN        Past Medical History:   Diagnosis Date    Allergic rhinitis     Anemia     ASVD (arteriosclerotic vascular disease)     Alfonso's esophagus 7/28/2017    Carotid stenosis 7/28/2017    Chronic kidney disease     Diabetes (HCC)     glucose runs 70 - 160's at home    Diabetic neuropathy (HCC) 7/28/2017    Diverticulosis     DJD (degenerative joint disease)     ED (erectile dysfunction)     Enlarged prostate     JUDY (generalized anxiety disorder)     GERD (gastroesophageal reflux disease)     controlled with med; alfonso's esophagus    Hiatal hernia     Hypercholesterolemia     Hypertension     Hypertrophy (benign) of prostate 7/28/2017    Ill-defined condition     peripheral vascular disease    Lung nodule     Neuropathy ([address]     On statin therapy 7/28/2017    Prostate cancer screening 7/28/2017    PVD (peripheral vascular disease) ([address]     Sebaceous cyst 7/28/2017    Spinal stenosis        Past Surgical History:   Procedure Laterality Date    CARDIAC SURG PROCEDURE UNLIST      CATH-2008    COLONOSCOPY N/A 6/15/2016    COL[facility]  6/15/2016         HX CATARACT REMOVAL      BILATERAL    HX ORTHOPAEDIC  1977    BONE CYST REM,ANU FROM RIGHT LEG    HX ORTHOPAEDIC  5-, 7-22-17, 7-26-17    left hand index finger    HX OTHER SURGICAL Left 06/2016    carotid endarectomy    NEUROLOGICAL PROCEDURE UNLISTED  2011    Back: spinal stenosis, pinched nerve repair    UPPER GI ENDOSCOPY,BIOPSY  6/15/2016            @[facility]@    Social History   Substance Use Topics    Smoking status: Former Smoker     Quit date: 7/7/1996    Smokeless tobacco: Never Used    Alcohol use No      Comment: rarely       Current Facility-Administered Medications   Medication Dose Route Frequency Provider Last Rate Last Dose    pravastatin (PRAVACHOL) tablet 40 mg  40 mg Oral QHS Blessing Rodriguez MD   40 mg at 09/01/17 2235    ferrous sulfate tablet 325 mg  325 mg Oral TID WITH MEALS Blessing Rodriguez MD   325 mg at 09/02/17 0818    ascorbic acid (vitamin C) (VITAMIN C) tablet 250 mg  250 mg Oral TID Blessing Rodriguez MD   250 mg at 09/02/17 0819    sodium chloride (NS) flush 10-30 mL  10-30 mL InterCATHeter PRN Blessing Rodriguez MD   30 mL at 09/02/17 0311    sodium chloride (NS) flush 10 mL  10 mL InterCATHeter Q24H Blessing Rodriguez MD   10 mL at 09/01/17 1638    sodium chloride (NS) flush 10-40 mL  10-40 mL InterCATHeter Krishna Anglin MD   10 mL at 09/01/17 2236    heparin (porcine) pf 300 Units  300 Units InterCATHeter PRN Blessing Rodriguez MD        meropenem (MERREM) 1 g in 0.9% sodium chloride (MBP/ADV) 50 mL  1 g IntraVENous Krishna Anglin MD        sodium chloride (NS) flush 5-10 mL  5-10 mL IntraVENous Ortega Zhao MD   10 mL at 09/01/17 2236    sodium chloride (NS) flush 5-10 mL  5-10 mL IntraVENous PRN Edwardo Ge MD        amLODIPine (NORVASC) tablet 5 mg  5 mg Oral DAILY Phillip Abrams MD   5 mg at 09/02/17 0818    gabapentin (NEURONTIN) capsule 300 mg  300 mg Oral TID Trent Sparks MD   300 mg at 09/02/17 0818    insulin glargine (LANTUS) injection 8 Units  8 Units SubCUTAneous QHS Trent Sparks MD   8 Units at 09/01/17 2233    lactobac ac& pc-s.therm-b.anim (LETICIA Q/RISAQUAD)  1 Cap Oral DAILY Trent Sparks MD   1 Cap at 09/02/17 0818    valsartan (DIOVAN) tablet 160 mg  160 mg Oral DAILY Trent Sparks MD   160 mg at 09/02/17 0818    insulin lispro (HUMALOG) injection   SubCUTAneous AC&HS Carolyn Gardiner MD   10 Units at 09/02/17 1149    glucose chewable tablet 16 g  4 Tab Oral PRN Trent Sparks MD        glucagon (GLUCAGEN) injection 1 mg  1 mg IntraMUSCular PRN Trent Sparks MD        hydrALAZINE (APRESOLINE) 20 mg/mL injection 10 mg  10 mg IntraVENous Q6H PRN Trent Sparks MD        sodium chloride (NS) flush 5-10 mL  5-10 mL IntraVENous Stefan Underwood MD   10 mL at 09/01/17 2236    sodium chloride (NS) flush 5-10 mL  5-10 mL IntraVENous PRN Trent Sparks MD        acetaminophen (TYLENOL) tablet 650 mg  650 mg Oral Q4H PRN Trent Sparks MD        Or    acetaminophen (TYLENOL) solution 650 mg  650 mg Per NG tube Q4H PRN Trent Sparks MD        Or    acetaminophen (TYLENOL) suppository 650 mg  650 mg Rectal Q4H PRN Trent Sparks MD        clopidogrel (PLAVIX) tablet 75 mg  75 mg Oral DAILY Phillip Abrams MD   75 mg at 09/02/17 0818    enoxaparin (LOVENOX) injection 40 mg  40 mg SubCUTAneous Q24H Phillip Abrams MD   40 mg at 09/01/17 1847    dextrose 10% infusion 125-250 mL  125-250 mL IntraVENous PRN Carolyn Gardiner MD        [facility] capsule 0.4 mg  0.4 mg Oral DAILY Phillip Abrams MD   0.4 mg at 09/02/17 0818    pantoprazole (PROTONIX) tablet 40 mg  40 mg Oral QHS Trent Sparks MD   40 mg at 09/01/17 8945     Facility-Administered Medications Ordered in Other Encounters   Medication Dose Route Frequency Provider Last Rate Last Dose    sodium chloride (NS) flush 10-40 mL  10-40 mL IntraVENous PRN Juan Sanchez MD   10 mL at 08/30/17 1540       No Known Allergies    Review of Systems:    A comprehensive review of systems was negative except for: Musculoskeletal: positive for myalgias, arthralgias and stiff joints  Neurological: positive for speech problems, paresthesia, coordination problems and weakness    Objective:      Objective:     Patient Vitals for the past 24 hrs:   BP Temp Pulse Resp SpO2   09/02/17 1134 166/47 97.9 °F (36.6 °C) 76 16 100 %   09/02/17 0805 145/45 97.4 °F (36.3 °C) 72 17 98 %   09/02/17 0259 156/81 98.2 °F (36.8 °C) (!) 59 16 100 %   09/01/17 2314 153/60 98.1 °F (36.7 °C) 73 16 99 %   09/01/17 1911 112/83 98.9 °F (37.2 °C) 71 16 100 %   09/01/17 1508 172/44 98.3 °F (36.8 °C) (!) 44 16 99 %         Lab Review   Recent Results (from the past 24 hour(s))   GLUCOSE, POC    Collection Time: 09/01/17  4:03 PM   Result Value Ref Range    Glucose (POC) 405 (H) 65 - 100 mg/dL    Performed by Marti Heath (PCT)    GLUCOSE, POC    Collection Time: 09/01/17  4:11 PM   Result Value Ref Range    Glucose (POC) 373 (H) 65 - 100 mg/dL    Performed by Marti Heath (PCT)    GLUCOSE, POC    Collection Time: 09/01/17  9:30 PM   Result Value Ref Range    Glucose (POC) 313 (H) 65 - 100 mg/dL    Performed by CHIKIS CHO    CBC WITH AUTOMATED DIFF    Collection Time: 09/02/17  3:10 AM   Result Value Ref Range    WBC 4.7 4.1 - 11.1 K/uL    RBC 2.71 (L) 4.10 - 5.70 M/uL    HGB 7.5 (L) 12.1 - 17.0 g/dL    HCT 23.3 (L) 36.6 - 50.3 %    MCV 86.0 80.0 - 99.0 FL    MCH 27.7 26.0 - 34.0 PG    MCHC 32.2 30.0 - 36.5 g/dL    RDW 17.4 (H) 11.5 - 14.5 %    PLATELET 567 985 - 343 K/uL    NEUTROPHILS 62 32 - 75 %    LYMPHOCYTES 24 12 - 49 %    MONOCYTES 12 5 - 13 %    EOSINOPHILS 1 0 - 7 %    BASOPHILS 1 0 - 1 %    ABS. NEUTROPHILS 3.0 1.8 - 8.0 K/UL    ABS. LYMPHOCYTES 1.1 0.8 - 3.5 K/UL    ABS. MONOCYTES 0.6 0.0 - 1.0 K/UL    ABS.  EOSINOPHILS 0.1 0.0 - 0.4 K/UL    ABS. BASOPHILS 0.0 0.0 - 0.1 K/UL   METABOLIC PANEL, BASIC    Collection Time: 09/02/17  3:10 AM   Result Value Ref Range    Sodium 143 136 - 145 mmol/L    Potassium 3.7 3.5 - 5.1 mmol/L    Chloride 110 (H) 97 - 108 mmol/L    CO2 26 21 - 32 mmol/L    Anion gap 7 5 - 15 mmol/L    Glucose 200 (H) 65 - 100 mg/dL    BUN 23 (H) 6 - 20 MG/DL    Creatinine 0.97 0.70 - 1.30 MG/DL    BUN/Creatinine ratio 24 (H) 12 - 20      GFR est AA >60 >60 ml/min/1.73m2    GFR est non-AA >60 >60 ml/min/1.73m2    Calcium 7.7 (L) 8.5 - 10.1 MG/DL   GLUCOSE, POC    Collection Time: 09/02/17  6:29 AM   Result Value Ref Range    Glucose (POC) 202 (H) 65 - 100 mg/dL    Performed by Jamie Mock    GLUCOSE, POC    Collection Time: 09/02/17 11:10 AM   Result Value Ref Range    Glucose (POC) 353 (H) 65 - 100 mg/dL    Performed by cady Parks    GLUCOSE, POC    Collection Time: 09/02/17 11:21 AM   Result Value Ref Range    Glucose (POC) 332 (H) 65 - 100 mg/dL    Performed by cady Parks            Additional comments:I personally viewed and interpreted the patient's MRI scan reviewed personally on the PACS system by myself today        NEUROLOGICAL EXAM:    Appearance: The patient is well developed, well nourished, provides a coherent history and is in no acute distress. Mental Status: Oriented to time, place and person and the president, cognitive function and fund of knowledge is normal. Speech is fluent without aphasia or dysarthria. Mood and affect appropriate. Cranial Nerves:   Intact visual fields, except left visual field may be impaired on double simultaneous stimulation. Fundi are benign. JULITA, EOM's full, no nystagmus, no ptosis. Facial sensation is normal. Corneal reflexes are not tested. Facial movement is symmetric. Hearing is abnormal bilaterally. Palate is midline with normal sternocleidomastoid and trapezius muscles are normal. Tongue is midline.   Neck without meningismus or bruits  Temporal arteries are not tender or enlarged   Motor:  5/5 strength in upper and lower proximal and distal muscles, with only mild weakness of his left upper extremity and slight drift. Normal bulk and tone. No fasciculations. Reflexes:   Deep tendon reflexes 2+/4 and symmetrical.  No babinski or clonus present   Sensory:   Normal to touch, pinprick and temperature and vibration. DSS is intact except in the left upper extremity where it is decreased   Gait:  Not tested gait. Tremor:   No tremor noted. Cerebellar:  No cerebellar signs present. Neurovascular:  Normal heart sounds and regular rhythm, peripheral pulses decreased, and no carotid bruits. Assessment:        Assessment:       ICD-10-CM ICD-9-CM    1. Cerebrovascular accident (CVA), unspecified mechanism ([facility] I63.9 434.91    2. ASVD (arteriosclerotic vascular disease) I70.90 440.9    3. Controlled type 2 diabetes mellitus without complication, with long-term current use of insulin ([facility]) E11.9 250.00     Z79.4 V58.67    4. Essential hypertension I10 401.9    5. Mixed hyperlipidemia E78.2 272.2    6. Infection of left hand L08.9 686.9    7. Diabetic polyneuropathy associated with type 2 diabetes mellitus ([facility]) E11.42 250.60      357.2    8. Martínez's esophagus without dysplasia K22.70 530.85    9. Benign non-nodular prostatic hyperplasia with lower urinary tract symptoms N40.1 600.91    10. Bilateral carotid artery disease (HCC) I77.9 447.9    11. Stenosis of both internal carotid arteries I65.23 433.10      433.30    12. Thrombotic stroke involving right middle cerebral artery ([facility] I63.311 434.01      Principal Problem:    CVA (cerebral vascular accident) ([facility] (8/31/2017)    Active Problems:    Infection of left hand (7/20/2017)      Overview: Debridement on several occasions 8/2017. Amputation of left second       finger.       Essential hypertension (7/27/2017)      Controlled type 2 diabetes mellitus without complication, with long-term current use of insulin ([facility] (7/27/2017)      Mixed hyperlipidemia (7/27/2017)      ASVD (arteriosclerotic vascular disease) (7/27/2017)      Overview: Carotid Endarectomy 2017      Diabetic neuropathy ([facility] (7/28/2017)      On statin therapy (7/28/2017)      Thrombotic stroke involving right middle cerebral artery ([facility] (9/1/2017)      Stenosis of both internal carotid arteries (9/1/2017)        Plan:     Patient with right parietal stroke and bilateral carotid stenosis in the range of 50-69%, patient needs CTA to rule out surgical disease  Continue antiplatelet therapy for now excellent workup and medical care as you are  We will follow closely with you, and see in a.m. Discussed with patient and family in detail and reviewed the records, reviewed the MRI scan personally on the PACS system myself today.       Signed:  Amelie Mckeon MD  9/2/2017  2:28 PM    Lester Kumar MD  [phone]

## 2017-09-03 NOTE — PROGRESS NOTES
Bedside shift change report given to Oscar Ochoa (oncoming nurse) by Lino Freitas (offgoing nurse). Report included the following information SBAR, Kardex, Intake/Output, MAR, Accordion and Recent Results.

## 2017-09-03 NOTE — PROGRESS NOTES
Patient discharged home. He verbalized understanding on his responsibility to follow-up with vascular and making his primary care appointment. NIH was performed with a score of 0 and discharge instructions were given. All questions were answered.

## 2017-09-03 NOTE — PROGRESS NOTES
PICC was left in place. Dressing was clean, dry, and intact. Flushed with normal saline before discharge.

## 2017-09-03 NOTE — DISCHARGE INSTRUCTIONS
[address]  [phone]      Patient Discharge Instructions    Tia Moya / [id number] : 1940    Admitted 2017 Discharged: 9/3/2017     Principal Problem:    CVA (cerebral vascular accident) ([facility] [address] (2017)    Active Problems:    Essential hypertension (2017)      Controlled type 2 diabetes mellitus without complication, with long-term current use of insulin ([address] (2017)      Mixed hyperlipidemia (2017)      ASVD (arteriosclerotic vascular disease) (2017)      Overview: Carotid Endarectomy 2017      Infection of left hand (2017)      Overview: Debridement on several occasions 2017. Amputation of left second       finger. Diabetic neuropathy ([facility] [address] (2017)      On statin therapy (2017)      Thrombotic stroke involving right middle cerebral artery ([facility] [address] (2017)      Stenosis of both internal carotid arteries (2017)          No Known Allergies    · It is important that you take the medication exactly as they are prescribed. · Do not take other medications without consulting your doctor. What to do at Next Level of Care    Recommended diet: Diabetic    Recommended activity: usual    Other: DO NOT  Johnson Emy 2017          Information obtained by :  I understand that if any problems occur once I am at home I am to contact my physician. I understand and acknowledge receipt of the instructions indicated above.                                                                                                                                            Physician's or R.N.'s Signature                                                                  Date/Time                                                                                                                                              Patient or Representative Signature Date/Time

## 2017-09-03 NOTE — PROGRESS NOTES
Bedside shift change report given to Emily Harvey RN (oncoming nurse) by Padma Tapia RN (offgoing nurse).  Report included the following information [facility], [facility] and [facility]      Zone Phone:   [phone]          Significant changes during shift:  wound care, had run of [place]   [place]      Patient Information      Huong Durham  68 y.o.  8/31/2017 12:09 Ashlee Morales MD. Raya Durán admitted from Home      Problem List              Patient Active Problem List      Diagnosis Date Noted    CVA (cerebral vascular accident) ([address] 08/31/2017    Hand abscess 08/15/2017    Alfonso's esophagus 07/28/2017    Carotid stenosis 07/28/2017    Diabetic neuropathy ([address] 07/28/2017    On statin therapy 07/28/2017    Hypertrophy (benign) of prostate 07/28/2017    Prostate cancer screening 07/28/2017    Essential hypertension 07/27/2017    Controlled type 2 diabetes mellitus without complication, with long-term current use of insulin ([address] 07/27/2017    Mixed hyperlipidemia 07/27/2017    ASVD (arteriosclerotic vascular disease) 07/27/2017    Infection of left hand 07/20/2017    Carotid arterial disease ([address] 06/29/2016    Diverticulosis 10/23/2013    Colitis, nonspecific 10/23/2013    Lumbar stenosis with neurogenic claudication 07/12/2011               Past Medical History:   Diagnosis Date    Allergic rhinitis       Anemia       ASVD (arteriosclerotic vascular disease)       Alfonso's esophagus 7/28/2017    Carotid stenosis 7/28/2017    Chronic kidney disease       Diabetes ([address]         glucose runs 70 - 160's at home    Diabetic neuropathy ([address] 7/28/2017    Diverticulosis       DJD (degenerative joint disease)       ED (erectile dysfunction)       Enlarged prostate       JUDY (generalized anxiety disorder)       GERD (gastroesophageal reflux disease)         controlled with med; alfonso's esophagus    Hiatal hernia       Hypercholesterolemia       Hypertension       Hypertrophy (benign) of prostate 7/28/2017    Ill-defined condition         peripheral vascular disease    Lung nodule       Neuropathy (HCC)       On statin therapy 7/28/2017    Prostate cancer screening 7/28/2017    PVD (peripheral vascular disease) ([facility] [address]       Sebaceous cyst 7/28/2017    Spinal stenosis                  Core Measures:      CVA: Yes Yes  CHF:No No  PNA:No No      Post Op Surgical (If Applicable):       Number times ambulated in hallway past shift:  0  Number of times OOB to chair past shift:   0      Activity Status:      OOB to Chair No  Ambulated this shift No   Bed Rest No      Supplemental T0: (DK Applicable)      NC No  NRB No          LINES AND DRAINS:          PICC LINE? Yes Placement date: 8/21. Reason Medically Necessary: long term antibiotic use              DVT prophylaxis:      DVT prophylaxis Med- yes lovenox  DVT prophylaxis SCD or KEITH- No       Wounds: (If Applicable)      Wounds- No      Location       Patient Safety:      Falls Score Total Score: 2  Safety Level_______  Bed Alarm On? Yes  Sitter?  No      Plan for upcoming shift: monitor HR              Discharge Plan: No just admitted      Active Consults:  IP CONSULT TO HOSPITALIST  IP CONSULT TO NEUROLOGY  IP CONSULT TO CARDIOLOGY

## 2017-09-03 NOTE — PROGRESS NOTES
PROGRESS NOTE    NAME:  Cristiane Lowery   :   [birth year]   MRN:   [id number]     Date/Time:  9/3/2017 10:05 AM  Subjective:   History:  Chart reviewed and patient seen and examined this AM and D/W his nurse and his wife and all events noted. He presented to the ER on  with confusion and was admitted for a CVA confirmed by CT Head and MRI has confirmed 2 R and 1 L acute CVAs with other lacunar infarcts. He had no focal weakness or speech impediment as he was told although he can't remember the episode of confusion. He denies any current confusion and has no other neurologic c/o. He denies any cardiac or respiratory c/o. There are no GI/  c/o. He has no other c/o on complete 14 point ROS. He has had recent several surgical procedures of L hand due to laceration with subsequent abcess and eventual loss of 2nd finger and remains on antibiotics and wound care daily.         Medications reviewed:  Current Facility-Administered Medications   Medication Dose Route Frequency    pravastatin (PRAVACHOL) tablet 40 mg  40 mg Oral QHS    ferrous sulfate tablet 325 mg  325 mg Oral TID WITH MEALS    ascorbic acid (vitamin C) (VITAMIN C) tablet 250 mg  250 mg Oral TID    sodium chloride (NS) flush 10-30 mL  10-30 mL InterCATHeter PRN    sodium chloride (NS) flush 10 mL  10 mL InterCATHeter Q24H    sodium chloride (NS) flush 10-40 mL  10-40 mL InterCATHeter Q8H    heparin (porcine) pf 300 Units  300 Units InterCATHeter PRN    meropenem (MERREM) 1 g in 0.9% sodium chloride (MBP/ADV) 50 mL  1 g IntraVENous Q8H    sodium chloride (NS) flush 5-10 mL  5-10 mL IntraVENous Q8H    sodium chloride (NS) flush 5-10 mL  5-10 mL IntraVENous PRN    amLODIPine (NORVASC) tablet 5 mg  5 mg Oral DAILY    gabapentin (NEURONTIN) capsule 300 mg  300 mg Oral TID    insulin glargine (LANTUS) injection 8 Units  8 Units SubCUTAneous QHS    lactobac ac& pc-s.therm-b.anim (LETICIA Q/RISAQUAD)  1 Cap Oral DAILY    valsartan (DIOVAN) tablet 160 mg  160 mg Oral DAILY    insulin lispro (HUMALOG) injection   SubCUTAneous AC&HS    glucose chewable tablet 16 g  4 Tab Oral PRN    glucagon (GLUCAGEN) injection 1 mg  1 mg IntraMUSCular PRN    hydrALAZINE (APRESOLINE) 20 mg/mL injection 10 mg  10 mg IntraVENous Q6H PRN    sodium chloride (NS) flush 5-10 mL  5-10 mL IntraVENous Q8H    sodium chloride (NS) flush 5-10 mL  5-10 mL IntraVENous PRN    acetaminophen (TYLENOL) tablet 650 mg  650 mg Oral Q4H PRN    Or    acetaminophen (TYLENOL) solution 650 mg  650 mg Per NG tube Q4H PRN    Or    acetaminophen (TYLENOL) suppository 650 mg  650 mg Rectal Q4H PRN    clopidogrel (PLAVIX) tablet 75 mg  75 mg Oral DAILY    enoxaparin (LOVENOX) injection 40 mg  40 mg SubCUTAneous Q24H    dextrose 10% infusion 125-250 mL  125-250 mL IntraVENous PRN    tamsulosin (FLOMAX) capsule 0.4 mg  0.4 mg Oral DAILY    pantoprazole (PROTONIX) tablet 40 mg  40 mg Oral QHS        Objective:   Vitals:  Visit Vitals    /62 (BP 1 Location: Left arm, BP Patient Position: At rest)    Pulse 78    Temp 98.2 °F (36.8 °C)    Resp 18    Ht 5' 10\" (1.778 m)    Wt 189 lb 9.6 oz (86 kg)    SpO2 100%    BMI 28 kg/m2      O2 Device: Room air Temp (24hrs), Av.9 °F (36.6 °C), Min:97.6 °F (36.4 °C), Max:98.2 °F (36.8 °C)      Last 24hr Input/Output:  No intake or output data in the 24 hours ending 17 1006     PHYSICAL EXAM:  General:     Alert, cooperative, no distress, appears stated age. Head:    Normocephalic, without obvious abnormality, atraumatic. Eyes:    Conjunctivae/corneas clear. PERRLA  Nose:   Nares normal. No drainage or sinus tenderness. Throat:     Lips, mucosa, and tongue normal.  No Thrush  Neck:   Supple, symmetrical,  no adenopathy, thyroid: non tender     no L carotid bruit but harsh R carotid Bruit,  no JVD. Back:     Symmetric,  No CVA tenderness. Lungs:    Clear to auscultation bilaterally. No Wheezing or Rhonchi. No rales.   Heart:    Regular rate and rhythm,  no murmur, rub or gallop. Abdomen:    Soft, non-tender. Not distended. Bowel sounds normal. No masses  Extremities:  Extremities normal except L hand bandaged, atraumatic, No cyanosis. No edema. No clubbing  Lymph nodes:  Cervical, supraclavicular normal.  Neurologic:  Normal strength, Alert and oriented X 3.    Skin:                 No rash      Lab Data Reviewed:    Recent Results (from the past 24 hour(s))   GLUCOSE, POC    Collection Time: 09/02/17 11:10 AM   Result Value Ref Range    Glucose (POC) 353 (H) 65 - 100 mg/dL    Performed by [employer]    GLUCOSE, POC    Collection Time: 09/02/17 11:21 AM   Result Value Ref Range    Glucose (POC) 332 (H) 65 - 100 mg/dL    Performed by [employer]    GLUCOSE, POC    Collection Time: 09/02/17  2:34 PM   Result Value Ref Range    Glucose (POC) 263 (H) 65 - 100 mg/dL    Performed by Shikha Barry (PCT)    GLUCOSE, POC    Collection Time: 09/02/17  4:05 PM   Result Value Ref Range    Glucose (POC) 242 (H) 65 - 100 mg/dL    Performed by [employer]    GLUCOSE, POC    Collection Time: 09/02/17  9:24 PM   Result Value Ref Range    Glucose (POC) 306 (H) 65 - 100 mg/dL    Performed by Kamari Le    METABOLIC PANEL, BASIC    Collection Time: 09/03/17  4:00 AM   Result Value Ref Range    Sodium 143 136 - 145 mmol/L    Potassium 3.6 3.5 - 5.1 mmol/L    Chloride 110 (H) 97 - 108 mmol/L    CO2 23 21 - 32 mmol/L    Anion gap 10 5 - 15 mmol/L    Glucose 185 (H) 65 - 100 mg/dL    BUN 17 6 - 20 MG/DL    Creatinine 0.92 0.70 - 1.30 MG/DL    BUN/Creatinine ratio 18 12 - 20      GFR est AA >60 >60 ml/min/1.73m2    GFR est non-AA >60 >60 ml/min/1.73m2    Calcium 7.6 (L) 8.5 - 10.1 MG/DL   CBC WITH AUTOMATED DIFF    Collection Time: 09/03/17  4:00 AM   Result Value Ref Range    WBC 4.8 4.1 - 11.1 K/uL    RBC 2.90 (L) 4.10 - 5.70 M/uL    HGB 8.1 (L) 12.1 - 17.0 g/dL    HCT 25.1 (L) 36.6 - 50.3 %    MCV 86.6 80.0 - 99.0 FL    MCH 27.9 26.0 - 34.0 PG    MCHC 32.3 30.0 - 36.5 g/dL    RDW 17.7 (H) 11.5 - 14.5 %    PLATELET 174 978 - 763 K/uL    NEUTROPHILS 64 32 - 75 %    LYMPHOCYTES 23 12 - 49 %    MONOCYTES 10 5 - 13 %    EOSINOPHILS 2 0 - 7 %    BASOPHILS 1 0 - 1 %    ABS. NEUTROPHILS 3.1 1.8 - 8.0 K/UL    ABS. LYMPHOCYTES 1.1 0.8 - 3.5 K/UL    ABS. MONOCYTES 0.5 0.0 - 1.0 K/UL    ABS. EOSINOPHILS 0.1 0.0 - 0.4 K/UL    ABS. BASOPHILS 0.0 0.0 - 0.1 K/UL   GLUCOSE, POC    Collection Time: 09/03/17  6:36 AM   Result Value Ref Range    Glucose (POC) 241 (H) 65 - 100 mg/dL    Performed by Devin Tracy          Assessment/Plan:     Principal Problem:    CVA (cerebral vascular accident) ([facility] [address] (8/31/2017)    Active Problems:    Essential hypertension (7/27/2017)      Controlled type 2 diabetes mellitus without complication, with long-term current use of insulin ([address] (7/27/2017)      Mixed hyperlipidemia (7/27/2017)      ASVD (arteriosclerotic vascular disease) (7/27/2017)      Overview: Carotid Endarectomy 2017      Infection of left hand (7/20/2017)      Overview: Debridement on several occasions 8/2017. Amputation of left second       finger. Diabetic neuropathy ([address] (7/28/2017)      On statin therapy (7/28/2017)      Thrombotic stroke involving right middle cerebral artery ([address] (9/1/2017)      Stenosis of both internal carotid arteries (9/1/2017)       ___________________________________________________  PLAN:    1. Continue ASA and Plavix which was added as on ASA at the time of this event  2. Carotid Dopplers, 50-69% bilateral, D/W Vascular Surgery and did CTA of head and neck(will likely need R CEA on 3-4 weeks)  3.  Echocardiogram - Global Hypokinesis with Efx 40-45%  4. For DM follow BS and resume home treatment  5. Continue his daily Lantus  6. Continue Norvasc and Diovan for HTN currently not controlled  7. Continue Ivanz for hand infection and continue wound care  8. Continue Pravachol for Hyperlipidemia  9. Gabapentin for his diabetic neuropathy  10. Continue Iron but increased to TID and added Vitamin C and follow Hgb now 8.1 from 8.3 on admission  11. MRI Acute infarcts R parietal (Largest), R temporal and L frontal  12. Renal function (BUN 17/.92 this AM)  13.  Home today with follow up in 5 days    45 minutes spent in direct care of this complex patient today        ___________________________________________________    Attending Physician: Uche Sow MD

## 2017-09-04 NOTE — PROGRESS NOTES
[facility] SHORT VISIT NOTE    1000  Pt arrived to [facility] ambulatory with cane and in no distress for daily antibiotic. Denies any new complaints. Blood pressure 140/61, pulse (!) 59, temperature 97.7 °F (36.5 °C), resp. rate 18. Double lumen PICC line present to right arm on admission. Dressing changed per [facility] protocol and both lumens flushed with positive blood return noted x2. Medication given:  Invanz IV    1055  Discharged home ambulatory and in no distress. Tolerated treatment well. Next appointment 9/5/17 at 1500.

## 2017-09-04 NOTE — DISCHARGE SUMMARY
[address] SUMMARY       Name:  Mj Dsouza   MR#:  [id number]   :  1940   Account #:  [de-identified]        Date of Adm:  2017       FINAL DIAGNOSES:   1. Acute cerebrovascular accident, right parietal, right temporal and   small left frontal.   2. Hypertension. 3. Type 2 diabetes. 4. Hyperlipidemia. 5. Atherosclerotic vascular disease with moderate carotid disease,   bilateral, status post left endarterectomy in 2016.   6. Infection, left hand, post accident with a Skil saw, has had   debridement on several occasions, with amputation, left 2nd finger. 7. Diabetic neuropathy. 8. Stenosis, both internal carotid arteries. PROCEDURES:   1. Head CT. 2. Head MRI. 3. Carotid Dopplers. 4. 2D echocardiogram.   5. CT angiogram, head and neck. CONSULTATIONS: Neurology, Dr. Fuentes Gudino. HOSPITAL COURSE: For details of admission history please see   admit note. Briefly, the patient is a 69-year-old white male who   presented to the emergency room on the morning of  with an   episode of increased confusion. He does not remember the episode,   but there were focal deficits noted as far as weakness and speech   impediment was noted. His confusion did clear after a while. He had a   CT obtained in the emergency room, which revealed evidence of a   large right parietal infarct, as well as no evidence of bleed. He had this   followed with the carotid Dopplers revealing moderate internal carotid   disease, bilateral, 50-69%. He had an echocardiogram revealing an   ejection fraction of 40-45%. No evidence of vegetations. This was   followed by an MRI of his head, which revealed the right parietal infarct   with a small right temporal infarct and a small left frontal infarct, as well   as some microvascular disease that was felt related to his   hypertension.  This was followed by a CT angiogram of the head and   neck. This revealed moderate carotid disease, bilateral, of the internal   carotids At the time of admission he was continued on aspirin,   started on Plavix 75 a day. Had no further neurologic events. He was   placed on telemetry monitor and monitored throughout the   hospitalization, at which time he did have one short run, 5 beats of   nonsustained ventricular tachycardia. He was seen in cardiology   consultation by Dr. Felecia Joy, who did not feel that warranted any   further cardiac management. At this point it is felt that maximal hospital   benefit has been achieved. He will be discharged home. DISCHARGE MEDICATIONS:   1. Aspirin 81 mg daily. 2. Plavix 75 mg daily. 3. Norvasc 5 mg daily. 4. Vitamin C 250 t.i.d.   5. Iron 325 t.i.d.   6. Gabapentin 300 mg by mouth 3 times daily. 7. NovoLog insulin 10 units daily with dinner. 8. Lantus insulin that he varies between 9 and 12 units at night-time. 9. NPH insulin 20 units in the morning. 10. Esperanza-Q 1 tablet by mouth once daily. 11. Lisinopril/HCT 20/12.5 two tablets daily. 12. Cozaar 100 mg daily. 13. Mobic 15 mg daily. 14. Metformin 500 mg 2 times daily, but he is not to resume that until   September 5 since he had contrast dye September 2.   15. Also had been on nadolol 40 mg daily. 16. Niacin 500 mg daily. 17. Prilosec 20 daily. 18. Zocor 10 daily. 19. Flomax 0.4 daily. 20. Tramadol 50 mg daily. DISCHARGE INSTRUCTIONS: He will continue his IV infusions for his   hand infection, which consist of ertapenem 1 gram IV every 24 hours   and continued wound care. He will have followup with me in the office   in 5 days. He will have followup with Dr. Micki Suarez from Vascular   Surgery within the next couple weeks and will need to have   intervention on the carotid arteries, which was discussed with him.          MD Suzette Lin / Henrietta Rice   D:  09/03/2017   10:25   T:  09/04/2017   00:44   Job #:  [id number] [facility]

## 2017-09-05 NOTE — PROGRESS NOTES
Pt arrived to [facility] ambulatory in no acute distress at 1508 for Ertapenem Daily Antibiotic .  Assessment unremarkable pt voiced no complaints or concerns. R PICC line in place flushed per protocol and positive for blood return, no issues noted. VS: 1508 97.9 oral, HR 68 RR 18 /55           1602  HR 65, RR 18, /52       Labs obtained: CBC drawn on 9/3/17 SED RATE & BMP Sent today. Results are pending at the time of this note. Please follow up in [facility] care      The following medications administered:  NS KVO  Invanz 1 gram IV over 30 min  NS Flush  Heparin Flush    Pt tolerated treatment well. No adverse reactions sent.  IV flushed per policy and caps placed.  Pt discharged ambulatory in no acute distress at 1603, accompanied by family. Next appointment 9/6/17.

## 2017-09-05 NOTE — TELEPHONE ENCOUNTER
Called patient to schedule transition to care follow up. Patient states he is doing okay but overall tired. Scheduled for f/up 9-8-17.

## 2017-09-05 NOTE — PROGRESS NOTES
Pt arrived to [facility] ambulatory in no acute distress at 1508 for Ertapenem Daily Antibiotic .  Assessment unremarkable pt voiced no complaints or concerns. R PICC line in place flushed per protocol and positive for blood return, no issues noted. Patient Vitals for the past 12 hrs:   Temp Pulse Resp BP   09/05/17 1602 - 65 18 130/52   09/05/17 1508 97.9 °F (36.6 °C) 68 18 136/55            Labs obtained: CBC drawn on 9/3/17 SED RATE & BMP Sent today    Labs*****      The following medications administered:  NS KVO  Invanz 1 gram IV over 30 min  NS Flush  Heparin Flush    Pt tolerated treatment well. No adverse reactions sent.  IV flushed per policy and caps placed.  Pt discharged ambulatory in no acute distress at 1603, accompanied by family. Next appointment 9/6/17.

## 2017-09-05 NOTE — PROGRESS NOTES
Hospital Discharge Follow-Up      Date/Time:  2017 3:19 PM    Patient listed on HSO high risk report on 17. RRAT score: High Risk            25       Total Score        3 Has Seen PCP in Last 6 Months (Yes=3, No=0)    4 IP Visits Last 12 Months (1-3=4, 4=9, >4=11)    18 Charlson Comorbidity Score (Age + Comorbid Conditions)        Criteria that do not apply:    . Living with Significant Other. Assisted Living. LTAC. SNF. or   Rehab    Patient Length of Stay (>5 days = 3)    Pt. Coverage (Medicare=5 , Medicaid, or Self-Pay=4)        Brief hospitalization history:  Patient was admitted to [facility] on 17 and discharged on 9/3/17 for CVA. Nurse Navigator(NN) contacted the patient by telephone to perform post hospital discharge assessment, and spoke to his daughter, Lorraine Matthew. Verified  and address with Lorraine Matthew as identifiers. Provided introduction to self, and explanation of the Nurses Navigator role. Lorraine Matthew says that her father is at the [facility] getting his daily transfusion. He had his finger amputated for cellulitis and is getting IV antibiotics with them. At [facility] was coming out daily previous to this hospitalization to do dressing changes. They did come out today. Lorraine Matthew says that it would be best to call the patient back in the morning, so NN will follow up with the patient tomorrow. 17: NN spoke with the patient. He states he is doing good and is not having any issues at this time. Reviewed appointments with him and advised patient to call Dr. Onel Gutierrez office to schedule appointment. The patient states that At [facility] is coming out  and  for dressing changes and they did come out yesterday. Patient given an opportunity to ask questions and they have no further questions or concerns at this time. No other clinical/social/functional needs noted. The patient agrees to contact the PCP office for questions related to their healthcare. NN provided contact information to the patient for future reference. The patient expressed thanks, offered no additional questions and ended the call. Support system:  patient, daughter and wife    Discharge Instructions :  Reviewed discharge instructions with patient. Patient verbalizes understanding of discharge instructions and follow-up care. Red Flags:  Signs of stroke  Reviewed red flags with patient, and patient verbalizes understanding. Labs Reviewed:  Recent Labs      09/03/17   0400   WBC  4.8   HGB  8.1*   HCT  25.1*   PLT  230     Recent Labs      09/03/17   0400   NA  143   K  3.6   CL  110*   CO2  23   GLU  185*   BUN  17   CREA  0.92   CA  7.6*       PCP/Specialist follow up: Patient scheduled to follow up with Uche Sow MD on 9/7/17, and Dr. Dion Gan (orthopedic) on 9/6/17. The patient is supposed to follow up with the vascular surgeon, but needs to call and make appointment.

## 2017-09-06 NOTE — PROGRESS NOTES
1500 Pt arrived at [facility] ambulatory and in no distress for Ertapenem. Assessment completed, no new complaints voiced. DL PICC intact with positive blood return. Patient Vitals for the past 12 hrs:   Temp Pulse Resp BP SpO2   09/06/17 1553 - (!) 50 - - -   09/06/17 1549 - (!) 42 - 154/44 -   09/06/17 1502 97.7 °F (36.5 °C) (!) 52 18 145/43 100 %     Medications received:  Entrapenem    1600 Tolerated treatment well, no adverse reaction noted. PICC flushed and capped. D/Cd from [facility] ambulatory and in no distress accompanied by spouse. Next appt 9/7/17.

## 2017-09-08 NOTE — PROGRESS NOTES
Pt arrived to [facility] ambulatory in no acute distress at 1510 for Invanz.  Assessment unremarkable. R arm picc flushed without issue and positive blood return noted.     Visit Vitals    /57 (BP 1 Location: Left arm, BP Patient Position: Sitting)    Pulse 63    Temp 98.3 °F (36.8 °C)    Resp 18     The following medications administered:  Invanz 1g IV over 30 minutes    Pt tolerated treatment well.  Picc flushed per policy and, new caps placed.  Pt discharged ambulatory in no acute distress at 1545, accompanied by spouse. Next appointment 9/9/17 at 1000 at [facility].

## 2017-09-08 NOTE — PROGRESS NOTES
Pt arrived to [facility] ambulatory in no acute distress at 1335 for Invanz.  Assessment unremarkable. R arm picc flushed without issue and positive blood return noted. Labs obtained CBC, BMP. The following medications administered:  Invanz 1g IV over 30 minutes    Pt tolerated treatment well.  Picc flushed per policy and, new caps placed.  Pt discharged ambulatory in no acute distress at 1440, accompanied by self. Next appointment 9/8/17 at 1500.

## 2017-09-10 NOTE — PROGRESS NOTES
Outpatient Infusion Center Progress Note    9678 Pt admit to [facility] for [address] ambulatory in stable condition. Assessment completed. No new concerns voiced. Visit Vitals    /58    Pulse 82    Temp 97.7 °F (36.5 °C)    Resp 18     Double lumen PICC flushed well in both ports with positive blood return in both ports. Medications:  Invanz IV    1035 Pt tolerated treatment well. PICC line flushed per policy, positive blood return noted at conclusion in both ports. End caps were capped with alcohol caps, secured with net dressing on right arm. D/c home ambulatory in no distress. Pt aware of next appointment scheduled for tomorrow at [facility] CHEMICAL DEPENDENCY [facility] results from 9/5 &9/7 faxed to Dr. Toya Berman office. Fax transmission good.

## 2017-09-11 PROBLEM — R00.1 BRADYCARDIA: Status: ACTIVE | Noted: 2017-01-01

## 2017-09-11 PROBLEM — R55 SYNCOPE AND COLLAPSE: Status: ACTIVE | Noted: 2017-01-01

## 2017-09-11 NOTE — ED PROVIDER NOTES
HPI Comments: Shelley Alvarez is a 68 y.o. male with hx of DM, HTN, CKD, and CVA who presents via EMS to [facility] ED after being found down by his wife tonight. Per EMS, pt was last known well ~30 minutes PTA, was found down by his wife, and had heart rate in ~20s at time of their arrival. Per wife, pt complained of some headache and chest pain during the day today. At time of arrival to ED, pt being externally paced by EMS at rate of 70; pt awake now, states he is unsure whether or not his CP is related to pacemaker. Per EMS, pt was admitted to [facility] (8/31/17) for CVA. PCP: Jeremy Harrison MD    Hx is otherwise limited secondary to the condition of the pt. The history is provided by the EMS personnel and the patient.         Past Medical History:   Diagnosis Date    Allergic rhinitis     Anemia     ASVD (arteriosclerotic vascular disease)     Alfonso's esophagus 7/28/2017    Carotid stenosis 7/28/2017    Chronic kidney disease     Diabetes ([address]     glucose runs 70 - 160's at home    Diabetic neuropathy ([address] 7/28/2017    Diverticulosis     DJD (degenerative joint disease)     ED (erectile dysfunction)     Enlarged prostate     JUDY (generalized anxiety disorder)     GERD (gastroesophageal reflux disease)     controlled with med; alfonso's esophagus    Hiatal hernia     Hypercholesterolemia     Hypertension     Hypertrophy (benign) of prostate 7/28/2017    Ill-defined condition     peripheral vascular disease    Lung nodule     Neuropathy ([address]     On statin therapy 7/28/2017    Prostate cancer screening 7/28/2017    PVD (peripheral vascular disease) ([address]     Sebaceous cyst 7/28/2017    Spinal stenosis        Past Surgical History:   Procedure Laterality Date    CARDIAC SURG PROCEDURE UNLIST      CATH-2008    COLONOSCOPY N/A 6/15/2016    COLONOSCOPY [facility] Mckenna Doss [facility]  6/15/2016         HX CATARACT REMOVAL      BILATERAL    HX ORTHOPAEDIC  1977    BONE CYST REM,ANU FROM RIGHT LEG    HX ORTHOPAEDIC  5-, 7-22-17, 7-26-17    left hand index finger    HX OTHER SURGICAL Left 06/2016    carotid endarectomy    NEUROLOGICAL PROCEDURE UNLISTED  2011    Back: spinal stenosis, pinched nerve repair    UPPER GI ENDOSCOPY,BIOPSY  6/15/2016              Family History:   Problem Relation Age of Onset    Diabetes Mother     Cancer Father      LUNG    Heart Disease Father        Social History     Social History    Marital status:      Spouse name: N/A    Number of children: N/A    Years of education: N/A     Occupational History    Not on file. Social History Main Topics    Smoking status: Former Smoker     Quit date: 7/7/1996    Smokeless tobacco: Never Used    Alcohol use No      Comment: rarely    Drug use: No    Sexual activity: Yes     Other Topics Concern    Not on file     Social History Narrative         ALLERGIES: Review of patient's allergies indicates no known allergies. Review of Systems   Unable to perform ROS: Acuity of condition       Patient Vitals for the past 12 hrs:   Temp Pulse Resp BP SpO2   09/11/17 1252 - - - - 100 %   09/11/17 1237 96 °F (35.6 °C) 70 15 154/52 100 %            Physical Exam   Constitutional: He is oriented to person, place, and time. HENT:   Head: Atraumatic. Eyes: EOM are normal.   Right pupil 4 mm, left pupil 3 mm; both reactive   Cardiovascular: Normal rate, regular rhythm, normal heart sounds and intact distal pulses. Exam reveals no gallop and no friction rub. No murmur heard. Initially paced, dropped immediately to ~35 without pacing, and repaced   Pulmonary/Chest: Effort normal and breath sounds normal. No respiratory distress. He has no wheezes. He has no rales. He exhibits no tenderness. Initially hypoxic with decreased respiratory drive; SpO2 improved to 100% on 4 L O2 NC, RR ~13   Abdominal: Soft.  Bowel sounds are normal. He exhibits no distension and no mass. There is no tenderness. There is no rebound and no guarding. Musculoskeletal: Normal range of motion. He exhibits no edema or tenderness. Neurological: He is alert and oriented to person, place, and time. Answering questions appropriately, moving extremities, following simple commands   Skin:   PICC line RUE   Psychiatric: He has a normal mood and affect. Nursing note and vitals reviewed. MDM  Number of Diagnoses or Management Options  Diagnosis management comments: Hemorrhagic conversion of recent stroke with possible autonomic dysfunction; could involve herniation to brain stem       Amount and/or Complexity of Data Reviewed  Clinical lab tests: ordered and reviewed  Tests in the radiology section of CPT®: ordered and reviewed  Tests in the medicine section of CPT®: ordered and reviewed  Obtain history from someone other than the patient: yes (EMS)  Review and summarize past medical records: yes  Discuss the patient with other providers: yes (Neurology; Cardiology; Hospitalist)  Independent visualization of images, tracings, or specimens: yes    Risk of Complications, Morbidity, and/or Mortality  Presenting problems: high  Diagnostic procedures: high  Management options: high    Critical Care  Total time providing critical care: 30-74 minutes    ED Course       Procedures     12:32 PM  Pt taken off of external pacer, HR immediately dropped to 35; pt given dose of atropine 1 mg, repaced at rate of 70. Consult Note:  1:09 PM  Federico Case MD spoke with Garcia Szymanski MD  Specialty: Neurology  Discussed pts hx, disposition, and available diagnostic and imaging results. Reviewed care plans. Consultant agrees with plans as outlined. He does not feel additional imaging is necessary given CTA during most recent admission, but agrees with plan for admission for further evaluation.     Consult Note:  1:38 PM  Federico Case MD spoke with Shruthi Torres MD  Specialty: Cardiology  Discussed pts hx, disposition, and available diagnostic and imaging results. Reviewed care plans. Consultant agrees with plans as outlined. He will come to ED to evaluate pt.    1:52 PM  Spoke with Dr. Mirtha Dupree, he advises admitting pt to Hospital, will consult. CONSULT NOTE:   1:56 PM  Joaquin Condon MD spoke with Kel Mccollum MD,   Specialty: Hospitalist  Discussed pt's hx, disposition, and available diagnostic and imaging results. Reviewed care plans. Consultant will evaluate pt for admission. CRITICAL CARE NOTE :    IMPENDING DETERIORATION -Respiratory, Cardiovascular and CNS  ASSOCIATED RISK FACTORS - Hypoxia, Dysrhythmia and Metabolic changes  MANAGEMENT- Bedside Assessment and Supervision of Care  INTERPRETATION -  Xrays, ECG, Blood Pressure and Cardiac Output Measures   INTERVENTIONS - hemodynamic mngmt, transcutaneous pacing and Metobolic interventions  CASE REVIEW - Hospitalist, Medical Sub-Specialist, Nursing and Family  TREATMENT RESPONSE -Improved  PERFORMED BY - Self    I have spent >110 minutes of critical care time involved in lab review, consultations with specialist, family decision- making, bedside attention and documentation. During this entire length of time I was immediately available to the patient . Joaquin Condon MD    LABORATORY TESTS:  Recent Results (from the past 12 hour(s))   CBC WITH AUTOMATED DIFF    Collection Time: 09/11/17 12:37 PM   Result Value Ref Range    WBC 5.9 4.1 - 11.1 K/uL    RBC 3.11 (L) 4.10 - 5.70 M/uL    HGB 8.7 (L) 12.1 - 17.0 g/dL    HCT 27.6 (L) 36.6 - 50.3 %    MCV 88.7 80.0 - 99.0 FL    MCH 28.0 26.0 - 34.0 PG    MCHC 31.5 30.0 - 36.5 g/dL    RDW 16.3 (H) 11.5 - 14.5 %    PLATELET 873 957 - 243 K/uL    NEUTROPHILS 72 32 - 75 %    LYMPHOCYTES 19 12 - 49 %    MONOCYTES 6 5 - 13 %    EOSINOPHILS 2 0 - 7 %    BASOPHILS 1 0 - 1 %    ABS. NEUTROPHILS 4.3 1.8 - 8.0 K/UL    ABS. LYMPHOCYTES 1.1 0.8 - 3.5 K/UL    ABS. MONOCYTES 0.4 0.0 - 1.0 K/UL    ABS.  EOSINOPHILS 0.1 0.0 - 0.4 K/UL    ABS. BASOPHILS 0.0 0.0 - 0.1 K/UL   METABOLIC PANEL, COMPREHENSIVE    Collection Time: 09/11/17 12:37 PM   Result Value Ref Range    Sodium 141 136 - 145 mmol/L    Potassium 4.4 3.5 - 5.1 mmol/L    Chloride 111 (H) 97 - 108 mmol/L    CO2 23 21 - 32 mmol/L    Anion gap 7 5 - 15 mmol/L    Glucose 90 65 - 100 mg/dL    BUN 23 (H) 6 - 20 MG/DL    Creatinine 1.27 0.70 - 1.30 MG/DL    BUN/Creatinine ratio 18 12 - 20      GFR est AA >60 >60 ml/min/1.73m2    GFR est non-AA 55 (L) >60 ml/min/1.73m2    Calcium 7.5 (L) 8.5 - 10.1 MG/DL    Bilirubin, total 0.2 0.2 - 1.0 MG/DL    ALT (SGPT) 13 12 - 78 U/L    AST (SGOT) 20 15 - 37 U/L    Alk. phosphatase 109 45 - 117 U/L    Protein, total 5.5 (L) 6.4 - 8.2 g/dL    Albumin 1.9 (L) 3.5 - 5.0 g/dL    Globulin 3.6 2.0 - 4.0 g/dL    A-G Ratio 0.5 (L) 1.1 - 2.2     PROTHROMBIN TIME + INR    Collection Time: 09/11/17 12:37 PM   Result Value Ref Range    INR 1.0 0.9 - 1.1      Prothrombin time 10.5 9.0 - 11.1 sec   TROPONIN I    Collection Time: 09/11/17 12:37 PM   Result Value Ref Range    Troponin-I, Qt. <0.04 <0.05 ng/mL   CK W/ REFLX CKMB    Collection Time: 09/11/17 12:37 PM   Result Value Ref Range    CK 36 (L) 39 - 308 U/L       IMAGING RESULTS:  CT Results  (Last 48 hours)               09/11/17 1249  CT CODE NEURO HEAD WO CONTRAST Final result    Impression:  IMPRESSION:    No acute intracranial process identified by noncontrast CT. Interval evolution   of the right parieto-occipital watershed infarct. Stable left parietal lytic lesion, without activity on PET scan. Narrative:  EXAM:  CT CODE NEURO HEAD WO CONTRAST       INDICATION:   Confusion/delirium, altered LOC, unexplained; CODE NEURO; stroke       COMPARISON: 9/2/2017. CONTRAST:  None. TECHNIQUE: Unenhanced CT of the head was performed using 5 mm images. Brain and   bone windows were generated.   CT dose reduction was achieved through use of a   standardized protocol tailored for this examination and automatic exposure   control for dose modulation. FINDINGS:   The ventricles and sulci are stable in size, shape and configuration and   midline. There is a stable 1.5 cm area of hypodensity in the posterior left   occipital lobe (image 11). The hypodensity in the right parietal occipital   cortex is less evident on today's CT (reference axial image 19 of the prior   study. There is no intracranial hemorrhage, extra-axial collection, mass, mass   effect or midline shift. The basilar cisterns are open. No acute infarct is   identified. The bone windows demonstrate a stable lytic left parietal 2.2 cm   lesion (image 26). Hyperdense material is present in both maxillary sinuses. MEDICATIONS GIVEN:  Medications   atropine injection 1 mg (1 mg IntraVENous Given 9/11/17 1232)   morphine injection 4 mg (4 mg IntraVENous Given 9/11/17 1323)       IMPRESSION:  No diagnosis found. PLAN:  1. Admit to Hospitalist    ADMIT NOTE:  1:56 PM  The patient is being admitted to the hospital by Dr. Abelino Kate. The results of their tests and reasons for their admission have been discussed with the patient and/or available family. They convey agreement and understanding for the need to be admitted and for their admission diagnosis. This note is prepared by Mariajose Valladares, acting as Scribe for Debby Mendenhall MD.    Debby Mendenhall MD: The scribe's documentation has been prepared under my direction and personally reviewed by me in its entirety. I confirm that the note above accurately reflects all work, treatment, procedures, and medical decision making performed by me.

## 2017-09-11 NOTE — CONSULTS
DATE OF CONSULTATION: 9/11/2017    CONSULTED BY: Negar Crespo MD    Chief Complaint   Patient presents with    Slow Heart Rate     HISTORY OF PRESENT ILLNESS  Reason for Consult    Patient had syncopal episode today, found to be bradycardic, Pacer placed. He had a stroke 9/2, small right parietal infarct, did well, was on ASA 81 mg/day. He is now in ICU. He denies new neurologic symptoms today besides syncope, headache and chest pain.         ROS  A ten system review of constitutional, cardiovascular, respiratory, musculoskeletal, endocrine, skin, SHEENT, genitourinary, psychiatric and neurologic systems was obtained and is unremarkable except as stated in HPI     PMH  Past Medical History:   Diagnosis Date    Allergic rhinitis     Anemia     ASVD (arteriosclerotic vascular disease)     Alfonso's esophagus 7/28/2017    Carotid stenosis 7/28/2017    Chronic kidney disease     Diabetes ([address]     glucose runs 70 - 160's at home    Diabetic neuropathy ([address] 7/28/2017    Diverticulosis     DJD (degenerative joint disease)     ED (erectile dysfunction)     Enlarged prostate     JUDY (generalized anxiety disorder)     GERD (gastroesophageal reflux disease)     controlled with med; alfonso's esophagus    Hiatal hernia     Hypercholesterolemia     Hypertension     Hypertrophy (benign) of prostate 7/28/2017    Ill-defined condition     peripheral vascular disease    Lung nodule     Neuropathy ([address]     On statin therapy 7/28/2017    Prostate cancer screening 7/28/2017    PVD (peripheral vascular disease) ([address]     Sebaceous cyst 7/28/2017    Spinal stenosis        FH  Family History   Problem Relation Age of Onset    Diabetes Mother     Cancer Father      LUNG    Heart Disease Father        SH  Social History     Social History    Marital status:      Spouse name: N/A    Number of children: N/A    Years of education: N/A     Social History Main Topics    Smoking status: Former Smoker Quit date: 7/7/1996    Smokeless tobacco: Never Used    Alcohol use No      Comment: rarely    Drug use: No    Sexual activity: Yes     Other Topics Concern    Not on file     Social History Narrative       ALLERGIES  No Known Allergies    PHYSICAL EXAM  EXAMINATION:   Patient Vitals for the past 24 hrs:   Temp Pulse Resp BP SpO2   09/11/17 1415 - 69 22 170/56 100 %   09/11/17 1345 - 65 12 157/50 100 %   09/11/17 1315 - 69 15 160/47 100 %   09/11/17 1252 - - - - 100 %   09/11/17 1237 96 °F (35.6 °C) 70 15 154/52 100 %        General:   Physical Exam   CONSTITUTIONAL: Oriented to person, place, and time, appears well-developed and well-nourished. Head: Normocephalic and atraumatic. ENT: Oropharynx is clear and moist. No oropharyngeal exudate. EYES: Conjunctivae and EOM are normal. Pupils are equal, round, and reactive to light. Fundoscopic exam normal, No scleral icterus. NECK: Normal range of motion. Neck supple. No thyromegaly present. Carotids w/o bruit  CARDIOVASCULARr: Normal rate and regular rhythm. No murmur heard. RESPIRATORY: Effort normal and breath sounds normal.   MUSCULOSKELETAL:Moves all extremities well   LYMPH:   No Cervical or Cranial adenopathy. Neurological Examination:   Mental Status:  AAO x3. Speech is fluent. Follows commands    Cranial Nerves: Visual fields are full. PERRL, Extraocular movements are full. Facial sensation intact V1- V3. Facial movement intact, symmetric. Hearing intact to conversation. Palate elevates symmetrically. Shoulder shrug symmetric. Tongue midline. Motor: Strength is 5/5 in all 4 ext. Normal tone. No atrophy. Sensation: Normal to light touch    Reflexes: DTRs 2+ throughout. Plantar responses downgoing. Coordination/Cerebellar: Intact to finger-nose-finger     Gait: NT      Imaging review:  MRI BRAIN 8/31/17    1. Acute nonhemorrhagic right parietal infarct in the posterior right middle  cerebral artery territory.  Tiny right temporal and left inferior frontal  infarcts. No associated mass effect or hemorrhage. The patient's nurse, Padma Tapia,  was informed of this finding on 9/1/2017 at 10:55 AM. I requested that she  notify the neurology team.  2. Mild white matter signal abnormality and chronic lacunar infarcts consistent  with intracranial small vessel disease. 3. Destructive left anterior parietal calvarial lesion, unchanged for at least  one year and not metabolically active on PET. 4. Pansinusitis. MRA BRAIN 9/1/17    IMPRESSION  IMPRESSION:  Negative MRA of the head. No major vessel occlusion or stenosis. Peripheral branches not well evaluated by this technique. .    CTA HEAD 9/2/17    IMPRESSION  Impression:     No evidence for acute large vessel arterial occlusion. Focal calcific plaque at  the distal right M1 bifurcation middle cerebral artery causing focal moderate  grade stenosis. The more distal M2/M3 segments are patent.     Patent left internal carotid artery with probable sequelae of carotid  endarterectomy and proximal short segment stenosis measuring 50% by NASCET  criteria.     Moderate to severe atherosclerotic disease at the origins of the right  brachiocephalic and left common carotid arteries which are patent distally     Subtle cortical and subcortical hypoattenuation in the right parietotemporal  lobe correlating to known area of acute infarction.       Severe sinus disease    CT HEAD 9/11/17  IMPRESSION:   No acute intracranial process identified by noncontrast CT. Interval evolution  of the right parieto-occipital watershed infarct. Stable left parietal lytic lesion, without activity on PET scan. HOME MEDS  Prior to Admission Medications   Prescriptions Last Dose Informant Patient Reported? Taking? L. acidoph & paracasei- S therm- Bifido (LETICIA-Q/RISAQUAD) 8 billion cell cap cap 9/10/2017 at Unknown time Self Yes Yes   Sig: Take 1 Cap by mouth three (3) times daily (with meals).    amLODIPine (NORVASC) 5 mg tablet 2017 at Unknown time Self Yes Yes   Sig: Take 5 mg by mouth daily. ascorbic acid, vitamin C, (VITAMIN C) 250 mg tablet 2017 at Unknown time Self Yes Yes   Sig: Take 250 mg by mouth three (3) times daily. aspirin 81 mg chewable tablet 9/10/2017 at Unknown time Self Yes Yes   Sig: Take 81 mg by mouth nightly. clopidogrel (PLAVIX) 75 mg tab 2017 at Unknown time Self Yes Yes   Sig: Take 75 mg by mouth daily. ertapenem 1 gram 1 g IVPB 9/10/2017 at Unknown time Child Yes Yes   Si g by IntraVENous route every twenty-four (24) hours. ferrous sulfate 325 mg (65 mg iron) tablet 2017 at Unknown time Self Yes Yes   Sig: Take 325 mg by mouth three (3) times daily (with meals). gabapentin (NEURONTIN) 300 mg capsule 2017 at Unknown time Self Yes Yes   Sig: Take 300 mg by mouth three (3) times daily. insulin NPH (NOVOLIN N) 100 unit/mL injection 2017 at Unknown time Self Yes Yes   Si Units by SubCUTAneous route daily (with breakfast). Indications: type 2 diabetes mellitus   insulin aspart (NOVOLOG) 100 unit/mL injection 9/10/2017 at Unknown time Self Yes Yes   Sig: 10 Units by SubCUTAneous route daily (with dinner). Indications: type 2 diabetes mellitus   insulin glargine (LANTUS) 100 unit/mL injection 9/10/2017 at Unknown time Self Yes Yes   Si-12 Units by SubCUTAneous route nightly. Patient uses sliding scale, but cannot remember at this time   lisinopril-hydroCHLOROthiazide (PRINZIDE, ZESTORETIC) 20-12.5 mg per tablet 2017 at Unknown time Self Yes Yes   Sig: Take 2 Tabs by mouth daily (with breakfast). losartan (COZAAR) 100 mg tablet 2017 at Unknown time Self Yes Yes   Sig: Take 100 mg by mouth two (2) times a day. Indications: HYPERTENSION   meloxicam (MOBIC) 15 mg tablet 9/10/2017 at Unknown time Self Yes Yes   Sig: Take 15 mg by mouth daily.    metFORMIN (GLUMETZA ER) 500 mg TG24 24 hour tablet 2017 at Unknown time Self Yes Yes   Sig: Take 1 Tab by mouth two (2) times a day. nadolol (CORGARD) 40 mg tablet 9/11/2017 at Unknown time Self Yes Yes   Sig: Take 40 mg by mouth daily. nicotinic acid (NIACIN) 500 mg tablet 9/10/2017 at Unknown time Self Yes Yes   Sig: Take 500 mg by mouth nightly. omeprazole (PRILOSEC) 20 mg capsule 9/10/2017 at Unknown time Self Yes Yes   Sig: Take 20 mg by mouth nightly. simvastatin (ZOCOR) 10 mg tablet 9/10/2017 at Unknown time Self Yes Yes   Sig: Take 10 mg by mouth nightly. tamsulosin (FLOMAX) 0.4 mg capsule 9/11/2017 at Unknown time Self Yes Yes   Sig: Take 0.4 mg by mouth daily. traMADol (ULTRAM) 50 mg tablet 9/10/2017 at Unknown time Self Yes Yes   Sig: Take 50 mg by mouth four (4) times daily as needed (pain/sleep). Facility-Administered Medications: None       CURRENT MEDS  No current facility-administered medications for this encounter. IMPRESSION:RECOMMENDATIONS:  No evidence of new neurologic deficit. Would continue ASA 81mg and Plavix 75mg when not contraindicated by todays procedure. Spoke with wife. Thank you very much for this consultation. We will follow up on the above studies and give further recommendations as indicated. Eduardo Melo. Lb Silveira MD  Neurologist    This note will not be viewable in [address]

## 2017-09-11 NOTE — CONSULTS
[address]   [address] [facility]       Name:  Jacqueline Boeck   MR#:  [id number]   :  1940   Account #:  [de-identified]    Date of Consultation:  2017   Date of Adm:  2017       REQUESTING PHYSICIAN: Liz Barrios MD, [address] [place] [address]    REASON FOR CONSULTATION: Evaluate syncope and bradycardia. HISTORY OF PRESENT ILLNESS: The patient is a 60-year-old man   with a history of hypertension, type 2 diabetes and recent occipital   stroke last month. The patient has also been on IV antibiotics for the   last couple of months for an apparent staph and strep infection of the   left hand. He had a finger amputation after a traumatic injury, and a   secondary infection apparently. The patient was seen by me during his most recent admission for   transient bradycardia. He did have what appeared to be a transient   third-degree heart block on telemetry, but had not had any clear   syncope or near syncope. He was watched and later discharged to   home. He did not have any further rhythm problems. The patient was found slumped over at a desk earlier today by his wife. He was unresponsive. His wife called 46, and the rescue squad   came. He was noted to be bradycardic with a heart rate in the 20s. He   was also hypoxic. He was placed on an external pacemaker, and   brought to the [address] [place] [address] ER. In the emergency room, the patient had a   head CT, which was unremarkable, showing no extension of the infarct   or bleeding. He does also have complaints of a headache. His cardiac   enzymes were negative. He has remained, for the most part, on an   external pacemaker with underlying bradycardia. I was asked to see   the patient for evaluation. Currently, the patient is able answer questions. He is being externally   paced. He has no other specific complaints. There is no history of prior   myocardial infarction or congestive heart failure.  He did have an   echocardiogram recently, which showed an EF of 40% to 45%. PAST MEDICAL HISTORY: As noted above. Peripheral vascular   disease, type 2 diabetes, chronic renal insufficiency, gastroesophageal   reflux and BPH. PAST SURGICAL HISTORY: Prior bilateral cataract surgery, prior left   CEA, prior left hand surgery. CURRENT MEDICATIONS INCLUDE   1. Vitamin C.   2. Plavix. 3. Iron sulfate. 4. Tramadol. 5. Lisinopril. 6. Lantus insulin. 7. Prilosec. 8. Metformin. 9. Losartan. 10. Aspirin. 11. Meloxicam.   12. Nadolol. 13. Norvasc. 14. Zocor. 15. Flomax. 16. Neurontin. SOCIAL HISTORY: The patient does not currently smoke and does not   abuse alcohol. He is  and lives locally. FAMILY HISTORY: Positive for heart disease. REVIEW OF SYSTEMS: As noted above, otherwise noncontributory. PHYSICAL EXAMINATION   GENERAL: Reveals an elderly white male, pale in appearance, but   awake and answering questions. VITAL SIGNS: Blood pressure 157/50, pulse 65, respirations 12,   temperature 96. HEENT: Pupils are equal. Oropharynx with moist oral mucosa. NECK: Supple. No masses or thyromegaly. No cervical or   supraclavicular adenopathy. Negative for carotid bruits. No JVD. CHEST: Clear. No wheezes or crackles. SKIN: Warm and dry. BACK: No scoliosis. CARDIAC: Regular rate and rhythm, 3/6 systolic murmur, no gallop   heard. ABDOMEN: Soft. No obvious masses or organomegaly. Bowel sounds   positive. EXTREMITIES: Cool. No cyanosis or clubbing. No pedal edema. Distal   pulses not well palpated. NEUROLOGIC: No obvious gross motor deficits. LABORATORY DATA: Troponin 0.04. Hemoglobin 8.7. INR 1.0. BUN   23, creatinine 1.3. EKG: Sinus rhythm, first-degree AV block, no Q-waves, nonspecific ST   and T changes, no pacing on this tracing. IMPRESSION   1. Bradycardia, probably due to complete heart block, symptomatic.    2. Mild left ventricular systolic dysfunction, no history of coronary artery   disease. 3. Hypertension. 4. Type 2 diabetes. 5. Peripheral vascular disease. 6. Mild renal insufficiency. 7. Left hand infection, currently on chronic antibiotic therapy. 8. Prior left carotid endarterectomy. RECOMMENDATIONS: The patient clearly has high-grade heart   block, and we will go ahead and arrange for a temporary transvenous   pacemaker. We need to establish whether the patient's infection is   controlled and he is not bacteremic before proceeding with permanent   pacing. I will have the hospitalist admit the patient, and will also contact   Dr. Rolan Torres. The temporary transvenous pacemaker procedure was   discussed with the family, and they understand and agree to proceed.         MD Erendira Carpenter   D:  09/11/2017   14:52   T:  09/11/2017   15:35   Job #:  [id number]

## 2017-09-11 NOTE — PROGRESS NOTES
[facility]  Pulmonary, Critical Care, and Sleep Medicine    Name: Alicia Shafer MRN: [id number]   : 1940 Hospital: [facility]   Date: 2017        Critical Care Initial Patient Consult    IMPRESSION:   · Severe Bradycardia with HR of 20. Had temporary pacemaker placed today. · Recent CVA  · DM  · Hypertension  · CRI  · Had left index finger amputated, has been on home Abx. Has a double lumen PICC for ertapenem. · Lung Nodule  · Recent infected left index finger. After a saw laceration. · PVD  · GERD  · JUDY  · Discussed with nurse and ID physician. RECOMMENDATIONS:   · Per Cardiology  · Has been on Ertapenem via PICC. Will place on Zosyn  while in hospital. Discussed with ID MD.   · Will repeat paired blood cx to ensure blood stream is clear. · Will obtain UA  · Ask Wound care to evaluate left hand. Ongoing wound care to left hand. · Advance diet as tolerated  · Will check CXR to Picc line placement. -appears to be in good position. PCP: Piyush Ingram. Subjective/History: This patient has been seen and evaluated at the request of Dr. Benjamin Mckeon for above. Patient is a 68 y.o. male who presented for headache and chest pain. PT was found down by his wife. Pt was being externally paced in in the ER. Was recently admitted on 17 for CVA. Also was recently admitted and treated at [facility] for infection finger following a saw injury. Left  Index finger flexor tenosynovitis. ON  pt was admitted for confusion and was admitted for CVA. Pt had a Ct of head and MRI which revealed right and 1      Left acute CVAs. Also noted to have lacunar infarcts. He had not focal weakness or speech issues. Has had several recent surgeries to his left hand and and had loss of his index finger on his left hand. Has been on antibiotics via PICC line. ROS pt was seen in the ICU. Denies any fevers, chills, sweats.  Has been taking po intake without difficulty. Has no Headache, no vision changes  NO back pain, no leg pain. Has some mild left hand pain at the site of his prior amputation. He has been dressing the wound at home. NO GERD. NO leg pain. NO acute skin changes  NO acute neurologic changes. Past Medical History:   Diagnosis Date    Allergic rhinitis     Anemia     ASVD (arteriosclerotic vascular disease)     Alfonso's esophagus 7/28/2017    Carotid stenosis 7/28/2017    Chronic kidney disease     Diabetes ([address]     glucose runs 70 - 160's at home    Diabetic neuropathy ([address] 7/28/2017    Diverticulosis     DJD (degenerative joint disease)     ED (erectile dysfunction)     Enlarged prostate     JUDY (generalized anxiety disorder)     GERD (gastroesophageal reflux disease)     controlled with med; alfonso's esophagus    Hiatal hernia     Hypercholesterolemia     Hypertension     Hypertrophy (benign) of prostate 7/28/2017    Ill-defined condition     peripheral vascular disease    Lung nodule     Neuropathy ([address]     On statin therapy 7/28/2017    Prostate cancer screening 7/28/2017    PVD (peripheral vascular disease) ([facility] [address]     Sebaceous cyst 7/28/2017    Spinal stenosis       Past Surgical History:   Procedure Laterality Date    CARDIAC SURG PROCEDURE UNLIST      CATH-2008    COLONOSCOPY N/A 6/15/2016    COL[facility]ul [facility]  6/15/2016         HX CATARACT REMOVAL      BILATERAL    HX ORTHOPAEDIC  1977    BONE CYST REM,ANU FROM RIGHT LEG    HX ORTHOPAEDIC  5-, 7-22-17, 7-26-17    left hand index finger    HX OTHER SURGICAL Left 06/2016    carotid endarectomy    NEUROLOGICAL PROCEDURE UNLISTED  2011    Back: spinal stenosis, pinched nerve repair    UPPER GI ENDOSCOPY,BIOPSY  6/15/2016           Prior to Admission medications    Medication Sig Start Date End Date Taking?  Authorizing Provider   ascorbic acid, vitamin C, (VITAMIN C) 250 mg tablet Take 250 mg by mouth three (3) times daily. Yes Historical Provider   clopidogrel (PLAVIX) 75 mg tab Take 75 mg by mouth daily. Yes Historical Provider   ferrous sulfate 325 mg (65 mg iron) tablet Take 325 mg by mouth three (3) times daily (with meals). Yes Historical Provider   traMADol (ULTRAM) 50 mg tablet Take 50 mg by mouth four (4) times daily as needed (pain/sleep). Yes Historical Provider   LJunior acidoph & paracasei- S therm- Bifido (LETICIA-Q/RISAQUAD) 8 billion cell cap cap Take 1 Cap by mouth three (3) times daily (with meals). Yes Historical Provider   lisinopril-hydroCHLOROthiazide (PRINZIDE, ZESTORETIC) 20-12.5 mg per tablet Take 2 Tabs by mouth daily (with breakfast). Yes Historical Provider   nicotinic acid (NIACIN) 500 mg tablet Take 500 mg by mouth nightly. Yes Historical Provider   ertapenem 1 gram 1 g IVPB 1 g by IntraVENous route every twenty-four (24) hours. Yes Historical Provider   insulin glargine (LANTUS) 100 unit/mL injection 9-12 Units by SubCUTAneous route nightly. Patient uses sliding scale, but cannot remember at this time   Yes Historical Provider   omeprazole (PRILOSEC) 20 mg capsule Take 20 mg by mouth nightly. Yes Historical Provider   insulin NPH (NOVOLIN N) 100 unit/mL injection 20 Units by SubCUTAneous route daily (with breakfast). Indications: type 2 diabetes mellitus   Yes Historical Provider   insulin aspart (NOVOLOG) 100 unit/mL injection 10 Units by SubCUTAneous route daily (with dinner). Indications: type 2 diabetes mellitus   Yes Historical Provider   metFORMIN (GLUMETZA ER) 500 mg TG24 24 hour tablet Take 1 Tab by mouth two (2) times a day. Yes Historical Provider   amLODIPine (NORVASC) 5 mg tablet Take 5 mg by mouth daily. Yes Historical Provider   losartan (COZAAR) 100 mg tablet Take 100 mg by mouth two (2) times a day. Indications: HYPERTENSION   Yes Historical Provider   aspirin 81 mg chewable tablet Take 81 mg by mouth nightly. Yes Historical Provider   meloxicam (MOBIC) 15 mg tablet Take 15 mg by mouth daily. Yes Historical Provider   tamsulosin (FLOMAX) 0.4 mg capsule Take 0.4 mg by mouth daily. Yes Historical Provider   gabapentin (NEURONTIN) 300 mg capsule Take 300 mg by mouth three (3) times daily. Yes Historical Provider   nadolol (CORGARD) 40 mg tablet Take 40 mg by mouth daily. Yes Historical Provider   simvastatin (ZOCOR) 10 mg tablet Take 10 mg by mouth nightly. Yes Historical Provider     Current Facility-Administered Medications   Medication Dose Route Frequency    0.45% sodium chloride infusion  75 mL/hr IntraVENous CONTINUOUS    heparin (porcine) injection 5,000 Units  5,000 Units SubCUTAneous Q8H    insulin lispro (HUMALOG) injection   SubCUTAneous AC&HS    insulin glargine (LANTUS) injection 12 Units  12 Units SubCUTAneous QHS    piperacillin-tazobactam (ZOSYN) 3.375 g in 0.9% sodium chloride (MBP/ADV) 100 mL  3.375 g IntraVENous Q8H     No Known Allergies   Social History   Substance Use Topics    Smoking status: Former Smoker     Quit date: 1996    Smokeless tobacco: Never Used    Alcohol use No      Comment: rarely      Family History   Problem Relation Age of Onset    Diabetes Mother     Cancer Father      LUNG    Heart Disease Father         Review of Systems:  A comprehensive review of systems was negative.     Objective:   Vital Signs:    Visit Vitals    /57    Pulse 69    Temp 97.5 °F (36.4 °C)    Resp 13    Ht 5' 10\" (1.778 m)    Wt 84.7 kg (186 lb 11.7 oz)    SpO2 100%    BMI 26.79 kg/m2       O2 Device: Room air   O2 Flow Rate (L/min): 2 l/min   Temp (24hrs), Av.8 °F (36 °C), Min:96 °F (35.6 °C), Max:97.5 °F (36.4 °C)       Intake/Output:   Last shift:         Last 3 shifts:    No intake or output data in the 24 hours ending 17 3529  Hemodynamics:   PAP:   CO:     Wedge:   CI:     CVP:    SVR:       PVR:       Ventilator Settings:  Mode Rate Tidal Volume Pressure FiO2 PEEP                    Peak airway pressure:      Minute ventilation:        Physical Exam:    General:  Alert, cooperative, no distress, appears stated age. Head:  Normocephalic, without obvious abnormality, atraumatic. Eyes:  Conjunctivae/corneas clear. PERRL, EOMs intact. Nose: Nares normal. Septum midline. Mucosa normal. No drainage or sinus tenderness. Throat: Lips, mucosa, and tongue normal. Teeth and gums normal.   Neck: Supple, symmetrical, trachea midline, no adenopathy, thyroid: no enlargment/tenderness/nodules, no carotid bruit and no JVD. Back:   Symmetric, no curvature. ROM normal.   Lungs:   Clear to auscultation bilaterally. Chest wall:  No tenderness or deformity. Heart:  Regular rate and rhythm, S1, S2 normal, no murmur, click, rub or gallop. Abdomen:   Soft, non-tender. Bowel sounds normal. No masses,  No organomegaly. Extremities: Extremities normal, atraumatic, no cyanosis or edema. Has dressing on left hand, Was removed. Has clean wound on base of left index finger insertion, now s/p amputation of left index finger. Has some fibrinous material present. He denies much purulent drainage on dressings. No erythema. Pulses: 2+ and symmetric all extremities. Skin: Skin color, texture, turgor normal. No rashes or lesions   Lymph nodes: Cervical, supraclavicular, and axillary nodes normal.   Neurologic: Grossly nonfocal       Data:     Recent Results (from the past 24 hour(s))   CBC WITH AUTOMATED DIFF    Collection Time: 09/11/17 12:37 PM   Result Value Ref Range    WBC 5.9 4.1 - 11.1 K/uL    RBC 3.11 (L) 4.10 - 5.70 M/uL    HGB 8.7 (L) 12.1 - 17.0 g/dL    HCT 27.6 (L) 36.6 - 50.3 %    MCV 88.7 80.0 - 99.0 FL    MCH 28.0 26.0 - 34.0 PG    MCHC 31.5 30.0 - 36.5 g/dL    RDW 16.3 (H) 11.5 - 14.5 %    PLATELET 004 021 - 608 K/uL    NEUTROPHILS 72 32 - 75 %    LYMPHOCYTES 19 12 - 49 %    MONOCYTES 6 5 - 13 %    EOSINOPHILS 2 0 - 7 %    BASOPHILS 1 0 - 1 %    ABS.  NEUTROPHILS 4.3 1.8 - 8.0 K/UL    ABS. LYMPHOCYTES 1.1 0.8 - 3.5 K/UL    ABS. MONOCYTES 0.4 0.0 - 1.0 K/UL    ABS. EOSINOPHILS 0.1 0.0 - 0.4 K/UL    ABS. BASOPHILS 0.0 0.0 - 0.1 K/UL   METABOLIC PANEL, COMPREHENSIVE    Collection Time: 09/11/17 12:37 PM   Result Value Ref Range    Sodium 141 136 - 145 mmol/L    Potassium 4.4 3.5 - 5.1 mmol/L    Chloride 111 (H) 97 - 108 mmol/L    CO2 23 21 - 32 mmol/L    Anion gap 7 5 - 15 mmol/L    Glucose 90 65 - 100 mg/dL    BUN 23 (H) 6 - 20 MG/DL    Creatinine 1.27 0.70 - 1.30 MG/DL    BUN/Creatinine ratio 18 12 - 20      GFR est AA >60 >60 ml/min/1.73m2    GFR est non-AA 55 (L) >60 ml/min/1.73m2    Calcium 7.5 (L) 8.5 - 10.1 MG/DL    Bilirubin, total 0.2 0.2 - 1.0 MG/DL    ALT (SGPT) 13 12 - 78 U/L    AST (SGOT) 20 15 - 37 U/L    Alk.  phosphatase 109 45 - 117 U/L    Protein, total 5.5 (L) 6.4 - 8.2 g/dL    Albumin 1.9 (L) 3.5 - 5.0 g/dL    Globulin 3.6 2.0 - 4.0 g/dL    A-G Ratio 0.5 (L) 1.1 - 2.2     PROTHROMBIN TIME + INR    Collection Time: 09/11/17 12:37 PM   Result Value Ref Range    INR 1.0 0.9 - 1.1      Prothrombin time 10.5 9.0 - 11.1 sec   TROPONIN I    Collection Time: 09/11/17 12:37 PM   Result Value Ref Range    Troponin-I, Qt. <0.04 <0.05 ng/mL   CK W/ REFLX CKMB    Collection Time: 09/11/17 12:37 PM   Result Value Ref Range    CK 36 (L) 39 - 308 U/L   LACTIC ACID    Collection Time: 09/11/17  1:41 PM   Result Value Ref Range    Lactic acid 1.2 0.4 - 2.0 MMOL/L   EKG, 12 LEAD, INITIAL    Collection Time: 09/11/17  4:19 PM   Result Value Ref Range    Ventricular Rate 63 BPM    Atrial Rate 63 BPM    P-R Interval 232 ms    QRS Duration 106 ms    Q-T Interval 424 ms    QTC Calculation (Bezet) 433 ms    Calculated P Axis 71 degrees    Calculated R Axis 7 degrees    Calculated T Axis -88 degrees    Diagnosis       Sinus rhythm with 1st degree AV block  Left ventricular hypertrophy with repolarization abnormality  Abnormal ECG  When compared with ECG of 11-SEP-2017 12:59,  MANUAL COMPARISON REQUIRED, DATA IS UNCONFIRMED     GLUCOSE, POC    Collection Time: 09/11/17  5:33 PM   Result Value Ref Range    Glucose (POC) 65 65 - 100 mg/dL    [facility]. Imaging:  I have personally reviewed the patients radiographs and have reviewed the reports:  CXR: 9-11-17: COMPARISON: August 31, 2017     FINDINGS: AP portable imaging of the chest performed at 4:03 PM demonstrates  stable mild cardiomegaly. Pacemaker lead terminates at the level of the right  ventricle. Right arm PICC line terminates at the level of the mid SVC. The lungs  are clear bilaterally. No significant osseous abnormalities are seen.     IMPRESSION: No evidence of acute cardiopulmonary process. Pacemaker lead and  right arm PICC line appear to be in satisfactory position.     Erasto Elena MD

## 2017-09-11 NOTE — PROGRESS NOTES
Temp transvenous pacer placed via rt femoral vein s complic   [place]    Settings 3 mA, 60 ppm     Full report to follow.

## 2017-09-11 NOTE — PROGRESS NOTES
[facility]  Pulmonary, Critical Care, and Sleep Medicine    Name: Tiff Vega MRN: [id number]   : 1940 Hospital: [facility]   Date: 2017        Critical Care Initial Patient Consult    IMPRESSION:   · Severe Bradycardia with HR of 20. Had temporary pacemaker placed today. · Recent CVA  · DM  · Hypertension  · CRI  · Had left index finger amputated, has been on home Abx. Has a double lumen PICC for ertapenem. · Lung Nodule  · Recent infected left index finger. After a saw laceration. · PVD  · GERD  · JUDY  · Discussed with nurse and ID physician. RECOMMENDATIONS:   · Per Cardiology  · Has been on Ertapenem via PICC. Will place on Ctx while in hospital. Discussed with ID>   · Will repeat paired blood cx  · Will obtain UA  · Ask Wound care to evaluate left hand. Ongoing wound care to left hand. · Advance diet as tolerated  · Will check CXR to Picc line placement. PCP: Susana Powell. Subjective/History: This patient has been seen and evaluated at the request of Dr. Jarad Baltazar for above. Patient is a 68 y.o. male who presented for headache and chest pain. PT was found down by his wife. Pt was being externally paced in in the ER. Was recently admitted on 17 for CVA. Also was recently admitted and treated at [facility] for infection finger following a saw injury. Left  Index finger flexor tenosynovitis. ON  pt was admitted for confusion and was admitted for CVA. Pt had a Ct of head and MRI which revealed right and 1      Left acute CVAs. Also noted to have lacunar infarcts. He had not focal weakness or speech issues. Has had several recent surgeries to his left hand and and had loss of his index finger on his left hand. Has been on antibiotics via PICC line. ROS pt was seen in the ICU. Denies any fevers, chills, sweats. Has been taking po intake without difficulty.    Has no Headache, no vision changes  NO back pain, no leg pain.   Has some mild left hand pain at the site of his prior amputation. He has been dressing the wound at home. NO GERD. NO leg pain. NO acute skin changes  NO acute neurologic changes. Past Medical History:   Diagnosis Date    Allergic rhinitis     Anemia     ASVD (arteriosclerotic vascular disease)     Alfonso's esophagus 7/28/2017    Carotid stenosis 7/28/2017    Chronic kidney disease     Diabetes ([address]     glucose runs 70 - 160's at home    Diabetic neuropathy ([address] 7/28/2017    Diverticulosis     DJD (degenerative joint disease)     ED (erectile dysfunction)     Enlarged prostate     JUDY (generalized anxiety disorder)     GERD (gastroesophageal reflux disease)     controlled with med; alfonso's esophagus    Hiatal hernia     Hypercholesterolemia     Hypertension     Hypertrophy (benign) of prostate 7/28/2017    Ill-defined condition     peripheral vascular disease    Lung nodule     Neuropathy ([address]     On statin therapy 7/28/2017    Prostate cancer screening 7/28/2017    PVD (peripheral vascular disease) ([address]     Sebaceous cyst 7/28/2017    Spinal stenosis       Past Surgical History:   Procedure Laterality Date    CARDIAC SURG PROCEDURE UNLIST      CATH-2008    COLONOSCOPY N/A 6/15/2016    COLONOSCOPY performed by Basia Lopez [facility] at [facility]  6/15/2016         HX CATARACT REMOVAL      BILATERAL    HX ORTHOPAEDIC  1977    BONE CYST REM,ANU FROM RIGHT LEG    HX ORTHOPAEDIC  5-, 7-22-17, 7-26-17    left hand index finger    HX OTHER SURGICAL Left 06/2016    carotid endarectomy    NEUROLOGICAL PROCEDURE UNLISTED  2011    Back: spinal stenosis, pinched nerve repair    UPPER GI ENDOSCOPY,BIOPSY  6/15/2016           Prior to Admission medications    Medication Sig Start Date End Date Taking? Authorizing Provider   ascorbic acid, vitamin C, (VITAMIN C) 250 mg tablet Take 250 mg by mouth three (3) times daily.    Yes Historical Provider   clopidogrel (PLAVIX) 75 mg tab Take 75 mg by mouth daily. Yes Historical Provider   ferrous sulfate 325 mg (65 mg iron) tablet Take 325 mg by mouth three (3) times daily (with meals). Yes Historical Provider   traMADol (ULTRAM) 50 mg tablet Take 50 mg by mouth four (4) times daily as needed (pain/sleep). Yes Historical Provider   JANET acidoph & paracasei- S therm- Bifido (LETICIA-Q/RISAQUAD) 8 billion cell cap cap Take 1 Cap by mouth three (3) times daily (with meals). Yes Historical Provider   lisinopril-hydroCHLOROthiazide (PRINZIDE, ZESTORETIC) 20-12.5 mg per tablet Take 2 Tabs by mouth daily (with breakfast). Yes Historical Provider   nicotinic acid (NIACIN) 500 mg tablet Take 500 mg by mouth nightly. Yes Historical Provider   ertapenem 1 gram 1 g IVPB 1 g by IntraVENous route every twenty-four (24) hours. Yes Historical Provider   insulin glargine (LANTUS) 100 unit/mL injection 9-12 Units by SubCUTAneous route nightly. Patient uses sliding scale, but cannot remember at this time   Yes Historical Provider   omeprazole (PRILOSEC) 20 mg capsule Take 20 mg by mouth nightly. Yes Historical Provider   insulin NPH (NOVOLIN N) 100 unit/mL injection 20 Units by SubCUTAneous route daily (with breakfast). Indications: type 2 diabetes mellitus   Yes Historical Provider   insulin aspart (NOVOLOG) 100 unit/mL injection 10 Units by SubCUTAneous route daily (with dinner). Indications: type 2 diabetes mellitus   Yes Historical Provider   metFORMIN (GLUMETZA ER) 500 mg TG24 24 hour tablet Take 1 Tab by mouth two (2) times a day. Yes Historical Provider   amLODIPine (NORVASC) 5 mg tablet Take 5 mg by mouth daily. Yes Historical Provider   losartan (COZAAR) 100 mg tablet Take 100 mg by mouth two (2) times a day. Indications: HYPERTENSION   Yes Historical Provider   aspirin 81 mg chewable tablet Take 81 mg by mouth nightly.    Yes Historical Provider   meloxicam (MOBIC) 15 mg tablet Take 15 mg by mouth daily. Yes Historical Provider   tamsulosin (FLOMAX) 0.4 mg capsule Take 0.4 mg by mouth daily. Yes Historical Provider   gabapentin (NEURONTIN) 300 mg capsule Take 300 mg by mouth three (3) times daily. Yes Historical Provider   nadolol (CORGARD) 40 mg tablet Take 40 mg by mouth daily. Yes Historical Provider   simvastatin (ZOCOR) 10 mg tablet Take 10 mg by mouth nightly. Yes Historical Provider     No current facility-administered medications for this encounter. No Known Allergies   Social History   Substance Use Topics    Smoking status: Former Smoker     Quit date: 1996    Smokeless tobacco: Never Used    Alcohol use No      Comment: rarely      Family History   Problem Relation Age of Onset    Diabetes Mother     Cancer Father      LUNG    Heart Disease Father         Review of Systems:  A comprehensive review of systems was negative. Objective:   Vital Signs:    Visit Vitals    /50 (BP 1 Location: Left arm, BP Patient Position: At rest)    Pulse 65    Temp 96 °F (35.6 °C)    Resp 12    Ht 5' 10\" (1.778 m)    Wt 84.7 kg (186 lb 11.7 oz)    SpO2 100%    BMI 26.79 kg/m2       O2 Device: Nasal cannula   O2 Flow Rate (L/min): 2 l/min   Temp (24hrs), Av °F (35.6 °C), Min:96 °F (35.6 °C), Max:96 °F (35.6 °C)       Intake/Output:   Last shift:         Last 3 shifts:    No intake or output data in the 24 hours ending 17 1424  Hemodynamics:   PAP:   CO:     Wedge:   CI:     CVP:    SVR:       PVR:       Ventilator Settings:  Mode Rate Tidal Volume Pressure FiO2 PEEP                    Peak airway pressure:      Minute ventilation:        Physical Exam:    General:  Alert, cooperative, no distress, appears stated age. Head:  Normocephalic, without obvious abnormality, atraumatic. Eyes:  Conjunctivae/corneas clear. PERRL, EOMs intact. Nose: Nares normal. Septum midline. Mucosa normal. No drainage or sinus tenderness.    Throat: Lips, mucosa, and tongue normal. Teeth and gums normal.   Neck: Supple, symmetrical, trachea midline, no adenopathy, thyroid: no enlargment/tenderness/nodules, no carotid bruit and no JVD. Back:   Symmetric, no curvature. ROM normal.   Lungs:   Clear to auscultation bilaterally. Chest wall:  No tenderness or deformity. Heart:  Regular rate and rhythm, S1, S2 normal, no murmur, click, rub or gallop. Abdomen:   Soft, non-tender. Bowel sounds normal. No masses,  No organomegaly. Extremities: Extremities normal, atraumatic, no cyanosis or edema. Has dressing on left hand, Was removed. Has clean wound on base of left index finger insertion, now s/p amputation of left index finger. Has some fibrinous material present. He denies much purulent drainage on dressings. No erythema. Pulses: 2+ and symmetric all extremities. Skin: Skin color, texture, turgor normal. No rashes or lesions   Lymph nodes: Cervical, supraclavicular, and axillary nodes normal.   Neurologic: Grossly nonfocal       Data:     Recent Results (from the past 24 hour(s))   CBC WITH AUTOMATED DIFF    Collection Time: 09/11/17 12:37 PM   Result Value Ref Range    WBC 5.9 4.1 - 11.1 K/uL    RBC 3.11 (L) 4.10 - 5.70 M/uL    HGB 8.7 (L) 12.1 - 17.0 g/dL    HCT 27.6 (L) 36.6 - 50.3 %    MCV 88.7 80.0 - 99.0 FL    MCH 28.0 26.0 - 34.0 PG    MCHC 31.5 30.0 - 36.5 g/dL    RDW 16.3 (H) 11.5 - 14.5 %    PLATELET 670 359 - 729 K/uL    NEUTROPHILS 72 32 - 75 %    LYMPHOCYTES 19 12 - 49 %    MONOCYTES 6 5 - 13 %    EOSINOPHILS 2 0 - 7 %    BASOPHILS 1 0 - 1 %    ABS. NEUTROPHILS 4.3 1.8 - 8.0 K/UL    ABS. LYMPHOCYTES 1.1 0.8 - 3.5 K/UL    ABS. MONOCYTES 0.4 0.0 - 1.0 K/UL    ABS. EOSINOPHILS 0.1 0.0 - 0.4 K/UL    ABS.  BASOPHILS 0.0 0.0 - 0.1 K/UL   METABOLIC PANEL, COMPREHENSIVE    Collection Time: 09/11/17 12:37 PM   Result Value Ref Range    Sodium 141 136 - 145 mmol/L    Potassium 4.4 3.5 - 5.1 mmol/L    Chloride 111 (H) 97 - 108 mmol/L    CO2 23 21 - 32 mmol/L    Anion gap 7 5 - 15 mmol/L Glucose 90 65 - 100 mg/dL    BUN 23 (H) 6 - 20 MG/DL    Creatinine 1.27 0.70 - 1.30 MG/DL    BUN/Creatinine ratio 18 12 - 20      GFR est AA >60 >60 ml/min/1.73m2    GFR est non-AA 55 (L) >60 ml/min/1.73m2    Calcium 7.5 (L) 8.5 - 10.1 MG/DL    Bilirubin, total 0.2 0.2 - 1.0 MG/DL    ALT (SGPT) 13 12 - 78 U/L    AST (SGOT) 20 15 - 37 U/L    Alk. phosphatase 109 45 - 117 U/L    Protein, total 5.5 (L) 6.4 - 8.2 g/dL    Albumin 1.9 (L) 3.5 - 5.0 g/dL    Globulin 3.6 2.0 - 4.0 g/dL    A-G Ratio 0.5 (L) 1.1 - 2.2     PROTHROMBIN TIME + INR    Collection Time: 09/11/17 12:37 PM   Result Value Ref Range    INR 1.0 0.9 - 1.1      Prothrombin time 10.5 9.0 - 11.1 sec   TROPONIN I    Collection Time: 09/11/17 12:37 PM   Result Value Ref Range    Troponin-I, Qt. <0.04 <0.05 ng/mL   CK W/ REFLX CKMB    Collection Time: 09/11/17 12:37 PM   Result Value Ref Range    CK 36 (L) 39 - 308 U/L   LACTIC ACID    Collection Time: 09/11/17  1:41 PM   Result Value Ref Range    Lactic acid 1.2 0.4 - 2.0 MMOL/L             Telemetry had HR of 20 in ER. Imaging:  I have personally reviewed the patients radiographs and have reviewed the reports:  None, pending.         Nadege Juarez MD

## 2017-09-11 NOTE — IP AVS SNAPSHOT
[address] [facility] 
[phone] Patient: Modesta Archibald MRN: [id number] LXR:[id number] You are allergic to the following No active allergies Recent Documentation Height Weight BMI Smoking Status 1.778 m 84.7 kg 26.79 kg/m2 Former Smoker Emergency Contacts Name Discharge Info Relation Home Work Mobile Neisha Harrington DISCHARGE CAREGIVER [3] Spouse [3] [phone] About your hospitalization You were admitted on:  September 11, 2017 You last received care in the:  [facility] 2 CARDIOPULMONARY CARE You were discharged on:  September 16, 2017 Unit phone number:  [phone] Why you were hospitalized Your primary diagnosis was:  Not on File Your diagnoses also included:  Syncope And Collapse, Bradycardia, Complete Heart Block (Hcc), Asvd (Arteriosclerotic Vascular Disease), Carotid Arterial Disease (Hcc), Controlled Type 2 Diabetes Mellitus Without Complication, With Long-Term Current Use Of Insulin (Hcc), Diabetic Neuropathy (Hcc), Essential Hypertension, 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 
[phone] Your Appointments Thursday September 28, 2017  9:40 AM EDT Follow Up with Emmy Pond MD  
 Ricarda Conroy [address] ([address] [place] [address]) [address] [address] [phone] Current Discharge Medication List  
  
CONTINUE these medications which have CHANGED Dose & Instructions Dispensing Information Comments Morning Noon Evening Bedtime  
 ascorbic acid (vitamin C) 250 mg tablet Commonly known as:  VITAMIN C What changed:  Another medication with the same name was removed. Continue taking this medication, and follow the directions you see here. Your last dose was: Your next dose is:    
   
   
 Dose:  250 mg Take 250 mg by mouth three (3) times daily. Refills:  0  
     
   
   
   
  
 clopidogrel 75 mg Tab Commonly known as:  PLAVIX What changed:  Another medication with the same name was removed. Continue taking this medication, and follow the directions you see here. Your last dose was: Your next dose is:    
   
   
 Dose:  75 mg Take 75 mg by mouth daily. Refills:  0  
     
   
   
   
  
 ertapenem 1 gram 1 g IVPB What changed:  Another medication with the same name was removed. Continue taking this medication, and follow the directions you see here. Your last dose was: Your next dose is:    
   
   
 Dose:  1 g  
1 g by IntraVENous route every twenty-four (24) hours. Refills:  0  
     
   
   
   
  
 ferrous sulfate 325 mg (65 mg iron) tablet What changed:  Another medication with the same name was removed. Continue taking this medication, and follow the directions you see here. Your last dose was: Your next dose is:    
   
   
 Dose:  325 mg Take 325 mg by mouth three (3) times daily (with meals). Refills:  0 CONTINUE these medications which have NOT CHANGED Dose & Instructions Dispensing Information Comments Morning Noon Evening Bedtime  
 amLODIPine 5 mg tablet Commonly known as:  Dontae Rings Your last dose was: Your next dose is:    
   
   
 Dose:  5 mg Take 5 mg by mouth daily. Refills:  0  
     
   
   
   
  
 aspirin 81 mg chewable tablet Your last dose was: Your next dose is:    
   
   
 Dose:  81 mg Take 81 mg by mouth nightly. Refills:  0  
     
   
   
   
  
 L. acidoph & paracasei- S therm- Bifido 8 billion cell Cap cap Commonly known as:  LETICIA-Q/RISAQUAD Your last dose was: Your next dose is:    
   
   
 Dose:  1 Cap Take 1 Cap by mouth three (3) times daily (with meals). Refills:  0  
     
   
   
   
  
 LANTUS 100 unit/mL injection Generic drug:  insulin glargine Your last dose was: Your next dose is:    
   
   
 Dose:  9-12 Units 9-12 Units by SubCUTAneous route nightly. Patient uses sliding scale, but cannot remember at this time Refills:  0  
     
   
   
   
  
 losartan 100 mg tablet Commonly known as:  COZAAR Your last dose was: Your next dose is:    
   
   
 Dose:  100 mg Take 100 mg by mouth two (2) times a day. Indications: HYPERTENSION Refills:  0 NovoLIN N 100 unit/mL injection Generic drug:  insulin NPH Your last dose was: Your next dose is:    
   
   
 Dose:  20 Units 20 Units by SubCUTAneous route daily (with breakfast). Indications: type 2 diabetes mellitus Refills:  0 NovoLOG 100 unit/mL injection Generic drug:  insulin aspart Your last dose was: Your next dose is:    
   
   
 Dose:  10 Units 10 Units by SubCUTAneous route daily (with dinner). Indications: type 2 diabetes mellitus Refills:  0  
     
   
   
   
  
 omeprazole 20 mg capsule Commonly known as:  PRILOSEC Your last dose was: Your next dose is:    
   
   
 Dose:  20 mg Take 20 mg by mouth nightly. Refills:  0  
     
   
   
   
  
 simvastatin 10 mg tablet Commonly known as:  ZOCOR Your last dose was: Your next dose is:    
   
   
 Dose:  10 mg Take 10 mg by mouth nightly. Refills:  0  
     
   
   
   
  
 tamsulosin 0.4 mg capsule Commonly known as:  FLOMAX Your last dose was: Your next dose is:    
   
   
 Dose:  0.4 mg Take 0.4 mg by mouth daily. Refills:  0  
     
   
   
   
  
 traMADol 50 mg tablet Commonly known as:  ULTRAM  
   
Your last dose was: Your next dose is:    
   
   
 Dose:  50 mg Take 50 mg by mouth four (4) times daily as needed (pain/sleep). Refills:  0 STOP taking these medications   
 gabapentin 300 mg capsule Commonly known as:  NEURONTIN  
   
  
 lisinopril-hydroCHLOROthiazide 20-12.5 mg per tablet Commonly known as:  PRINZIDE, ZESTORETIC  
   
  
 meloxicam 15 mg tablet Commonly known as:  MOBIC  
   
  
 metFORMIN 500 mg Tg24 24 hour tablet Commonly known as:  GLUMETZA ER  
   
  
 nadolol 40 mg tablet Commonly known as:  CORGARD  
   
  
 nicotinic acid 500 mg tablet Commonly known as:  NIACIN Discharge Instructions [place] [id number] [phone] Patient Discharge Instructions Santoselton Gardiner / [id number] : 1940 Admitted 2017 Discharged: 2017 Take Home Medications · It is important that you take the medication exactly as they are prescribed. · Keep your medication in the bottles provided by the pharmacist and keep a list of the medication names, dosages, and times to be taken in your wallet. · Do not take other medications without consulting your doctor. What to do at [facility] Recommended diet: Diabetic Diet, Recommended activity: Activity as tolerated, PT/OT Follow-up with Dr. Zoraida Schreiber in 2 week or just after discharge from rehab. Call [phone] for your appointment. Information obtained by : 
I understand that if any problems occur once I am at home I am to contact my physician. I understand and acknowledge receipt of the instructions indicated above. Physician's or R.N.'s Signature                                                                  Date/Time Patient or Representative Signature                                                          Date/Time Discharge Instructions Attachments/References BRADYCARDIA PACEMAKER: PRE-OP (ENGLISH) Discharge Orders None Introducing [facility]! Nicole Menard introduces [employer] patient portal. Now you can access parts of your medical record, email your doctor's office, and request medication refills online. 1. In your internet browser, go to [email or web address] 2. Click on the First Time User? Click Here link in the Sign In box. You will see the New Member Sign Up page. 3. Enter your [employer] Access Code exactly as it appears below. You will not need to use this code after youve completed the sign-up process. If you do not sign up before the expiration date, you must request a new code. · [employer] Access Code: [email or web address] Expires: 11/19/2017  1:07 PM 
 
4. Enter the last four digits of your Social Security Number (xxxx) and Date of Birth (mm/dd/yyyy) as indicated and click Submit. You will be taken to the next sign-up page. 5. Create a [employer] ID. This will be your [employer] login ID and cannot be changed, so think of one that is secure and easy to remember. 6. Create a [employer] password. You can change your password at any time. 7. Enter your Password Reset Question and Answer. This can be used at a later time if you forget your password. 8. Enter your e-mail address. You will receive e-mail notification when new information is available in [address] 9. Click Sign Up. You can now view and download portions of your medical record. 10. Click the Download Summary menu link to download a portable copy of your medical information. If you have questions, please visit the Frequently Asked Questions section of the [employer] website. Remember, [employer] is NOT to be used for urgent needs. For medical emergencies, dial 911. Now available from your iPhone and Android! General Information Please provide this summary of care documentation to your next provider. Patient Signature:  ____________________________________________________________ Date:  ____________________________________________________________  
  
Allegra Boyd Provider Signature:  ____________________________________________________________ Date:  ____________________________________________________________ More Information Pacemaker Placement: Before Your Procedure What is pacemaker placement? A pacemaker is a small, battery-powered device. It sends electrical signals to the heart. This keeps the heartbeat steady when you have bradycardia (a slow heart rate). Thin wires, called leads, carry the signals between the pacemaker and the heart. This device is also called a pacer. You will get medicine before the procedure. This helps you relax and helps prevent pain. The doctor will make a cut in the skin just below your collarbone. The cut may be on either side of your chest. The doctor will put the pacemaker leads through the cut. The leads go into a large blood vessel in the upper chest. Then the doctor will guide the leads through the blood vessel into the heart.  The doctor will place the pacemaker under the skin of your chest. He or she will attach the leads to the pacemaker. Then the cut will be closed with stitches. The procedure usually takes about an hour. You may need to spend the night in the hospital. 
Pacemaker batteries usually last 5 to 15 years. Your doctor will talk to you about how often you will need to have your pacemaker and battery checked. You can likely return to many of your normal activities after your procedure. But to stay safe, you may need to make some changes in your normal routine. You may feel worried about having a pacemaker. This is common. You might feel better if you learn techniques to help you relax. And it can help if you learn about how the pacemaker helps your heart. Talk to your doctor about your questions and concerns. Follow-up care is a key part of your treatment and safety. Be sure to make and go to all appointments, and call your doctor if you are having problems. It's also a good idea to know your test results and keep a list of the medicines you take. What happens before the procedure? Procedures can be stressful. This information will help you understand what you can expect. And it will help you safely prepare for your procedure. Preparing for the procedure · Understand exactly what procedure is planned, along with the risks, benefits, and other options. · Tell your doctors ALL the medicines, vitamins, supplements, and herbal remedies you take. Some of these can increase the risk of bleeding or interact with anesthesia. · If you take aspirin or some other blood thinner, be sure to talk to your doctor. He or she will tell you if you should stop taking these medicines before the procedure. Make sure that you understand exactly what your doctor wants you to do. · Your doctor will tell you which medicines to take or stop before your procedure.  You may need to stop taking certain medicines a week or more before the procedure. So talk to your doctor as soon as you can. · If you have an advance directive, let your doctor know. It may include a living will and a durable power of  for health care. Bring a copy to the hospital. If you don't have one, you may want to prepare one. It lets your doctor and loved ones know your health care wishes. Doctors advise that everyone prepare these papers before any type of surgery or procedure. What happens on the day of the procedure? · Follow the instructions exactly about when to stop eating and drinking. If you don't, your procedure may be canceled. If your doctor told you to take your medicines on the day of the procedure, take them with only a sip of water. · Take a bath or shower before you come in for your procedure. Do not apply lotions, perfumes, deodorants, or nail polish. · Take off all jewelry and piercings. And take out contact lenses, if you wear them. At the hospital or surgery center · Bring a picture ID. · You will be kept comfortable and safe by your anesthesia provider. You may get medicine that relaxes you or puts you in a light sleep. The area being worked on will be numb. · The procedure will take about an hour. Going home · Be sure you have someone to drive you home. Anesthesia and pain medicine make it unsafe for you to drive. · You will be given more specific instructions about recovering from your procedure. They will cover things like diet, wound care, follow-up care, driving, and getting back to your normal routine. When should you call your doctor? · You have questions or concerns. · You don't understand how to prepare for your procedure. · You become ill before the procedure (such as fever, flu, or a cold). · You need to reschedule or have changed your mind about having the procedure. Where can you learn more? Go to http://cielo-jayne.info/. Enter S946 in the search box to learn more about \"Pacemaker Placement: Before Your Procedure. \" Current as of: September 29, 2016 Content Version: 11.3 © 2394-9253 [employer]. Care instructions adapted under license by [employer] (which disclaims liability or warranty for this information). If you have questions about a medical condition or this instruction, always ask your healthcare professional. [place] [address] any warranty or liability for your use of this information.

## 2017-09-11 NOTE — ED NOTES
Spoke with Dr Onelia Negron who states that Hospitalist to admit. Awaiting hospitalist for admission order.

## 2017-09-11 NOTE — ED NOTES
Spoke with Nelly Curran in CCU and updated her on pt and pending admission orders/[place] [facility]

## 2017-09-11 NOTE — WOUND CARE
Wound care consult for POA left hand wound. Patient is a 67 y/o CM seen last week (9/1) by wound care nurse for open surgical incision where left index finger amputated at [facility] aprox a month ago. Currently brought to ED with bradycardia and syncope today. Patient had a temp pacemaker placed today and they want to place a permanent one and have called ID MD and wound care to evaluate for wound infection in this hand wound. WOUND POA CONDITIONS        Wound Hand Left (Active)   DRESSING TYPE Open to air 9/11/2017  4:11 PM   Incision site well approximated? No 9/11/2017  4:11 PM   Non-Pressure Injury Full thickness (subcut/muscle) 9/11/2017  4:11 PM   Wound Length (cm) 3.5 cm 9/11/2017  4:11 PM   Wound Width (cm) 2 cm 9/11/2017  4:11 PM   Wound Depth (cm) 0.3 9/11/2017  4:11 PM   Wound Surface area (cm^3) 2.1 cm^2 9/11/2017  4:11 PM   Condition of Base Pink;Ligament exposed 9/11/2017  4:11 PM   Condition of Edges Rolled/curled 9/11/2017  4:11 PM   Tissue Type Pink;Yellow 9/11/2017  4:11 PM   Tissue Type Percent Pink 85 9/11/2017  4:11 PM   Tissue Type Percent Yellow 15 9/11/2017  4:11 PM   Drainage Amount  Scant 9/11/2017  4:11 PM   Drainage Color Serous 9/11/2017  4:11 PM   Wound Odor None 9/11/2017  4:11 PM   Periwound Skin Condition Intact 9/11/2017  4:11 PM   Cleansing and Cleansing Agents  Normal saline 9/11/2017  4:11 PM   Dressing Type Applied Wound gel;Silver products;Dry dressing 9/11/2017  4:11 PM   Procedure Tolerated Well 9/11/2017  4:11 PM   Number of days:0           Recommend:    Left hand wound: daily: cleanse with NS, wipe clean. Apply Carrysen wound gel on wound base and then a piece of Aquacell-AG. Cover with dry dressing.   Prosper Crabtree RN, CWON, zone ph# [phone]

## 2017-09-11 NOTE — ED NOTES
Brianna from EP lab bedside for pt transport to EP lab for temp pacemaker. Report to [facility]. Pt alert oriented x 4 at time of transport to EP lab. Pt wife accompanied pt to floor.

## 2017-09-11 NOTE — ED TRIAGE NOTES
Assumed care of pt from EMS. Per ems PT was found by wife down. Per EMS upon arrival pt's heart rate was 20 respirations 9 SpO2 \"in the 50's\". Pt Alert and responding to voice. Pt reports head and chest pain. Pt on monitor x3. Dr Darell Mensah at bedside.

## 2017-09-11 NOTE — ED NOTES
When transitioned from EMS pacer to ED pacer pt's heart rate dropped to the 30's. Pt arrives being externally paced rate 70 by [facility] and being bagged via BVM.

## 2017-09-11 NOTE — H&P
Hospitalist Admission Note    NAME: Huong Durham   :  89   MRN:  [id number]     Date/Time:  2017 4:02 PM    Patient PCP: Rula Beltran MD  ________________________________________________________________________    My assessment of this patient's clinical condition and my plan of care is as follows. Assessment / Plan:    Syncope  Bradycardia / complete heart block   -s/p temporary transvenous pacemaker insertion. -will need PPM.  ID consulted for input regarding infection risk. Recent CVA (right parietal, right temporal, left frontal)  BL ICA stenosis (50-59%),  Hx L CEA  -CT head No acute intracranial process identified by noncontrast CT. Interval evolution of the right parieto-occipital watershed infarct. Stable left parietal lytic lesion, without activity on PET scan.  -restart antiplatelets at some point, when okay with cardiology.    -Neurology input noted. HTN  -holding meds    DM2 with neuropathy  --300. Will restart lantus 12 units QHS with SSI prn. Hold metformin and NPH until work up is complete and tolerating full diet. Left hand infection  -s/p I and D / debridment [facility] ). S/P left index finger amputation [facility] )  -cultures grew microearophilic strep and staph aureus  -was on ertapenem as OP. Started on rocephin today. ID consulted    Will turn over care to Dr Boston Miranda in the morning. Code Status:  DNR  Surrogate Decision Maker: wife    DVT Prophylaxis: Heparin SQ  GI Prophylaxis: not indicated          Subjective:   CHIEF COMPLAINT:  Found unresponsive    HISTORY OF PRESENT ILLNESS:     Cuong Maya is a 68 y.o.  male with a history of DM, HTN, left hand infection (on IV abx) and recent stroke who presents with AMS and bradycardia. Patient was recently admitted ( to ) for CVA and so plavix was added to his ASA.   Family reports that he was fine until his wife found him unresponsive and slumped over his desk.   EMS was dispatched and found patient bradycardic with HR in the 20s. He was externally paced en route to ER. CT head nothing acute. Patient was brought to the EP lab for insertion of temporary transvenous pacemaker. We were asked to admit for work up and evaluation of the above problems. I saw him in his room in the ICU.         Past Medical History:   Diagnosis Date    Allergic rhinitis     Anemia     ASVD (arteriosclerotic vascular disease)     Alfonso's esophagus 7/28/2017    Carotid stenosis 7/28/2017    Chronic kidney disease     Diabetes ([address]     glucose runs 70 - 160's at home    Diabetic neuropathy ([address] 7/28/2017    Diverticulosis     DJD (degenerative joint disease)     ED (erectile dysfunction)     Enlarged prostate     JUDY (generalized anxiety disorder)     GERD (gastroesophageal reflux disease)     controlled with med; alfonso's esophagus    Hiatal hernia     Hypercholesterolemia     Hypertension     Hypertrophy (benign) of prostate 7/28/2017    Ill-defined condition     peripheral vascular disease    Lung nodule     Neuropathy ([address]     On statin therapy 7/28/2017    Prostate cancer screening 7/28/2017    PVD (peripheral vascular disease) ([address]     Sebaceous cyst 7/28/2017    Spinal stenosis         Past Surgical History:   Procedure Laterality Date    CARDIAC SURG PROCEDURE UNLIST      CATH-2008    COLONOSCOPY N/A 6/15/2016    COLONOSCOPY performed by Bo [facility] at [facility]  6/15/2016         HX CATARACT REMOVAL      BILATERAL    HX ORTHOPAEDIC  1977    BONE CYST REM,ANU FROM RIGHT LEG    HX ORTHOPAEDIC  5-, 7-22-17, 7-26-17    left hand index finger    HX OTHER SURGICAL Left 06/2016    carotid endarectomy    NEUROLOGICAL PROCEDURE UNLISTED  2011    Back: spinal stenosis, pinched nerve repair    UPPER GI ENDOSCOPY,BIOPSY  6/15/2016            Social History   Substance Use Topics    Smoking status: Former Smoker     Quit date: 7/7/1996    Smokeless tobacco: Never Used    Alcohol use No      Comment: rarely        Family History   Problem Relation Age of Onset    Diabetes Mother     Cancer Father      LUNG    Heart Disease Father      No Known Allergies     Prior to Admission medications    Medication Sig Start Date End Date Taking? Authorizing Provider   ascorbic acid, vitamin C, (VITAMIN C) 250 mg tablet Take 250 mg by mouth three (3) times daily. Yes Historical Provider   clopidogrel (PLAVIX) 75 mg tab Take 75 mg by mouth daily. Yes Historical Provider   ferrous sulfate 325 mg (65 mg iron) tablet Take 325 mg by mouth three (3) times daily (with meals). Yes Historical Provider   traMADol (ULTRAM) 50 mg tablet Take 50 mg by mouth four (4) times daily as needed (pain/sleep). Yes Historical Provider   L. acidoph & paracasei- S therm- Bifido (LETICIA-Q/RISAQUAD) 8 billion cell cap cap Take 1 Cap by mouth three (3) times daily (with meals). Yes Historical Provider   lisinopril-hydroCHLOROthiazide (PRINZIDE, ZESTORETIC) 20-12.5 mg per tablet Take 2 Tabs by mouth daily (with breakfast). Yes Historical Provider   nicotinic acid (NIACIN) 500 mg tablet Take 500 mg by mouth nightly. Yes Historical Provider   ertapenem 1 gram 1 g IVPB 1 g by IntraVENous route every twenty-four (24) hours. Yes Historical Provider   insulin glargine (LANTUS) 100 unit/mL injection 9-12 Units by SubCUTAneous route nightly. Patient uses sliding scale, but cannot remember at this time   Yes Historical Provider   omeprazole (PRILOSEC) 20 mg capsule Take 20 mg by mouth nightly. Yes Historical Provider   insulin NPH (NOVOLIN N) 100 unit/mL injection 20 Units by SubCUTAneous route daily (with breakfast). Indications: type 2 diabetes mellitus   Yes Historical Provider   insulin aspart (NOVOLOG) 100 unit/mL injection 10 Units by SubCUTAneous route daily (with dinner).  Indications: type 2 diabetes mellitus   Yes Historical Provider   [facility]) 500 mg TG24 24 hour tablet Take 1 Tab by mouth two (2) times a day. Yes Historical Provider   amLODIPine (NORVASC) 5 mg tablet Take 5 mg by mouth daily. Yes Historical Provider   losartan (COZAAR) 100 mg tablet Take 100 mg by mouth two (2) times a day. Indications: HYPERTENSION   Yes Historical Provider   aspirin 81 mg chewable tablet Take 81 mg by mouth nightly. Yes Historical Provider   meloxicam (MOBIC) 15 mg tablet Take 15 mg by mouth daily. Yes Historical Provider   tamsulosin (FLOMAX) 0.4 mg capsule Take 0.4 mg by mouth daily. Yes Historical Provider   gabapentin (NEURONTIN) 300 mg capsule Take 300 mg by mouth three (3) times daily. Yes Historical Provider   nadolol (CORGARD) 40 mg tablet Take 40 mg by mouth daily. Yes Historical Provider   simvastatin (ZOCOR) 10 mg tablet Take 10 mg by mouth nightly.    Yes Historical Provider       REVIEW OF SYSTEMS:   (completed in ICU after transvenous pacer)     Total of 12 systems reviewed as follows:       POSITIVE= underlined text  Negative = text not underlined  General:  fever, chills, sweats, generalized weakness, weight loss/gain,      loss of appetite   Eyes:    blurred vision, eye pain, loss of vision, double vision  ENT:    rhinorrhea, pharyngitis   Respiratory:   cough, sputum production, SOB, [place], wheezing, pleuritic pain   Cardiology:   chest pain, palpitations, orthopnea, PND, edema, syncope   Gastrointestinal:  abdominal pain , N/V, diarrhea, dysphagia, constipation, bleeding   Genitourinary:  frequency, urgency, dysuria, hematuria, incontinence   Muskuloskeletal :  arthralgia, myalgia, back pain  Hematology:  easy bruising, nose or gum bleeding, lymphadenopathy   Dermatological: rash, ulceration, pruritis, color change / jaundice  Endocrine:   hot flashes or polydipsia   Neurological:  headache, dizziness, confusion, focal weakness, paresthesia,     Speech difficulties, memory loss, gait difficulty, syncope  Psychological: Feelings of anxiety, depression, agitation    Objective:   VITALS:    Visit Vitals    /56 (BP 1 Location: Left arm, BP Patient Position: At rest)    Pulse 65    Temp 97.5 °F (36.4 °C)    Resp 11    Ht 5' 10\" (1.778 m)    Wt 84.7 kg (186 lb 11.7 oz)    SpO2 100%    BMI 26.79 kg/m2       PHYSICAL EXAM:    General:    Alert, cooperative, no distress, appears stated age. HEENT: Atraumatic, anicteric sclerae, pink conjunctivae     No oral ulcers, mucosa moist, throat clear, dentition fair  Neck:  Supple, symmetrical,  thyroid: non tender  Lungs:   Clear to auscultation bilaterally. No Wheezing or Rhonchi. No rales. Chest wall:  No tenderness  No Accessory muscle use. Heart:   Regular  rhythm,  No  murmur   No edema  Abdomen:   Soft, non-tender. Not distended. Bowel sounds normal  Extremities: No cyanosis. No clubbing,  Skin turgor normal, Capillary refill normal, Radial dial pulse 2+  Skin:     Not pale. Not Jaundiced  No rashes   Psych:  Good insight. Not depressed. Not anxious or agitated. Neurologic: EOMs intact. No facial asymmetry. No aphasia or slurred speech. Symmetrical strength, Sensation grossly intact.  Alert and oriented X 4.     _______________________________________________________________________  Care Plan discussed with:    Comments   Patient x    Family      RN     Care Manager                    Consultant:  x  Pulmonary   _______________________________________________________________________  Expected  Disposition:   Home with Family TBD   HH/PT/OT/RN    SNF/LTC    VICKI    ________________________________________________________________________  TOTAL TIME:   54    Minutes    Critical Care Provided     Minutes non procedure based      Comments    x Reviewed previous records   >50% of visit spent in counseling and coordination of care  Discussion with patient and/or family and questions answered       Given the patient's current clinical presentation, I have a high level of concern for decompensation if discharged from the ED. Complex decision making was performed which includes reviewing the patient's available past medical records, laboratory results, and Xray films. I have also directly communicated my plan and discussed this case with the involved ED physician.     ____________________________________________________________________  Ramiro Cadena MD    Procedures: see electronic medical records for all procedures/Xrays and details which were not copied into this note but were reviewed prior to creation of Plan. LAB DATA REVIEWED:    Recent Results (from the past 24 hour(s))   CBC WITH AUTOMATED DIFF    Collection Time: 09/11/17 12:37 PM   Result Value Ref Range    WBC 5.9 4.1 - 11.1 K/uL    RBC 3.11 (L) 4.10 - 5.70 M/uL    HGB 8.7 (L) 12.1 - 17.0 g/dL    HCT 27.6 (L) 36.6 - 50.3 %    MCV 88.7 80.0 - 99.0 FL    MCH 28.0 26.0 - 34.0 PG    MCHC 31.5 30.0 - 36.5 g/dL    RDW 16.3 (H) 11.5 - 14.5 %    PLATELET 556 776 - 857 K/uL    NEUTROPHILS 72 32 - 75 %    LYMPHOCYTES 19 12 - 49 %    MONOCYTES 6 5 - 13 %    EOSINOPHILS 2 0 - 7 %    BASOPHILS 1 0 - 1 %    ABS. NEUTROPHILS 4.3 1.8 - 8.0 K/UL    ABS. LYMPHOCYTES 1.1 0.8 - 3.5 K/UL    ABS. MONOCYTES 0.4 0.0 - 1.0 K/UL    ABS. EOSINOPHILS 0.1 0.0 - 0.4 K/UL    ABS. BASOPHILS 0.0 0.0 - 0.1 K/UL   METABOLIC PANEL, COMPREHENSIVE    Collection Time: 09/11/17 12:37 PM   Result Value Ref Range    Sodium 141 136 - 145 mmol/L    Potassium 4.4 3.5 - 5.1 mmol/L    Chloride 111 (H) 97 - 108 mmol/L    CO2 23 21 - 32 mmol/L    Anion gap 7 5 - 15 mmol/L    Glucose 90 65 - 100 mg/dL    BUN 23 (H) 6 - 20 MG/DL    Creatinine 1.27 0.70 - 1.30 MG/DL    BUN/Creatinine ratio 18 12 - 20      GFR est AA >60 >60 ml/min/1.73m2    GFR est non-AA 55 (L) >60 ml/min/1.73m2    Calcium 7.5 (L) 8.5 - 10.1 MG/DL    Bilirubin, total 0.2 0.2 - 1.0 MG/DL    ALT (SGPT) 13 12 - 78 U/L    AST (SGOT) 20 15 - 37 U/L    Alk.  phosphatase 109 45 - 117 U/L Protein, total 5.5 (L) 6.4 - 8.2 g/dL    Albumin 1.9 (L) 3.5 - 5.0 g/dL    Globulin 3.6 2.0 - 4.0 g/dL    A-G Ratio 0.5 (L) 1.1 - 2.2     PROTHROMBIN TIME + INR    Collection Time: 09/11/17 12:37 PM   Result Value Ref Range    INR 1.0 0.9 - 1.1      Prothrombin time 10.5 9.0 - 11.1 sec   TROPONIN I    Collection Time: 09/11/17 12:37 PM   Result Value Ref Range    Troponin-I, Qt. <0.04 <0.05 ng/mL   CK W/ REFLX CKMB    Collection Time: 09/11/17 12:37 PM   Result Value Ref Range    CK 36 (L) 39 - 308 U/L   LACTIC ACID    Collection Time: 09/11/17  1:41 PM   Result Value Ref Range    Lactic acid 1.2 0.4 - 2.0 MMOL/L

## 2017-09-11 NOTE — PROGRESS NOTES
Pharmacy Clarification of Prior to Admission Medication Regimen     The patient was interviewed regarding clarification of the prior to admission medication regimen. Wife and daughter were present in room and obtained permission from patient to discuss drug regimen with visitor(s) present. Information Obtained From: Patient, Patient's Daughter, Yunior SUNSHINE Martinez Progress Note dated 2017    Pertinent Pharmacy Findings:   Identified High Alert Medication Information  o Current IV antibiotics:   - Name: ertapenem 1 gram 1 g IVPB  - Dosing Instructions: 1 g by IntraVENous route every twenty-four (24) hours. - Indication: Infection on hand from surgery  - Patient has received two weeks and 5 days of therapy as of 9/10/2017 (patient needs treatment today 2017)  - Last dose administered: 9/10/2017 (at [facility])    PTA medication list was corrected to the following:     Prior to Admission Medications   Prescriptions Last Dose Informant Patient Reported? Taking? L. acidoph & paracasei- S therm- Bifido (LETICIA-Q/RISAQUAD) 8 billion cell cap cap 9/10/2017 at Unknown time Self Yes Yes   Sig: Take 1 Cap by mouth three (3) times daily (with meals). amLODIPine (NORVASC) 5 mg tablet 2017 at Unknown time Self Yes Yes   Sig: Take 5 mg by mouth daily. ascorbic acid, vitamin C, (VITAMIN C) 250 mg tablet 2017 at Unknown time Self Yes Yes   Sig: Take 250 mg by mouth three (3) times daily. aspirin 81 mg chewable tablet 9/10/2017 at Unknown time Self Yes Yes   Sig: Take 81 mg by mouth nightly. clopidogrel (PLAVIX) 75 mg tab 2017 at Unknown time Self Yes Yes   Sig: Take 75 mg by mouth daily. ertapenem 1 gram 1 g IVPB 9/10/2017 at Unknown time Child Yes Yes   Si g by IntraVENous route every twenty-four (24) hours. ferrous sulfate 325 mg (65 mg iron) tablet 2017 at Unknown time Self Yes Yes   Sig: Take 325 mg by mouth three (3) times daily (with meals).    gabapentin (NEURONTIN) 300 mg capsule 2017 at Unknown time Self Yes Yes   Sig: Take 300 mg by mouth three (3) times daily. insulin NPH (NOVOLIN N) 100 unit/mL injection 2017 at Unknown time Self Yes Yes   Si Units by SubCUTAneous route daily (with breakfast). Indications: type 2 diabetes mellitus   insulin aspart (NOVOLOG) 100 unit/mL injection 9/10/2017 at Unknown time Self Yes Yes   Sig: 10 Units by SubCUTAneous route daily (with dinner). Indications: type 2 diabetes mellitus   insulin glargine (LANTUS) 100 unit/mL injection 9/10/2017 at Unknown time Self Yes Yes   Si-12 Units by SubCUTAneous route nightly. Patient uses sliding scale, but cannot remember at this time   lisinopril-hydroCHLOROthiazide (PRINZIDE, ZESTORETIC) 20-12.5 mg per tablet 2017 at Unknown time Self Yes Yes   Sig: Take 2 Tabs by mouth daily (with breakfast). losartan (COZAAR) 100 mg tablet 2017 at Unknown time Self Yes Yes   Sig: Take 100 mg by mouth two (2) times a day. Indications: HYPERTENSION   meloxicam (MOBIC) 15 mg tablet 9/10/2017 at Unknown time Self Yes Yes   Sig: Take 15 mg by mouth daily. metFORMIN (GLUMETZA ER) 500 mg TG24 24 hour tablet 2017 at Unknown time Self Yes Yes   Sig: Take 1 Tab by mouth two (2) times a day. nadolol (CORGARD) 40 mg tablet 2017 at Unknown time Self Yes Yes   Sig: Take 40 mg by mouth daily. nicotinic acid (NIACIN) 500 mg tablet 9/10/2017 at Unknown time Self Yes Yes   Sig: Take 500 mg by mouth nightly. omeprazole (PRILOSEC) 20 mg capsule 9/10/2017 at Unknown time Self Yes Yes   Sig: Take 20 mg by mouth nightly. simvastatin (ZOCOR) 10 mg tablet 9/10/2017 at Unknown time Self Yes Yes   Sig: Take 10 mg by mouth nightly. tamsulosin (FLOMAX) 0.4 mg capsule 2017 at Unknown time Self Yes Yes   Sig: Take 0.4 mg by mouth daily. traMADol (ULTRAM) 50 mg tablet 9/10/2017 at Unknown time Self Yes Yes   Sig: Take 50 mg by mouth four (4) times daily as needed (pain/sleep). Facility-Administered Medications: None          Thank you,  Cyndee Barragan CPhT  Medication History Pharmacy Technician

## 2017-09-12 PROBLEM — I44.2 COMPLETE HEART BLOCK (HCC): Status: ACTIVE | Noted: 2017-01-01

## 2017-09-12 NOTE — PROGRESS NOTES
1600  Received patient from cath lab. Alert,cooperative. Temporary pacemaker wire intact through sheath in right groin. rythmn is sinus rythmn with runs of paced beats. Site soft,no oozing or hematoma noted. PICC intact in right upper arm. PICC present on admission. Wife and daughter at bedside. Left hand dressing removed, and old wound inspected by Dr. Helga Rodgers and Infectious disease MD.  Redressed by wound care RN. 1730-accucheck 64. sherbet,broth and jello eaten by patient as requested by patient and family to bring up level. 1805   Repeat accucheck  61. Glucose tabs given to patient. Blood cultures drawn. Patient states he has not voided since about 0700 this morning. Attempted to use urinal. Was only able to void 80cc. Sent to lab. 1832-  accucheck  74.  10% dextrose 125cc infused over 10 minutes per protocol. [facility] - bladder scan done,showed 677 cc. Dr. Raj Baker notified. Order received to hold evening lantus dose, and to insert erickson catheter.

## 2017-09-12 NOTE — PROGRESS NOTES
Attended Interdisciplinary rounds in Critical Care Unit, where patient care was discussed. Visit by: Juan Martin. Meghan Bedolla.  Humberto Apodaca MA, [address]

## 2017-09-12 NOTE — PROGRESS NOTES
[facility]  Pulmonary, Critical Care, and Sleep Medicine    Name: Kiara Faith MRN: [id number]   : 1940 Hospital: [facility]   Date: 2017        Critical Care    IMPRESSION:   · Severe Bradycardia with HR of 20. Has temporary pacemaker. · Recent CVA  · DM  · Chronic anemia  · Hypertension  · CRI  · Had left index finger amputated, has been on home Abx IV ertapenam.  Has a double lumen PICC; switched to IV Zosyn   · Lung Nodule  · Recent infected left index finger. After a saw laceration. · PVD  · GERD  · JUDY  · Discussed with nurse and ID physician. RECOMMENDATIONS:   · Per Cardiology  · IV Zosyn  while in hospital. Discussed with ID MD.   · Follow paired blood cx to ensure blood stream is clear. If clear, PPM  · Will obtain UA  · Ask Wound care to evaluate left hand. Ongoing wound care to left hand. · Advance diet as tolerated       pt anxious to get OOB but tied to sheath and pacer. No fever. Hand feels good. PCP: Maine Mitchell. Subjective/History: This patient has been seen and evaluated at the request of Dr. Estella Soria for above. Patient is a 68 y.o. male who presented for headache and chest pain. PT was found down by his wife. Pt was being externally paced in in the ER. Was recently admitted on 17 for CVA. Also was recently admitted and treated at [facility] for infection finger following a saw injury. Left  Index finger flexor tenosynovitis. ON  pt was admitted for confusion and was admitted for CVA. Pt had a Ct of head and MRI which revealed right and 1      Left acute CVAs. Also noted to have lacunar infarcts. He had not focal weakness or speech issues. Has had several recent surgeries to his left hand and and had loss of his index finger on his left hand. Has been on antibiotics via PICC line. ROS pt was seen in the ICU. Denies any fevers, chills, sweats. Has been taking po intake without difficulty.    Has no Headache, no vision changes  NO back pain, no leg pain. Has some mild left hand pain at the site of his prior amputation. He has been dressing the wound at home. NO GERD. NO leg pain. NO acute skin changes  NO acute neurologic changes. Past Surgical History:   Procedure Laterality Date    CARDIAC SURG PROCEDURE UNLIST      CATH-2008    COLONOSCOPY N/A 6/15/2016    COLONOSCOPY performed by Nesha Arreguin MD at [facility]  6/15/2016         HX CATARACT REMOVAL      BILATERAL    HX ORTHOPAEDIC  1977    BONE CYST REM,ANU FROM RIGHT LEG    HX ORTHOPAEDIC  5-, 7-22-17, 7-26-17    left hand index finger    HX OTHER SURGICAL Left 06/2016    carotid endarectomy    NEUROLOGICAL PROCEDURE UNLISTED  2011    Back: spinal stenosis, pinched nerve repair    UPPER GI ENDOSCOPY,BIOPSY  6/15/2016             Current Facility-Administered Medications   Medication Dose Route Frequency    mupirocin (BACTROBAN) 2 % ointment   Both Nostrils BID    0.45% sodium chloride infusion  75 mL/hr IntraVENous CONTINUOUS    heparin (porcine) injection 5,000 Units  5,000 Units SubCUTAneous Q8H    insulin lispro (HUMALOG) injection   SubCUTAneous AC&HS    piperacillin-tazobactam (ZOSYN) 3.375 g in 0.9% sodium chloride (MBP/ADV) 100 mL  3.375 g IntraVENous Q8H    insulin glargine (LANTUS) injection 12 Units  12 Units SubCUTAneous QHS     No Known Allergies   Social History   Substance Use Topics    Smoking status: Former Smoker     Quit date: 7/7/1996    Smokeless tobacco: Never Used    Alcohol use No      Comment: rarely      Family History   Problem Relation Age of Onset    Diabetes Mother     Cancer Father      LUNG    Heart Disease Father         Review of Systems:  A comprehensive review of systems was negative.     Objective:   Vital Signs:    Visit Vitals    /51    Pulse 89    Temp 97.8 °F (36.6 °C)    Resp 18    Ht 5' 10\" (1.778 m)    Wt 84.7 kg (186 lb 11.7 oz)    SpO2 100%    BMI 26.79 kg/m2       O2 Device: Room air   O2 Flow Rate (L/min): 2 l/min   Temp (24hrs), Av.4 °F (36.3 °C), Min:96 °F (35.6 °C), Max:97.9 °F (36.6 °C)       Intake/Output:   Last shift:         Last 3 shifts: 09/10 1901 -  0700  In: 1252.5 [I.V.:1252.5]  Out: 2538 [Urine:1655]    Intake/Output Summary (Last 24 hours) at 17 0744  Last data filed at 17 0600   Gross per 24 hour   Intake           1252.5 ml   Output             1655 ml   Net           -402.5 ml     Hemodynamics:   PAP:   CO:     Wedge:   CI:     CVP:    SVR:       PVR:       Ventilator Settings:  Mode Rate Tidal Volume Pressure FiO2 PEEP                    Peak airway pressure:      Minute ventilation:        Physical Exam:    General:  Alert, cooperative, no distress, appears stated age. Head:  Normocephalic, without obvious abnormality, atraumatic. Eyes:  Conjunctivae/corneas clear. PERRL, EOMs intact. Nose: Nares normal. Septum midline. Mucosa normal. No drainage or sinus tenderness. Throat: Lips, mucosa, and tongue normal. Teeth and gums normal.   Neck: Supple, symmetrical, trachea midline, no adenopathy, thyroid: no enlargment/tenderness/nodules, no carotid bruit and no JVD. Back:   Symmetric, no curvature. ROM normal.   Lungs:   Clear to auscultation bilaterally. Chest wall:  No tenderness or deformity. Heart:  Regular rate and rhythm, S1, S2 normal, no murmur, click, rub or gallop. Abdomen:   Soft, non-tender. Bowel sounds normal. No masses,  No organomegaly. Extremities: Extremities normal, atraumatic, no cyanosis or edema. Has dressing on left hand, Was removed. Has clean wound on base of left index finger insertion, now s/p amputation of left index finger. Has some fibrinous material present. He denies much purulent drainage on dressings. No erythema. Pulses: 2+ and symmetric all extremities.    Skin: Skin color, texture, turgor normal. No rashes or lesions   Lymph nodes: Cervical, supraclavicular, and axillary nodes normal.   Neurologic: Grossly nonfocal       Data:     Recent Results (from the past 24 hour(s))   CBC WITH AUTOMATED DIFF    Collection Time: 09/11/17 12:37 PM   Result Value Ref Range    WBC 5.9 4.1 - 11.1 K/uL    RBC 3.11 (L) 4.10 - 5.70 M/uL    HGB 8.7 (L) 12.1 - 17.0 g/dL    HCT 27.6 (L) 36.6 - 50.3 %    MCV 88.7 80.0 - 99.0 FL    MCH 28.0 26.0 - 34.0 PG    MCHC 31.5 30.0 - 36.5 g/dL    RDW 16.3 (H) 11.5 - 14.5 %    PLATELET 739 474 - 536 K/uL    NEUTROPHILS 72 32 - 75 %    LYMPHOCYTES 19 12 - 49 %    MONOCYTES 6 5 - 13 %    EOSINOPHILS 2 0 - 7 %    BASOPHILS 1 0 - 1 %    ABS. NEUTROPHILS 4.3 1.8 - 8.0 K/UL    ABS. LYMPHOCYTES 1.1 0.8 - 3.5 K/UL    ABS. MONOCYTES 0.4 0.0 - 1.0 K/UL    ABS. EOSINOPHILS 0.1 0.0 - 0.4 K/UL    ABS. BASOPHILS 0.0 0.0 - 0.1 K/UL   METABOLIC PANEL, COMPREHENSIVE    Collection Time: 09/11/17 12:37 PM   Result Value Ref Range    Sodium 141 136 - 145 mmol/L    Potassium 4.4 3.5 - 5.1 mmol/L    Chloride 111 (H) 97 - 108 mmol/L    CO2 23 21 - 32 mmol/L    Anion gap 7 5 - 15 mmol/L    Glucose 90 65 - 100 mg/dL    BUN 23 (H) 6 - 20 MG/DL    Creatinine 1.27 0.70 - 1.30 MG/DL    BUN/Creatinine ratio 18 12 - 20      GFR est AA >60 >60 ml/min/1.73m2    GFR est non-AA 55 (L) >60 ml/min/1.73m2    Calcium 7.5 (L) 8.5 - 10.1 MG/DL    Bilirubin, total 0.2 0.2 - 1.0 MG/DL    ALT (SGPT) 13 12 - 78 U/L    AST (SGOT) 20 15 - 37 U/L    Alk.  phosphatase 109 45 - 117 U/L    Protein, total 5.5 (L) 6.4 - 8.2 g/dL    Albumin 1.9 (L) 3.5 - 5.0 g/dL    Globulin 3.6 2.0 - 4.0 g/dL    A-G Ratio 0.5 (L) 1.1 - 2.2     PROTHROMBIN TIME + INR    Collection Time: 09/11/17 12:37 PM   Result Value Ref Range    INR 1.0 0.9 - 1.1      Prothrombin time 10.5 9.0 - 11.1 sec   TROPONIN I    Collection Time: 09/11/17 12:37 PM   Result Value Ref Range    Troponin-I, Qt. <0.04 <0.05 ng/mL   CK W/ REFLX CKMB    Collection Time: 09/11/17 12:37 PM   Result Value Ref Range    CK 36 (L) 39 - 308 U/L   LACTIC ACID    Collection Time: 09/11/17  1:41 PM   Result Value Ref Range    Lactic acid 1.2 0.4 - 2.0 MMOL/L   EKG, 12 LEAD, INITIAL    Collection Time: 09/11/17  4:19 PM   Result Value Ref Range    Ventricular Rate 63 BPM    Atrial Rate 63 BPM    P-R Interval 232 ms    QRS Duration 106 ms    Q-T Interval 424 ms    QTC Calculation (Bezet) 433 ms    Calculated P Axis 71 degrees    Calculated R Axis 7 degrees    Calculated T Axis -88 degrees    Diagnosis       Sinus rhythm with 1st degree AV block  Left ventricular hypertrophy with repolarization abnormality  Abnormal ECG  When compared with ECG of 11-SEP-2017 12:59,  MANUAL COMPARISON REQUIRED, DATA IS UNCONFIRMED     GLUCOSE, POC    Collection Time: 09/11/17  5:33 PM   Result Value Ref Range    Glucose (POC) 65 65 - 100 mg/dL    Performed by [employer]    GLUCOSE, POC    Collection Time: 09/11/17  6:09 PM   Result Value Ref Range    Glucose (POC) 61 (L) 65 - 100 mg/dL    Performed by [employer]    GLUCOSE, POC    Collection Time: 09/11/17  6:32 PM   Result Value Ref Range    Glucose (POC) 76 65 - 100 mg/dL    Performed by [employer]    GLUCOSE, POC    Collection Time: 09/11/17  6:52 PM   Result Value Ref Range    Glucose (POC) 123 (H) 65 - 100 mg/dL    Performed by [employer]    URINALYSIS W/ REFLEX CULTURE    Collection Time: 09/11/17  6:54 PM   Result Value Ref Range    Color YELLOW/STRAW      Appearance CLEAR CLEAR      Specific gravity 1.017 1.003 - 1.030      pH (UA) 6.0 5.0 - 8.0      Protein 300 (A) NEG mg/dL    Glucose NEGATIVE  NEG mg/dL    Ketone NEGATIVE  NEG mg/dL    Bilirubin NEGATIVE  NEG      Blood NEGATIVE  NEG      Urobilinogen 0.2 0.2 - 1.0 EU/dL    Nitrites NEGATIVE  NEG      Leukocyte Esterase NEGATIVE  NEG      WBC 0-4 0 - 4 /hpf    RBC 0-5 0 - 5 /hpf    Epithelial cells FEW FEW /lpf    Bacteria NEGATIVE  NEG /hpf    UA:UC IF INDICATED CULTURE NOT INDICATED BY UA RESULT CNI      Hyaline cast 2-5 0 - 5 /lpf   CULTURE, BLOOD, PAIRED    Collection Time: 09/11/17  6:54 PM   Result Value Ref Range    Special Requests: NO SPECIAL REQUESTS      Culture result: NO GROWTH AFTER 10 HOURS     C REACTIVE PROTEIN, QT    Collection Time: 09/11/17  6:55 PM   Result Value Ref Range    C-Reactive protein 0.56 0.00 - 0.60 mg/dL   GLUCOSE, POC    Collection Time: 09/11/17 10:17 PM   Result Value Ref Range    Glucose (POC) 142 (H) 65 - 100 mg/dL    Performed by [employer]    CBC WITH AUTOMATED DIFF    Collection Time: 09/12/17  4:28 AM   Result Value Ref Range    WBC 4.6 4.1 - 11.1 K/uL    RBC 2.88 (L) 4.10 - 5.70 M/uL    HGB 8.0 (L) 12.1 - 17.0 g/dL    HCT 25.0 (L) 36.6 - 50.3 %    MCV 86.8 80.0 - 99.0 FL    MCH 27.8 26.0 - 34.0 PG    MCHC 32.0 30.0 - 36.5 g/dL    RDW 16.1 (H) 11.5 - 14.5 %    PLATELET 281 506 - 052 K/uL    NEUTROPHILS 62 32 - 75 %    LYMPHOCYTES 28 12 - 49 %    MONOCYTES 7 5 - 13 %    EOSINOPHILS 2 0 - 7 %    BASOPHILS 1 0 - 1 %    ABS. NEUTROPHILS 2.9 1.8 - 8.0 K/UL    ABS. LYMPHOCYTES 1.3 0.8 - 3.5 K/UL    ABS. MONOCYTES 0.3 0.0 - 1.0 K/UL    ABS. EOSINOPHILS 0.1 0.0 - 0.4 K/UL    ABS. BASOPHILS 0.0 0.0 - 0.1 K/UL   METABOLIC PANEL, COMPREHENSIVE    Collection Time: 09/12/17  4:28 AM   Result Value Ref Range    Sodium 140 136 - 145 mmol/L    Potassium 4.0 3.5 - 5.1 mmol/L    Chloride 108 97 - 108 mmol/L    CO2 28 21 - 32 mmol/L    Anion gap 4 (L) 5 - 15 mmol/L    Glucose 149 (H) 65 - 100 mg/dL    BUN 20 6 - 20 MG/DL    Creatinine 1.08 0.70 - 1.30 MG/DL    BUN/Creatinine ratio 19 12 - 20      GFR est AA >60 >60 ml/min/1.73m2    GFR est non-AA >60 >60 ml/min/1.73m2    Calcium 7.4 (L) 8.5 - 10.1 MG/DL    Bilirubin, total 0.3 0.2 - 1.0 MG/DL    ALT (SGPT) 11 (L) 12 - 78 U/L    AST (SGOT) 17 15 - 37 U/L    Alk.  phosphatase 99 45 - 117 U/L    Protein, total 5.0 (L) 6.4 - 8.2 g/dL    Albumin 1.8 (L) 3.5 - 5.0 g/dL    Globulin 3.2 2.0 - 4.0 g/dL    A-G Ratio 0.6 (L) 1.1 - 2.2     MAGNESIUM    Collection Time: 09/12/17  4:28 AM Result Value Ref Range    Magnesium 2.0 1.6 - 2.4 mg/dL   PHOSPHORUS    Collection Time: 09/12/17  4:28 AM   Result Value Ref Range    Phosphorus 3.6 2.6 - 4.7 MG/DL             Telemetry had HR of 20 in ER. Imaging:  I have personally reviewed the patients radiographs and have reviewed the reports:  CXR: 9-11-17: COMPARISON: August 31, 2017     FINDINGS: AP portable imaging of the chest performed at 4:03 PM demonstrates  stable mild cardiomegaly. Pacemaker lead terminates at the level of the right  ventricle. Right arm PICC line terminates at the level of the mid SVC. The lungs  are clear bilaterally. No significant osseous abnormalities are seen.     IMPRESSION: No evidence of acute cardiopulmonary process. Pacemaker lead and  right arm PICC line appear to be in satisfactory position.     Philip Murphy MD

## 2017-09-12 NOTE — PROGRESS NOTES
200 Liu catheter inserted without difficulty per protocol. Tolerated procedure well. 0630 Uneventful night. Voicing no complaints. Slept most of the night.

## 2017-09-12 NOTE — PROGRESS NOTES
Readmission Assessment:    Level 2 Readmission - Patient has had several hospitalizations and readmissions for several medical conditions. Most recent hospitalization 8/31/2017 to 9/3/2017 for treatment of acute stroke. Patient had prior hospitalizations and surgeries over the last 3 months related to left hand infection due to skill saw accident and amputation of 2nd finger on left hand. Patient has been receiving IV antibiotics from [facility] - wife provides transportation. At [address] [facility] [place] [facility] [place] care. CM has contacted PCP office - Dr. Aretha Murray - [phone] - to determine contact information for NN. Dr. María Elena Salinas office is new to [facility] and does not have NN in office at present time - Dr. María Elena Salinas nurse or off site NN will be following patient. Care Management Interventions  PCP Verified by CM:  Yes (PCP - Dr. Aretha Murray - [phone])  Transition of Care Consult (CM Consult):  (Readmission Assessment)  MyChart Signup: No  Discharge Durable Medical Equipment: No (Patient has a cane and walker - has not been needing them at home)  Physical Therapy Consult: No  Occupational Therapy Consult: No  Speech Therapy Consult: No  Current Support Network: Lives with Spouse, Own Home (Lives in two story home w/ no difficulties entering home or using 2nd story bedroom)  Confirm Follow Up Transport: Family (Wife has been providing all transportation)  Plan discussed with Pt/Family/Caregiver: Yes  Discharge Location  Discharge Placement:  (Plan is for patient to return home w/ family assistance and home health)    Moiz Goldsmith RN, BSN, [facility] Case Manager  [phone]

## 2017-09-12 NOTE — PROGRESS NOTES
07:30 Report received from Bailey RN, pt in bed, lying flat, alert and oriented, denies pain, sheath in place, no bleeding or oozing at site, Dr. Joshua Arevalo at bedside  08:45 Dr. Haydee Lo at bedside, will wait for cultures to result before placing permanent pacer, pt states understanding, ordered a breakfast tray, pt tolerated food well  12:00 Pt confused, thinks he lost a hand full of pills, is asking the same questions repeatedly, needs frequent reminders to keep right leg flat  13:00 Tolerated lunch, remains slightly confused   17:00 wife and daughter at bedside, daughter confirms that since pts stroke a few weeks ago he is very confused as baseline, can be impulsive, tends to get his night and days confused. 17:30 pulling at lines, redirected pt, checked groin, dressing clean dry, no oozing, no bleeding, pacer capturing.    18:15 Pts confusion continues to increase, having visual hallucinations, paged Cynthia Garrett, Dr. Valles Gone on call, ordered a stat head CT without contrast  18:50 Back from CT, waiting on results   19:00 Shift report given to Brook Lee

## 2017-09-12 NOTE — CARDIO/PULMONARY
C/P Rehab Note:    Chart Reviewed. Per Cardiology note from today:     Bradycardia, probably due to complete heart block, symptomatic. S/p temp transvenous pacer. Plan for PPM on Wednesday. Echo on 9/1/17 LV Ef 40-45%. Pt is a former smoker. History significant for:  -HTN     -DM  -PVD  -CVA    Met with Pt who was lying in bed. Reviewed role of Cardiac Rehab Nurse. Handout provided on,\"Pacemaker Placement: Before Your Procedure\". Advised Pt that medication would be given prior to procedure to provide relaxation. The doctor will make and incision below collarbone. The doctor will place the pacemaker leads through the incision. The leads go into a large blood vessel in the upper chest. Then the doctor will guide the leads through th blood vessel into the heart. The doctor will place the pacemaker under the skin of your chest.  The procedure may take up to one hour. Briefly reviewed post procedure restrictions. Pt responded,\"Just very anxious to get out here. Spent to many months in the Hospital and I have things to do. \"  Provided emotional support and comfort. Pt without questions and demonstrated understanding.

## 2017-09-12 NOTE — PROGRESS NOTES
PROGRESS NOTE    NAME:  Alicia Shafer   :      MRN:   [id number]     Date/Time:  2017 7:07 AM  Subjective:   History:  Chart reviewed and patient seen and examined and D/W her nurse and Dr. Amanda Casillas and all events noted. He was admitted yesterday with [facility] and now has temporary pacemaker. He had syncope and presented to ER with the [facility]. He now feels well w/o cardio/respiratory c/o. There are no GI/ c/o. He has no neurologic c/o although seems a little confused since his recent stroke. There are no other c/o on complete ROS. 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 He has been on once daily I.V antibiotics for a month now and never had positive blood cultures, He is S/P Parietal CVA about 2 weeks ago.       Medications reviewed:  Current Facility-Administered Medications   Medication Dose Route Frequency    mupirocin (BACTROBAN) 2 % ointment   Both Nostrils BID    0.45% sodium chloride infusion  75 mL/hr IntraVENous CONTINUOUS    heparin (porcine) injection 5,000 Units  5,000 Units SubCUTAneous Q8H    insulin lispro (HUMALOG) injection   SubCUTAneous AC&HS    glucose chewable tablet 16 g  4 Tab Oral PRN    dextrose 10% infusion 125-250 mL  125-250 mL IntraVENous PRN    glucagon (GLUCAGEN) injection 1 mg  1 mg IntraMUSCular PRN    hydrALAZINE (APRESOLINE) 20 mg/mL injection 10 mg  10 mg IntraVENous Q6H PRN    piperacillin-tazobactam (ZOSYN) 3.375 g in 0.9% sodium chloride (MBP/ADV) 100 mL  3.375 g IntraVENous Q8H    insulin glargine (LANTUS) injection 12 Units  12 Units SubCUTAneous QHS     Facility-Administered Medications Ordered in Other Encounters   Medication Dose Route Frequency    sodium chloride (NS) flush 5-10 mL  5-10 mL IntraVENous PRN        Objective:   Vitals:  Visit Vitals    /51    Pulse 89    Temp 97.8 °F (36.6 °C)    Resp 18    Ht 5' 10\" (1.778 m)    Wt 186 lb 11.7 oz (84.7 kg)    SpO2 100%    BMI 26.79 kg/m2    O2 Flow Rate (L/min): 2 l/min O2 Device: Room air Temp (24hrs), Av.4 °F (36.3 °C), Min:96 °F (35.6 °C), Max:97.9 °F (36.6 °C)      Last 24hr Input/Output:    Intake/Output Summary (Last 24 hours) at 17 0756  Last data filed at 17 0600   Gross per 24 hour   Intake           1252.5 ml   Output             1655 ml   Net           -402.5 ml        PHYSICAL EXAM:  General:     Alert, cooperative, no distress, appears stated age. Head:    Normocephalic, without obvious abnormality, atraumatic. Eyes:    Conjunctivae/corneas clear. PERRLA  Nose:   Nares normal. No drainage or sinus tenderness. Throat:     Lips, mucosa, and tongue normal.  No Thrush  Neck:   Supple, symmetrical,  no adenopathy, thyroid: non tender     no carotid bruit and no JVD. Back:     Symmetric,  No CVA tenderness. Lungs:    Clear to auscultation bilaterally. No Wheezing or Rhonchi. No rales. Heart:    Regular rate and rhythm (PACED) ,  no murmur, rub or gallop. Abdomen:    Soft, non-tender. Not distended. Bowel sounds normal. No masses  Extremities:  Extremities normal except L hand wound post surgery, No cyanosis. No edema. No clubbing  Lymph nodes:  Cervical, supraclavicular normal.  Neurologic:  Normal strength, Alert and oriented X 3 with a little confusion. Skin:                 No rash      Lab Data Reviewed:    Recent Results (from the past 24 hour(s))   CBC WITH AUTOMATED DIFF    Collection Time: 17 12:37 PM   Result Value Ref Range    WBC 5.9 4.1 - 11.1 K/uL    RBC 3.11 (L) 4.10 - 5.70 M/uL    HGB 8.7 (L) 12.1 - 17.0 g/dL    HCT 27.6 (L) 36.6 - 50.3 %    MCV 88.7 80.0 - 99.0 FL    MCH 28.0 26.0 - 34.0 PG    MCHC 31.5 30.0 - 36.5 g/dL    RDW 16.3 (H) 11.5 - 14.5 %    PLATELET 138 308 - 044 K/uL    NEUTROPHILS 72 32 - 75 %    LYMPHOCYTES 19 12 - 49 %    MONOCYTES 6 5 - 13 %    EOSINOPHILS 2 0 - 7 %    BASOPHILS 1 0 - 1 %    ABS. NEUTROPHILS 4.3 1.8 - 8.0 K/UL    ABS.  LYMPHOCYTES 1.1 0.8 - 3.5 K/UL    ABS. MONOCYTES 0.4 0.0 - 1.0 K/UL    ABS. EOSINOPHILS 0.1 0.0 - 0.4 K/UL    ABS. BASOPHILS 0.0 0.0 - 0.1 K/UL   METABOLIC PANEL, COMPREHENSIVE    Collection Time: 09/11/17 12:37 PM   Result Value Ref Range    Sodium 141 136 - 145 mmol/L    Potassium 4.4 3.5 - 5.1 mmol/L    Chloride 111 (H) 97 - 108 mmol/L    CO2 23 21 - 32 mmol/L    Anion gap 7 5 - 15 mmol/L    Glucose 90 65 - 100 mg/dL    BUN 23 (H) 6 - 20 MG/DL    Creatinine 1.27 0.70 - 1.30 MG/DL    BUN/Creatinine ratio 18 12 - 20      GFR est AA >60 >60 ml/min/1.73m2    GFR est non-AA 55 (L) >60 ml/min/1.73m2    Calcium 7.5 (L) 8.5 - 10.1 MG/DL    Bilirubin, total 0.2 0.2 - 1.0 MG/DL    ALT (SGPT) 13 12 - 78 U/L    AST (SGOT) 20 15 - 37 U/L    Alk.  phosphatase 109 45 - 117 U/L    Protein, total 5.5 (L) 6.4 - 8.2 g/dL    Albumin 1.9 (L) 3.5 - 5.0 g/dL    Globulin 3.6 2.0 - 4.0 g/dL    A-G Ratio 0.5 (L) 1.1 - 2.2     PROTHROMBIN TIME + INR    Collection Time: 09/11/17 12:37 PM   Result Value Ref Range    INR 1.0 0.9 - 1.1      Prothrombin time 10.5 9.0 - 11.1 sec   TROPONIN I    Collection Time: 09/11/17 12:37 PM   Result Value Ref Range    Troponin-I, Qt. <0.04 <0.05 ng/mL   CK W/ REFLX CKMB    Collection Time: 09/11/17 12:37 PM   Result Value Ref Range    CK 36 (L) 39 - 308 U/L   LACTIC ACID    Collection Time: 09/11/17  1:41 PM   Result Value Ref Range    Lactic acid 1.2 0.4 - 2.0 MMOL/L   EKG, 12 LEAD, INITIAL    Collection Time: 09/11/17  4:19 PM   Result Value Ref Range    Ventricular Rate 63 BPM    Atrial Rate 63 BPM    P-R Interval 232 ms    QRS Duration 106 ms    Q-T Interval 424 ms    QTC Calculation (Bezet) 433 ms    Calculated P Axis 71 degrees    Calculated R Axis 7 degrees    Calculated T Axis -88 degrees    Diagnosis       Sinus rhythm with 1st degree AV block  Left ventricular hypertrophy with repolarization abnormality  Abnormal ECG  When compared with ECG of 11-SEP-2017 12:59,  MANUAL COMPARISON REQUIRED, DATA IS UNCONFIRMED GLUCOSE, POC    Collection Time: 09/11/17  5:33 PM   Result Value Ref Range    Glucose (POC) 65 65 - 100 mg/dL    Performed by Jocelyn Farah    GLUCOSE, POC    Collection Time: 09/11/17  6:09 PM   Result Value Ref Range    Glucose (POC) 61 (L) 65 - 100 mg/dL    Performed by Jocelyn Farah    GLUCOSE, POC    Collection Time: 09/11/17  6:32 PM   Result Value Ref Range    Glucose (POC) 76 65 - 100 mg/dL    Performed by Jocelyn Farah    GLUCOSE, POC    Collection Time: 09/11/17  6:52 PM   Result Value Ref Range    Glucose (POC) 123 (H) 65 - 100 mg/dL    Performed by Jocelyn Farah    URINALYSIS W/ REFLEX CULTURE    Collection Time: 09/11/17  6:54 PM   Result Value Ref Range    Color YELLOW/STRAW      Appearance CLEAR CLEAR      Specific gravity 1.017 1.003 - 1.030      pH (UA) 6.0 5.0 - 8.0      Protein 300 (A) NEG mg/dL    Glucose NEGATIVE  NEG mg/dL    Ketone NEGATIVE  NEG mg/dL    Bilirubin NEGATIVE  NEG      Blood NEGATIVE  NEG      Urobilinogen 0.2 0.2 - 1.0 EU/dL    Nitrites NEGATIVE  NEG      Leukocyte Esterase NEGATIVE  NEG      WBC 0-4 0 - 4 /hpf    RBC 0-5 0 - 5 /hpf    Epithelial cells FEW FEW /lpf    Bacteria NEGATIVE  NEG /hpf    UA:UC IF INDICATED CULTURE NOT INDICATED BY UA RESULT CNI      Hyaline cast 2-5 0 - 5 /lpf   CULTURE, BLOOD, PAIRED    Collection Time: 09/11/17  6:54 PM   Result Value Ref Range    Special Requests: NO SPECIAL REQUESTS      Culture result: NO GROWTH AFTER 10 HOURS     C REACTIVE PROTEIN, QT    Collection Time: 09/11/17  6:55 PM   Result Value Ref Range    C-Reactive protein 0.56 0.00 - 0.60 mg/dL   GLUCOSE, POC    Collection Time: 09/11/17 10:17 PM   Result Value Ref Range    Glucose (POC) 142 (H) 65 - 100 mg/dL    Performed by Jaye Mcknight    CBC WITH AUTOMATED DIFF    Collection Time: 09/12/17  4:28 AM   Result Value Ref Range    WBC 4.6 4.1 - 11.1 K/uL    RBC 2.88 (L) 4.10 - 5.70 M/uL    HGB 8.0 (L) 12.1 - 17.0 g/dL    HCT 25.0 (L) 36.6 - 50.3 %    MCV 86.8 80.0 - 99.0 FL MCH 27.8 26.0 - 34.0 PG    MCHC 32.0 30.0 - 36.5 g/dL    RDW 16.1 (H) 11.5 - 14.5 %    PLATELET 985 828 - 971 K/uL    NEUTROPHILS 62 32 - 75 %    LYMPHOCYTES 28 12 - 49 %    MONOCYTES 7 5 - 13 %    EOSINOPHILS 2 0 - 7 %    BASOPHILS 1 0 - 1 %    ABS. NEUTROPHILS 2.9 1.8 - 8.0 K/UL    ABS. LYMPHOCYTES 1.3 0.8 - 3.5 K/UL    ABS. MONOCYTES 0.3 0.0 - 1.0 K/UL    ABS. EOSINOPHILS 0.1 0.0 - 0.4 K/UL    ABS. BASOPHILS 0.0 0.0 - 0.1 K/UL   METABOLIC PANEL, COMPREHENSIVE    Collection Time: 09/12/17  4:28 AM   Result Value Ref Range    Sodium 140 136 - 145 mmol/L    Potassium 4.0 3.5 - 5.1 mmol/L    Chloride 108 97 - 108 mmol/L    CO2 28 21 - 32 mmol/L    Anion gap 4 (L) 5 - 15 mmol/L    Glucose 149 (H) 65 - 100 mg/dL    BUN 20 6 - 20 MG/DL    Creatinine 1.08 0.70 - 1.30 MG/DL    BUN/Creatinine ratio 19 12 - 20      GFR est AA >60 >60 ml/min/1.73m2    GFR est non-AA >60 >60 ml/min/1.73m2    Calcium 7.4 (L) 8.5 - 10.1 MG/DL    Bilirubin, total 0.3 0.2 - 1.0 MG/DL    ALT (SGPT) 11 (L) 12 - 78 U/L    AST (SGOT) 17 15 - 37 U/L    Alk.  phosphatase 99 45 - 117 U/L    Protein, total 5.0 (L) 6.4 - 8.2 g/dL    Albumin 1.8 (L) 3.5 - 5.0 g/dL    Globulin 3.2 2.0 - 4.0 g/dL    A-G Ratio 0.6 (L) 1.1 - 2.2     MAGNESIUM    Collection Time: 09/12/17  4:28 AM   Result Value Ref Range    Magnesium 2.0 1.6 - 2.4 mg/dL   PHOSPHORUS    Collection Time: 09/12/17  4:28 AM   Result Value Ref Range    Phosphorus 3.6 2.6 - 4.7 MG/DL         Assessment/Plan:     Active Problems:    Essential hypertension (7/27/2017)      Controlled type 2 diabetes mellitus without complication, with long-term current use of insulin ([address] (7/27/2017)      ASVD (arteriosclerotic vascular disease) (7/27/2017)      Overview: Carotid Endarectomy 2017      Carotid arterial disease ([address] (6/29/2016)      Diabetic neuropathy ([facility] (7/28/2017)      Thrombotic stroke involving right middle cerebral artery ([facility] (9/1/2017)      Overview: August 2017      Syncope and collapse (9/11/2017)      Bradycardia (9/11/2017)      Complete heart block ([address] (9/12/2017)       ___________________________________________________  PLAN:    1. Permanent Pacemaker today  2. Once PP in can tramsfer out of CCU  3.  Zosyn for antibiotic coverage while inpatient then back to once daily Ertapenem on D/C for 2 more weeks  4. For DM follow BS and treat SSI  5. On home Lantus  6. With anemia Hgb stable at 8.0 from last hospital numbers  7. Holding ASA and Plavix for Pacemaker  8. Currently off Norvasc and Cozaar which he takes for HTN and follow BP  9.   Has significant Carotid Disease so will need to go back on ASA and Plavix after Pacemaker placed    40 minutes spent in direct care of this complex patient today in CCU        ___________________________________________________    Attending Physician: Marilyn De Souza MD

## 2017-09-13 NOTE — PROGRESS NOTES
1930 -- Bedside and Verbal shift change report given to Janice Martin (oncoming nurse) by Montana Phan (offgoing nurse). Report included the following information SBAR, Kardex, Intake/Output, MAR, Recent Results and Cardiac Rhythm Paced. 2000 -- Assessment completed. Patient is alert, cooperative with periodic confusion. Temporary transvenous pacemaker wire intact through sheath in right groin. NSR with runs of paced beats. Sheath site soft, no hematoma noted or tenderness. Dressing C/D/I. Left hand dressing changed as patient took it off. 0.45% NS IV infusion @ 75mL/hr. VSS. Will continue to monitor patient. Patient instructed to stay supine to prevent sheath dislodgement and to call RN if he needs to be repositioned. 2102 -- Patient increasingly restless pulling at lines, redirected patient, sheath site intact without oozing. Pacer capturing. 2215 -- Patient once again seen sitting up in bed by RN and stating he was going fishing and to watch out for his \"fishing line & hook\". Patient redirected to place, situation and time. Patient seems restless and tried to pull out lines. 2234 -- Dr. Hardik Valerio paged for restlessness and to prevent patient from pulling out sheath. 2241 -- Order received for 4.5mg melatonin P.O. QHS PRN.   0000 -- Re-Assessment completed. See chart for details. Patient is confused and requires constant redirection. Patient continues to think he is fishing and is constantly repeating the same questions. Temporary transvenous pacemaker wire intact through sheath in right groin. NSR with runs of paced beats. Sheath site soft, no hematoma noted or tenderness. Dressing C/D/I. Left hand dressing changed as patient took it off. 0.45% NS IV infusion @ 75mL/hr. VSS. Will continue to monitor patient. Patient instructed to stay supine to prevent sheath dislodgement and to call RN if he needs to be repositioned. 5 -- RN heard bed alarm go off in patient room followed by equipment falling in room.  RN observed patient OOB standing on floor wrapped up in multiple monitoring lines and IV tubing. Another RN immediately called to bedside to help get patient back to bed safely without sheath dislodgement or harm to patient. Patient assisted back to bed without issue. Sheath remains in place without oozing, bleeding. Dressing C/D/I and pacer capturing. 12 -- Martin Bracket is now at bedside for patient safety. VSS.   0400 -- Re-Assessment completed. See chart for details. VSS. Patient is confused and requires constant redirection. Patient continues to think he is going upstairs to get a new gown. Temporary transvenous pacemaker wire intact through sheath in right groin. NSR with runs of paced beats. Sheath site soft, no hematoma noted or tenderness. Dressing C/D/I. Left hand dressing C/D/I. 0.45% NS IV infusion @ 75mL/hr. VSS. Will continue to monitor patient. Patient instructed to stay supine to prevent sheath dislodgement and to call RN if he needs to be repositioned. 0730 -- Bedside and Verbal shift change report given to Rae [address]. (oncoming nurse) by Clarice Jensen. (offgoing nurse). Report included the following information SBAR, Kardex, Intake/Output, MAR, Recent Results and Cardiac Rhythm NSR w/ Paced beats .

## 2017-09-13 NOTE — PROGRESS NOTES
TRANSFER - OUT REPORT:    Verbal report given to ADRIEL Moyer(name) on Shelley Alvarez  being transferred to IVCU(unit) for routine progression of care       Report consisted of patients Situation, Background, Assessment and   Recommendations(SBAR). Information from the following report(s) Procedure Summary was reviewed with the receiving nurse. Lines:   PICC Double Lumen 29/49/95 Right;Basilic (Active)   Central Line Being Utilized Yes 9/13/2017  4:00 AM   Criteria for Appropriate Use Long term IV/antibiotic administration 9/13/2017  4:00 AM   Site Assessment Clean, dry, & intact 9/13/2017  4:00 AM   Phlebitis Assessment 0 9/13/2017  4:00 AM   Infiltration Assessment 0 9/13/2017  4:00 AM   Date of Last Dressing Change 09/12/17 9/13/2017  4:00 AM   Dressing Status Clean, dry, & intact 9/13/2017  4:00 AM   Action Taken Open ports on tubing capped 9/13/2017  4:00 AM   Dressing Type Disk with Chlorhexadine gluconate (CHG); Transparent 9/13/2017  4:00 AM   Hub Color/Line Status Purple;Capped 9/13/2017  4:00 AM   Positive Blood Return (Site #1) Yes 9/13/2017  4:00 AM   Hub Color/Line Status Red;Capped 9/13/2017  4:00 AM   Positive Blood Return (Site #2) Yes 9/13/2017  4:00 AM   Alcohol Cap Used Yes 9/13/2017  4:00 AM       Peripheral IV 09/11/17 Left Forearm (Active)   Site Assessment Clean, dry, & intact 9/13/2017  4:00 AM   Phlebitis Assessment 0 9/13/2017  4:00 AM   Infiltration Assessment 0 9/13/2017  4:00 AM   Dressing Status Clean, dry, & intact 9/13/2017  4:00 AM   Dressing Type Transparent;Tape 9/13/2017  4:00 AM   Hub Color/Line Status Pink;Capped 9/13/2017  4:00 AM   Action Taken Open ports on tubing capped 9/13/2017  4:00 AM   Alcohol Cap Used Yes 9/13/2017  4:00 AM        Opportunity for questions and clarification was provided.       Patient transported with:   Registered Nurse

## 2017-09-13 NOTE — PROGRESS NOTES
PROGRESS NOTE    NAME:  Tori Moreno   :   [birth year]   MRN:   [id number]     Date/Time:  2017 7:47 AM  Subjective:   History:  Chart reviewed and patient seen and examined and D/W her nurse and Dr. Edi Rodriguez and all events noted. He was admitted on  with [facility] and now has temporary pacemaker for permanent pacemaker this AM. He had syncope and presented to ER with the [facility]. He now feels well w/o cardio/respiratory c/o. There are no GI/ c/o. He has no neurologic c/o although seems a little confused since his recent stroke and significant confusion overnight. There are no other c/o on complete ROS. 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 He has been on once daily I.V antibiotics for a month now and never had positive blood cultures, He is S/P Parietal CVA about 2 weeks ago.       Medications reviewed:  Current Facility-Administered Medications   Medication Dose Route Frequency    fentaNYL citrate (PF) injection 25-50 mcg  25-50 mcg IntraVENous Multiple    heparinized saline 2 units/mL infusion 1,000 Units  500 mL Irrigation ONCE    iopamidol (ISOVUE-370) 76 % injection 0-50 mL  0-50 mL IntraVENous Multiple    midazolam (VERSED) injection 0.5-2 mg  0.5-2 mg IntraVENous Multiple    bacitracin injection 50,000 Units  50,000 Units Irrigation ONCE    vancomycin (VANCOCIN) 1,000 mg in 0.9% sodium chloride (MBP/ADV) 250 mL  1,000 mg IntraVENous ONCE    lidocaine-EPINEPHrine (XYLOCAINE) 1 %-1:100,000 injection 0-300 mg  0-30 mL SubCUTAneous ONCE    haloperidol lactate (HALDOL) injection 5 mg  5 mg IntraVENous Q8H PRN    midazolam (VERSED) 1 mg/mL injection        fentaNYL citrate (PF) 50 mcg/mL injection        iopamidol (ISOVUE-370) 76 % injection        vancomycin (VANCOCIN) 1,000 mg injection        0.9% sodium chloride (MBP/ADV) 0.9 % infusion        heparinized saline 2 units/mL 1,000 unit/500 mL infusion        bacitracin 50,000 unit injection        ADDaptor        mupirocin (BACTROBAN) 2 % ointment   Both Nostrils BID    aspirin chewable tablet 81 mg  81 mg Oral DAILY    insulin NPH (NOVOLIN N, HUMULIN N) injection 20 Units  20 Units SubCUTAneous DAILY    lisinopril (PRINIVIL, ZESTRIL) tablet 20 mg  20 mg Oral DAILY    cefTRIAXone (ROCEPHIN) 1 g in 0.9% sodium chloride (MBP/ADV) 50 mL  1 g IntraVENous Q24H    sodium chloride (NS) flush 10-30 mL  10-30 mL InterCATHeter PRN    sodium chloride (NS) flush 10 mL  10 mL InterCATHeter Q24H    sodium chloride (NS) flush 10 mL  10 mL InterCATHeter PRN    sodium chloride (NS) flush 10-40 mL  10-40 mL InterCATHeter Q8H    heparin (porcine) pf 300 Units  300 Units InterCATHeter PRN    labetalol (NORMODYNE;TRANDATE) injection 10 mg  10 mg IntraVENous Q4H PRN    melatonin tablet 4.5 mg  4.5 mg Oral QHS PRN    0.45% sodium chloride infusion  75 mL/hr IntraVENous CONTINUOUS    heparin (porcine) injection 5,000 Units  5,000 Units SubCUTAneous Q8H    insulin lispro (HUMALOG) injection   SubCUTAneous AC&HS    glucose chewable tablet 16 g  4 Tab Oral PRN    dextrose 10% infusion 125-250 mL  125-250 mL IntraVENous PRN    glucagon (GLUCAGEN) injection 1 mg  1 mg IntraMUSCular PRN    hydrALAZINE (APRESOLINE) 20 mg/mL injection 10 mg  10 mg IntraVENous Q6H PRN    insulin glargine (LANTUS) injection 12 Units  12 Units SubCUTAneous QHS        Objective:   Vitals:  Visit Vitals    BP (!) 137/93    Pulse 62    Temp 98 °F (36.7 °C)    Resp 15    Ht 5' 10\" (1.778 m)    Wt 186 lb 11.7 oz (84.7 kg)    SpO2 99%    BMI 26.79 kg/m2    O2 Flow Rate (L/min): 2 l/min O2 Device: Room air Temp (24hrs), Av.7 °F (36.5 °C), Min:97 °F (36.1 °C), Max:98 °F (36.7 °C)      Last 24hr Input/Output:    Intake/Output Summary (Last 24 hours) at 17 0790  Last data filed at 17 0500   Gross per 24 hour   Intake             1725 ml   Output             3250 ml   Net            -1525 ml PHYSICAL EXAM:  General:     Alert, cooperative, no distress, appears stated age. Head:    Normocephalic, without obvious abnormality, atraumatic. Eyes:    Conjunctivae/corneas clear. PERRLA  Nose:   Nares normal. No drainage or sinus tenderness. Throat:     Lips, mucosa, and tongue normal.  No Thrush  Neck:   Supple, symmetrical,  no adenopathy, thyroid: non tender     no carotid bruit and no JVD. Back:     Symmetric,  No CVA tenderness. Lungs:    Clear to auscultation bilaterally. No Wheezing or Rhonchi. No rales. Heart:    Regular rate and rhythm (PACED) ,  no murmur, rub or gallop. Abdomen:    Soft, non-tender. Not distended. Bowel sounds normal. No masses  Extremities:  Extremities normal except L hand wound post surgery, No cyanosis. No edema. No clubbing  Lymph nodes:  Cervical, supraclavicular normal.  Neurologic:  Normal strength, Alert and oriented X 3 with a little confusion.    Skin:                 No rash      Lab Data Reviewed:    Recent Results (from the past 24 hour(s))   GLUCOSE, POC    Collection Time: 09/12/17  7:54 AM   Result Value Ref Range    Glucose (POC) 232 (H) 65 - 100 mg/dL    Performed by [address] [place] [address], POC    Collection Time: 09/12/17 11:17 AM   Result Value Ref Range    Glucose (POC) 215 (H) 65 - 100 mg/dL    Performed by [address] [place] [address], POC    Collection Time: 09/12/17  5:14 PM   Result Value Ref Range    Glucose (POC) 282 (H) 65 - 100 mg/dL    Performed by [address] [place] [address], POC    Collection Time: 09/12/17  9:34 PM   Result Value Ref Range    Glucose (POC) 223 (H) 65 - 100 mg/dL    Performed by [address] [place] [address], BASIC    Collection Time: 09/13/17  4:05 AM   Result Value Ref Range    Sodium 143 136 - 145 mmol/L    Potassium 3.5 3.5 - 5.1 mmol/L    Chloride 109 (H) 97 - 108 mmol/L    CO2 26 21 - 32 mmol/L    Anion gap 8 5 - 15 mmol/L    Glucose 76 65 - 100 mg/dL    BUN 14 6 - 20 MG/DL    Creatinine 0.81 0.70 - 1.30 MG/DL BUN/Creatinine ratio 17 12 - 20      GFR est AA >60 >60 ml/min/1.73m2    GFR est non-AA >60 >60 ml/min/1.73m2    Calcium 8.0 (L) 8.5 - 10.1 MG/DL   GLUCOSE, POC    Collection Time: 09/13/17  7:30 AM   Result Value Ref Range    Glucose (POC) 100 65 - 100 mg/dL    Performed by Gerhardt Sauer          Assessment/Plan:     Active Problems:    Essential hypertension (7/27/2017)      Controlled type 2 diabetes mellitus without complication, with long-term current use of insulin ([facility] (7/27/2017)      ASVD (arteriosclerotic vascular disease) (7/27/2017)      Overview: Carotid Endarectomy 2017      Carotid arterial disease ([facility] (6/29/2016)      Diabetic neuropathy ([facility] (7/28/2017)      Thrombotic stroke involving right middle cerebral artery ([facility] (9/1/2017)      Overview: August 2017      Syncope and collapse (9/11/2017)      Bradycardia (9/11/2017)      Complete heart block ([facility] (9/12/2017)       ___________________________________________________  PLAN:    1. Permanent Pacemaker today  2. Once PP in can tramsfer out of CCU  3. Rocephin for antibiotic coverage while inpatient then back to once daily Ertapenem on D/C for 2 more weeks  4. For DM follow BS and treat SSI, will adjust SSI coverage as BSs running high  5. On home Lantus  6. With anemia Hgb stable at 8.0 from last hospital numbers  7. Holding ASA and Plavix for Pacemaker  8. Currently off Norvasc and Cozaar which he takes for HTN and follow BP  9.   Has significant Carotid Disease so will need to go back on ASA and Plavix after Pacemaker placed            ___________________________________________________    Attending Physician: Lora Ridley MD

## 2017-09-13 NOTE — ROUTINE PROCESS
TRANSFER - IN REPORT:    Verbal report received from [facility]) on Shilo Almodovar  being received from CCL(unit) for routine progression of care      Report consisted of patients Situation, Background, Assessment and   Recommendations(SBAR). Information from the following report(s) SBAR, Kardex, Intake/Output and MAR was reviewed with the receiving nurse. Opportunity for questions and clarification was provided. Assessment completed upon patients arrival to unit and care assumed.      Primary Nurse Marcio Awan and Dahlia Martinez RN performed a dual skin assessment on this patient Impairment noted- see wound doc flow sheet  Rocco score is 16

## 2017-09-13 NOTE — PROGRESS NOTES
Cardiology Progress Note      9/13/2017 12:32 PM    Admit Date: 9/11/2017          Subjective: Confused post pacer. No other issues          Visit Vitals    BP (!) 161/92    Pulse 73    Temp 97.3 °F (36.3 °C)    Resp 14    Ht 5' 10\" (1.778 m)    Wt 84.7 kg (186 lb 11.7 oz)    SpO2 100%    BMI 26.79 kg/m2     09/11 1901 - 09/13 0700  In: 2800 [I.V.:2800]  Out: 4950 [Urine:4950]        Objective:      Physical Exam:  VS as above  Chest CTA ant  Card RRR no gallop     Data Review:   Labs:    BUN 14  Creat 0.8         Assessment:     1. Bradycardia, probably due to complete heart block, symptomatic. S/p temp transvenous pacer   2. Mild left ventricular systolic dysfunction, no history of coronary artery   disease. 3. Hypertension. 4. Type 2 diabetes. 5. Peripheral vascular disease. 6. Mild renal insufficiency. 7. Left hand infection, currently on chronic antibiotic therapy. 8. Prior left carotid endarterectomy. 9. Anemia   10. Recent CVA     Plan: Cont current Rx.  Pacer check in am.

## 2017-09-13 NOTE — PROGRESS NOTES
Went to see pt. And spoke with him and his wife. Pt. Had pacemaker today. He is still confused but is calm and still. He is able to talk to me, but is obviously confused as to place. There is a sitter, but she states he is much calmer now. He was combative when he got back to his room and has wrist restraints . Wife seems very anxious. He had a mild parietal CVA 2 wks. Ago and didn't go to rehab. It was mainly his speech and confusion. He and his wife usually manage well. He has been getting IV antbx. at home. Not sure if this will continue after DC. He was supposed to go home tomorrow afternoon. [facility] with [facility] for his left hand. (see Gerald Vides's assessment note)  I told wife that CM would follow up in the am to see how he is doing and whether there are other DC needs. Leonard Carter LPZPJ,TERESA,DJC--[phone]

## 2017-09-13 NOTE — PROGRESS NOTES
TRANSFER - IN REPORT:    Verbal report received from Christina Calvillo RN on Margarette Chowdhury  being received from Cath Lab for routine post - op. Report consisted of patients Situation, Background, Assessment and Recommendations(SBAR). Information from the following report(s) SBAR, Procedure Summary, MAR, Recent Results and Cardiac Rhythm paced was reviewed with the receiving clinician. Opportunity for questions and clarification was provided. Assessment completed upon patients arrival to [address] [place] [address] and care assumed. Cardiac Cath Lab Recovery Arrival Note:    Margarette Chowdhury arrived to [facility] recovery area. Patient procedure= insertion of permanent pacemaker. Patient on cardiac monitor, non-invasive blood pressure, SPO2 monitor. On room air. IV  of NS on pump at 50 ml/hr. Patient status doing well with some problems : confusion / agitation. Patient responds to voice, confused. Patient reports no complaints. PROCEDURE SITE CHECK:    Procedure site:with a small amount of bleeding / swelling noted: pressure dressing applied, as patient is moving arms, resisting nurses, denies pain/discomfort reported at procedure site. Dr Wynne Record made aware of pt's agitation. Orders received. No change in patient status. Continue to monitor patient and status.

## 2017-09-13 NOTE — PROGRESS NOTES
SHEATH PULL NOTE:    Patient informed of procedure with questions answered with review. Sheath site prepped with Chloraprep swab. 6 fr sheath in RFV pulled by Jefferson Danielson. Hand hold and quick clot, with manual compression to site. No bleeding, no hematoma, no pain at site. Hemostasis obtained with hand hold/manual compression at site. Patient tolerated well. No change in status. Handhold for 10 minutes. No change at site. sterile dressing applied to site. No bleeding, no hematoma, no pain/discomfort at site. Groin instructions provided with review. Continue to monitor procedure site and patient status. *Advised patient to keep head flat and extremity flat to decrease risk of bleeding. *Recommended that patient not drink for ONE HOUR post sheath pull completion. *Recommended that patient not eat for TWO HOURS post sheath pull completion. *Instructed patient on rationale for delay of PO products to decrease risk for aspiration and if additional treatment to procedure site is required. Patient verbalized understanding of instructions with review. Pt consfused and agitated, moving all extremities. Dr Loree Perry made aware of pt's status, med pt with Versed 2 mg IV for agitation with some effect.   O2 placed on pt at 2 L, via NC.

## 2017-09-14 NOTE — PROGRESS NOTES
PROGRESS NOTE    NAME:  Shelley Alvarez   :      MRN:   [id number]     Date/Time:  2017 7:23 AM  Subjective:   History:  Chart reviewed and patient seen and examined and D/W his nurse and Dr. Onelia Negron and all events noted. He was admitted on  with [facility] and now has permanent pacemaker which was placed yesterday AM. He had syncope and presented to ER with the [facility]. He now feels well w/o cardio/respiratory c/o. There are no GI/ c/o. He has no neurologic c/o although extremely confused post pacemaker placement and still intermittently confused although A & O X 3 this AM there is still some confusion present. There are no other c/o on complete ROS. 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 He has been on once daily I.V antibiotics for a month now and never had positive blood cultures, He is S/P Parietal CVA about 2 weeks ago.       Medications reviewed:  Current Facility-Administered Medications   Medication Dose Route Frequency    haloperidol lactate (HALDOL) injection 5 mg  5 mg IntraVENous Q8H PRN    vancomycin (VANCOCIN) 1,000 mg injection        0.9% sodium chloride (MBP/ADV) 0.9 % infusion        ADDaptor        sodium chloride (NS) flush 5-10 mL  5-10 mL IntraVENous Q8H    sodium chloride (NS) flush 5-10 mL  5-10 mL IntraVENous PRN    acetaminophen (TYLENOL) tablet 650 mg  650 mg Oral Q4H PRN    HYDROcodone-acetaminophen (NORCO) 5-325 mg per tablet 1 Tab  1 Tab Oral Q4H PRN    mupirocin (BACTROBAN) 2 % ointment   Both Nostrils BID    aspirin chewable tablet 81 mg  81 mg Oral DAILY    insulin NPH (NOVOLIN N, HUMULIN N) injection 20 Units  20 Units SubCUTAneous DAILY    lisinopril (PRINIVIL, ZESTRIL) tablet 20 mg  20 mg Oral DAILY    cefTRIAXone (ROCEPHIN) 1 g in 0.9% sodium chloride (MBP/ADV) 50 mL  1 g IntraVENous Q24H    sodium chloride (NS) flush 10-30 mL  10-30 mL InterCATHeter PRN    sodium chloride (NS) flush 10 mL  10 mL InterCATHeter Q24H    sodium chloride (NS) flush 10 mL  10 mL InterCATHeter PRN    sodium chloride (NS) flush 10-40 mL  10-40 mL InterCATHeter Q8H    heparin (porcine) pf 300 Units  300 Units InterCATHeter PRN    labetalol (NORMODYNE;TRANDATE) injection 10 mg  10 mg IntraVENous Q4H PRN    melatonin tablet 4.5 mg  4.5 mg Oral QHS PRN    0.45% sodium chloride infusion  75 mL/hr IntraVENous CONTINUOUS    heparin (porcine) injection 5,000 Units  5,000 Units SubCUTAneous Q8H    insulin lispro (HUMALOG) injection   SubCUTAneous AC&HS    glucose chewable tablet 16 g  4 Tab Oral PRN    dextrose 10% infusion 125-250 mL  125-250 mL IntraVENous PRN    glucagon (GLUCAGEN) injection 1 mg  1 mg IntraMUSCular PRN    hydrALAZINE (APRESOLINE) 20 mg/mL injection 10 mg  10 mg IntraVENous Q6H PRN    insulin glargine (LANTUS) injection 12 Units  12 Units SubCUTAneous QHS        Objective:   Vitals:  Visit Vitals    /63    Pulse 77    Temp 98.1 °F (36.7 °C)    Resp 18    Ht 5' 10\" (1.778 m)    Wt 186 lb 11.7 oz (84.7 kg)    SpO2 100%    BMI 26.79 kg/m2    O2 Flow Rate (L/min): 2 l/min O2 Device: Room air Temp (24hrs), Av °F (36.7 °C), Min:97.3 °F (36.3 °C), Max:98.4 °F (36.9 °C)      Last 24hr Input/Output:    Intake/Output Summary (Last 24 hours) at 17 0723  Last data filed at 17 0326   Gross per 24 hour   Intake                0 ml   Output             2050 ml   Net            -2050 ml        PHYSICAL EXAM:  General:     Alert, cooperative, no distress, appears stated age. Head:    Normocephalic, without obvious abnormality, atraumatic. Eyes:    Conjunctivae/corneas clear. PERRLA  Nose:   Nares normal. No drainage or sinus tenderness. Throat:     Lips, mucosa, and tongue normal.  No Thrush  Neck:   Supple, symmetrical,  no adenopathy, thyroid: non tender     no carotid bruit and no JVD. Back:     Symmetric,  No CVA tenderness. Lungs:    Clear to auscultation bilaterally.   No Wheezing or Rhonchi. No rales. Heart:    Regular rate and rhythm (PACED) ,  no murmur, rub or gallop,. Bandage WISNTON chest wall  Abdomen:    Soft, non-tender. Not distended. Bowel sounds normal. No masses  Extremities:  Extremities normal except L hand wound post surgery, No cyanosis. No edema. No clubbing  Lymph nodes:  Cervical, supraclavicular normal.  Neurologic:  Normal strength, Alert and oriented X 3 with a little confusion.    Skin:                 No rash      Lab Data Reviewed:    Recent Results (from the past 24 hour(s))   GLUCOSE, POC    Collection Time: 09/13/17  7:30 AM   Result Value Ref Range    Glucose (POC) 100 65 - 100 mg/dL    Performed by Kaitlin Lara    GLUCOSE, POC    Collection Time: 09/13/17 11:12 AM   Result Value Ref Range    Glucose (POC) 139 (H) 65 - 100 mg/dL    Performed by Lena Euceda    GLUCOSE, POC    Collection Time: 09/13/17  4:37 PM   Result Value Ref Range    Glucose (POC) 136 (H) 65 - 100 mg/dL    Performed by Lena Euceda    GLUCOSE, POC    Collection Time: 09/13/17  8:34 PM   Result Value Ref Range    Glucose (POC) 205 (H) 65 - 100 mg/dL    Performed by Gather    METABOLIC PANEL, BASIC    Collection Time: 09/14/17  2:39 AM   Result Value Ref Range    Sodium 139 136 - 145 mmol/L    Potassium 4.0 3.5 - 5.1 mmol/L    Chloride 106 97 - 108 mmol/L    CO2 24 21 - 32 mmol/L    Anion gap 9 5 - 15 mmol/L    Glucose 207 (H) 65 - 100 mg/dL    BUN 13 6 - 20 MG/DL    Creatinine 0.88 0.70 - 1.30 MG/DL    BUN/Creatinine ratio 15 12 - 20      GFR est AA >60 >60 ml/min/1.73m2    GFR est non-AA >60 >60 ml/min/1.73m2    Calcium 7.8 (L) 8.5 - 10.1 MG/DL   GLUCOSE, POC    Collection Time: 09/14/17  7:20 AM   Result Value Ref Range    Glucose (POC) 206 (H) 65 - 100 mg/dL    Performed by RAMONA LARA          Assessment/Plan:     Active Problems:    Essential hypertension (7/27/2017)      Controlled type 2 diabetes mellitus without complication, with long-term current use of insulin ([facility] [address] (7/27/2017)      ASVD (arteriosclerotic vascular disease) (7/27/2017)      Overview: Carotid Endarectomy 2017      Carotid arterial disease ([facility] [address] (6/29/2016)      Diabetic neuropathy ([facility] [address] (7/28/2017)      Thrombotic stroke involving right middle cerebral artery ([facility] [address] (9/1/2017)      Overview: August 2017      Syncope and collapse (9/11/2017)      Bradycardia (9/11/2017)      Complete heart block ([facility] [address] (9/12/2017)       ___________________________________________________  PLAN:    1. Permanent Pacemaker placed yesterday  2. Resume Plavix which he has been on since CVA  3. Rocephin for antibiotic coverage while inpatient then back to once daily Ertapenem on D/C for 2 more weeks  4. For DM follow BS and treat SSI, yesterdayadjusted SSI coverage as BSs running high  5. On home Lantus  6. With anemia Hgb stable at 8.0 from last hospital numbers  7. Home when [facility] with Cardiology  8. Resume Norvasc and Cozaar which he takes for HTN and follow BP  9. Has significant Carotid Disease so need to go back on ASA and Plavix      1:40 PM physical therapy note from earlier today reviewed. Of note is marked decline in functional ability from the time of his last hospital discharge. I think to make sure that we are not dealing with a extension of his recent stroke or a new stroke and we need to repeat a CT of his head since we cannot do an MRI now with a pacemaker in. I concur with the need for rehab if he is not more functional tomorrow. I will schedule a head CT be done this afternoon.       ___________________________________________________    Attending Physician: Micaela Marvin MD

## 2017-09-14 NOTE — PROGRESS NOTES
Cardiology Progress Note      9/14/2017 8:30 AM    Admit Date: 9/11/2017          Subjective: Stable today. Confusion resolved. Pacer check good. Visit Vitals    /63    Pulse 77    Temp 98.1 °F (36.7 °C)    Resp 18    Ht 5' 10\" (1.778 m)    Wt 84.7 kg (186 lb 11.7 oz)    SpO2 100%    BMI 26.79 kg/m2     09/12 1901 - 09/14 0700  In: 900 [I.V.:900]  Out: 3720 [Urine:3720]        Objective:      Physical Exam:  VS as above  Pacer site ok- mild hematoma  Chest CTA  Card RRR no gallop    Data Review:   Labs:    BUN 13  Creat 0.9     Telemetry: SR R 86       Assessment:     1. Bradycardia, probably due to complete heart block, symptomatic. S/p dual chamber pacer yest   2. Mild left ventricular systolic dysfunction, no history of coronary artery   disease. 3. Hypertension. 4. Type 2 diabetes. 5. Peripheral vascular disease. 6. Mild renal insufficiency. 7. Left hand infection, currently on chronic antibiotic therapy. 8. Prior left carotid endarterectomy. 9. Anemia   10. Recent CVA       Plan: Ambulate today and make sure he is functional  enough to go home.  Agree with d/c in am. PT/OT consult

## 2017-09-14 NOTE — PROGRESS NOTES
Pressure Ulcer Prevention Alert Received for Rocco < 14 (moderate risk).        Care Plan/Interventions for Nursin. Complete Rocco Pressure Ulcer Risk Scale and use sub scores to identify appropriate interventions. 2. Perform Assessment: skin, changes in LOC, visual cues for pain, monitor skin under medical devices  3. Respond to Reduced Sensory Perception: changes in LOC, check visual cues for pain, float heels, suspension boots, pressure redistribution bed/mattress/chair cushion, turning and reposition approximately every 2 hours (pillows & wedges), pad between skin to skin, turn & reposition  4. Manage Moisture: absorbent under pads, internal / external urinary device, internal /  external fecal device, minimize layers, contain wound drainage, access need for specialty bed, limit adult briefs, maintain skin hydration (lotion/cream), moisture barrier, offer toileting every hour  5. Promote Activity: increase time out of bed, chair cushion, PT/OT evaluation, trapeze to reposition, pressure redistribution bed/mattress/chair  6. Address Reduced Mobility: float heels / suspension boot, HOB 30 degrees or less, pressure redistribution bed/mattress/cushion, PT / OT evaluation, turn and reposition approximately every 2 hours (pillows & wedges)  7. Promote Nutrition: document food / fluid / supplement intake, encourage/assist with meals as needed  8. Reduce Friction and Shear: transferring/repositioning devices (lift/draw sheet), lift team/ patient mobility team, feet elevated on foot rest, minimize layers, foam dressing / transparent film / skin sealants, protective barrier creams and emollients, transfer aides (board, Mason lift, ceiling lift, stand assist), HOB 30 degrees or less, trapeze to reposition.   Wound Care Team

## 2017-09-14 NOTE — PROGRESS NOTES
Bedside and Verbal shift change report given to Rayo Ceballos (oncoming nurse) by Irma Conway (offgoing nurse). Report included the following information SBAR, Kardex, Intake/Output, MAR, Accordion and Recent Results.

## 2017-09-14 NOTE — CARDIO/PULMONARY
C/P Rehab Note:     Chart Reviewed. Per Cardiology note from today:      Bradycardia, probably due to complete heart block, symptomatic. Now S/P dual chamber pacemaker on 9/13/17. Echo on 9/1/17 LV Ef 40-45%. Pt is a former smoker.      History significant for:  -HTN     -DM  -PVD  -CVA     Met with patient and his wife. Printed material given and discussed regarding pacemaker post pacemaker instructions including restrictions for the affected arm (no raising the arm above shoulder level, no lifting more than 10 pounds for 30 days), care of site, monitoring for infection, when to call the doctor, avoiding impacts/pressure to the site, avoiding extreme activities, no driving for 14 days, use of cell phones and microwaves and avoiding strong magnetic fields. Questions were answered but pt and wife encouraged to read written instructions and ask questions if clarification needed. Pt and wife indicated basic understanding but would benefit from further reinforcement.

## 2017-09-14 NOTE — PROGRESS NOTES
Problem: Self Care Deficits Care Plan (Adult)  Goal: *Acute Goals and Plan of Care (Insert Text)  Occupational Therapy Goals  Initiated 9/14/2017  1. Patient will perform grooming at the sink with supervision/set-up within 7 day(s). 2. Patient will perform bathing UB and marion area at the sink with minimal assistance/contact guard assist within 7 day(s). 3. Patient will perform lower body dressing with moderate assistance within 7 day(s). 4. Patient will perform toilet transfers with supervision/set-up within 7 day(s). 5. Patient will perform all aspects of toileting with supervision/set-up within 7 day(s). OCCUPATIONAL THERAPY EVALUATION  Patient: Timmy Fairchild (13 y.o. male)  Date: 9/14/2017  Primary Diagnosis: Syncope and collapse  Bradycardia        Precautions:   Fall (pacemaker precautions)      ASSESSMENT :  Based on the objective data described below, the patient presents with generalized weakness, decreased endurance, strength, functional mobility, cognition and ADLs. Pt was living with wife and stated that wife was assisting  him with ADLs due to his surgery on L hand. Pt was educated on precautions for pacer and put the sling on pt prior to getting up for the day. Pt had wrist restraints on when OT arrived and nursing came into room and stated that pt no longer had to wear restraints. Pt was min assist for bed mobility and was able to sit on EOb with no assist.  Pt stood with min assist and was min assist of 2 for transfer to toilet and was able to stand with min assist and min assist for transfer to chair. Pt noted to have inattention to the left and decreased proprioception on the left UE. Pt continues to talk during therapy and but if fairly easy to get him to focus on the task at hand. Recommend that pt have further therapy at discharge at in pt or rehab at [facility] pending his progress.    (concerned about wife being able to take care of pt until he is fully functional but may need assist in the home)     Patient will benefit from skilled intervention to address the above impairments. Patients rehabilitation potential is considered to be Good  Factors which may influence rehabilitation potential include:   [ ]             None noted  [ ]             Mental ability/status  [ ]             Medical condition  [X]             Home/family situation and support systems  [ ]             Safety awareness  [ ]             Pain tolerance/management  [ ]             Other:        PLAN :  Recommendations and Planned Interventions:  [X]               Self Care Training                  [ ]        Therapeutic Activities  [X]               Functional Mobility Training    [ ]        Cognitive Retraining  [X]               Therapeutic Exercises           [X]        Endurance Activities  [ ]               Balance Training                   [X]        Neuromuscular Re-Education  [ ]               Visual/Perceptual Training     [X]   Home Safety Training  [X]               Patient Education                 [X]        Family Training/Education  [ ]               Other (comment):     Frequency/Duration: Patient will be followed by occupational therapy 4 times a week to address goals. Discharge Recommendations: To Be Determined  Further Equipment Recommendations for Discharge: tbd       SUBJECTIVE:   Patient stated I was able to mow the lawn and do anything that I wanted to do before this.       OBJECTIVE DATA SUMMARY:   HISTORY:   Past Medical History:   Diagnosis Date    Allergic rhinitis      Anemia      ASVD (arteriosclerotic vascular disease)      Martínez's esophagus 7/28/2017    Carotid stenosis 7/28/2017    Chronic kidney disease      Diabetes ([facility] [address]       glucose runs 70 - 160's at home    Diabetic neuropathy ([facility] [address] 7/28/2017    Diverticulosis      DJD (degenerative joint disease)      ED (erectile dysfunction)      Enlarged prostate      JUDY (generalized anxiety disorder)      GERD (gastroesophageal reflux disease)       controlled with med; alfonos's esophagus    Hiatal hernia      Hypercholesterolemia      Hypertension      Hypertrophy (benign) of prostate 7/28/2017    Ill-defined condition       peripheral vascular disease    Lung nodule      [facility]uropathy ([facility] [address]      On statin therapy 7/28/2017    Prostate cancer screening 7/28/2017    PVD (peripheral vascular disease) ([facility] [address]      Sebaceous cyst 7/28/2017    Spinal stenosis       Past Surgical History:   Procedure Laterality Date    CARDIAC SURG PROCEDURE UNLIST         CATH-2008    COLONOSCOPY N/A 6/15/2016     COL[facility]   6/15/2016          HX CATARACT REMOVAL         BILATERAL    HX ORTHOPAEDIC   1977     BONE CYST REM,ANU FROM RIGHT LEG    HX ORTHOPAEDIC   5-, 7-22-17, 7-26-17     left hand index finger    HX OTHER SURGICAL Left 06/2016     carotid endarectomy    NEUROLOGICAL PROCEDURE UNLISTED   2011     Back: spinal stenosis, pinched nerve repair    UPPER GI ENDOSCOPY,BIOPSY   6/15/2016              Prior Level of Function/Home Situation: pt lives with family and stated that he needed assist for ADLs prior due to his recent surgery on left hand  Expanded or extensive additional review of patient history:      Home Situation  Home Environment: Private residence  # Steps to Enter: 2  Rails to Enter: Yes  Hand Rails : Bilateral  Wheelchair Ramp: No  One/Two Story Residence: One story  Living Alone: No  Support Systems: Child(jessica), Family member(s), Home care staff, Spouse/Significant Other/Partner  Patient Expects to be Discharged to[de-identified] Private residence  Current DME Used/Available at Home: Glucometer, Cherylynn Dings, straight, Cherylynn Dings, quad, Blood pressure cuff, Walker, rolling, Grab bars  [X]  Right hand dominant             [ ]  Left hand dominant     EXAMINATION OF PERFORMANCE DEFICITS:  Cognitive/Behavioral Status:  Neurologic State: Alert;Confused  Orientation Level: Disoriented to place; Disoriented to situation;Disoriented to time;Oriented to person  Cognition: Decreased attention/concentration;Decreased command following; Impaired decision making;Memory loss              Skin: in fair health      Edema: none     Hearing: Auditory  Auditory Impairment: None     Vision/Perceptual:                 Pt appears to have left inattention                      Range of Motion:     AROM: Generally decreased, functional                          Strength:     Strength: Generally decreased, functional                 Coordination:  Coordination: Generally decreased, functional  Fine Motor Skills-Upper: Left Impaired;Right Intact    Gross Motor Skills-Upper: Left Impaired;Right Intact     Tone & Sensation:     Tone: Normal                          Balance:  Sitting: Intact  Standing: Impaired; Without support  Standing - Static: Fair  Standing - Dynamic : Fair     Functional Mobility and Transfers for ADLs:  Bed Mobility:  Rolling: Minimum assistance  Supine to Sit: Minimum assistance  Scooting: Minimum assistance     Transfers:  Sit to Stand: Minimum assistance  Stand to Sit: Minimum assistance  Bed to Chair: Minimum assistance;Assist x2     ADL Assessment:  Feeding: Setup     Oral Facial Hygiene/Grooming: Setup     Bathing: Maximum assistance;Minimum assistance     Upper Body Dressing: Maximum assistance;Minimum assistance     Lower Body Dressing: Maximum assistance;Minimum assistance     Toileting: Maximum assistance;Minimum assistance         Barthel Index:      Bathin  Bladder: 0  Bowels: 5  Groomin  Dressin  Feedin  Mobility: 5  Stairs: 5  Toilet Use: 5  Transfer (Bed to Chair and Back): 5  Total: 35         Barthel and G-code impairment scale:  Percentage of impairment CH  0% CI  1-19% CJ  20-39% CK  40-59% CL  60-79% CM  80-99% CN  100%   Barthel Score 0-100 100 99-80 79-60 59-40 20-39 1-19    0   Barthel Score 0-20 20 17-19 13-16 9-12 5-8 1-4 0      The Barthel ADL Index: Guidelines  1. The index should be used as a record of what a patient does, not as a record of what a patient could do. 2. The main aim is to establish degree of independence from any help, physical or verbal, however minor and for whatever reason. 3. The need for supervision renders the patient not independent. 4. A patient's performance should be established using the best available evidence. Asking the patient, friends/relatives and nurses are the usual sources, but direct observation and common sense are also important. However direct testing is not needed. 5. Usually the patient's performance over the preceding 24-48 hours is important, but occasionally longer periods will be relevant. 6. Middle categories imply that the patient supplies over 50 per cent of the effort. 7. Use of aids to be independent is allowed. Caitlin Ruano., Barthel, D.W. (9358). Functional evaluation: the Barthel Index. [address] [facility] (14)2. Fazal Toribio emeterio AFSANEH Perez, Tiffanie Lee., Yasmine Philip., [place], [address] [place], [place] [address]e (1999). Measuring the change indisability after inpatient rehabilitation; comparison of the responsiveness of the Barthel Index and Functional Brooke Measure. Journal of Neurology, Neurosurgery, and Psychiatry, 66(4), 849-083. Myron Jain, N.J.A, ITALO Causey, & Christos Dyer MEWA. (2004.) Assessment of post-stroke quality of life in cost-effectiveness studies: The usefulness of the Barthel Index and the EuroQoL-5D. Quality of Life Research, 13, 960-20               Functional Measure:        G codes: In compliance with CMSs Claims Based Outcome Reporting, the following G-code set was chosen for this patient based on their primary functional limitation being treated: The outcome measure chosen to determine the severity of the functional limitation was the Barthel with a score of 35/100 which was correlated with the impairment scale.       · Self Care:               - CURRENT STATUS:    CL - 60%-79% impaired, limited or restricted               - GOAL STATUS:           CK - 40%-59% impaired, limited or restricted               - D/C STATUS:                       ---------------To be determined---------------      Occupational Therapy Evaluation Charge Determination   History Examination Decision-Making   HIGH Complexity : Extensive review of history including physical, cognitive and psychosocial history  MEDIUM Complexity : 3-5 performance deficits relating to physical, cognitive , or psychosocial skils that result in activity limitations and / or participation restrictions MEDIUM Complexity : Patient may present with comorbidities that affect occupational performnce. Miniml to moderate modification of tasks or assistance (eg, physical or verbal ) with assesment(s) is necessary to enable patient to complete evaluation       Based on the above components, the patient evaluation is determined to be of the following complexity level: MEDIUM  Pain:  Pain Scale 1: Numeric (0 - 10)  Pain Intensity 1: 0              Activity Tolerance:   vss  Please refer to the flowsheet for vital signs taken during this treatment. After treatment:   [X] Patient left in no apparent distress sitting up in chair  [ ] Patient left in no apparent distress in bed  [X] Call bell left within reach  [X] Nursing notified  [X] Caregiver present  [ ] Bed alarm activated      COMMUNICATION/EDUCATION:   The patients plan of care was discussed with: Physical Therapist and Registered Nurse.  [X] Home safety education was provided and the patient/caregiver indicated understanding. [ ] Patient/family have participated as able in goal setting and plan of care. [ ] Patient/family agree to work toward stated goals and plan of care. [ ] Patient understands intent and goals of therapy, but is neutral about his/her participation. [ ] Patient is unable to participate in goal setting and plan of care.   This patients plan of care is appropriate for delegation to AFSANEH.      Thank you for this referral.  Thaddeus Ac, OT  Time Calculation: 28 mins

## 2017-09-14 NOTE — PROGRESS NOTES
Problem: Mobility Impaired (Adult and Pediatric)  Goal: *Acute Goals and Plan of Care (Insert Text)  Physical Therapy Goals  Initiated 9/14/2017  1. Patient will move from supine to sit and sit to supine , scoot up and down and roll side to side in bed with independence within 7 day(s). 2. Patient will transfer from bed to chair and chair to bed with modified independence using the least restrictive device within 7 day(s). 3. Patient will perform sit to stand with modified independence within 7 day(s). 4. Patient will ambulate with modified independence for 200 feet with the least restrictive device within 7 day(s). 5. Patient will ascend/descend 2 stairs with 1 handrail(s) with supervision/set-up within 7 day(s). All while maintaining pacemaker precautions. PHYSICAL THERAPY EVALUATION  Patient: Rao Blanc (53 y.o. male)  Date: 9/14/2017  Primary Diagnosis: Syncope and collapse  Bradycardia        Precautions:  Fall (pacemaker precautions)      ASSESSMENT :  Based on the objective data described below, the patient presents with impaired functional mobility secondary to impaired standing balance, generalized weakness, moderate gait impairments, impaired mentation, fair activity tolerance, pacemaker precautions (pacemaker placement 9/13), and with possible residual deficits from recent acute CVA (noted possible L visual field impairments and impaired proprioception in LUE). Pt received supine in bed with wife present and agreeable to PT evaluation. Pt cleared by nursing for mobility. Educated pt on pacemaker precautions and use of LUE arm sling prior to mobilizing. Pt performed bed mobility and all transfers with min A x 1-2. Pt ambulated 90' total with min-CGA x 1-2 using HHA x 1, however required additional cueing and assist for directional changes and awareness of environment (pt ambulating towards walls).  He demonstrates unsteady gait overall with mild path deviations, slow gait speed, and widened ALESSANDRA throughout gait. He did require occasional cueing/reminders for pacemaker precautions. Pt was assisted into bedside chair following therapy session and left with all needs met, RN aware, and wife present. Would recommend pt discharge to rehab, SNF vs inpt TBD based on progress in acute PT, as pt with decline from baseline functional mobility and with limited support at home (only wife who will be unable to provide physical assist). Patient will benefit from skilled intervention to address the above impairments. Patients rehabilitation potential is considered to be Fair  Factors which may influence rehabilitation potential include:   [ ]         None noted  [X]         Mental ability/status  [X]         Medical condition  [X]         Home/family situation and support systems  [X]         Safety awareness  [ ]         Pain tolerance/management  [ ]         Other:        PLAN :  Recommendations and Planned Interventions:  [X]           Bed Mobility Training             [ ]    Neuromuscular Re-Education  [X]           Transfer Training                   [ ]    Orthotic/Prosthetic Training  [X]           Gait Training                         [ ]    Modalities  [X]           Therapeutic Exercises           [ ]    Edema Management/Control  [X]           Therapeutic Activities            [X]    Patient and Family Training/Education  [ ]           Other (comment):     Frequency/Duration: Patient will be followed by physical therapy  5 times a week to address goals. Discharge Recommendations: Rehab, Inpatient Rehab vs [facility], To Be Determined  Further Equipment Recommendations for Discharge: TBD       SUBJECTIVE:   Patient stated I might need to hold onto you.       OBJECTIVE DATA SUMMARY:   HISTORY:    Past Medical History:   Diagnosis Date    Allergic rhinitis      Anemia      ASVD (arteriosclerotic vascular disease)      Martínez's esophagus 7/28/2017    Carotid stenosis 7/28/2017    Chronic kidney disease      Diabetes ([facility]       glucose runs 70 - 160's at home    Diabetic neuropathy ([facility] 7/28/2017    Diverticulosis      DJD (degenerative joint disease)      ED (erectile dysfunction)      Enlarged prostate      JUDY (generalized anxiety disorder)      GERD (gastroesophageal reflux disease)       controlled with med; alfonso's esophagus    Hiatal hernia      Hypercholesterolemia      Hypertension      Hypertrophy (benign) of prostate 7/28/2017    Ill-defined condition       peripheral vascular disease    Lung nodule      Neuropathy ([facility]      On statin therapy 7/28/2017    Prostate cancer screening 7/28/2017    PVD (peripheral vascular disease) ([facility]      Sebaceous cyst 7/28/2017    Spinal stenosis       Past Surgical History:   Procedure Laterality Date    CARDIAC SURG PROCEDURE UNLIST         CATH-2008    COLONOSCOPY N/A 6/15/2016     COLONOSCOPY performed by Teto Mace [facility] at [facility]   6/15/2016          HX CATARACT REMOVAL         BILATERAL    HX ORTHOPAEDIC   1977     BONE CYST REM,ANU FROM RIGHT LEG    HX ORTHOPAEDIC   5-, 7-22-17, 7-26-17     left hand index finger    HX OTHER SURGICAL Left 06/2016     carotid endarectomy    NEUROLOGICAL PROCEDURE UNLISTED   2011     Back: spinal stenosis, pinched nerve repair    UPPER GI ENDOSCOPY,BIOPSY   6/15/2016           Prior Level of Function/Home Situation: Pt is independent for mobility at baseline, however pt with recent acute infarct with admission at [facility] (Sept 1-2, 2017) and was ambulating with supervision. CVA appears to have impacted mentation. Has been at home with wife since discharge.   Personal factors and/or comorbidities impacting plan of care: Recent acute CVA      Home Situation  Home Environment: Private residence  # Steps to Enter: 2  Rails to Enter: Yes  Hand Rails : Bilateral  Wheelchair Ramp: No  One/Two Story Residence: One story  Living Alone: No  Support Systems: Child(jessica), Family member(s), Home care staff, Spouse/Significant Other/Partner  Patient Expects to be Discharged to[de-identified] Private residence  Current DME Used/Available at Home: Glucometer, Rosaura Silvius, straight, Rosaura Silvius, quad, Blood pressure cuff, Walker, rolling, Grab bars     EXAMINATION/PRESENTATION/DECISION MAKING:   Critical Behavior:  Neurologic State: Alert, Confused  Orientation Level: Disoriented to place, Disoriented to situation, Disoriented to time, Oriented to person  Cognition: Decreased attention/concentration, Decreased command following, Impaired decision making, Memory loss     Hearing: Auditory  Auditory Impairment: None  Skin:  Pacemaker incision  Edema: None  Range Of Motion:  AROM: Generally decreased, functional                       Strength:    Strength: Generally decreased, functional                    Tone & Sensation:   Tone: Normal                              Coordination:  Coordination: Generally decreased, functional  Vision:      Functional Mobility:  Bed Mobility:  Rolling: Minimum assistance  Supine to Sit: Minimum assistance     Scooting: Minimum assistance  Transfers:  Sit to Stand: Minimum assistance  Stand to Sit: Minimum assistance        Bed to Chair: Minimum assistance;Assist x2              Balance:   Sitting: Intact  Standing: Impaired; Without support  Standing - Static: Fair  Standing - Dynamic : Fair  Ambulation/Gait Training:  Distance (ft): 90 Feet (ft)  Assistive Device: Gait belt (HHA x 1)  Ambulation - Level of Assistance: Minimal assistance;Contact guard assistance;Assist x1;Additional time     Gait Description (WDL): Exceptions to WDL  Gait Abnormalities: Decreased step clearance; Path deviations; Altered arm swing        Base of Support: Widened     Speed/Katarzyna: Pace decreased (<100 feet/min)  Step Length: Left shortened;Right shortened        Functional Measure:  Barthel Index:      Bathin  Bladder: 0  Bowels: 5  Groomin  Dressin  Feeding: 5  Mobility: 5  Stairs: 5  Toilet Use: 5  Transfer (Bed to Chair and Back): 5  Total: 35         Barthel and G-code impairment scale:  Percentage of impairment CH  0% CI  1-19% CJ  20-39% CK  40-59% CL  60-79% CM  80-99% CN  100%   Barthel Score 0-100 100 99-80 79-60 59-40 20-39 1-19    0   Barthel Score 0-20 20 17-19 13-16 9-12 5-8 1-4 0      The Barthel ADL Index: Guidelines  1. The index should be used as a record of what a patient does, not as a record of what a patient could do. 2. The main aim is to establish degree of independence from any help, physical or verbal, however minor and for whatever reason. 3. The need for supervision renders the patient not independent. 4. A patient's performance should be established using the best available evidence. Asking the patient, friends/relatives and nurses are the usual sources, but direct observation and common sense are also important. However direct testing is not needed. 5. Usually the patient's performance over the preceding 24-48 hours is important, but occasionally longer periods will be relevant. 6. Middle categories imply that the patient supplies over 50 per cent of the effort. 7. Use of aids to be independent is allowed. Bam Lei., Barthel, D.W. (2931). Functional evaluation: the Barthel Index. [facility] (14)2. Rigo Marcum, LAURAF, Ewelina Perera., Leif Sinks., [address] (1999). Measuring the change indisability after inpatient rehabilitation; comparison of the responsiveness of the Barthel Index and Functional Bacon Measure. Journal of Neurology, Neurosurgery, and Psychiatry, 66(4), 757-189. Deep Lewis, N.J.A, Alejandro Nevarez,  W.J.M, & Samuel Carey, M.A. (2004.) Assessment of post-stroke quality of life in cost-effectiveness studies: The usefulness of the Barthel Index and the EuroQoL-5D. Quality of Life Research, 13, 990-17         G codes:   In compliance with CMSs Claims Based Outcome Reporting, the following G-code set was chosen for this patient based on their primary functional limitation being treated: The outcome measure chosen to determine the severity of the functional limitation was the Barthel Index with a score of 35/100 which was correlated with the impairment scale. · Mobility - Walking and Moving Around:               - CURRENT STATUS:    CL - 60%-79% impaired, limited or restricted               - GOAL STATUS:           CI - 1%-19% impaired, limited or restricted               - D/C STATUS:                       ---------------To be determined---------------      Physical Therapy Evaluation Charge Determination   History Examination Presentation Decision-Making   MEDIUM  Complexity : 1-2 comorbidities / personal factors will impact the outcome/ POC  HIGH Complexity : 4+ Standardized tests and measures addressing body structure, function, activity limitation and / or participation in recreation  MEDIUM Complexity : Evolving with changing characteristics  Other outcome measures Barthel Index HIGH       Based on the above components, the patient evaluation is determined to be of the following complexity level: MEDIUM     Pain:  Pain Scale 1: Numeric (0 - 10)  Pain Intensity 1: 0              Activity Tolerance:   Fair - BP elevated; pt with confusion; pt without pain complaints  Please refer to the flowsheet for vital signs taken during this treatment. After treatment:   [X]         Patient left in no apparent distress sitting up in chair  [ ]         Patient left in no apparent distress in bed  [X]         Call bell left within reach  [X]         Nursing notified  [X]         Caregiver present  [ ]         Bed alarm activated      COMMUNICATION/EDUCATION:   The patients plan of care was discussed with: Physical Therapist, Occupational Therapist and Registered Nurse.  [X]         Fall prevention education was provided and the patient/caregiver indicated understanding.   [X] Patient/family have participated as able in goal setting and plan of care. [X]         Patient/family agree to work toward stated goals and plan of care. [ ]         Patient understands intent and goals of therapy, but is neutral about his/her participation. [ ]         Patient is unable to participate in goal setting and plan of care.      Thank you for this referral.  Gallo Sneed, PT, DPT   Time Calculation: 29 mins

## 2017-09-15 NOTE — PROGRESS NOTES
PROGRESS NOTE    NAME:  Lamont Beckett   :   3/41/1952   MRN:   [id number]     Date/Time:  9/15/2017 7:22 AM  Subjective:   History:  Chart reviewed and patient seen and examined and D/W his nurse and daughter-in-law and all events noted. He was admitted on  with [facility] and now has permanent pacemaker which was placed on  AM. He had syncope and presented to ER with the [facility]B. He now feels well w/o cardio/respiratory c/o. There are no GI/ c/o. He has no neurologic c/o although extremely confused and impulsive and per PT very weak and unsteady yesterday. There are no other c/o on complete ROS. 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 He has been on once daily I.V antibiotics for a month now and never had positive blood cultures, He is S/P Parietal CVA about 2 weeks ago.       Medications reviewed:  Current Facility-Administered Medications   Medication Dose Route Frequency    LORazepam (ATIVAN) injection 1 mg  1 mg IntraVENous Q4H PRN    losartan (COZAAR) tablet 100 mg  100 mg Oral DAILY    amLODIPine (NORVASC) tablet 5 mg  5 mg Oral DAILY    clopidogrel (PLAVIX) tablet 75 mg  75 mg Oral DAILY    haloperidol lactate (HALDOL) injection 5 mg  5 mg IntraVENous Q8H PRN    vancomycin (VANCOCIN) 1,000 mg injection        0.9% sodium chloride (MBP/ADV) 0.9 % infusion        ADDaptor        sodium chloride (NS) flush 5-10 mL  5-10 mL IntraVENous Q8H    sodium chloride (NS) flush 5-10 mL  5-10 mL IntraVENous PRN    acetaminophen (TYLENOL) tablet 650 mg  650 mg Oral Q4H PRN    HYDROcodone-acetaminophen (NORCO) 5-325 mg per tablet 1 Tab  1 Tab Oral Q4H PRN    mupirocin (BACTROBAN) 2 % ointment   Both Nostrils BID    aspirin chewable tablet 81 mg  81 mg Oral DAILY    insulin NPH (NOVOLIN N, HUMULIN N) injection 20 Units  20 Units SubCUTAneous DAILY    cefTRIAXone (ROCEPHIN) 1 g in 0.9% sodium chloride (MBP/ADV) 50 mL  1 g IntraVENous Q24H    sodium chloride (NS) flush 10-30 mL  10-30 mL InterCATHeter PRN    sodium chloride (NS) flush 10 mL  10 mL InterCATHeter Q24H    sodium chloride (NS) flush 10 mL  10 mL InterCATHeter PRN    sodium chloride (NS) flush 10-40 mL  10-40 mL InterCATHeter Q8H    heparin (porcine) pf 300 Units  300 Units InterCATHeter PRN    labetalol (NORMODYNE;TRANDATE) injection 10 mg  10 mg IntraVENous Q4H PRN    melatonin tablet 4.5 mg  4.5 mg Oral QHS PRN    0.45% sodium chloride infusion  75 mL/hr IntraVENous CONTINUOUS    heparin (porcine) injection 5,000 Units  5,000 Units SubCUTAneous Q8H    insulin lispro (HUMALOG) injection   SubCUTAneous AC&HS    glucose chewable tablet 16 g  4 Tab Oral PRN    dextrose 10% infusion 125-250 mL  125-250 mL IntraVENous PRN    glucagon (GLUCAGEN) injection 1 mg  1 mg IntraMUSCular PRN    hydrALAZINE (APRESOLINE) 20 mg/mL injection 10 mg  10 mg IntraVENous Q6H PRN    insulin glargine (LANTUS) injection 12 Units  12 Units SubCUTAneous QHS        Objective:   Vitals:  Visit Vitals    /59    Pulse 76    Temp 97.9 °F (36.6 °C)    Resp 18    Ht 5' 10\" (1.778 m)    Wt 186 lb 11.7 oz (84.7 kg)    SpO2 100%    BMI 26.79 kg/m2    O2 Flow Rate (L/min): 2 l/min O2 Device: Room air Temp (24hrs), Av.2 °F (36.8 °C), Min:97.9 °F (36.6 °C), Max:98.5 °F (36.9 °C)      Last 24hr Input/Output:    Intake/Output Summary (Last 24 hours) at 09/15/17 0723  Last data filed at 17 2337   Gross per 24 hour   Intake                0 ml   Output             1150 ml   Net            -1150 ml        PHYSICAL EXAM:  General:     Alert, cooperative, no distress, appears stated age. Head:    Normocephalic, without obvious abnormality, atraumatic. Eyes:    Conjunctivae/corneas clear. PERRLA  Nose:   Nares normal. No drainage or sinus tenderness.   Throat:     Lips, mucosa, and tongue normal.  No Thrush  Neck:   Supple, symmetrical,  no adenopathy, thyroid: non tender     no carotid bruit and no JVD. Back:     Symmetric,  No CVA tenderness. Lungs:    Clear to auscultation bilaterally. No Wheezing or Rhonchi. No rales. Heart:    Regular rate and rhythm (PACED) ,  no murmur, rub or gallop,. Bandage WINSTON chest wall  Abdomen:    Soft, non-tender. Not distended. Bowel sounds normal. No masses  Extremities:  Extremities normal except L hand wound post surgery, No cyanosis. No edema. No clubbing  Lymph nodes:  Cervical, supraclavicular normal.  Neurologic:  Normal strength, Alert and oriented X 3 with significant confusion.    Skin:                 No rash      Lab Data Reviewed:    Recent Results (from the past 24 hour(s))   GLUCOSE, POC    Collection Time: 09/14/17 11:00 AM   Result Value Ref Range    Glucose (POC) 350 (H) 65 - 100 mg/dL    Performed by [address], POC    Collection Time: 09/14/17  4:36 PM   Result Value Ref Range    Glucose (POC) 62 (L) 65 - 100 mg/dL    Performed by [address], POC    Collection Time: 09/14/17  5:07 PM   Result Value Ref Range    Glucose (POC) 76 65 - 100 mg/dL    Performed by [address], POC    Collection Time: 09/14/17  5:30 PM   Result Value Ref Range    Glucose (POC) 111 (H) 65 - 100 mg/dL    Performed by Yasir Mckeon    GLUCOSE, POC    Collection Time: 09/14/17  8:38 PM   Result Value Ref Range    Glucose (POC) 303 (H) 65 - 100 mg/dL    Performed by Tammy Reynolds    METABOLIC PANEL, BASIC    Collection Time: 09/15/17  3:51 AM   Result Value Ref Range    Sodium 140 136 - 145 mmol/L    Potassium 3.8 3.5 - 5.1 mmol/L    Chloride 108 97 - 108 mmol/L    CO2 25 21 - 32 mmol/L    Anion gap 7 5 - 15 mmol/L    Glucose 202 (H) 65 - 100 mg/dL    BUN 21 (H) 6 - 20 MG/DL    Creatinine 1.08 0.70 - 1.30 MG/DL    BUN/Creatinine ratio 19 12 - 20      GFR est AA >60 >60 ml/min/1.73m2    GFR est non-AA >60 >60 ml/min/1.73m2    Calcium 7.8 (L) 8.5 - 10.1 MG/DL         Assessment/Plan:     Active Problems:    Essential hypertension (7/27/2017)      Controlled type 2 diabetes mellitus without complication, with long-term current use of insulin ([address] (7/27/2017)      ASVD (arteriosclerotic vascular disease) (7/27/2017)      Overview: Carotid Endarectomy 2017      Carotid arterial disease ([address] (6/29/2016)      Diabetic neuropathy ([address] (7/28/2017)      Thrombotic stroke involving right middle cerebral artery ([address] (9/1/2017)      Overview: August 2017      Syncope and collapse (9/11/2017)      Bradycardia (9/11/2017)      Complete heart block ([address] (9/12/2017)       ___________________________________________________  PLAN:    1. Permanent Pacemaker placed on 9/13  2. Resumed Plavix which he has been on since CVA  3. Rocephin for antibiotic coverage while inpatient then back to once daily Ertapenem on D/C for 2 more weeks  4. For DM follow BS and treat SSI, adjusted SSI coverage as BSs running high  5. On home Lantus  6. With anemia Hgb stable at 8.0 from last hospital numbers  7. Needs inpatient Rehab and prefer [facility] as still has I.V. Antibiotics QD  8. Resumed Norvasc and Cozaar which he takes for HTN and follow BP  9. Has significant Carotid Disease so back on ASA and Plavix  10.  Head CT yesterday w/o acute process so all residual from CVA about 2 weeks ago            ___________________________________________________    Attending Physician: Padmini Douglas MD

## 2017-09-15 NOTE — PROGRESS NOTES
Pt. Has been impulsive throughout the night with decreased in safety awareness in getting out of bed and chair to \"go get food\" or \"go to computer room\". Haldol PRN order was given, patients behavior did not change. Melatonin was given to help  Pt. Sleep, very little rest throughout the night. Continuing to have to re-orient patient to location and time. Attempts to move not realizing he has his erickson still in place. Mood waxes and wanes from pleasant to hostile and demanding. Combative when attempting to help patient with heart monitor. I feel the patient would not be safe to go home with family member due to instability on feet and with episodes of confusion. Pt. Not a threat to self or others, just confused as to what is going on in his surroundings. Will continue to monitor and consider calling MD for new orders. Pt. Currently in bed with bed alarm in place.

## 2017-09-15 NOTE — DIABETES MGMT
DTC Progress Note    Recommendations/ Comments: Please consider discontinuing NPH, increasing lantus to 15units Qhs, adding humalog 7units ac tid and adding DM restrictions to current diet. Patient is a 68 y.o. male with known DM on Lantus - usually < 20 units daily, NPH 20 units at breakfast, Novolog 10 units at dinner and Metformin 500 mg BID at home. A1c:   Lab Results   Component Value Date/Time    Hemoglobin A1c 6.9 09/01/2017 03:31 AM    Hemoglobin A1c 8.1 08/15/2017 11:43 PM       Recent Glucose Results:   Lab Results   Component Value Date/Time     (H) 09/15/2017 03:51 AM    GLUCPOC 302 (H) 09/15/2017 11:05 AM    GLUCPOC 256 (H) 09/15/2017 08:13 AM    GLUCPOC 303 (H) 09/14/2017 08:38 PM        Lab Results   Component Value Date/Time    Creatinine 1.08 09/15/2017 03:51 AM     Estimated Creatinine Clearance: 59.1 mL/min (based on Cr of 1.08). Active Orders   Diet    DIET CARDIAC Regular        PO intake:   No data found.       Current hospital DM medication:    Lantus 12 units daily  NPH 20 units at breakfast  Lispro normal correction scale    Thank you  RESHMA Vela, RN, [address]

## 2017-09-15 NOTE — PROGRESS NOTES
Problem: Mobility Impaired (Adult and Pediatric)  Goal: *Acute Goals and Plan of Care (Insert Text)  Physical Therapy Goals  Initiated 9/14/2017  1. Patient will move from supine to sit and sit to supine , scoot up and down and roll side to side in bed with independence within 7 day(s). 2. Patient will transfer from bed to chair and chair to bed with modified independence using the least restrictive device within 7 day(s). 3. Patient will perform sit to stand with modified independence within 7 day(s). 4. Patient will ambulate with modified independence for 200 feet with the least restrictive device within 7 day(s). 5. Patient will ascend/descend 2 stairs with 1 handrail(s) with supervision/set-up within 7 day(s). All while maintaining pacemaker precautions. PHYSICAL THERAPY TREATMENT  Patient: Naila Escamilla (14 y.o. male)  Date: 9/15/2017  Diagnosis: Syncope and collapse  Bradycardia <principal problem not specified>       Precautions: Fall (pacemaker precautions)      ASSESSMENT:  Patient making significant gains in gait distance, assistance level with mobility. However, he remains at a HIGH fall risk, and is highly impulsive and seemingly unaware of need for safety with all tasks given said fall risk. He did not recall any PPM precautions. Received at bedside chair with spouse present who contradicted patient's statements when questioned regarding activity since author treated patient just s/p recent CVA; he reports having no home therapy as recommended. Patient with shoes on throughout session, and without sling, which may have impacted his abilities at yesterdays evaluation. He was happier with use of SPC and more confident with such with improved gait speed vs without. While he mobilized with CGA mobility, during functional testing he demonstrates high fall risk and scoring below age-matched norms as detailed.  Given this, and what appears to be impaired safety/cognition 2/2 potential dementia/delirium/prior CVA with agitation at times, he is NOT safe to return to home at this time with support from his 4'9 frail-appearing spouse. Recommend short stint at IP rehabilitation prior to return to home with home therapy. After much discussion with patient, spouse, they are in agreement. Patient requests to ambulate over 8 steps vs 5 performed today next session. Progression toward goals:  [X]    Improving appropriately and progressing toward goals  [ ]    Improving slowly and progressing toward goals  [ ]    Not making progress toward goals and plan of care will be adjusted       PLAN:  Patient continues to benefit from skilled intervention to address the above impairments. Continue treatment per established plan of care. Discharge Recommendations:  Inpatient Rehab   Further Equipment Recommendations for Discharge:  None        SUBJECTIVE:   Patient stated I agree with that.       OBJECTIVE DATA SUMMARY:   Critical Behavior:  Neurologic State: Confused  Orientation Level: Disoriented to situation, Oriented to person, Oriented to place, Oriented to time  Cognition: Appropriate safety awareness, Impaired decision making, Memory loss     Functional Mobility Training:  Bed Mobility: at chair                     Transfers:  Sit to Stand: Contact guard assistance  Stand to Sit: Contact guard assistance                             Balance:  Sitting: Intact; Without support  Standing: Impaired; With support (SPC)  Standing - Static: Good;Constant support  Standing - Dynamic : Good  Ambulation/Gait Training:  Distance (ft): 470 Feet (ft)  Assistive Device: Gait belt;Cane, straight  Ambulation - Level of Assistance: Contact guard assistance        Gait Abnormalities: Decreased step clearance                                               Without SPC, slowed gait speed, without balance checks. He prefers SPC use thus provided.    Stairs:  Number of Stairs Trained: 5  Stairs - Level of Assistance: Contact guard assistance              Rail Use: Both (step to)  Functional Outcomes:  Five times sit to stand:      Five Times Sit to Stand: 19 Seconds             Normative averages:  Clients younger than 61years old  £  10 seconds = Normal   Clients older than 61years old £ 14.2 seconds = Normal   Change of ³ 2.3 seconds shows a significant clinical improvement     Shaina RW. Reference values for the five repetition sit to stand test: a descriptive metaanalysis of data from elders. Percept Mot Skills 2006; 103(1):215-222. Tinetti test:      Sitting Balance: 1  Arises: 1  Attempts to Rise: 1  Immediate Standing Balance: 1  Standing Balance: 1  Nudged: 2  Eyes Closed: 1  Turn 360 Degrees - Continuous/Discontinuous: 0  Turn 360 Degrees - Steady/Unsteady: 0  Sitting Down: 1  Balance Score: 9  Indication of Gait: 1  R Step Length/Height: 1  L Step Length/Height: 1  R Foot Clearance: 1  L Foot Clearance: 1  Step Symmetry: 1  Step Continuity: 1  Path: 1  Trunk: 1  Walking Time: 0  Gait Score: 9  Total Score: 18         Tinetti Test and G-code impairment scale:  Percentage of Impairment CH     0%    CI     1-19% CJ     20-39% CK     40-59% CL     60-79% CM     80-99% CN      100%   Tinetti  Score 0-28 28 23-27 17-22 12-16 6-11 1-5 0          Tinetti Tool Score Risk of Falls  <19 = High Fall Risk  19-24 = Moderate Fall Risk  25-28 = Low Fall Risk  Tinetti ME. Performance-Oriented Assessment of Mobility Problems in Elderly Patients. [facility]; [id number].  (Scoring Description: PT Bulletin Feb. 10, 1993)     Older adults: Jayden Zhang et al, 2009; n = 1000 [facility] elderly evaluated with ABC, GIANCARLO, ADL, and IADL)  · Mean GIANCARLO score for males aged 69-68 years = 26.21(3.40)  · Mean GIANCARLO score for females age 69-68 years = 25.16(4.30)  · Mean GIANCARLO score for males over 80 years = 23.29(6.02)  · Mean GIANCARLO score for females over 80 years = 17.20(8.32)         Pain:  Pain Scale 1: Numeric (0 - 10)  Pain Intensity 1: 0 Activity Tolerance:   WNL throughout      Please refer to the flowsheet for vital signs taken during this treatment.   After treatment:   [X]    Patient left in no apparent distress sitting up in chair  [ ]    Patient left in no apparent distress in bed  [X]    Call bell left within reach  [X]    Nursing notified  [X]    Caregiver present  [X]    Bed alarm activated      COMMUNICATION/COLLABORATION:   The patients plan of care was discussed with: Registered Nurse     Marcus Cochran, PT, DPT    Time Calculation: 38 mins

## 2017-09-15 NOTE — PROGRESS NOTES
Hospitalist Dr. Megan Lugo called and got ativan 1mg PRN every 4 hours as needed for patient if he becomes restless again. Pt. Still remains in bed resting at this time.

## 2017-09-15 NOTE — PROCEDURES
[facility], [address] [place] [address]       Name:  Jacqueline Boeck   MR#:  [id number]   :  1940   Account #:  [de-identified]        Date of Adm:  2017       PROCEDURE: Temporary transvenous pacemaker. : Christina Giang MD     INDICATION: Symptomatic profound bradycardia. TECHNIQUE: Right femoral vein via Carmita. DESCRIPTION OF PROCEDURE: The patient was brought to the EP   lab. Informed consent was obtained. The right groin was prepped and   draped in the usual fashion and a 6-English sheath was placed in this   vessel. A 5-English bipolar pacing catheter was advanced into the apex   of the right ventricle without difficulty. The catheter was then attached   to the external generator and temporary pacing was begun. The   external pacemaker was discontinued. Final settings were a rate of 50   paced beats per minute at a setting of 2 milliamps. The patient   tolerated the procedure well without complications. ANESTHESIA: The patient received 1 mg of IV Versed and 25 mcg of   IV fentanyl for sedation under the constant supervision of the attending   physician. This was done from 2:53 p.m. until 3:13 p.m. CONCLUSION: Successful placement of temporary transvenous   pacemaker via the right femoral vein approach.         MD Arie Rojas / Nani Craven   D:  2017   15:44   T:  09/15/2017   10:55   Job #:  [id number]

## 2017-09-15 NOTE — PROGRESS NOTES
Cardiology Progress Note      9/15/2017 9:03 AM    Admit Date: 9/11/2017          Subjective: Intermittant confusion overnight. To go to rehab facility when bed avail- hopefully [facility]          Visit Vitals    /54 (BP 1 Location: Left arm, BP Patient Position: At rest)    Pulse 84    Temp 97.8 °F (36.6 °C)    Resp 18    Ht 5' 10\" (1.778 m)    Wt 84.7 kg (186 lb 11.7 oz)    SpO2 98%    BMI 26.79 kg/m2     09/13 1901 - 09/15 0700  In: -   Out: 2200 [Urine:2200]        Objective:      Physical Exam:  VS as above  Chest CTA  Card RRR  Site looks ok- dressing in place     Data Review:   Labs:    BUN 21  creat 1.1    Telemetry: SR R 77       Assessment:     1. Bradycardia, probably due to complete heart block, symptomatic. S/p dual chamber pacer 9/13   2. Mild left ventricular systolic dysfunction, no history of coronary artery   disease. 3. Hypertension. 4. Type 2 diabetes. 5. Peripheral vascular disease. 6. Mild renal insufficiency. 7. Left hand infection, currently on chronic antibiotic therapy. 8. Prior left carotid endarterectomy. 9. Anemia   10. Recent CVA     Plan: Cont current Rx. Will see in office 10 days.

## 2017-09-15 NOTE — PROCEDURES
[facility], [address] [facility]       Name:  Adriana Chavira   MR#:  [id number]   :  1940   Account #:  [de-identified]        Date of Adm:  2017       ANESTHESIA:  Sedation. PROCEDURES PERFORMED:   1. Left subclavian venogram.   2. Dual-chamber permanent pacemaker placement. SURGEON: Lobito Baez MD     INDICATION: Nonreversible symptomatic sick sinus syndrome with   atrioventricular node disease. DESCRIPTION OF PROCEDURE: The patient was brought to the   cardiac catheterization lab in a fasting state and after informed consent   was obtained. Electrocardiographic and hemodynamic monitoring were   performed. Sedation was performed by the nurse who was in constant   attendance throughout the procedure. Sedation was done from 8:17   a.m. until 9:30 a.m. with IV Versed and IV Demerol also under the   constant supervision of the attending physician. Lidocaine 1% with   epinephrine was used to anesthetize the left chest wall implant site. The pocket was formed in the usual fashion and venous access was   obtained using a micropuncture needle. An 8-Palauan safety sheath   was placed in the left subclavian vein. Two guidewires were   placed in the sheath and the sheath was removed and placed over one   of the 2 guidewires. The second sheath was placed over the second   guidewire without difficulty. The guidewires were removed and the right   ventricular lead was subsequently advanced to the right ventricular   apex under fluoroscopic guidance. After appropriate parameters were   obtained, the lead was screwed in place in the usual fashion. This was   [employer] Tendril MRI, model [id number], 58 cm lead. This   serial number was CBD [id number]. Parameters obtained included an R-  wave of 11.1 millivolts, impedance of 1176 ohms and pacing threshold   of 0.6 volts at 0.4 milliseconds pulse width.  These were the final parameters obtained prior to closure of the pocket. Following this, the   atrial lead was advanced and placed in a similar fashion in the area of   the lateral right atrial wall. This was a [employer] Tendril MRI,   model [id number], 52 cm lead, serial number [id number]. Parameters obtained for this lead included a P-wave of 7.1 millivolts,   impedance of 980 ohms and pacing threshold of 1.7 volts at 0.4   milliseconds pulse width. The leads were subsequently anchored to   the pocket floor using 2-0 silk sutures at each anchor sleeve. The pulse generator was then connected to the leads and placed in the   pocket after hemostasis was obtained. This was a [address] [employer] MRI, model [id number] device, serial [id number]. Vigorous   irrigation with antibiotic solution was then performed in the pacemaker   pocket and the pocket was closed using 2 running 2-0 Vicryl layers   with a more superficial layer of running 4-0 Vicryl in a subcuticular   fashion. Final fluoroscopic check revealed adequate redundancy of the   leads and absence of a pneumothorax. Final settings were in the   DDDR mode with a lower rate limit of 60 and upper rate limit of 130. PLAN: The patient was to have a followup portable chest x-ray   immediately post-procedure and a pacemaker check the morning after   the procedure. The patient was also to receive IV antibiotics post-  procedure and post-discharge followup was to be in approximately 10   days for a routine wound and device check.         MD Sunni Shaver / Marcela Townsend   D:  09/14/2017   15:53   T:  09/15/2017   11:28   Job #:  [id number]

## 2017-09-15 NOTE — PROGRESS NOTES
CM received consult for [facility] rehab. CM will send referral via [employer] so that [facility] staff can evaluate Pt for Acute Rehab. CM will let Pt know. CM will continue to monitor discharge plan.       Asad Thakkar,MARTHA   Ext [phone]

## 2017-09-15 NOTE — PROGRESS NOTES
OT attempted to see pt and per nursing pt did not sleep at all last night and the night before and was up all day yesterday and is finally getting some rest.  Will follow up with pt later today as able.

## 2017-09-15 NOTE — PROGRESS NOTES
TRANSFER - OUT REPORT:    Verbal report given to [address] nurse (name) on Tori Moreno  being transferred to [facility](unit) for routine progression of care       Report consisted of patients Situation, Background, Assessment and   Recommendations(SBAR). Information from the following report(s) SBAR, Kardex, Intake/Output, MAR, Accordion and Recent Results was reviewed with the receiving nurse. Lines:   PICC Double Lumen 01/60/12 Right;Basilic (Active)   Central Line Being Utilized Yes 9/15/2017  2:17 PM   Criteria for Appropriate Use Long term IV/antibiotic administration 9/15/2017  2:17 PM   Site Assessment Clean, dry, & intact 9/15/2017  2:17 PM   Phlebitis Assessment 0 9/14/2017  7:00 AM   Infiltration Assessment 0 9/14/2017  7:00 AM   Date of Last Dressing Change 09/12/17 9/13/2017 12:04 PM   Dressing Status Clean, dry, & intact 9/14/2017  7:00 AM   Action Taken Open ports on tubing capped 9/13/2017 12:04 PM   Dressing Type Disk with Chlorhexadine gluconate (CHG) 9/13/2017 12:04 PM   Hub Color/Line Status Flushed 9/15/2017  2:17 PM   Positive Blood Return (Site #1) Yes 9/13/2017 12:04 PM   Hub Color/Line Status Flushed 9/15/2017  2:17 PM   Positive Blood Return (Site #2) Yes 9/13/2017 12:04 PM   Alcohol Cap Used Yes 9/13/2017 12:04 PM       Peripheral IV 09/11/17 Left Forearm (Active)   Site Assessment Clean, dry, & intact 9/15/2017  2:17 PM   Phlebitis Assessment 0 9/15/2017  2:17 PM   Infiltration Assessment 0 9/15/2017  2:17 PM   Dressing Status Clean, dry, & intact 9/15/2017  2:17 PM   Dressing Type Tape;Transparent 9/15/2017  2:17 PM   Hub Color/Line Status Capped 9/15/2017  2:17 PM   Action Taken Open ports on tubing capped 9/13/2017  4:00 AM   Alcohol Cap Used Yes 9/13/2017 12:04 PM        Opportunity for questions and clarification was provided.       Patient transported with:   Registered Nurse  Tech

## 2017-09-16 NOTE — DISCHARGE INSTRUCTIONS
[address]  [phone]      Patient Discharge Instructions    Alicia Shafer / [id number] : 1940    Admitted 2017 Discharged: 2017     Take Home Medications            · It is important that you take the medication exactly as they are prescribed. · Keep your medication in the bottles provided by the pharmacist and keep a list of the medication names, dosages, and times to be taken in your wallet. · Do not take other medications without consulting your doctor. What to do at Home    Recommended diet: Diabetic Diet,     Recommended activity: Activity as tolerated, PT/OT      Follow-up with Dr. Sarina Barrios in 2 week or just after discharge from rehab. Call [phone] for your appointment. Information obtained by :  I understand that if any problems occur once I am at home I am to contact my physician. I understand and acknowledge receipt of the instructions indicated above.                                                                                                                                            Physician's or R.N.'s Signature                                                                  Date/Time                                                                                                                                              Patient or Representative Signature                                                          Date/Time

## 2017-09-16 NOTE — PROGRESS NOTES
Bedside shift change report given to Dorothy Sifuentes RN (oncoming nurse) by Sherita Matute RN (offgoing nurse). Report included the following information SBAR. Hospital to [facility] [address] Maldon[address]                                                                        68 y.o.   male    [address]   Room: 2211/01    [facility] [place] [facility]  Unit Phone# :  [phone]      [address]  [facility] [place] [facility]  [address] [address]  Dept: [phone]  Loc: [phone]                    SITUATION     Admitted:  9/11/2017         Attending Provider:  Duncan Paul MD       Consultations:  IP CONSULT TO CARDIOLOGY  IP CONSULT TO NEUROLOGY  IP CONSULT TO HOSPITALIST    PCP:  Duncan Paul MD   [phone]    Treatment Team: Attending Provider: Duncan Paul MD; Consulting Provider: Katie Prince MD; Consulting Provider: Martha Bauer.  Joshua Vega MD; Consulting Provider: Angelina Nogueira DO; Utilization Review: William Zapata RN    Admitting Dx:  Syncope and collapse  Bradycardia       Principal Problem: <principal problem not specified>    * No surgery found * of      BY: * Surgery not found *             ON: * No surgery found *                  Code Status: Prior                Advance Directives:   Advance Care Planning 9/11/2017   Patient's Healthcare Decision Maker is: Legal Next of [place] [address]   Primary Decision Maker Name James Woo   Primary Decision Maker Phone Number [phone]   Primary Decision Maker Relationship to Patient Spouse   Secondary Decision Maker Name [address] [place] Phone Number [phone]   Secondary Decision Maker Relationship to Patient Adult child   Confirm Advance Directive None   Patient Would Like to Complete Advance Directive No    (Send w/patient)   No Doesnt Have       Isolation:  There are currently no Active Isolations       MDRO: No current active infections    Pain Medications given: Acetaminophen    Last dose: 9/15/2017 at  [facility] needed: no  Type of equipment:         BACKGROUND     Allergies:  No Known Allergies    Past Medical History:   Diagnosis Date    Allergic rhinitis     Anemia     ASVD (arteriosclerotic vascular disease)     Alfonso's esophagus 7/28/2017    Carotid stenosis 7/28/2017    Chronic kidney disease     Diabetes ([facility]     glucose runs 70 - 160's at home    Diabetic neuropathy ([facility] 7/28/2017    Diverticulosis     DJD (degenerative joint disease)     ED (erectile dysfunction)     Enlarged prostate     JUDY (generalized anxiety disorder)     GERD (gastroesophageal reflux disease)     controlled with med; alfonso's esophagus    Hiatal hernia     Hypercholesterolemia     Hypertension     Hypertrophy (benign) of prostate 7/28/2017    Ill-defined condition     peripheral vascular disease    Lung nodule     Neuropathy ([facility]     On statin therapy 7/28/2017    Prostate cancer screening 7/28/2017    PVD (peripheral vascular disease) ([facility]     Sebaceous cyst 7/28/2017    Spinal stenosis        Past Surgical History:   Procedure Laterality Date    CARDIAC SURG PROCEDURE UNLIST      CATH-2008    COLONOSCOPY N/A 6/15/2016    COLONOSCOPY performed by Dorita Whitten [facility] at [facility]  6/15/2016         HX CATARACT REMOVAL      BILATERAL    HX ORTHOPAEDIC  1977    BONE CYST REM,ANU FROM RIGHT LEG    HX ORTHOPAEDIC  5-, 7-22-17, 7-26-17    left hand index finger    HX OTHER SURGICAL Left 06/2016    carotid endarectomy    NEUROLOGICAL PROCEDURE UNLISTED  2011    Back: spinal stenosis, pinched nerve repair    UPPER GI ENDOSCOPY,BIOPSY  6/15/2016            Prescriptions Prior to Admission   Medication Sig    ascorbic acid, vitamin C, (VITAMIN C) 250 mg tablet Take 250 mg by mouth three (3) times daily.  clopidogrel (PLAVIX) 75 mg tab Take 75 mg by mouth daily.     ferrous sulfate 325 mg (65 mg iron) tablet Take 325 mg by mouth three (3) times daily (with meals).  traMADol (ULTRAM) 50 mg tablet Take 50 mg by mouth four (4) times daily as needed (pain/sleep).  L. acidoph & paracasei- S therm- Bifido (LETICIA-Q/RISAQUAD) 8 billion cell cap cap Take 1 Cap by mouth three (3) times daily (with meals).  lisinopril-hydroCHLOROthiazide (PRINZIDE, ZESTORETIC) 20-12.5 mg per tablet Take 2 Tabs by mouth daily (with breakfast).  nicotinic acid (NIACIN) 500 mg tablet Take 500 mg by mouth nightly.  ertapenem 1 gram 1 g IVPB 1 g by IntraVENous route every twenty-four (24) hours.  insulin glargine (LANTUS) 100 unit/mL injection 9-12 Units by SubCUTAneous route nightly. Patient uses sliding scale, but cannot remember at this time    omeprazole (PRILOSEC) 20 mg capsule Take 20 mg by mouth nightly.  insulin NPH (NOVOLIN N) 100 unit/mL injection 20 Units by SubCUTAneous route daily (with breakfast). Indications: type 2 diabetes mellitus    insulin aspart (NOVOLOG) 100 unit/mL injection 10 Units by SubCUTAneous route daily (with dinner). Indications: type 2 diabetes mellitus    metFORMIN (GLUMETZA ER) 500 mg TG24 24 hour tablet Take 1 Tab by mouth two (2) times a day.  amLODIPine (NORVASC) 5 mg tablet Take 5 mg by mouth daily.  losartan (COZAAR) 100 mg tablet Take 100 mg by mouth two (2) times a day. Indications: HYPERTENSION    aspirin 81 mg chewable tablet Take 81 mg by mouth nightly.  meloxicam (MOBIC) 15 mg tablet Take 15 mg by mouth daily.  tamsulosin (FLOMAX) 0.4 mg capsule Take 0.4 mg by mouth daily.  gabapentin (NEURONTIN) 300 mg capsule Take 300 mg by mouth three (3) times daily.  nadolol (CORGARD) 40 mg tablet Take 40 mg by mouth daily.  simvastatin (ZOCOR) 10 mg tablet Take 10 mg by mouth nightly.        Hard scripts included in transfer packet no    Vaccinations:    Immunization History   Administered Date(s) Administered    Influenza Vaccine 10/30/2015, 10/11/2016    Pneumococcal Conjugate (PCV-13) 08/14/2015    Pneumococcal Polysaccharide (PPSV-23) 01/01/2006    Tdap 05/22/2017       Readmission Risks:    Known Risks: The Charlson CoMorbitiy Index tool is an evidenced based tool that has more automatic generated information. The tool looks at many different items such as the age of the patient, how many times they were admitted in the last calendar year, current length of stay in the hospital and their diagnosis. All of these items are pulled automatically from information documented in the chart from various places and will generate a score that predicts whether a patient is at low (less than 13), medium (13-20) or high (21 or greater) risk of being readmitted.         ASSESSMENT                Temp: 97.9 °F (36.6 °C) (09/16/17 1451) Pulse (Heart Rate): (!) 105 (09/16/17 1451)     Resp Rate: 18 (09/16/17 1451)           BP: 106/50 (09/16/17 1451)     O2 Sat (%): 100 % (09/16/17 1451)     Weight: 84.7 kg (186 lb 11.7 oz)    Height: 5' 10\" (177.8 cm) (09/11/17 1237)       If above not within 1 hour of discharge:    BP:_____  P:____  R:____ T:_____ O2 Sat: ___%  O2: ______    Active Orders   Diet    DIET CARDIAC Regular         Orientation: oriented to time, place, person and situation     Active Behaviors: None                                   Active Lines/Drains:  (Peg Tube / Liu / CL or S/L?): yes Right PICC    Urinary Status: Voiding     Last BM: Last Bowel Movement Date: 09/14/17     Skin Integrity: Incision (comment)   Wound Hand Left-DRESSING STATUS: Clean, dry, and intact    Wound Hand Left-DRESSING TYPE: Silver products    Mobility: Slightly limited   Weight Bearing Status: WBAT (Weight Bearing as Tolerated)      Gait Training  Assistive Device: Gait belt, Cane, straight  Ambulation - Level of Assistance: Contact guard assistance  Distance (ft): 470 Feet (ft)  Stairs - Level of Assistance: Contact guard assistance  Number of Stairs Trained: 5  Rail Use: Both (step to)         Lab Results   Component Value Date/Time    Glucose 202 09/15/2017 03:51 AM    Hemoglobin A1c 6.9 09/01/2017 03:31 AM    INR 1.0 09/11/2017 12:37 PM    INR 1.1 08/31/2017 01:14 PM    HGB 8.0 09/12/2017 04:28 AM    HGB 8.7 09/11/2017 12:37 PM        RECOMMENDATION     See After Visit Summary (AVS) for:  · Discharge instructions  · After [address]   · Special equipment needed (entered pre-discharge by Care Management)  · Medication Reconciliation    · Follow up Appointment(s)         Report given/sent by:  Jonah Greco                    Verbal report given to: Twila Joyce, RN  FAXED to:           Estimated discharge time:  9/16/2017 at 1800

## 2017-09-16 NOTE — PROGRESS NOTES
Dressing change completed to L hand per wound care instructions. Pt tolerated well. Will cont to monitor.   Jo Ann Soria RN

## 2017-09-16 NOTE — PROGRESS NOTES
Cardiology Progress Note      9/16/2017 11:22 AM    Admit Date: 9/11/2017          Subjective: NAEO. To go to rehab facility when bed avail (maybe?)- hopefully [facility]. Walking the halls. Visit Vitals    /47 (BP 1 Location: Left arm, BP Patient Position: Sitting)    Pulse (!) 105    Temp 97.2 °F (36.2 °C)    Resp 18    Ht 5' 10\" (1.778 m)    Wt 84.7 kg (186 lb 11.7 oz)    SpO2 100%    BMI 26.79 kg/m2     09/14 1901 - 09/16 0700  In: 400 [P.O.:400]  Out: 975 [Urine:975]        Objective:      Physical Exam:  VS as above  Chest CTA  Card RRR  Site looks ok- dressing in place     Data Review:   Labs:  No labs today    Telemetry: SR R 77 ; tachy when walking      Assessment:     1. Bradycardia, probably due to complete heart block, symptomatic. S/p dual chamber pacer 9/13   2. Mild left ventricular systolic dysfunction, no history of coronary artery   disease. 3. Hypertension. 4. Type 2 diabetes. 5. Peripheral vascular disease. 6. Mild renal insufficiency. 7. Left hand infection, currently on chronic antibiotic therapy. 8. Prior left carotid endarterectomy. 9. Anemia   10. Recent CVA     Plan: Cont current Rx. Dr. Hu Huynh to follow in office post-d/c. Iam Caputo

## 2017-09-16 NOTE — PROGRESS NOTES
PROGRESS NOTE    NAME:  Cristiane Lowery   :   [birth year]   MRN:   [id number]     Date/Time:  2017 9:07 AM  Subjective:   History:  Pt. Denies complaint this am, mildly confused last pm? Wife at bedside. States he did well with PT yesterday. Denies CP, SOB, PND/orthopnea.        Medications reviewed:  Current Facility-Administered Medications   Medication Dose Route Frequency    LORazepam (ATIVAN) injection 1 mg  1 mg IntraVENous Q4H PRN    tamsulosin (FLOMAX) capsule 0.4 mg  0.4 mg Oral DAILY    losartan (COZAAR) tablet 100 mg  100 mg Oral DAILY    amLODIPine (NORVASC) tablet 5 mg  5 mg Oral DAILY    clopidogrel (PLAVIX) tablet 75 mg  75 mg Oral DAILY    haloperidol lactate (HALDOL) injection 5 mg  5 mg IntraVENous Q8H PRN    vancomycin (VANCOCIN) 1,000 mg injection        0.9% sodium chloride (MBP/ADV) 0.9 % infusion        ADDaptor        sodium chloride (NS) flush 5-10 mL  5-10 mL IntraVENous Q8H    sodium chloride (NS) flush 5-10 mL  5-10 mL IntraVENous PRN    acetaminophen (TYLENOL) tablet 650 mg  650 mg Oral Q4H PRN    HYDROcodone-acetaminophen (NORCO) 5-325 mg per tablet 1 Tab  1 Tab Oral Q4H PRN    mupirocin (BACTROBAN) 2 % ointment   Both Nostrils BID    aspirin chewable tablet 81 mg  81 mg Oral DAILY    insulin NPH (NOVOLIN N, HUMULIN N) injection 20 Units  20 Units SubCUTAneous DAILY    cefTRIAXone (ROCEPHIN) 1 g in 0.9% sodium chloride (MBP/ADV) 50 mL  1 g IntraVENous Q24H    sodium chloride (NS) flush 10-30 mL  10-30 mL InterCATHeter PRN    sodium chloride (NS) flush 10 mL  10 mL InterCATHeter Q24H    sodium chloride (NS) flush 10 mL  10 mL InterCATHeter PRN    sodium chloride (NS) flush 10-40 mL  10-40 mL InterCATHeter Q8H    heparin (porcine) pf 300 Units  300 Units InterCATHeter PRN    labetalol (NORMODYNE;TRANDATE) injection 10 mg  10 mg IntraVENous Q4H PRN    melatonin tablet 4.5 mg  4.5 mg Oral QHS PRN    heparin (porcine) injection 5,000 Units  5,000 Units SubCUTAneous Q8H    insulin lispro (HUMALOG) injection   SubCUTAneous AC&HS    glucose chewable tablet 16 g  4 Tab Oral PRN    dextrose 10% infusion 125-250 mL  125-250 mL IntraVENous PRN    glucagon (GLUCAGEN) injection 1 mg  1 mg IntraMUSCular PRN    hydrALAZINE (APRESOLINE) 20 mg/mL injection 10 mg  10 mg IntraVENous Q6H PRN    insulin glargine (LANTUS) injection 12 Units  12 Units SubCUTAneous QHS        Objective:   Vitals:  Visit Vitals    BP 99/74 (BP 1 Location: Left arm, BP Patient Position: Sitting)    Pulse (!) 105    Temp 97.6 °F (36.4 °C)    Resp 18    Ht 5' 10\" (1.778 m)    Wt 186 lb 11.7 oz (84.7 kg)    SpO2 100%    BMI 26.79 kg/m2    O2 Flow Rate (L/min): 2 l/min O2 Device: Room air Temp (24hrs), Av.6 °F (36.4 °C), Min:97.4 °F (36.3 °C), Max:98 °F (36.7 °C)      Last 24hr Input/Output:    Intake/Output Summary (Last 24 hours) at 17 7477  Last data filed at 09/15/17 1600   Gross per 24 hour   Intake              200 ml   Output              200 ml   Net                0 ml        PHYSICAL EXAM:  General:     Alert, cooperative, no distress, appears stated age. Head:    Normocephalic, without obvious abnormality, atraumatic. Eyes:    Conjunctivae/corneas clear. PERRLA  Nose:   Nares normal. No drainage or sinus tenderness. Throat:     Lips, mucosa, and tongue normal.  No Thrush  Neck:   Supple, symmetrical,  no adenopathy, thyroid: non tender     no carotid bruit and no JVD. Back:     Symmetric,  No CVA tenderness. Lungs:    Clear to auscultation bilaterally. No Wheezing or Rhonchi. No rales. Heart:    Regular rate and rhythm,  no murmur, rub or gallop. Left anterior chest wall pacemaker site c/d/i  Abdomen:    Soft, non-tender. Not distended. Bowel sounds normal. No masses  Extremities:  left hand dressed  Lymph nodes:  Cervical, supraclavicular normal.  Neurologic:  Normal strength, Alert and oriented X 3.    Skin:                No rash      Lab Data Reviewed:    Recent Results (from the past 24 hour(s))   GLUCOSE, POC    Collection Time: 09/15/17 11:05 AM   Result Value Ref Range    Glucose (POC) 302 (H) 65 - 100 mg/dL    Performed by [address], POC    Collection Time: 09/15/17  4:18 PM   Result Value Ref Range    Glucose (POC) 272 (H) 65 - 100 mg/dL    Performed by [address], POC    Collection Time: 09/15/17  8:42 PM   Result Value Ref Range    Glucose (POC) 225 (H) 65 - 100 mg/dL    Performed by Shalom Aguilar    GLUCOSE, POC    Collection Time: 09/16/17  8:26 AM   Result Value Ref Range    Glucose (POC) 368 (H) 65 - 100 mg/dL    Performed by Colby Gonsales (PCT)          Assessment/Plan:     Active Problems:    Carotid arterial disease ([address] (6/29/2016)      Essential hypertension (7/27/2017)      Controlled type 2 diabetes mellitus without complication, with long-term current use of insulin ([address] (7/27/2017)      ASVD (arteriosclerotic vascular disease) (7/27/2017)      Overview: Carotid Endarectomy 2017      Diabetic neuropathy ([address] (7/28/2017)      Thrombotic stroke involving right middle cerebral artery ([address] (9/1/2017)      Overview: August 2017      Syncope and collapse (9/11/2017)      Bradycardia (9/11/2017)      Complete heart block ([address] (9/12/2017)       ___________________________________________________  PLAN:    1. Monitor FSBS, SSI  2.  IV ceftriaxone for osteomyelitis, to be on 2 more weeks abx IV on discharge  3. Hx CVA stable by repeat CT, recent CEA  4.  Syncope secondary to CHB, s/p pacemaker, stable            ___________________________________________________    Attending Physician: Rony Schmid MD

## 2017-09-16 NOTE — PROGRESS NOTES
CM received call from pt's nurse Brea More regarding pt possibly being accepted at [facility]. CM spoke with Lizabeth Main at [facility] in [place] who expressed that she will get back with CM by later today to confirm if patient can come. CM has notified Brea More. CM received call from Lizabeth Main at [facility] who had accepted pt. RN please call report to [phone]. Admitting MD is Dr. Milagros Gotti.       [address] [place] [address] [place]

## 2017-09-19 NOTE — PROGRESS NOTES
Readmission DC Note:  Pt was accepted and transferred to [facility] for Inpt Rehab on 9/16/17. Attempted to alert PCP NN of pt's discharge, but Dr. Mya Jiang practice is new to [facility] and does not have NN in place yet.  Pt is scheduled for f/u appmt with PCP on 9/28 - CM confirmed this appmt with office repr, Loretta Henry, MSW

## 2017-09-20 NOTE — DISCHARGE SUMMARY
[address] SUMMARY       Name:  Jason Box   MR#:  [id number]   :  1940   Account #:  [de-identified]        Date of Adm:  2017       FINAL DIAGNOSES    1. Complete heart block, necessitating permanent pacemaker   placement. 2. Syncope secondary to #1.   3. Recent cerebrovascular accident. 4. Confusion secondary to cerebrovascular accident. 5. Generalized weakness, failure to thrive. 6. Osteomyelitis, left second finger on long-term IV antibiotics. 7. Diabetes mellitus. 8. Hypertension. 9. Hyperlipidemia. CONSULTATIONS: Cardiology, Wally Vizcaino MD    PROCEDURES: Placement of permanent biventricular pacemaker. COMPLICATIONS: None. For details of admission, please see admit note. HOSPITAL COURSE: Briefly, the patient is a 66-year-old white male   who has recently had several hospitalizations initially at [facility]   secondary to an infected left hand from a wound sustained from a   skill saw accident. He developed osteomyelitis of his second finger   and required amputation and has been on IV antibiotics. He was   recently admitted to the hospital about 1-1/2 to 2 weeks prior to this   hospitalization with a CVA and was found to have significant carotid   disease. He was started on Plavix in addition to his aspirin and this   seemed to be doing well, only to develop a problem where he had a   syncopal episode and 911 was called, who found him with a heart rate   of 20 with complete heart block. He was admitted to the ICU where a   permanent pacemaker was placed after he was cleared of having any   positive blood cultures in light of his recent hand infection.  Post-  pacemaker placement, he did have some increasing confusion which   seemed to be controlled, but secondary to his recent hospitalization,   he had become very weak and unsteady on his feet and it was felt that rehabilitation would be the best benefit. He was evaluated by   [facility] and accepted in admission there at the time of   discharge here on 09/16/2017. DISCHARGE MEDICATIONS: Unknown to this system. 1. Aspirin 325 daily. 2. Plavix 75 mg daily. 3. Lantus insulin 12 units daily. 4. Prilosec 20 mg daily. 5. Novolin NPH 20 units each morning. 6. NovoLog 10 units daily with dinner. 7. Metformin 500 mg 1 tablet by mouth 2 times a day. 8. Cozaar 100 mg daily. 9. Mobic 15 mg daily. 10. Flomax 0.4 daily. 11. Corgard 40 mg daily. 12. Neurontin 300 mg 3 times daily. 13. Zocor 10 mg daily. 14. Tramadol 50 mg every 4 hours p.r.n. 15. Lisinopril HCT 20/12.5 two tablets daily. 16. Niacin 500 mg daily. 17. Ertapenem 1 gram IV daily. 18. Iron 325 three times daily. 19. Vitamin C 250 3 times daily.      FOLLOWUP: Follow up with me after he is discharged from [facility] GIOVANNY Vance MD      [facility] / Lizabeth Arreguin   D:  09/19/2017   18:02   T:  09/20/2017   07:24   Job #:  [id number]

## 2017-09-24 NOTE — PROGRESS NOTES
1100 Pt admit to [place] for [address] [facility] [address] ambulatory in stable condition. Assessment completed. No new concerns voiced. PICC with positive blood return. Labs drawn per order and sent for processing. Normal Saline started at [facility]. Please see connect [facility] for pending lab results. Visit Vitals    /59    Pulse (!) 103    Temp 96.9 °F (36.1 °C)    Resp 18       Medications:  Normal Saline KVO  Invanz  Heparin Flush    1210 Pt tolerated treatment well. PICC maintained positive blood return throughout treatment, flushed with positive blood return at conclusion and heparinized. D/c home ambulatory in no distress. Pt aware of next appointment scheduled for 9/24/17.

## 2017-09-25 NOTE — PROGRESS NOTES
1500 Pt arrived at [facility] ambulatory and in no distress for invanz. Assessment completed, no new complaints voiced. Left hand dressing managed by home health, clean dry and intact. Right PICC flushes well, blood return both lumens    Visit Vitals    /66 (BP 1 Location: Left arm, BP Patient Position: Sitting)    Pulse 88    Temp 97 °F (36.1 °C)    Resp 18    SpO2 100%       Medications received:  NS @ KVO  invanz 1 gram IV over 30 min    Both lumens flushed with NS and heparin and capped/ secured for tomorrow    1600 Tolerated treatment well, no adverse reaction noted. D/Cd from [facility] ambulatory and in no distress accompanied by wife.   Next appt tomorrow

## 2017-09-26 NOTE — PROGRESS NOTES
NN following patient related to the [facility]SO high risk report. Patient was re-admitted within 30 days for syncope due to complete heart block. Patient was discharged to [address] [place] [address]. Patient came home on 9/23/17. Patient is scheduled to follow up with PCP Dr. Hallie Roberts on 9/28/17. NN attempted to contact the patient for follow up and was unable to reach him so left a message on his voicemail. Patient did attend appointment with Dr. Hallie Roberts on 9/28. NN spoke with Shraddha Son at [facility] and verifies that he is being seen by them for home health.

## 2017-09-26 NOTE — PROGRESS NOTES
Pt arrived to [facility] ambulatory in no acute distress at 1400 for Invanz.  Assessment unremarkable. R arm PICC flushed without issue and positive blood return noted.  Dressing changed. Visit Vitals    /63 (BP 1 Location: Left arm, BP Patient Position: Sitting)    Pulse 86    Temp 97.8 °F (36.6 °C)    Resp 18    SpO2 100%     The following medications administered:[email or web address] 1g IV over 30 minutes    Pt tolerated treatment well.  PICC flushed per policy, and new green caps placed.  Pt discharged ambulatory in no acute distress at 1500, accompanied by spouse. Next appointment 9/27/17 at 1500.

## 2017-09-27 NOTE — PROGRESS NOTES
Outpatient Infusion Center Progress Note    1450 Pt admit to [facility] for [address] [facility] [address] ambulatory in stable condition. Assessment completed. No new concerns voiced. Right arm double lumen PICC flushed well with positive blood return in both purple and red ports. Visit Vitals    /56 (BP 1 Location: Left arm, BP Patient Position: Sitting)    Pulse 94    Temp 98.1 °F (36.7 °C)    Resp 18       Medications:  Invanz 1 GM - infused over 30 minutes    1540 Pt tolerated treatment well. PICC flushed per policy at conclusion with positive blood return in both ports. D/c home ambulatory in no distress. Pt aware of next appointment scheduled for 09/28/17.

## 2017-09-28 PROBLEM — Z95.0 S/P CARDIAC PACEMAKER PROCEDURE: Status: ACTIVE | Noted: 2017-01-01

## 2017-09-28 NOTE — PROGRESS NOTES
Transitions Of Care (d/c from [facility] 9-23-17)       HPI:  Trudy Hong is a 68y.o. year old male who is here for a follow up visit for hospitalization transition of care. He was last seen by me on 8/23/2017. Discharged on: Discharge on 9/19/2017 from [facility] to [place] [address] discharged from [place] [address] on 9/23/2017    Diagnosis in hospital: Syncope secondary to complete heart block requiring permanent pacemaker placement    Complications in hospital: No complications although patient was debilitated from recent stroke and required rehabilitation at time of hospital discharge    Medication changes: Corgard, losartan, gabapentin, and metformin were all discontinued. Discharge Summary reviewed. Today 9/28/2017 reviewed discharge summary from [facility] as well as from [place] [address]      He reports the following: He feels fatigue since he has been discharged. He has been off of the nadolol and that has not made any change in change in his fatigue. He does note he started to get a little tingling in his legs and burning since his gabapentin was discontinued. And being off that has made no difference in his fatigue either. He denies any palpitations chest pain lightheadedness or cardiorespiratory complaints. He denies any focal weakness but is generally weak without any other neurologic complaints. There are no GI/ complaints. There are no other complaints on complete review of systems.       Visit Vitals    /64 (BP 1 Location: Left arm, BP Patient Position: Sitting)    Pulse (!) 115    Resp 16    Ht 5' 9\" (1.753 m)    Wt 175 lb (79.4 kg)    SpO2 98%    BMI 25.84 kg/m2       Historical Data    Past Medical History:   Diagnosis Date    Allergic rhinitis     Anemia     ASVD (arteriosclerotic vascular disease)     Martínez's esophagus 7/28/2017    Carotid stenosis 7/28/2017    Chronic kidney disease     Diabetes ([facility]     glucose runs 70 - 160's at home    Diabetic neuropathy ([facility] 7/28/2017    Diverticulosis     DJD (degenerative joint disease)     ED (erectile dysfunction)     Enlarged prostate     JUDY (generalized anxiety disorder)     GERD (gastroesophageal reflux disease)     controlled with med; alfonso's esophagus    Hiatal hernia     Hypercholesterolemia     Hypertension     Hypertrophy (benign) of prostate 7/28/2017    Ill-defined condition     peripheral vascular disease    Lung nodule     Neuropathy ([facility]     On statin therapy 7/28/2017    Prostate cancer screening 7/28/2017    PVD (peripheral vascular disease) ([facility]     Sebaceous cyst 7/28/2017    Spinal stenosis        Past Surgical History:   Procedure Laterality Date    CARDIAC SURG PROCEDURE UNLIST      CATH-2008    COLONOSCOPY N/A 6/15/2016    COLONOSCOPY performed by Oliver Haley MD at [facility]  6/15/2016         HX CATARACT REMOVAL      BILATERAL    HX ORTHOPAEDIC  1977    BONE CYST REM,ANU FROM RIGHT LEG    HX ORTHOPAEDIC  5-, 7-22-17, 7-26-17    left hand index finger    HX OTHER SURGICAL Left 06/2016    carotid endarectomy    NEUROLOGICAL PROCEDURE UNLISTED  2011    Back: spinal stenosis, pinched nerve repair    UPPER GI ENDOSCOPY,BIOPSY  6/15/2016            Outpatient Encounter Prescriptions as of 9/28/2017   Medication Sig Dispense Refill    ascorbic acid, vitamin C, (VITAMIN C) 250 mg tablet Take 250 mg by mouth three (3) times daily.  clopidogrel (PLAVIX) 75 mg tab Take 75 mg by mouth daily.  ferrous sulfate 325 mg (65 mg iron) tablet Take 325 mg by mouth three (3) times daily (with meals).  traMADol (ULTRAM) 50 mg tablet Take 50 mg by mouth four (4) times daily as needed (pain/sleep).  ertapenem 1 gram 1 g IVPB 1 g by IntraVENous route every twenty-four (24) hours.       insulin glargine (LANTUS) 100 unit/mL injection 9-12 Units by SubCUTAneous route nightly. Patient uses sliding scale, but cannot remember at this time      omeprazole (PRILOSEC) 20 mg capsule Take 20 mg by mouth nightly.  insulin NPH (NOVOLIN N) 100 unit/mL injection 20 Units by SubCUTAneous route daily (with breakfast). Indications: type 2 diabetes mellitus      insulin aspart (NOVOLOG) 100 unit/mL injection 10 Units by SubCUTAneous route daily (with dinner). Indications: type 2 diabetes mellitus      amLODIPine (NORVASC) 5 mg tablet Take 5 mg by mouth daily.  aspirin 81 mg chewable tablet Take 81 mg by mouth nightly.  tamsulosin (FLOMAX) 0.4 mg capsule Take 0.4 mg by mouth daily.  simvastatin (ZOCOR) 10 mg tablet Take 10 mg by mouth nightly.  [DISCONTINUED] L. acidoph & paracasei- S therm- Bifido (LETICIA-Q/RISAQUAD) 8 billion cell cap cap Take 1 Cap by mouth three (3) times daily (with meals).  [DISCONTINUED] losartan (COZAAR) 100 mg tablet Take 100 mg by mouth two (2) times a day. Indications: HYPERTENSION       Facility-Administered Encounter Medications as of 2017   Medication Dose Route Frequency Provider Last Rate Last Dose    ertapenem (INVANZ) 1 g in 0.9% sodium chloride (MBP/ADV) 50 mL  1 g IntraVENous ONCE Santa Gonsales MD        [] sodium chloride (NS) flush 10-40 mL  10-40 mL IntraVENous PRN Santa Gonsales MD   10 mL at 17 1456    [] heparin (porcine) pf 500 Units  500 Units IntraVENous PRN Santa Gonsales MD   500 Units at 17 1456    [] 0.9% sodium chloride infusion  25 mL/hr IntraVENous PRN Santa Gonsales MD   Stopped at 17 1456    sodium chloride (NS) flush 10-40 mL  10-40 mL IntraVENous PRN Santa Gonsales MD   10 mL at 17 1557    [COMPLETED] ertapenem (INVANZ) 1 g in 0.9% sodium chloride (MBP/ADV) 50 mL  1 g IntraVENous ONCE Santa Gonsales MD   Stopped at 17 1536        No Known Allergies     Social History     Social History    Marital status:  Spouse name: N/A    Number of children: N/A    Years of education: N/A     Occupational History    Not on file. Social History Main Topics    Smoking status: Former Smoker     Quit date: 7/7/1996    Smokeless tobacco: Never Used    Alcohol use No      Comment: rarely    Drug use: No    Sexual activity: Yes     Other Topics Concern    Not on file     Social History Narrative      REVIEW OF SYSTEMS:  General: negative for - chills or fever. Generalized fatigue present as noted  ENT: negative for - headaches, nasal congestion or tinnitus  Eyes: no blurred or visual changes  Neck: No stiffness or swollen nodes  Respiratory: negative for - cough, hemoptysis, shortness of breath or wheezing  Cardiovascular : negative for - chest pain, edema, palpitations or shortness of breath  Gastrointestinal: negative for - abdominal pain, blood in stools, heartburn or nausea/vomiting  Genito-Urinary: no dysuria, trouble voiding, or hematuria  Musculoskeletal: negative for - gait disturbance, joint pain, joint stiffness or joint swelling  Neurological: no TIA or stroke symptoms. Some recurrence of his neuropathy symptoms in his legs  Hematologic: no bruises, no bleeding  Lymphatic: no swollen glands  Integument: no lumps, mole changes, nail changes or rash  Endocrine:no malaise/lethargy poly uria or polydipsia or unexpected weight changes      Visit Vitals    /64 (BP 1 Location: Left arm, BP Patient Position: Sitting)    Pulse (!) 115    Resp 16    Ht 5' 9\" (1.753 m)    Wt 175 lb (79.4 kg)    SpO2 98%    BMI 25.84 kg/m2     CONSTITUTIONAL: well , well nourished, appears age appropriate  HEAD: normocephalic, atraumatic  EYES: sclera anicteric, PERRL, EOMI  ENMT:moist mucous membranes, pharynx clear          Nares: w/o erythema or edema  NECK: supple.  Thyroid normal, No JVD or bruits  RESPIRATORY: Chest: clear to ascultation and percussion   CARDIOVASCULAR: Heart: regular tachycardia at 120, no murmurs, rubs or gallops, PMI not displaced, no thrill  GASTROINTESTINAL: Abdomen: non distended, soft, non-tender, bowel sounds normal  HEMATOLOGIC: no petechiae or purpura  LYMPHATIC: no lymphadenopathy  MUSCULOSKELETAL: Extremities: no edema or active synovitis, pulse 1+   BACK; no point or CVAT  INTEGUMENT: No unusual rashes or suspicious skin lesions noted. Nails appear normal.  NEUROLOGIC: non-focal exam   MENTAL STATUS: alert and oriented, appropriate affect   PSYCHIATRIC: normal affect    ASSESSMENT:   1. Essential hypertension    2. Controlled type 2 diabetes mellitus without complication, with long-term current use of insulin ([address]    3. Cerebrovascular accident (CVA), unspecified mechanism ([address]    4. Thrombotic stroke involving right middle cerebral artery ([address]    5. Stenosis of both internal carotid arteries    6. Complete heart block ([address]    7. S/P cardiac pacemaker procedure      Impression  1. Hypertension that is not controlled resume Cozaar 100 mg daily and will also be resumed the nadolol secondary to his tachycardia  2. Tachycardia EKG confirms tachycardia resume nadolol at previous dose  3. Diabetes we will see what that status is  4. Recent CVA seems to be recovering well after going through rehab  5. Bilateral carotid stenosis once he is fully recovered with an have to get into vascular surgery to evaluate that  6. Status post pacemaker secondary to complete heart block  I will recheck him again myself in a week and see how he is with resumption of his blood pressure medication and nadolol for heart rate control I will see him sooner should there be a problem this is all discussed with his wife present with him today. PLAN:  .  Orders Placed This Encounter    AMB POC COMPLETE CBC,AUTOMATED ENTER    AMB POC COMPREHENSIVE METABOLIC PANEL    AMB POC EKG ROUTINE W/ 12 LEADS, INTER & REP         ATTENTION:   This medical record was transcribed using an electronic medical records system.   Although proofread, it may and can contain electronic and spelling errors. Other human spelling and other errors may be present. Corrections may be executed at a later time. Please feel free to contact us for any clarifications as needed. Follow-up Disposition:  Return in about 1 week (around 10/5/2017). Oneyda Blackwood MD    Orders Placed This Encounter    AMB POC COMPLETE CBC,AUTOMATED ENTER    AMB POC COMPREHENSIVE METABOLIC PANEL    AMB POC EKG ROUTINE W/ 12 LEADS, INTER & REP     Order Specific Question:   Reason for Exam:     Answer:   tachycardia          Results for orders placed or performed in visit on 09/28/17   AMB POC COMPLETE CBC,AUTOMATED ENTER   Result Value Ref Range    WBC (POC)  4.1 - 10.9 K/uL    RBC (POC)  4.20 - 6.30 M/uL    HGB (POC)  12.0 - 18.0 g/dL    HCT (POC)  37.0 - 51.0 %    MCV (POC)  80.0 - 97.0 fL    MCH (POC)  26.0 - 32.0 pg    MCHC (POC)  30.0 - 36.0 g/dL    PLATELET (POC)  657.7 - 440.0 K/uL    ABS. LYMPHS (POC)  0.6 - 4.1 K/uL    LYMPHOCYTES (POC)  10.0 - 58.5 %    Mid # (POC)  0.0 - 1.8 K/uL    MID% POC  0.1 - 24.0 %    ABS. GRANS (POC)  2.0 - 7.8 K/uL    GRANULOCYTES (POC)  37.0 - 92.0 %   AMB POC COMPREHENSIVE METABOLIC PANEL   Result Value Ref Range    GLUCOSE  75 - 110 mg/dL    BUN  9 - 20 mg/dL    Creatinine (POC)  0.8 - 1.5 mg/dL    Sodium (POC)  137 - 145 MMOL/L    Potassium (POC)  3.6 - 5.0 MMOL/L    CHLORIDE  98 - 107 MMOL/L    CO2  22 - 32 MMOL/L    CALCIUM  8.4 - 10.2 mg/dL    TOTAL PROTEIN  6.3 - 8.2 g/dL    ALBUMIN  3.9 - 5.4 g/dL    AST (POC)  14 - 36 U/L    ALT (POC)  9 - 52 U/L    ALKALINE PHOS  38 - 126 U/L    TOTAL BILIRUBIN  0.2 - 1.3 mg/dL    eGFR (POC)         I have reviewed the patient's medical history in detail and updated the computerized patient record. We had a prolonged discussion about these complex clinical issues and went over the various important aspects to consider. All questions were answered.      Advised him to call back or return to office if symptoms do not improve, change in nature, or persist.    He was given an after visit summary or informed of [employer] Access which includes patient instructions, diagnoses, current medications, & vitals. He expressed understanding with the diagnosis and plan.

## 2017-09-28 NOTE — PATIENT INSTRUCTIONS

## 2017-09-28 NOTE — PROGRESS NOTES
Transition into care visit. [facility]. Yesterday was told that he would be having thearpy at home thru [facility] [address]. Flu vaccine given at [id number] [address] [facility]. 1. Have you been to the ER, urgent care clinic since your last visit? Hospitalized since your last visit? Yes, [facility]. 2. Have you seen or consulted any other health care providers outside of the [address] [place] [address] since your last visit? Include any pap smears or colon screening.

## 2017-09-28 NOTE — MR AVS SNAPSHOT
Visit Information Date & Time Provider Department Dept. Phone Encounter #  
 9/28/2017  9:40 AM [id number] Follow-up Instructions Return in about 1 week (around 10/5/2017). Upcoming Health Maintenance Date Due  
 FOOT EXAM Q1 9/14/1950 MICROALBUMIN Q1 9/14/1950 EYE EXAM RETINAL OR DILATED Q1 9/14/1950 ZOSTER VACCINE AGE 60> 7/14/2000 GLAUCOMA SCREENING Q2Y 9/14/2005 MEDICARE YEARLY EXAM 9/14/2005 HEMOGLOBIN A1C Q6M 3/1/2018 LIPID PANEL Q1 9/1/2018 DTaP/Tdap/Td series (2 - Td) 5/22/2027 Allergies as of 9/28/2017  Review Complete On: 9/28/2017 By: Lora Ridley MD  
 No Known Allergies Current Immunizations  Reviewed on 9/26/2017 Name Date Influenza Vaccine 10/11/2016, 10/30/2015 Influenza Vaccine (Quad) PF 9/22/2017  8:45 AM  
 Pneumococcal Conjugate (PCV-13) 8/14/2015 Pneumococcal Polysaccharide (PPSV-23) 1/1/2006 Tdap 5/22/2017  1:11 PM  
  
 Not reviewed this visit You Were Diagnosed With   
  
 Codes Comments Essential hypertension    -  Primary ICD-10-CM: I10 
ICD-9-CM: 401.9 Controlled type 2 diabetes mellitus without complication, with long-term current use of insulin (HCC)     ICD-10-CM: E11.9, Z79.4 ICD-9-CM: 250.00, V58.67 Cerebrovascular accident (CVA), unspecified mechanism ([facility] [address]     ICD-10-CM: I63.9 ICD-9-CM: 434.91 Thrombotic stroke involving right middle cerebral artery ([facility] [address]     ICD-10-CM: K12.102 ICD-9-CM: 434.01 Stenosis of both internal carotid arteries     ICD-10-CM: I65.23 ICD-9-CM: 433.10, 433.30 Complete heart block (HCC)     ICD-10-CM: I44.2 ICD-9-CM: 426.0 S/P cardiac pacemaker procedure     ICD-10-CM: Z95.0 ICD-9-CM: V45.01 Vitals  BP Pulse Resp Height(growth percentile) Weight(growth percentile) SpO2  
 162/64 (BP 1 Location: Left arm, BP Patient Position: Sitting) (!) 115 16 5' 9\" (1.753 m) 175 lb (79.4 kg) 98% BMI Smoking Status 25.84 kg/m2 Former Smoker BMI and BSA Data Body Mass Index Body Surface Area  
 25.84 kg/m 2 1.97 m 2 Preferred Pharmacy Pharmacy Name Phone [facility] [address] [place] [address]  [place], [address] [phone] Your Updated Medication List  
  
   
This list is accurate as of: 9/28/17 10:49 AM.  Always use your most recent med list. amLODIPine 5 mg tablet Commonly known as:  Coni Chimes Take 5 mg by mouth daily. ascorbic acid (vitamin C) 250 mg tablet Commonly known as:  VITAMIN C Take 250 mg by mouth three (3) times daily. aspirin 81 mg chewable tablet Take 81 mg by mouth nightly. clopidogrel 75 mg Tab Commonly known as:  PLAVIX Take 75 mg by mouth daily. ertapenem 1 gram 1 g IVPB  
1 g by IntraVENous route every twenty-four (24) hours. ferrous sulfate 325 mg (65 mg iron) tablet Take 325 mg by mouth three (3) times daily (with meals). LANTUS 100 unit/mL injection Generic drug:  insulin glargine 9-12 Units by SubCUTAneous route nightly. Patient uses sliding scale, but cannot remember at this time NovoLIN N 100 unit/mL injection Generic drug:  insulin NPH  
20 Units by SubCUTAneous route daily (with breakfast). Indications: type 2 diabetes mellitus NovoLOG 100 unit/mL injection Generic drug:  insulin aspart 10 Units by SubCUTAneous route daily (with dinner). Indications: type 2 diabetes mellitus  
  
 omeprazole 20 mg capsule Commonly known as:  PRILOSEC Take 20 mg by mouth nightly. simvastatin 10 mg tablet Commonly known as:  ZOCOR Take 10 mg by mouth nightly. tamsulosin 0.4 mg capsule Commonly known as:  FLOMAX Take 0.4 mg by mouth daily. traMADol 50 mg tablet Commonly known as:  ULTRAM  
Take 50 mg by mouth four (4) times daily as needed (pain/sleep). We Performed the Following AMB POC COMPLETE CBC,AUTOMATED ENTER [id number] CPT(R)] AMB POC COMPREHENSIVE METABOLIC PANEL [79980 CPT(R)] AMB POC EKG ROUTINE W/ 12 LEADS, INTER & REP [25290 CPT(R)] Follow-up Instructions Return in about 1 week (around 10/5/2017). To-Do List   
 09/28/2017  1:00 PM  
  Appointment with CHAIR [facility] ([phone]) Patient Instructions Learning About Meal Planning for Diabetes Why plan your meals? Meal planning can be a key part of managing diabetes. Planning meals and snacks with the right balance of carbohydrate, protein, and fat can help you keep your blood sugar at the target level you set with your doctor. You don't have to eat special foods. You can eat what your family eats, including sweets once in a while. But you do have to pay attention to how often you eat and how much you eat of certain foods. You may want to work with a dietitian or a certified diabetes educator. He or she can give you tips and meal ideas and can answer your questions about meal planning. This health professional can also help you reach a healthy weight if that is one of your goals. What plan is right for you? Your dietitian or diabetes educator may suggest that you start with the plate format or carbohydrate counting. The plate format The plate format is a simple way to help you manage how you eat. You plan meals by learning how much space each food should take on a plate. Using the plate format helps you spread carbohydrate throughout the day. It can make it easier to keep your blood sugar level within your target range. It also helps you see if you're eating healthy portion sizes. To use the plate format, you put non-starchy vegetables on half your plate. Add meat or meat substitutes on one-quarter of the plate.  Put a grain or starchy vegetable (such as brown rice or a potato) on the final quarter of the plate. You can add a small piece of fruit and some low-fat or fat-free milk or yogurt, depending on your carbohydrate goal for each meal. 
Here are some tips for using the plate format: · Make sure that you are not using an oversized plate. A 9-inch plate is best. Many restaurants use larger plates. · Get used to using the plate format at home. Then you can use it when you eat out. · Write down your questions about using the plate format. Talk to your doctor, a dietitian, or a diabetes educator about your concerns. Carbohydrate counting With carbohydrate counting, you plan meals based on the amount of carbohydrate in each food. Carbohydrate raises blood sugar higher and more quickly than any other nutrient. It is found in desserts, breads and cereals, and fruit. It's also found in starchy vegetables such as potatoes and corn, grains such as rice and pasta, and milk and yogurt. Spreading carbohydrate throughout the day helps keep your blood sugar levels within your target range. Your daily amount depends on several things, including your weight, how active you are, which diabetes medicines you take, and what your goals are for your blood sugar levels. A registered dietitian or diabetes educator can help you plan how much carbohydrate to include in each meal and snack. A guideline for your daily amount of carbohydrate is: · 45 to 60 grams at each meal. That's about the same as 3 to 4 carbohydrate servings. · 15 to 20 grams at each snack. That's about the same as 1 carbohydrate serving. The Nutrition Facts label on packaged foods tells you how much carbohydrate is in a serving of the food. First, look at the serving size on the food label. Is that the amount you eat in a serving? All of the nutrition information on a food label is based on that serving size. So if you eat more or less than that, you'll need to adjust the other numbers. Total carbohydrate is the next thing you need to look for on the label. If you count carbohydrate servings, one serving of carbohydrate is 15 grams. For foods that don't come with labels, such as fresh fruits and vegetables, you'll need a guide that lists carbohydrate in these foods. Ask your doctor, dietitian, or diabetes educator about books or other nutrition guides you can use. If you take insulin, you need to know how many grams of carbohydrate are in a meal. This lets you know how much rapid-acting insulin to take before you eat. If you use an insulin pump, you get a constant rate of insulin during the day. So the pump must be programmed at meals to give you extra insulin to cover the rise in blood sugar after meals. When you know how much carbohydrate you will eat, you can take the right amount of insulin. Or, if you always use the same amount of insulin, you need to make sure that you eat the same amount of carbohydrate at meals. If you need more help to understand carbohydrate counting and food labels, ask your doctor, dietitian, or diabetes educator. How do you get started with meal planning? Here are some tips to get started: 
· Plan your meals a week at a time. Don't forget to include snacks too. · Use cookbooks or online recipes to plan several main meals. Plan some quick meals for busy nights. You also can double some recipes that freeze well. Then you can save half for other busy nights when you don't have time to cook. · Make sure you have the ingredients you need for your recipes. If you're running low on basic items, put these items on your shopping list too. · List foods that you use to make breakfasts, lunches, and snacks. List plenty of fruits and vegetables. · Post this list on the refrigerator. Add to it as you think of more things you need. · Take the list to the store to do your weekly shopping. Follow-up care is a key part of your treatment and safety.  Be sure to make and go to all appointments, and call your doctor if you are having problems. It's also a good idea to know your test results and keep a list of the medicines you take. Where can you learn more? Go to http://cielo-jayne.info/. Patricia Miller in the search box to learn more about \"Learning About Meal Planning for Diabetes. \" Current as of: March 13, 2017 Content Version: 11.3 © 8456-9670 [employer]ret[employer]. Care instructions adapted under license by [employer] (which disclaims liability or warranty for this information). If you have questions about a medical condition or this instruction, always ask your healthcare professional. [address] any warranty or liability for your use of this information. Introducing [address]! Charlie Perez introduces [employer] patient portal. Now you can access parts of your medical record, email your doctor's office, and request medication refills online. 1. In your internet browser, go to [email or web address] 2. Click on the First Time User? Click Here link in the Sign In box. You will see the New Member Sign Up page. 3. Enter your [employer] Access Code exactly as it appears below. You will not need to use this code after youve completed the sign-up process. If you do not sign up before the expiration date, you must request a new code. · [employer] Access Code: [email or web address] Expires: 11/19/2017  1:07 PM 
 
4. Enter the last four digits of your Social Security Number (xxxx) and Date of Birth (mm/dd/yyyy) as indicated and click Submit. You will be taken to the next sign-up page. 5. Create a [employer] ID. This will be your [employer] login ID and cannot be changed, so think of one that is secure and easy to remember. 6. Create a [employer] password. You can change your password at any time. 7. Enter your Password Reset Question and Answer.  This can be used at a later time if you forget your password. 8. Enter your e-mail address. You will receive e-mail notification when new information is available in [address] 9. Click Sign Up. You can now view and download portions of your medical record. 10. Click the Download Summary menu link to download a portable copy of your medical information. If you have questions, please visit the Frequently Asked Questions section of the [employer] website. Remember, [employer] is NOT to be used for urgent needs. For medical emergencies, dial 911. Now available from your iPhone and Android! Please provide this summary of care documentation to your next provider. Your primary care clinician is listed as Girma. If you have any questions after today's visit, please call [phone].

## 2017-09-28 NOTE — PROGRESS NOTES
Outpatient Infusion Center Progress Note    1410- Pt admit to [facility] for [address] [facility] [address] ambulatory in stable condition. Assessment completed. No new concerns voiced. Pt states he is back on his blood pressure meds as ordered by MD at follow up appointment this morning. Right arm double lumen PICC intact, flushes well with positive blood return from both ports. Labs drawn per order. Visit Vitals    /44 (BP 1 Location: Left arm, BP Patient Position: Sitting)    Pulse 84    Temp 96.9 °F (36.1 °C)    Resp 18    SpO2 100%       Medications:  NS KVO  Invanz 1 g IV    1500- Pt tolerated treatment well. PICC flushed per policy at conclusion with positive blood return noted in both ports. D/c home ambulatory in no distress. Pt aware of next appointment scheduled for 9/29 at 2:30 PM.    Recent Results (from the past 12 hour(s))   AMB POC COMPLETE CBC,AUTOMATED ENTER    Collection Time: 09/28/17 10:55 AM   Result Value Ref Range    WBC (POC) 5.7 4.1 - 10.9 K/uL    RBC (POC) 3.42 (A) 4.20 - 6.30 M/uL    HGB (POC) 9.9 (A) 12.0 - 18.0 g/dL    HCT (POC) 29.8 (A) 37.0 - 51.0 %    MCV (POC) 87.0 80.0 - 97.0 fL    MCH (POC) 29.1 26.0 - 32.0 pg    MCHC (POC) 33.4 30.0 - 36.0 g/dL    PLATELET (POC) 872.6 140.0 - 440.0 K/uL    ABS. LYMPHS (POC) 0.7 0.6 - 4.1 K/uL    LYMPHOCYTES (POC) 12.5 10.0 - 58.5 %    Mid # (POC) 0.2 0.0 - 1.8 K/uL    MID% POC 3.6 0.1 - 24.0 %    ABS.  GRANS (POC) 4.8 2.0 - 7.8 K/uL    GRANULOCYTES (POC) 83.9 37.0 - 92.0 %   AMB POC COMPREHENSIVE METABOLIC PANEL    Collection Time: 09/28/17 10:55 AM   Result Value Ref Range    GLUCOSE 441.0 (A) 75 - 110 mg/dL    BUN 21.0 (A) 9 - 20 mg/dL    Creatinine (POC) 1.1 0.8 - 1.5 mg/dL    Sodium (POC) 137.0 137 - 145 MMOL/L    Potassium (POC) 5.0 3.6 - 5.0 MMOL/L    CHLORIDE 101.0 98 - 107 MMOL/L    CO2 21.0 (A) 22 - 32 MMOL/L    CALCIUM 8.5 8.4 - 10.2 mg/dL    TOTAL PROTEIN 5.6 (A) 6.3 - 8.2 g/dL    ALBUMIN 2.9 (A) 3.9 - 5.4 g/dL    AST (POC) 22 14 - 36 U/L    ALT (POC) 24 9 - 52 U/L    ALKALINE PHOS 137.0 (A) 38 - 126 U/L    TOTAL BILIRUBIN 0.4 0.2 - 1.3 mg/dL    eGFR (POC) 64.4    CBC WITH AUTOMATED DIFF    Collection Time: 09/28/17  2:13 PM   Result Value Ref Range    WBC 5.0 4.1 - 11.1 K/uL    RBC 3.20 (L) 4.10 - 5.70 M/uL    HGB 9.3 (L) 12.1 - 17.0 g/dL    HCT 28.6 (L) 36.6 - 50.3 %    MCV 89.4 80.0 - 99.0 FL    MCH 29.1 26.0 - 34.0 PG    MCHC 32.5 30.0 - 36.5 g/dL    RDW 17.6 (H) 11.5 - 14.5 %    PLATELET 324 393 - 934 K/uL    NEUTROPHILS 87 (H) 32 - 75 %    LYMPHOCYTES 9 (L) 12 - 49 %    MONOCYTES 3 (L) 5 - 13 %    EOSINOPHILS 0 0 - 7 %    BASOPHILS 1 0 - 1 %    ABS. NEUTROPHILS 4.2 1.8 - 8.0 K/UL    ABS. LYMPHOCYTES 0.5 (L) 0.8 - 3.5 K/UL    ABS. MONOCYTES 0.2 0.0 - 1.0 K/UL    ABS. EOSINOPHILS 0.0 0.0 - 0.4 K/UL    ABS.  BASOPHILS 0.1 0.0 - 0.1 K/UL    RBC COMMENTS NORMOCYTIC, NORMOCHROMIC      RBC COMMENTS ANISOCYTOSIS  1+       METABOLIC PANEL, BASIC    Collection Time: 09/28/17  2:13 PM   Result Value Ref Range    Sodium 133 (L) 136 - 145 mmol/L    Potassium 4.3 3.5 - 5.1 mmol/L    Chloride 98 97 - 108 mmol/L    CO2 20 (L) 21 - 32 mmol/L    Anion gap 15 5 - 15 mmol/L    Glucose 507 (H) 65 - 100 mg/dL    BUN 25 (H) 6 - 20 MG/DL    Creatinine 1.29 0.70 - 1.30 MG/DL    BUN/Creatinine ratio 19 12 - 20      GFR est AA >60 >60 ml/min/1.73m2    GFR est non-AA 54 (L) >60 ml/min/1.73m2    Calcium 7.8 (L) 8.5 - 10.1 MG/DL

## 2017-09-29 NOTE — PROGRESS NOTES
1510 Pt arrived at [facility] ambulatory and in no distress for Daily  Invanz  Assessment unremarkable except pt c/o of generalized weakness. Patient Vitals for the past 12 hrs:   Temp Pulse Resp BP SpO2   09/29/17 1517 97.7 °F (36.5 °C) 60 18 94/47 100 %       Medications received:  NS KVO  Invanz 1 g IV over 30 min  NS Flush  Heparin Flush    1605 Tolerated treatment well, no adverse reaction noted. D/Cd from [facility] ambulatory and in no distress accompanied by Spouse.   Next appt 9/30/17

## 2017-09-30 NOTE — PROGRESS NOTES
Outpatient Infusion Center - Chemotherapy Progress Note    0740 Pt admit to [facility] for daily Invanz ambulatory with cane in stable condition. Assessment completed. No new concerns voiced. PICC line flushed with positive blood return in both lumens; NS infusing. Visit Vitals    /47    Pulse 61    Temp 97.6 °F (36.4 °C)    Resp 16       Medications:  NS  Invanz    0840 Pt tolerated treatment well. PICC line maintained positive blood return throughout treatment, flushed with positive blood return at conclusion, heparinized and capped. D/c home ambulatory in no distress. Pt aware of next [facility] appointment scheduled for 10/01/2017.

## 2017-10-01 NOTE — PROGRESS NOTES
[facility] Outpatient Infusion Center Note:  0720  Pt arrived at [facility] ambulatory and in no distress for daily antibiotic    Assessment stable, no new complaints voiced. Pt arrived early . His arm was bleeding from scab and was cleaned and pressure dressing applied with coban. Medications received:  *Invanz    1835Tolerated treatment well, no adverse reaction noted. D/Cd from [facility] ambulatory and in no distress accompanied by *wife  Next appt at [address] [facility] at 1500 , 10/1  Visit Vitals    /50    Pulse (!) 59    Temp 97.6 °F (36.4 °C)    Resp 18     No results found for this or any previous visit (from the past 12 hour(s)).

## 2017-10-01 NOTE — PROGRESS NOTES
Problem: Patient Education: Go to Education Activity  Goal: Patient/Family Education  Outcome: Progressing Towards Goal  Skin care

## 2017-10-02 NOTE — PROGRESS NOTES
1500 Pt arrived at [facility] ambulatory and in no distress for Invanz. Assessment completed, no new complaints voiced. DL PICC intact with positive blood return. Labs drawn. /47  Pulse 60  Temp 97.8 °F (36.6 °C)  Resp 18     Recent Results (from the past 12 hour(s))   CBC WITH AUTOMATED DIFF    Collection Time: 10/02/17  3:02 PM   Result Value Ref Range    WBC 4.8 4.1 - 11.1 K/uL    RBC 3.16 (L) 4.10 - 5.70 M/uL    HGB 9.0 (L) 12.1 - 17.0 g/dL    HCT 27.7 (L) 36.6 - 50.3 %    MCV 87.7 80.0 - 99.0 FL    MCH 28.5 26.0 - 34.0 PG    MCHC 32.5 30.0 - 36.5 g/dL    RDW 17.9 (H) 11.5 - 14.5 %    PLATELET 607 673 - 052 K/uL    NEUTROPHILS 73 32 - 75 %    LYMPHOCYTES 18 12 - 49 %    MONOCYTES 8 5 - 13 %    EOSINOPHILS 1 0 - 7 %    BASOPHILS 0 0 - 1 %    ABS. NEUTROPHILS 3.5 1.8 - 8.0 K/UL    ABS. LYMPHOCYTES 0.8 0.8 - 3.5 K/UL    ABS. MONOCYTES 0.4 0.0 - 1.0 K/UL    ABS. EOSINOPHILS 0.1 0.0 - 0.4 K/UL    ABS. BASOPHILS 0.0 0.0 - 0.1 K/UL   METABOLIC PANEL, BASIC    Collection Time: 10/02/17  3:02 PM   Result Value Ref Range    Sodium 136 136 - 145 mmol/L    Potassium 4.2 3.5 - 5.1 mmol/L    Chloride 104 97 - 108 mmol/L    CO2 25 21 - 32 mmol/L    Anion gap 7 5 - 15 mmol/L    Glucose 409 (H) 65 - 100 mg/dL    BUN 31 (H) 6 - 20 MG/DL    Creatinine 1.15 0.70 - 1.30 MG/DL    BUN/Creatinine ratio 27 (H) 12 - 20      GFR est AA >60 >60 ml/min/1.73m2    GFR est non-AA >60 >60 ml/min/1.73m2    Calcium 7.3 (L) 8.5 - 10.1 MG/DL   SED RATE (ESR)    Collection Time: 10/02/17  3:02 PM   Result Value Ref Range    Sed rate, automated 109 (H) 0 - 20 mm/hr       Medications received:  Invanz 1000 mg IV    1600 Tolerated treatment well, no adverse reaction noted. D/Cd from [facility] ambulatory and in no distress accompanied by spouse. Next appt 10/3 @ 1400. Pt may be late due to another MD appointment. Offered to reschedule.

## 2017-10-03 NOTE — PROGRESS NOTES
Labs are stable so continue current treatment blood sugar was markedly elevated nonfasting. Wife informed regarding. F/up as scheduled.

## 2017-10-03 NOTE — PROGRESS NOTES
Outpatient Infusion Center Progress Note    9373- Pt admit to [facility] for Daily Invanz ambulatory in stable condition. Assessment completed. No new concerns voiced. Right arm double lumen PICC intact, flushes well with positive blood return from both ports. Dressing changer per protocol using sterile technique. Visit Vitals    /48 (BP 1 Location: Left arm, BP Patient Position: Sitting)    Pulse 62    Temp 97.5 °F (36.4 °C)    Resp 18    SpO2 100%     Medications:  NS KVO  Invanz 1 g IV    1550- Pt tolerated treatment well. PICC flushed per policy at conclusion of tx with positive blood return noted in both ports. D/c home ambulatory in no distress.  Pt aware of next appointment scheduled for 10/4 at 2 PM.

## 2017-10-04 NOTE — PROGRESS NOTES
[facility] Short Consult Note:    3252 Pt arrived at [facility] ambulatory and in no distress for ertapenem. No new complaints voiced. Right DL picc flushes well with blood return    Visit Vitals    /55 (BP 1 Location: Left arm, BP Patient Position: Sitting)    Pulse 63    Temp 97.9 °F (36.6 °C)    Resp 18       Medications received:  NS @ KVO  Invanz 1 gram IV over 30 min    PICC flushed with NS and heparin and secured for tomorrow    1535 Tolerated treatment well, no adverse reaction noted. D/Cd from [facility] ambulatory and in no distress accompanied by wife.   Next appt tomorrow

## 2017-10-05 NOTE — PROGRESS NOTES
[facility] Short Visit Note:    1400 Pt arrived to [facility] ambulatory and in no distress for antibiotics. Dual lumen PICC intact to right upper arm, flushed with positive blood return noted in each port. No new complaints voiced. Labs drawn per orders including CBC with diff, BMP, and Sed rate. Visit Vitals    /50 (BP 1 Location: Right arm, BP Patient Position: Sitting)    Pulse 72    Temp 98 °F (36.7 °C)    Resp 18     Recent Results (from the past 12 hour(s))   CBC WITH AUTOMATED DIFF    Collection Time: 10/05/17  2:16 PM   Result Value Ref Range    WBC 4.9 4.1 - 11.1 K/uL    RBC 3.16 (L) 4.10 - 5.70 M/uL    HGB 9.1 (L) 12.1 - 17.0 g/dL    HCT 27.8 (L) 36.6 - 50.3 %    MCV 88.0 80.0 - 99.0 FL    MCH 28.8 26.0 - 34.0 PG    MCHC 32.7 30.0 - 36.5 g/dL    RDW 17.7 (H) 11.5 - 14.5 %    PLATELET 047 338 - 531 K/uL    NEUTROPHILS 72 32 - 75 %    LYMPHOCYTES 17 12 - 49 %    MONOCYTES 8 5 - 13 %    EOSINOPHILS 2 0 - 7 %    BASOPHILS 1 0 - 1 %    ABS. NEUTROPHILS 3.5 1.8 - 8.0 K/UL    ABS. LYMPHOCYTES 0.8 0.8 - 3.5 K/UL    ABS. MONOCYTES 0.4 0.0 - 1.0 K/UL    ABS. EOSINOPHILS 0.1 0.0 - 0.4 K/UL    ABS. BASOPHILS 0.0 0.0 - 0.1 K/UL   METABOLIC PANEL, BASIC    Collection Time: 10/05/17  2:16 PM   Result Value Ref Range    Sodium 139 136 - 145 mmol/L    Potassium 4.1 3.5 - 5.1 mmol/L    Chloride 105 97 - 108 mmol/L    CO2 27 21 - 32 mmol/L    Anion gap 7 5 - 15 mmol/L    Glucose 323 (H) 65 - 100 mg/dL    BUN 29 (H) 6 - 20 MG/DL    Creatinine 1.12 0.70 - 1.30 MG/DL    BUN/Creatinine ratio 26 (H) 12 - 20      GFR est AA >60 >60 ml/min/1.73m2    GFR est non-AA >60 >60 ml/min/1.73m2    Calcium 7.7 (L) 8.5 - 10.1 MG/DL   SED RATE (ESR)    Collection Time: 10/05/17  2:16 PM   Result Value Ref Range    Sed rate, automated 61 (H) 0 - 20 mm/hr     Medications received:  NS @ KVO  Invanz 1 gram IV over 30 minutes    1500 Pt tolerated treatment well, no adverse reaction noted.   PICC flushed per protocol and end caps applied. Discharged from [facility] ambulatory and in no acute distress accompanied by spouse. Next appt 10/6/17 @ 1063.

## 2017-10-05 NOTE — PROGRESS NOTES
1. Have you been to the ER, urgent care clinic since your last visit? Hospitalized since your last visit? No    2. Have you seen or consulted any other health care providers outside of the [address] [place] [address] since your last visit? Include any pap smears or colon screening. Yes. Dr. Dennis Longoria for hand, released him yesterday. Continues hand therapy for 2 more visits. Continues to have home health nurse who reports to Dale Richard. Saw  10-3-2017, scheduled for f/u in  6 months. Had Flu vaccine at [facility]. 1 week follow up.

## 2017-10-05 NOTE — MR AVS SNAPSHOT
Visit Information Date & Time Provider Department Dept. Phone Encounter #  
 10/5/2017 10:30 AM Sulema Diallo MD [facility] 26 [phone] [id number] Follow-up Instructions Return in about 2 weeks (around 10/19/2017). Upcoming Health Maintenance Date Due  
 FOOT EXAM Q1 9/14/1950 MICROALBUMIN Q1 9/14/1950 EYE EXAM RETINAL OR DILATED Q1 9/14/1950 ZOSTER VACCINE AGE 60> 7/14/2000 GLAUCOMA SCREENING Q2Y 9/14/2005 MEDICARE YEARLY EXAM 9/14/2005 HEMOGLOBIN A1C Q6M 3/1/2018 LIPID PANEL Q1 9/1/2018 DTaP/Tdap/Td series (2 - Td) 5/22/2027 Allergies as of 10/5/2017  Review Complete On: 10/5/2017 By: Sulema Diallo MD  
 No Known Allergies Current Immunizations  Reviewed on 10/4/2017 Name Date Influenza Vaccine 10/11/2016, 10/30/2015 Influenza Vaccine (Quad) PF 9/22/2017  8:45 AM  
 Pneumococcal Conjugate (PCV-13) 8/14/2015 Pneumococcal Polysaccharide (PPSV-23) 1/1/2006 Tdap 5/22/2017  1:11 PM  
  
 Not reviewed this visit You Were Diagnosed With   
  
 Codes Comments Essential hypertension    -  Primary ICD-10-CM: I10 
ICD-9-CM: 401.9 ASVD (arteriosclerotic vascular disease)     ICD-10-CM: I70.90 ICD-9-CM: 440.9 Controlled type 2 diabetes mellitus without complication, with long-term current use of insulin (HCC)     ICD-10-CM: E11.9, Z79.4 ICD-9-CM: 250.00, V58.67 Cerebrovascular accident (CVA), unspecified mechanism ([facility] [address]     ICD-10-CM: I63.9 ICD-9-CM: 434.91 Bilateral carotid artery stenosis     ICD-10-CM: I65.23 ICD-9-CM: 433.10, 433.30 Infection of left hand     ICD-10-CM: L08.9 ICD-9-CM: 401. 9 Vitals BP Pulse Resp Height(growth percentile) Weight(growth percentile) SpO2  
 148/56 66 18 5' 9\" (1.753 m) 184 lb 6.4 oz (83.6 kg) 98% BMI Smoking Status 27.23 kg/m2 Former Smoker Vitals History BMI and BSA Data Body Mass Index Body Surface Area  
 27.23 kg/m 2 2.02 m 2 Preferred Pharmacy Pharmacy Name Phone [facility] [address], [address] [place] [address] Avenue [phone] Your Updated Medication List  
  
   
This list is accurate as of: 10/5/17 11:36 AM.  Always use your most recent med list. amLODIPine 5 mg tablet Commonly known as:  Dean Conine Take 5 mg by mouth daily. ascorbic acid (vitamin C) 250 mg tablet Commonly known as:  VITAMIN C Take 250 mg by mouth three (3) times daily. aspirin 81 mg chewable tablet Take 81 mg by mouth nightly. clopidogrel 75 mg Tab Commonly known as:  PLAVIX Take 75 mg by mouth daily. ertapenem 1 gram 1 g IVPB  
1 g by IntraVENous route every twenty-four (24) hours. ferrous sulfate 325 mg (65 mg iron) tablet Take 325 mg by mouth three (3) times daily (with meals). gabapentin 300 mg capsule Commonly known as:  NEURONTIN Take 300 mg by mouth three (3) times daily. LANTUS 100 unit/mL injection Generic drug:  insulin glargine 9-12 Units by SubCUTAneous route nightly. Patient uses sliding scale, but cannot remember at this time  
  
 lisinopril-hydroCHLOROthiazide 20-12.5 mg per tablet Commonly known as:  Climmie Sindy Take 2 Tabs by mouth daily. losartan 100 mg tablet Commonly known as:  COZAAR Take 100 mg by mouth two (2) times a day. NovoLIN N 100 unit/mL injection Generic drug:  insulin NPH  
20 Units by SubCUTAneous route daily (with breakfast). Indications: type 2 diabetes mellitus NovoLOG 100 unit/mL injection Generic drug:  insulin aspart 10 Units by SubCUTAneous route daily (with dinner). Indications: type 2 diabetes mellitus  
  
 omeprazole 20 mg capsule Commonly known as:  PRILOSEC Take 20 mg by mouth nightly. simvastatin 10 mg tablet Commonly known as:  ZOCOR Take 10 mg by mouth nightly. tamsulosin 0.4 mg capsule Commonly known as:  FLOMAX Take 0.4 mg by mouth daily. traMADol 50 mg tablet Commonly known as:  ULTRAM  
Take 50 mg by mouth four (4) times daily as needed (pain/sleep). Follow-up Instructions Return in about 2 weeks (around 10/19/2017). To-Do List   
 10/05/2017  3:00 PM  
  Appointment with [address] [facility] at [facility] ([phone]) 10/06/2017  3:00 PM  
  Appointment with [address] [facility] [address] at [facility] ([phone]) 10/07/2017  8:00 AM  
  Appointment with [facility] ([phone]) 10/08/2017  9:00 AM  
  Appointment with [facility] ([phone]) Introducing [facility]! Peyton Barahona introduces Amperion patient portal. Now you can access parts of your medical record, email your doctor's office, and request medication refills online. 1. In your internet browser, go to [email or web address] 2. Click on the First Time User? Click Here link in the Sign In box. You will see the New Member Sign Up page. 3. Enter your Amperion Access Code exactly as it appears below. You will not need to use this code after youve completed the sign-up process. If you do not sign up before the expiration date, you must request a new code. · Amperion Access Code: [email or web address] Expires: 11/19/2017  1:07 PM 
 
4. Enter the last four digits of your Social Security Number (xxxx) and Date of Birth (mm/dd/yyyy) as indicated and click Submit. You will be taken to the next sign-up page. 5. Create a [employer] ID. This will be your [employer] login ID and cannot be changed, so think of one that is secure and easy to remember. 6. Create a [employer] password. You can change your password at any time. 7. Enter your Password Reset Question and Answer. This can be used at a later time if you forget your password. 8. Enter your e-mail address. You will receive e-mail notification when new information is available in [phone]. 9. Click Sign Up. You can now view and download portions of your medical record. 10. Click the Download Summary menu link to download a portable copy of your medical information. If you have questions, please visit the Frequently Asked Questions section of the LiveDeal website. Remember, LiveDeal is NOT to be used for urgent needs. For medical emergencies, dial 911. Now available from your iPhone and Android! Please provide this summary of care documentation to your next provider. Your primary care clinician is listed as Girma. If you have any questions after today's visit, please call [phone].

## 2017-10-05 NOTE — PROGRESS NOTES
Chief Complaint   Patient presents with    Hypertension     1 week follow up       SUBJECTIVE:    Max Lozano is a 68 y.o. male who returns in follow-up for his hypertension, sick sinus syndrome with subsequent tachycardia after having a pacemaker placed, recent CVA, recent osteomyelitis left second finger, and diabetes. He actually seems to be feeling better he has made progress. He was seen by the cardiologist Dr. Guru Junior has been released for 6 months. He was seen by the orthopedist Dr. Macarena Cuevas and has been released completely. He has been released from [facility] rehab. He still being followed by the infectious disease doctor and receiving IV infusions daily. He denies chest pain palpitations shortness of breath other cardiorespiratory complaints. He denies any GI/ complaints. He said the hand appears to be healed to him although still being dressed and attended to by home health. He denies any other complaints on complete review of systems. He is now using a cane to get around. He is following his diet monitor his blood sugar trying to get some exercise and taking his medicine regular. Current Outpatient Prescriptions   Medication Sig Dispense Refill    losartan (COZAAR) 100 mg tablet Take 100 mg by mouth two (2) times a day.  lisinopril-hydroCHLOROthiazide (PRINZIDE, ZESTORETIC) 20-12.5 mg per tablet Take 2 Tabs by mouth daily.  gabapentin (NEURONTIN) 300 mg capsule Take 300 mg by mouth three (3) times daily.  ascorbic acid, vitamin C, (VITAMIN C) 250 mg tablet Take 250 mg by mouth three (3) times daily.  clopidogrel (PLAVIX) 75 mg tab Take 75 mg by mouth daily.  ferrous sulfate 325 mg (65 mg iron) tablet Take 325 mg by mouth three (3) times daily (with meals).  traMADol (ULTRAM) 50 mg tablet Take 50 mg by mouth four (4) times daily as needed (pain/sleep).  ertapenem 1 gram 1 g IVPB 1 g by IntraVENous route every twenty-four (24) hours.       insulin glargine (LANTUS) 100 unit/mL injection 9-12 Units by SubCUTAneous route nightly. Patient uses sliding scale, but cannot remember at this time      omeprazole (PRILOSEC) 20 mg capsule Take 20 mg by mouth nightly.  insulin NPH (NOVOLIN N) 100 unit/mL injection 20 Units by SubCUTAneous route daily (with breakfast). Indications: type 2 diabetes mellitus      insulin aspart (NOVOLOG) 100 unit/mL injection 10 Units by SubCUTAneous route daily (with dinner). Indications: type 2 diabetes mellitus      amLODIPine (NORVASC) 5 mg tablet Take 5 mg by mouth daily.  aspirin 81 mg chewable tablet Take 81 mg by mouth nightly.  tamsulosin (FLOMAX) 0.4 mg capsule Take 0.4 mg by mouth daily.  simvastatin (ZOCOR) 10 mg tablet Take 10 mg by mouth nightly. Facility-Administered Medications Ordered in Other Visits   Medication Dose Route Frequency Provider Last Rate Last Dose    sodium chloride (NS) flush 10-40 mL  10-40 mL IntraVENous PRN Santa Dodd MD   10 mL at 10/04/17 1533    heparin (porcine) pf 500 Units  500 Units IntraVENous PRN Santa Dodd MD   500 Units at 10/04/17 1534    [START ON 10/7/2017] ertapenem (INVANZ) 1 g in 0.9% sodium chloride (MBP/ADV) 50 mL  1 g IntraVENous ONCE Laura Ram MD       [address] [place] [address] ON 10/8/2017] ertapenem (INVANZ) 1 g in 0.9% sodium chloride (MBP/ADV) 50 mL  1 g IntraVENous ONCE Laura Ram MD        ertapenem [facility]) 1 g in 0.9% sodium chloride (MBP/ADV) 50 mL  1 g IntraVENous ONCE Not On File Bshsi        [START ON 10/6/2017] ertapenem (INVANZ) 1 g in 0.9% sodium chloride (MBP/ADV) 50 mL  1 g IntraVENous ONCE Not On File Bshsi         Past Medical History:   Diagnosis Date    Allergic rhinitis     Anemia     ASVD (arteriosclerotic vascular disease)     Martínez's esophagus 7/28/2017    Carotid stenosis 7/28/2017    Chronic kidney disease     Diabetes ([address]     glucose runs 70 - 160's at home    Diabetic neuropathy ([facility] [address] 7/28/2017    Diverticulosis     DJD (degenerative joint disease)     ED (erectile dysfunction)     Enlarged prostate     JUDY (generalized anxiety disorder)     GERD (gastroesophageal reflux disease)     controlled with med; alfonso's esophagus    Hiatal hernia     Hypercholesterolemia     Hypertension     Hypertrophy (benign) of prostate 7/28/2017    Ill-defined condition     peripheral vascular disease    Lung nodule     Neuropathy     On statin therapy 7/28/2017    Prostate cancer screening 7/28/2017    PVD (peripheral vascular disease) ([address]     Sebaceous cyst 7/28/2017    Spinal stenosis      Past Surgical History:   Procedure Laterality Date    CARDIAC SURG PROCEDURE UNLIST      CATH-2008    COLONOSCOPY N/A 6/15/2016    COLON[facility]COPY performed by [facility]ane [facility] at [facility]  6/15/2016         HX CATARACT REMOVAL      BILATERAL    HX ORTHOPAEDIC  1977    BONE CYST REM,ANU FROM RIGHT LEG    HX ORTHOPAEDIC  5-, 7-22-17, 7-26-17    left hand index finger    HX OTHER SURGICAL Left 06/2016    carotid endarectomy    NEUROLOGICAL PROCEDURE UNLISTED  2011    Back: spinal stenosis, pinched nerve repair    UPPER GI ENDOSCOPY,BIOPSY  6/15/2016          No Known Allergies    REVIEW OF SYSTEMS:  General: negative for - chills or fever, or weight loss or gain  ENT: negative for - headaches, nasal congestion or tinnitus  Eyes: no blurred or visual changes  Neck: No stiffness or swollen nodes  Respiratory: negative for - cough, hemoptysis, shortness of breath or wheezing  Cardiovascular : negative for - chest pain, edema, palpitations or shortness of breath  Gastrointestinal: negative for - abdominal pain, blood in stools, heartburn or nausea/vomiting  Genito-Urinary: no dysuria, trouble voiding, or hematuria  Musculoskeletal: negative for - gait disturbance, joint pain, joint stiffness or joint swelling  Neurological: no TIA or stroke symptoms  Hematologic: no bruises, no bleeding  Lymphatic: no swollen glands  Integument: no lumps, mole changes, nail changes or rash  Endocrine:no malaise/lethargy poly uria or polydipsia or unexpected weight changes        Social History     Social History    Marital status:      Spouse name: N/A    Number of children: N/A    Years of education: N/A     Social History Main Topics    Smoking status: Former Smoker     Quit date: 7/7/1996    Smokeless tobacco: Never Used    Alcohol use No      Comment: rarely    Drug use: No    Sexual activity: Yes     Other Topics Concern    None     Social History Narrative     Family History   Problem Relation Age of Onset    Diabetes Mother     Cancer Father      LUNG    Heart Disease Father        OBJECTIVE:     Visit Vitals    /56    Pulse 66    Resp 18    Ht 5' 9\" (1.753 m)    Wt 184 lb 6.4 oz (83.6 kg)    SpO2 98%    BMI 27.23 kg/m2     CONSTITUTIONAL:   well nourished, appears age appropriate  EYES: sclera anicteric, PERRL, EOMI  ENMT:nars clear, moist mucous membranes, pharynx clear  NECK: supple. Thyroid normal, No JVD or bruits  RESPIRATORY: Chest: clear to ascultation and percussion, normal inspiratory effort  CARDIOVASCULAR: Heart: regular rate and rhythm no murmurs, rubs or gallops, PMI not displaced, No thrills  GASTROINTESTINAL: Abdomen: non distended, soft, non tender, bowel sounds normal  HEMATOLOGIC: no purpura, petechiae or bruising  LYMPHATIC: No lymph node enlargemant  MUSCULOSKELETAL: Extremities: no edema or active synovitis, pulse 1+ left hand still dressed after having amputation second finger  INTEGUMENT: No unusual rashes or suspicious skin lesions noted. Nails appear normal.  PERIPHERAL VASCULAR: normal pulses femoral, PT and DP  NEUROLOGIC: non-focal exam, A & O X 3  PSYCHIATRIC:, appropriate affect     ASSESSMENT:   1. Essential hypertension    2. ASVD (arteriosclerotic vascular disease)    3.  Controlled type 2 diabetes mellitus without complication, with long-term current use of insulin ([facility] [address]    4. Cerebrovascular accident (CVA), unspecified mechanism ([facility] [address]    5. Bilateral carotid artery stenosis    6. Infection of left hand      Impression  1. Hypertension improved not yet controlled I am not can adjust his medications today but I will recheck him in 2 weeks and we may have to adjust them based upon what we find then  2. ASVD bilateral carotid disease status post recent embolic CVA appointment to see vascular surgery Dr. Dax Dsouza  3. Diabetes blood sugars seem to be much better controlled by his checks at home and he will continue to monitor that  4. Recent CVA he seems to have recovered  5. Infection left hand still being followed by infectious disease with IV antibiotics and review of the chart reveals that he is still being followed as far as his sed rate and CBC. I will not check labs today since he is due to get them at the infusion center. I will check to see the results of those once they are complete. I will see him myself again in 2 weeks at which time he is to come in for his fasting blood work. I will see him sooner should there be a problem. PLAN:  .  Orders Placed This Encounter    losartan (COZAAR) 100 mg tablet    lisinopril-hydroCHLOROthiazide (PRINZIDE, ZESTORETIC) 20-12.5 mg per tablet    gabapentin (NEURONTIN) 300 mg capsule         ATTENTION:   This medical record was transcribed using an electronic medical records system. Although proofread, it may and can contain electronic and spelling errors. Other human spelling and other errors may be present. Corrections may be executed at a later time. Please feel free to contact us for any clarifications as needed. Follow-up Disposition:  Return in about 2 weeks (around 10/19/2017). No results found for any visits on 10/05/17. Alessio Mathews MD    The patient verbalized understanding of the problems and plans as explained.

## 2017-10-06 NOTE — PROGRESS NOTES
Pt arrived to [facility] ambulatory in no acute distress at 1450 for Invanz.  Assessment unremarkable. R arm PICC flushed without issue and positive blood return noted in each lumen. Patient Vitals for the past 12 hrs:   Temp Pulse Resp BP   10/06/17 1540 - 67 18 126/52   10/06/17 1449 97.4 °F (36.3 °C) 68 18 141/56       The following medications [email or web address] 1g IV over 30 minutes    Pt tolerated treatment well.  PICC flushed per policy and new green caps placed.  Pt discharged ambulatory in no acute distress at 1540, accompanied by spouse. Next appointment 10/7/17 at 0800 at [facility].

## 2017-10-07 NOTE — PROGRESS NOTES
0745 Pt arrived to [facility] ambulatory in no acute distrese for Invanz.  Assessment unremarkable. R arm PICC flushed without issue and positive blood return noted in each lumen. Patient Vitals for the past 12 hrs:   Temp Pulse Resp BP SpO2   10/07/17 0746 97.8 °F (36.6 °C) 74 18 119/58 100 %       The following medications [email or web address] 1g IV over 30 minutes    0830 Pt tolerated treatment well.  PICC flushed per policy and new green caps placed.  Pt discharged ambulatory in no acute distress at , accompanied by spouse. Next appointment 10/8/17 at 0800 at [facility].

## 2017-10-09 NOTE — PROGRESS NOTES
OPIC Short Consult Note:    8517 Pt arrived at [facility] ambulatory and in no distress for labs/ invanz. Assessment completed, no new complaints voiced. Right PICC flushes well with blood return both lumens    Visit Vitals    /52 (BP 1 Location: Left arm, BP Patient Position: Sitting)    Pulse 94    Temp 98.5 °F (36.9 °C)    Resp 18       Medications received:  NS @ KVO  invanz 1 gram IV over 30 min    Both lumens flushed with NS and heparin and capped/ secured for tomorrow. [address] Double R [place] well, no adverse reaction noted. D/Cd from [facility] ambulatory and in no distress accompanied by wife. Next appt tomorrow    Recent Results (from the past 12 hour(s))   CBC WITH AUTOMATED DIFF    Collection Time: 10/09/17  2:25 PM   Result Value Ref Range    WBC 5.6 4.1 - 11.1 K/uL    RBC 3.37 (L) 4.10 - 5.70 M/uL    HGB 9.5 (L) 12.1 - 17.0 g/dL    HCT 29.5 (L) 36.6 - 50.3 %    MCV 87.5 80.0 - 99.0 FL    MCH 28.2 26.0 - 34.0 PG    MCHC 32.2 30.0 - 36.5 g/dL    RDW 16.4 (H) 11.5 - 14.5 %    PLATELET 619 136 - 678 K/uL    NEUTROPHILS 74 32 - 75 %    LYMPHOCYTES 17 12 - 49 %    MONOCYTES 8 5 - 13 %    EOSINOPHILS 1 0 - 7 %    BASOPHILS 0 0 - 1 %    ABS. NEUTROPHILS 4.1 1.8 - 8.0 K/UL    ABS. LYMPHOCYTES 0.9 0.8 - 3.5 K/UL    ABS. MONOCYTES 0.5 0.0 - 1.0 K/UL    ABS. EOSINOPHILS 0.1 0.0 - 0.4 K/UL    ABS.  BASOPHILS 0.0 0.0 - 0.1 K/UL   METABOLIC PANEL, BASIC    Collection Time: 10/09/17  2:25 PM   Result Value Ref Range    Sodium 138 136 - 145 mmol/L    Potassium 4.3 3.5 - 5.1 mmol/L    Chloride 101 97 - 108 mmol/L    CO2 29 21 - 32 mmol/L    Anion gap 8 5 - 15 mmol/L    Glucose 214 (H) 65 - 100 mg/dL    BUN 24 (H) 6 - 20 MG/DL    Creatinine 1.23 0.70 - 1.30 MG/DL    BUN/Creatinine ratio 20 12 - 20      GFR est AA >60 >60 ml/min/1.73m2    GFR est non-AA 57 (L) >60 ml/min/1.73m2    Calcium 7.9 (L) 8.5 - 10.1 MG/DL   SED RATE (ESR)    Collection Time: 10/09/17  2:25 PM   Result Value Ref Range    Sed rate, automated 71 (H) 0 - 20 mm/hr

## 2017-10-10 NOTE — PROGRESS NOTES
1515 Pt arrived at [facility] ambulatory and in no distress for invanz/ PICC dressing change. Assessment completed, no new complaints voiced. DL right PICC flushes well, blood return both lumens    Visit Vitals    BP 96/49 (BP 1 Location: Left arm, BP Patient Position: Sitting)    Pulse 69    Temp 96.6 °F (35.9 °C)    Resp 18    SpO2 98%       Medications received:  Invanz 1 gram IV over 30 min    Right PICC dressing changed, cap ends changed. Both lumens flushed with NS and heparin and secured for tomorrow    1600 Tolerated treatment well, no adverse reaction noted. D/Cd from [facility] ambulatory and in no distress accompanied by wife.   Next appt tomorrow

## 2017-10-10 NOTE — PROGRESS NOTES
NN Note: Patient is greater than 30 days post episode. Pt has attended follow up appointment. Pt has not reached out to NN for additional questions or concerns. No further needs identified. At this time resolving episode.

## 2017-10-11 NOTE — PROGRESS NOTES
1505 Pt arrived at [facility] ambulatory and in no distress for Invanz. Assessment unchanged, no new complaints voiced. R arm PICC in place, positive for blood return and flushes well. Patient Vitals for the past 12 hrs:   Temp Pulse Resp BP   10/11/17 1551 - 61 16 (!) 116/39   10/11/17 1512 97 °F (36.1 °C) 66 18 127/52           Medications received:  Invanz 1 gram  IV over 30 min  NS Flush  Heparin Flush     1555 Tolerated treatment well, no adverse reaction noted. D/Cd from [facility] ambulatory and in no distress accompanied by Spouse.   Next appt 10/12/17

## 2017-10-12 NOTE — PROGRESS NOTES
Outpatient Infusion Center Progress Note    3031- Pt admit to [facility] for [address] [facility] [address] ambulatory in stable condition. Assessment completed. No new concerns voiced. Double lumen PICC intact, flushes well, and positive for blood return. Labs drawn per order and sent for processing. Visit Vitals    /49 (BP 1 Location: Left arm, BP Patient Position: Sitting)    Pulse 63    Temp 97.6 °F (36.4 °C)    Resp 18    SpO2 99%     Medications:  Invanz 1 gram IV    Recent Results (from the past 12 hour(s))   CBC WITH AUTOMATED DIFF    Collection Time: 10/12/17  3:11 PM   Result Value Ref Range    WBC 3.3 (L) 4.1 - 11.1 K/uL    RBC 3.23 (L) 4.10 - 5.70 M/uL    HGB 9.3 (L) 12.1 - 17.0 g/dL    HCT 28.4 (L) 36.6 - 50.3 %    MCV 87.9 80.0 - 99.0 FL    MCH 28.8 26.0 - 34.0 PG    MCHC 32.7 30.0 - 36.5 g/dL    RDW 16.0 (H) 11.5 - 14.5 %    PLATELET 525 043 - 077 K/uL    NEUTROPHILS 63 32 - 75 %    LYMPHOCYTES 26 12 - 49 %    MONOCYTES 7 5 - 13 %    EOSINOPHILS 3 0 - 7 %    BASOPHILS 1 0 - 1 %    ABS. NEUTROPHILS 2.1 1.8 - 8.0 K/UL    ABS. LYMPHOCYTES 0.8 0.8 - 3.5 K/UL    ABS. MONOCYTES 0.2 0.0 - 1.0 K/UL    ABS. EOSINOPHILS 0.1 0.0 - 0.4 K/UL    ABS. BASOPHILS 0.0 0.0 - 0.1 K/UL   METABOLIC PANEL, BASIC    Collection Time: 10/12/17  3:11 PM   Result Value Ref Range    Sodium 141 136 - 145 mmol/L    Potassium 4.1 3.5 - 5.1 mmol/L    Chloride 106 97 - 108 mmol/L    CO2 26 21 - 32 mmol/L    Anion gap 9 5 - 15 mmol/L    Glucose 198 (H) 65 - 100 mg/dL    BUN 29 (H) 6 - 20 MG/DL    Creatinine 1.23 0.70 - 1.30 MG/DL    BUN/Creatinine ratio 24 (H) 12 - 20      GFR est AA >60 >60 ml/min/1.73m2    GFR est non-AA 57 (L) >60 ml/min/1.73m2    Calcium 7.8 (L) 8.5 - 10.1 MG/DL   SED RATE (ESR)    Collection Time: 10/12/17  3:11 PM   Result Value Ref Range    Sed rate, automated 65 (H) 0 - 20 mm/hr       1550- Pt tolerated treatment well. PICC flushed and capped. D/c home ambulatory in no distress.  Pt aware of next appointment scheduled for 10/13 at 3 PM.

## 2017-10-13 NOTE — PROGRESS NOTES
Outpatient Infusion Center Progress Note    1017 Pt admit to [facility] for [place] [facility]s ambulatory in stable condition. Assessment completed. No new concerns voiced. Right arm double lumen PICC flushed well with positive blood return in both purple and red ports.     Patient Vitals for the past 12 hrs:   Temp Pulse Resp BP SpO2   10/13/17 1419 98.4 °F (36.9 °C) 63 18 164/60 100 %         Medications:  Invanz 1 GM - infused over 30 minutes     1455 Pt tolerated treatment well. PICC flushed per policy at conclusion with positive blood return in both ports. D/c home ambulatory in no distress. Pt aware of next appointment scheduled for 10/14/17 at 8:00am at [facility]. [address]- attempted to call MD office to obtain order to D/C PICC line for Oswaldo 10/15/17. Left message on nurse VM, voicemail message states messages left after 12pm on Friday will not be answered until next business day. Called office back and asked if another provider was covering for MD, was told there was no one covering. Current order does not have PICC line removal checked on order form. 1600- April called back from `s office and states that patient had been D/Cd from care and was to follow up with infectious dz ()for D/C. While talking with April, realized that patient actually has one more week of treatment per order start and end.   [facility] office and spoke to Martha Johnson, per Martha Johnson patient was a \"no show\" at their office on 9/21/17. The only order they have is from Aug. With 6 week treatment. Advised that current order from Dr. Nae Mathews states 4 weeks starting 9/24/17 and follow up needed with Dr. Erick Guerrero. (PCP) refused to take care of this order due to not being his specialty. Per Martha Johnson, fax current order to office and when Dr. Dilcia Martinez gets back from vacation on Wednesday 10/18/17 he can  figure out end order for treatment. 1615- order faxed to Dr. Dilcia Martinez office.

## 2017-10-15 NOTE — PROGRESS NOTES
Outpatient Infusion Center Progress Note    0805 Pt admit to [facility] for daily Invanz ambulatory in stable condition. Assessment completed. No new concerns voiced. PICC flushed with positive blood return in both lumens and NS started at [facility] in purple port. Visit Vitals    /53 (BP 1 Location: Left arm, BP Patient Position: Sitting)    Pulse 61    Temp 96.9 °F (36.1 °C)    Resp 18       Medications:  NS KVO  Invanz  NS flushes  Heparin     0855 Pt tolerated treatment well. PICC maintained positive blood return throughout treatment. PICC flushed, heparinized, and capped for tomorrow's infusion. D/c home ambulatory in no distress. Pt aware of next appointment scheduled for 10/16/17 at 3:00 at [facility].

## 2017-10-16 NOTE — PROGRESS NOTES
Outpatient Infusion Center Progress Note    3279 Pt admit to [facility] for [address] [facility] [address] ambulatory in stable condition. Assessment completed. No new concerns voiced. Right arm double lumen PICC flushed well with positive blood return in both purple and red ports. Labs drawn per order and sent for results. Pending results at time of note.      Medications:  Invanz 1 GM - infused over 30 minutes     1635 Pt tolerated treatment well. PICC flushed per policy at conclusion with positive blood return in both ports. D/c home ambulatory in no distress. Pt aware of next appointment scheduled for 10/17/17.   Patient Vitals for the past 12 hrs:   Temp Pulse Resp BP   10/16/17 1540 97.4 °F (36.3 °C) 64 18 146/58

## 2017-10-17 NOTE — PROGRESS NOTES
[facility] Short Visit Note:    8144 Pt arrived to [facility] ambulatory with cane and in no distress for daily anitbiotics. Dual lumen PICC intact to right upper arm, flushed with positive blood return noted. No new complaints voiced. Visit Vitals    /45 (BP 1 Location: Left arm, BP Patient Position: Sitting)    Pulse 65    Temp 97.6 °F (36.4 °C)    Resp 16       Medications received:  Invanz 1g IV over 30 minutes    1510 Pt tolerated treatment well, no adverse reaction noted. PICC dressing changed per policy and ports flushed with saline and Heparin. Curos end caps placed. Discharged from [facility] ambulatory and in no acute distress accompanied by self. Next appt 10/18/17 @ 1500.

## 2017-10-18 NOTE — PROGRESS NOTES
1300 Pt arrived at [facility] ambulatory and in no distress for invanz. Assessment completed, no new complaints voiced. Right DL PICC flushes well with blood return both lumens     Visit Vitals    /53 (BP 1 Location: Left arm, BP Patient Position: Sitting)    Pulse 64    Temp 97.3 °F (36.3 °C)    Resp 18    SpO2 100%       Medications received:  NS @ KVO  Invanz 1 gram IV over 30 minutes    Both lumens flushed with NS and heparin and capped/ secured for tomorrow    1350 Tolerated treatment well, no adverse reaction noted. D/Cd from [facility] ambulatory and in no distress accompanied by wife.   Next appt tomorrow

## 2017-10-18 NOTE — PROGRESS NOTES
*** pt arrived to [facility] ambulatory. Pt denies any distress or discomfort at this time. PICC line noted with double lumen. Blood return noted, flushed without difficulty. ***    Invanz 1g IV over 30 min. Given     *** PICC line flushed with normal saline and heparin then secured. Pt denies any distress or discomfort at this time.  Pt discharged home ambulatory with next apt

## 2017-10-19 PROBLEM — Z00.00 MEDICARE ANNUAL WELLNESS VISIT, INITIAL: Status: ACTIVE | Noted: 2017-01-01

## 2017-10-19 NOTE — PROGRESS NOTES
1600- Pt arrived at [facility] ambulatory and in no distress for Invanz. Assessment unchanged, no new complaints voiced. Double lumen PICC intact, flushes well, and positive for blood return. Labs drawn and sent for processing. **Please follow up in [facility] for results. Visit Vitals    /64 (BP 1 Location: Left arm, BP Patient Position: Sitting)    Pulse 76    Temp 98.9 °F (37.2 °C)    Resp 18    SpO2 99%       Medications received:  Invanz 1 gram IV    Called and spoke with Lisa Carty at Dr. Carmen Waldrop office. Order to be faxed for removal of PICC line tomorrow after last dose of antibiotics. 1645- Tolerated treatment well, no adverse reaction noted. PICC flushed, heparinized, and capped. D/Cd from [facility] ambulatory and in no distress.  Next appt 10/20 at 3 PM.

## 2017-10-19 NOTE — PROGRESS NOTES
Prabhakar García is a 68 y.o. male      Chief Complaint   Patient presents with    Follow-up     3 Month F/up         1. Have you been to the ER, urgent care clinic since your last visit? Hospitalized since your last visit? No    2. Have you seen or consulted any other health care providers outside of the [address] [place] [address] since your last visit? Include any pap smears or colon screening.  No

## 2017-10-19 NOTE — TELEPHONE ENCOUNTER
Requested Prescriptions     Pending Prescriptions Disp Refills    traMADol (ULTRAM) 50 mg tablet 360 Tab 1     Si tablet four times daily as needed for pain.

## 2017-10-19 NOTE — PROGRESS NOTES
This is an Initial Medicare Annual Wellness Exam (AWV) (Performed 12 months after IPPE or effective date of Medicare Part B enrollment, Once in a lifetime)    I have reviewed the patient's medical history in detail and updated the computerized patient record. He presents today for his Medicare annual wellness examination screening questionnaire. He is also here in follow-up of his medical problems include hypertension, diabetes, hyperlipidemia, GERD, DJD, recent complete heart block with pacemaker placement, recent CVA, and recent osteomyelitis involving his left second finger with amputation of the finger and long term antibiotic treatment. He still on antibiotics followed by infectious disease doctor. He denies any pain in his left hand. There are no other arthritic complaints. He denies headaches or neurologic complaints although his wife says that his memory is still not back to normal.  He denies any GI/ complaints. There are no complaints of chest pain shortness of breath or cardiorespiratory complaints. There are no other complaints on complete review of systems. He is taking his medication trying to follow his diet and try and remain physically active.     History     Past Medical History:   Diagnosis Date    Allergic rhinitis     Anemia     ASVD (arteriosclerotic vascular disease)     Alfonso's esophagus 7/28/2017    Carotid stenosis 7/28/2017    Chronic kidney disease     Diabetes ([address]     glucose runs 70 - 160's at home    Diabetic neuropathy ([facility] 7/28/2017    Diverticulosis     DJD (degenerative joint disease)     ED (erectile dysfunction)     Enlarged prostate     JUDY (generalized anxiety disorder)     GERD (gastroesophageal reflux disease)     controlled with med; alfonso's esophagus    Hiatal hernia     Hypercholesterolemia     Hypertension     Hypertrophy (benign) of prostate 7/28/2017    Ill-defined condition     peripheral vascular disease    Lung nodule     Neuropathy     On statin therapy 7/28/2017    Prostate cancer screening 7/28/2017    PVD [facility]peripheral vascular disease) [facility]     Sebaceous cyst 7/28/2017    Spinal stenosis       Past Surgical History:   Procedure Laterality Date    CARDIAC SURG PROCEDURE UNLIST      CATH-2008    COLONOSCOPY N/A 6/15/2016    COLONOSCOPY performed by Betsy Rendon MD at [facility]  6/15/2016         HX CATARACT REMOVAL      BILATERAL    HX ORTHOPAEDIC  1977    BONE CYST REM,ANU FROM RIGHT LEG    HX ORTHOPAEDIC  5-, 7-22-17, 7-26-17    left hand index finger    HX OTHER SURGICAL Left 06/2016    carotid endarectomy    NEUROLOGICAL PROCEDURE UNLISTED  2011    Back: spinal stenosis, pinched nerve repair    UPPER GI ENDOSCOPY,BIOPSY  6/15/2016          Current Outpatient Prescriptions   Medication Sig Dispense Refill    simvastatin (ZOCOR) 10 mg tablet Take 10 mg by mouth.  nadolol (CORGARD) 40 mg tablet Take 40 mg by mouth daily.  losartan (COZAAR) 100 mg tablet Take 100 mg by mouth two (2) times a day.  lisinopril-hydroCHLOROthiazide (PRINZIDE, ZESTORETIC) 20-12.5 mg per tablet Take 2 Tabs by mouth daily.  clopidogrel (PLAVIX) 75 mg tab Take 75 mg by mouth daily.  ferrous sulfate 325 mg (65 mg iron) tablet Take 325 mg by mouth three (3) times daily (with meals).  insulin glargine (LANTUS) 100 unit/mL injection 9-12 Units by SubCUTAneous route nightly. Patient uses sliding scale, but cannot remember at this time      omeprazole (PRILOSEC) 20 mg capsule Take 20 mg by mouth nightly.  insulin NPH (NOVOLIN N) 100 unit/mL injection 20 Units by SubCUTAneous route daily (with breakfast). Indications: type 2 diabetes mellitus      insulin aspart (NOVOLOG) 100 unit/mL injection 10 Units by SubCUTAneous route daily (with dinner). Indications: type 2 diabetes mellitus      amLODIPine (NORVASC) 5 mg tablet Take 5 mg by mouth daily.       aspirin 81 mg chewable tablet Take 81 mg by mouth nightly.  tamsulosin (FLOMAX) 0.4 mg capsule Take 0.4 mg by mouth daily.  simvastatin (ZOCOR) 10 mg tablet Take 10 mg by mouth nightly. Facility-Administered Medications Ordered in Other Visits   Medication Dose Route Frequency Provider Last Rate Last Dose    sodium chloride (NS) flush 10-40 mL  10-40 mL IntraVENous PRN Santa Patel MD   10 mL at 10/18/17 1348    heparin (porcine) pf 500 Units  500 Units IntraVENous PRN Santa Patel MD   500 Units at 10/18/17 1348    ertapenem (INVANZ) 1 g in 0.9% sodium chloride (MBP/ADV) 50 mL  1 g IntraVENous ONCE Santa Patel MD       [address] [place] [address] ON 10/20/2017] ertapenem (INVANZ) 1 g in 0.9% sodium chloride (MBP/ADV) 50 mL  1 g IntraVENous ONCE Santa Patel MD         No Known Allergies  Family History   Problem Relation Age of Onset    Diabetes Mother     Cancer Father      LUNG    Heart Disease Father      Social History   Substance Use Topics    Smoking status: Former Smoker     Quit date: 7/7/1996    Smokeless tobacco: Never Used    Alcohol use No      Comment: rarely     Patient Active Problem List   Diagnosis Code    Lumbar stenosis with neurogenic claudication M48.062    Diverticulosis K57.90    Colitis, nonspecific K52.9    Infection of left hand L08.9    Essential hypertension I10    Controlled type 2 diabetes mellitus without complication, with long-term current use of insulin (HCC) E11.9, Z79.4    Mixed hyperlipidemia E78.2    ASVD (arteriosclerotic vascular disease) I70.90    Martínez's esophagus K22.70    Carotid stenosis I65.29    Diabetic neuropathy (HCC) E11.40    On statin therapy Z79.899    Hypertrophy (benign) of prostate N40.0    Prostate cancer screening Z12.5    Hand abscess L02.519    CVA (cerebral vascular accident) ([address] I63.9    Thrombotic stroke involving right middle cerebral artery ([address] I63.311    Stenosis of both internal carotid arteries I65.23    Syncope and collapse R55    Bradycardia R00.1    Complete heart block (HCC) I44.2    S/P cardiac pacemaker procedure Z95.0    Medicare annual wellness visit, initial Z00.00       Depression Risk Factor Screening:     PHQ over the last two weeks 10/19/2017   Little interest or pleasure in doing things Not at all   Feeling down, depressed or hopeless Not at all   Total Score PHQ 2 0     Alcohol Risk Factor Screening: You do not drink alcohol or very rarely. Functional Ability and Level of Safety:     Hearing Loss  Hearing is good. Activities of Daily Living  The home contains: no safety equipment. Patient does total self care    Fall RiskFall Risk Assessment, last 12 mths 10/19/2017   Able to walk? Yes   Fall in past 12 months? No       Abuse Screen  Patient is not abused    Cognitive Screening   Evaluation of Cognitive Function:  Has your family/caregiver stated any concerns about your memory: no  Normal     ROS:    Constitutional: He denies fevers, weight loss, sweats. Eyes: No blurred or double vision. ENT: No difficulty with swallowing, taste, speech or smell. Neck: no stiffness or swelling  Respiratory: No cough wheezing or shortness of breath. Cardiovascular: Denies chest pain, palpitations, unexplained indigestion or syncope. Gastrointestinal:  No changes in bowel movements, no abdominal pain, no bloating. Genitourinary:  He denies frequency, nocturia or stranguria. Extremities: No joint pain, stiffness or swelling. Neurological:  No numbness, tingling, burring paresthesias or loss of motor strength. No syncope, dizziness or frequent headache  Lymphatic: no adenopathy noted  Hematologic: no easy bruising or bleeding gums  Skin:  No recent rashes or mole changes. Psychiatric/Behavioral:  Negative for depression.       Vitals:    10/19/17 0947 10/19/17 1030   BP: 166/69 150/66   Pulse: 90    Resp: 16    Temp: 98 °F (36.7 °C)    TempSrc: Oral    SpO2: 98%    Weight: 181 lb 12.8 oz (82.5 kg)    Height: 5' 9\" (1.753 m)    PainSc:   0 - No pain         PHYSICAL EXAM:    General appearance - alert, well appearing, and in no distress  Mental status - alert, oriented to person, place, and time  HEENT:  Ears - bilateral TM's and external ear canals clear  Eyes - pupillary responses were normal.  Extraocular muscle function intact. Lids and conjunctiva not injected. Fundoscopic exam revealed sharp disc margins. eye movements intact  Pharynx- clear with teeth in good repair. No masses were noted  Neck - supple without thyromegaly or burit. No JVD noted  Lungs - clear to auscultation and percussion  Cardiac- normal rate, regular rhythm without murmurs. PMI not displaced. No gallop, rub or click  Abdomen - flat, soft, non-tender without palpable organomegaly or mass. No pulsatile mass was felt, and not bruit was heard. Bowel sounds were active  : Circumcised, Testes descended w/o masses  Rectal: Deferred as having been done already this year  Extremities -  no clubbing cyanosis or edema. No diabetic foot changes noted  Lymphatics - no palpable lymphadenopathy, no hepatosplenomegaly  Hematologic: no petechiae or purpura  Peripheral vascular -Femoral, Dorsalis pedis and posterior tibial pulses felt without difficulty  Skin - no rash or unusual mole change noted  Neurological - Cranial nerves II-XII grossly intact. Motor strength 5/5. DTR's 2+ and symmetric. Station and gait normal.  Normal distal sensation and proprioception all toes both feet  Back exam - full range of motion, no tenderness, palpable spasm or pain on motion  Musculoskeletal - no joint tenderness, deformity or swelling      Patient Care Team   Patient Care Team:  Hong Valle MD as PCP - General (Internal Medicine)  Gisele Flood MD (Orthopedic Surgery)  Nivia Shaffer MD (General and Vascular Surgery)    Assessment/Plan   Education and counseling provided:  Are appropriate based on today's review and evaluation    ASSESSMENT:   1. Essential hypertension    2. Controlled type 2 diabetes mellitus without complication, with long-term current use of insulin ([facility]    3. Mixed hyperlipidemia    4. ASVD (arteriosclerotic vascular disease)    5. Cerebrovascular accident (CVA), unspecified mechanism ([facility]    6. Complete heart block ([facility]    7. S/P cardiac pacemaker procedure    8. Stenosis of both internal carotid arteries    9. Medicare annual wellness visit, initial      Impression  1. Hypertension that is controlled on last check but not controlled today although he thinks is because he is a little irritated with having to wait so therefore not make any changes today  2. Diabetes we will see what the status is and last numbers reviewed we will make changes are necessary based upon today's lab  3. Hyperlipidemia prior labs reviewed repeat status pending will adjust medicines if necessary  4. ASVD he is due to see Dr. Karen Colon for evaluation of his carotids and see if he needs to have any surgery since he had a recent stroke  5. Recent CVA seems to be slowly recovering  6. Status post pacemaker for complete heart block he is doing well from that standpoint  Medicare annual wellness examination screening questionnaire completed today. The results were reviewed with he and his wife and the questions were answered. Lifestyle recommendations and modifications discussed and made.   Laboratory studies pending as noted will make further recommendations adjustment based on today's lab and follow stable continue same and recheck scheduled again for 3 months or sooner should be a problem but I will get him back in a quicker nonfasting appointment in 1 month to make sure his blood pressure is okay    PLAN:  .  Orders Placed This Encounter    AMB POC URINE, MICROALBUMIN, SEMIQUANT (1 RESULT)    AMB POC LIPID PROFILE    AMB POC HEMOGLOBIN A1C    AMB POC COMPREHENSIVE METABOLIC PANEL    AMB POC COMPLETE CBC,AUTOMATED ENTER    AMB POC CK (CPK)    simvastatin (ZOCOR) 10 mg tablet    nadolol (CORGARD) 40 mg tablet         ATTENTION:   This medical record was transcribed using an electronic medical records system. Although proofread, it may and can contain electronic and spelling errors. Other human spelling and other errors may be present. Corrections may be executed at a later time. Please feel free to contact us for any clarifications as needed. Follow-up Disposition:  Return in about 3 months (around 1/19/2018).       Radha Darling MD     Health Maintenance Due   Topic Date Due    EYE EXAM RETINAL OR DILATED Q1  09/14/1950    ZOSTER VACCINE AGE 60>  07/14/2000    GLAUCOMA SCREENING Q2Y  09/14/2005

## 2017-10-19 NOTE — MR AVS SNAPSHOT
Visit Information Date & Time Provider Department Dept. Phone Encounter #  
 10/19/2017  9:00 AM Tomi Chris, [facility] [address] [facility] [phone] [id number] Upcoming Health Maintenance Date Due  
 FOOT EXAM Q1 9/14/1950 MICROALBUMIN Q1 9/14/1950 EYE EXAM RETINAL OR DILATED Q1 9/14/1950 ZOSTER VACCINE AGE 60> 7/14/2000 GLAUCOMA SCREENING Q2Y 9/14/2005 MEDICARE YEARLY EXAM 9/14/2005 HEMOGLOBIN A1C Q6M 3/1/2018 LIPID PANEL Q1 9/1/2018 DTaP/Tdap/Td series (2 - Td) 5/22/2027 Allergies as of 10/19/2017  Review Complete On: 10/19/2017 By: Tomi Chris MD  
 No Known Allergies Current Immunizations  Reviewed on 10/18/2017 Name Date Influenza Vaccine 10/11/2016, 10/30/2015 Influenza Vaccine (Quad) PF 9/22/2017  8:45 AM  
 Pneumococcal Conjugate (PCV-13) 8/14/2015 Pneumococcal Polysaccharide (PPSV-23) 1/1/2006 Tdap 5/22/2017  1:11 PM  
  
 Not reviewed this visit You Were Diagnosed With   
  
 Codes Comments Essential hypertension    -  Primary ICD-10-CM: I10 
ICD-9-CM: 401.9 Controlled type 2 diabetes mellitus without complication, with long-term current use of insulin (HCC)     ICD-10-CM: E11.9, Z79.4 ICD-9-CM: 250.00, V58.67 Mixed hyperlipidemia     ICD-10-CM: E78.2 ICD-9-CM: 272.2 ASVD (arteriosclerotic vascular disease)     ICD-10-CM: I70.90 ICD-9-CM: 440.9 Cerebrovascular accident (CVA), unspecified mechanism ([facility] [address]     ICD-10-CM: I63.9 ICD-9-CM: 434.91 Complete heart block (HCC)     ICD-10-CM: I44.2 ICD-9-CM: 426.0 S/P cardiac pacemaker procedure     ICD-10-CM: Z95.0 ICD-9-CM: V45.01 Stenosis of both internal carotid arteries     ICD-10-CM: I65.23 ICD-9-CM: 433.10, 433.30 Vitals BP Pulse Temp Resp Height(growth percentile) Weight(growth percentile)  166/69 (BP 1 Location: Right arm, BP Patient Position: Sitting) 90 98 °F (36.7 °C) (Oral) 16 5' 9\" (1.753 m) 181 lb 12.8 oz (82.5 kg) SpO2 BMI Smoking Status 98% 26.85 kg/m2 Former Smoker Vitals History BMI and BSA Data Body Mass Index Body Surface Area  
 26.85 kg/m 2 2 m 2 Preferred Pharmacy Pharmacy Name Phone [facility] [address], [address] [place] [address] Avenue [phone] Your Updated Medication List  
  
   
This list is accurate as of: 10/19/17 10:28 AM.  Always use your most recent med list. amLODIPine 5 mg tablet Commonly known as:  Lowell Blade Take 5 mg by mouth daily. aspirin 81 mg chewable tablet Take 81 mg by mouth nightly. clopidogrel 75 mg Tab Commonly known as:  PLAVIX Take 75 mg by mouth daily. ferrous sulfate 325 mg (65 mg iron) tablet Take 325 mg by mouth three (3) times daily (with meals). LANTUS 100 unit/mL injection Generic drug:  insulin glargine 9-12 Units by SubCUTAneous route nightly. Patient uses sliding scale, but cannot remember at this time  
  
 lisinopril-hydroCHLOROthiazide 20-12.5 mg per tablet Commonly known as:  Megan Whiteside Take 2 Tabs by mouth daily. losartan 100 mg tablet Commonly known as:  COZAAR Take 100 mg by mouth two (2) times a day. nadolol 40 mg tablet Commonly known as:  CORGARD Take 40 mg by mouth daily. NovoLIN N 100 unit/mL injection Generic drug:  insulin NPH  
20 Units by SubCUTAneous route daily (with breakfast). Indications: type 2 diabetes mellitus NovoLOG 100 unit/mL injection Generic drug:  insulin aspart 10 Units by SubCUTAneous route daily (with dinner). Indications: type 2 diabetes mellitus  
  
 omeprazole 20 mg capsule Commonly known as:  PRILOSEC Take 20 mg by mouth nightly. * simvastatin 10 mg tablet Commonly known as:  ZOCOR Take 10 mg by mouth. * simvastatin 10 mg tablet Commonly known as:  ZOCOR Take 10 mg by mouth nightly. tamsulosin 0.4 mg capsule Commonly known as:  FLOMAX Take 0.4 mg by mouth daily. * Notice: This list has 2 medication(s) that are the same as other medications prescribed for you. Read the directions carefully, and ask your doctor or other care provider to review them with you. To-Do List   
 10/19/2017  3:00 PM  
  Appointment with [address] at [facility] ([phone])  
  
 10/20/2017  3:00 PM  
  Appointment with [address] at [facility] ([phone]) Patient Instructions Learning About Reducing Germs and Infection in the Home Germs and infection can spread easily in the home. This may happen when items around the house become soiled or when you come into contact with body fluids, such as blood or urine. A person's cough or sneeze can spread germs too. Washing your hands often can help you keep germs and infection from spreading. Keeping the home clean can help too. To help reduce germs and avoid infections, make sure to disinfect all parts of the home. \"Disinfecting\" means using soap or another  to get rid of germs that can cause infection. How can you reduce germs in the bathroom and kitchen? · Use different cleaning cloths to clean the bathroom and the kitchen. · Clean kitchen counters often. Use a household . · Clean the bathroom and kitchen floors each week. If you mop the floors, always use a clean mop. Wipe up any spills when they happen. · Clean the inside of the refrigerator monthly and when there is spoiled food. Wipe shelves with soap and water. · Clean soap dishes and any containers used for dental care items each week. What else can you do to reduce germs? · Make sure you disinfect your cleaning tools regularly. Dirty mops and sponges, for example, can contain germs and mold. And dirty cleaning rags can spread germs instead of removing them. · Soak sponges and mops in disinfectant, such as diluted household bleach, for 5 minutes each week. To dilute household bleach, use 5 tablespoons (about 1/3 cup) of bleach to 1 gallon of water. · Cleaning rags can be washed in a washing machine with household detergent. You can add household bleach to the wash cycle, if you like. · Don't clean rags, sponges, bedpans, or urinals in the kitchen sink. And don't pour mop water into the kitchen sink. Instead, empty dirty water into the toilet or shower drain. This helps keep germs from spreading from one area to another. · Clean all medical equipment, humidifiers, and dehumidifiers. Follow the 's instructions. For example, for some equipment you may clean with a mixture of 1 part vinegar to 3 parts water. Here are some other tips for reducing germs in the home: 
· Wash higher surfaces before washing lower ones. For example, clean counters and sinks before floors and toilets. · Wear gloves when cleaning pet items, such as litter boxes. · Change the water in flower vases often. · Keep the home well ventilated with fresh air. How can you avoid infections from others? When you can, avoid having people in your home who are sick. And ask all people in the home to cover their mouths with a tissue or with the inside of the elbow when they cough or sneeze. Throw away used tissues and empty the trash often. Make sure to wash bedding often, especially when someone is sick. You can reduce the chances of spreading infection by not sharing food, drinks, and personal items such as towels, washcloths, toothbrushes, and underclothes. Where can you learn more? Go to http://cielo-jayne.info/. Enter Y176 in the search box to learn more about \"Learning About Reducing Germs and Infection in the Home. \" Current as of: April 20, 2017 Content Version: 11.3 © 6237-3190 [employer], [employer].  Care instructions adapted under license by [address] (which disclaims liability or warranty for this information). If you have questions about a medical condition or this instruction, always ask your healthcare professional. [address] any warranty or liability for your use of this information. Introducing [facility]! Waqar Sanchez introduces [employer] patient portal. Now you can access parts of your medical record, email your doctor's office, and request medication refills online. 1. In your internet browser, go to [email or web address] 2. Click on the First Time User? Click Here link in the Sign In box. You will see the New Member Sign Up page. 3. Enter your [employer] Access Code exactly as it appears below. You will not need to use this code after youve completed the sign-up process. If you do not sign up before the expiration date, you must request a new code. · [employer] Access Code: [email or web address] Expires: 11/19/2017  1:07 PM 
 
4. Enter the last four digits of your Social Security Number (xxxx) and Date of Birth (mm/dd/yyyy) as indicated and click Submit. You will be taken to the next sign-up page. 5. Create a [employer] ID. This will be your [employer] login ID and cannot be changed, so think of one that is secure and easy to remember. 6. Create a [employer] password. You can change your password at any time. 7. Enter your Password Reset Question and Answer. This can be used at a later time if you forget your password. 8. Enter your e-mail address. You will receive e-mail notification when new information is available in [address] 9. Click Sign Up. You can now view and download portions of your medical record. 10. Click the Download Summary menu link to download a portable copy of your medical information. If you have questions, please visit the Frequently Asked Questions section of the [employer] website.  Remember, [employer] is NOT to be used for urgent needs. For medical emergencies, dial 911. Now available from your iPhone and Android! Please provide this summary of care documentation to your next provider. Your primary care clinician is listed as Girma. If you have any questions after today's visit, please call [phone].

## 2017-10-19 NOTE — PATIENT INSTRUCTIONS
Learning About Reducing Germs and Infection in the Home  Germs and infection can spread easily in the home. This may happen when items around the house become soiled or when you come into contact with body fluids, such as blood or urine. A person's cough or sneeze can spread germs too. Washing your hands often can help you keep germs and infection from spreading. Keeping the home clean can help too. To help reduce germs and avoid infections, make sure to disinfect all parts of the home. \"Disinfecting\" means using soap or another  to get rid of germs that can cause infection. How can you reduce germs in the bathroom and kitchen? · Use different cleaning cloths to clean the bathroom and the kitchen. · Clean kitchen counters often. Use a household . · Clean the bathroom and kitchen floors each week. If you mop the floors, always use a clean mop. Wipe up any spills when they happen. · Clean the inside of the refrigerator monthly and when there is spoiled food. Wipe shelves with soap and water. · Clean soap dishes and any containers used for dental care items each week. What else can you do to reduce germs? · Make sure you disinfect your cleaning tools regularly. Dirty mops and sponges, for example, can contain germs and mold. And dirty cleaning rags can spread germs instead of removing them. · Soak sponges and mops in disinfectant, such as diluted household bleach, for 5 minutes each week. To dilute household bleach, use 5 tablespoons (about 1/3 cup) of bleach to 1 gallon of water. · Cleaning rags can be washed in a washing machine with household detergent. You can add household bleach to the wash cycle, if you like. · Don't clean rags, sponges, bedpans, or urinals in the kitchen sink. And don't pour mop water into the kitchen sink. Instead, empty dirty water into the toilet or shower drain. This helps keep germs from spreading from one area to another.   · Clean all medical equipment, humidifiers, and dehumidifiers. Follow the 's instructions. For example, for some equipment you may clean with a mixture of 1 part vinegar to 3 parts water. Here are some other tips for reducing germs in the home:  · Wash higher surfaces before washing lower ones. For example, clean counters and sinks before floors and toilets. · Wear gloves when cleaning pet items, such as litter boxes. · Change the water in flower vases often. · Keep the home well ventilated with fresh air. How can you avoid infections from others? When you can, avoid having people in your home who are sick. And ask all people in the home to cover their mouths with a tissue or with the inside of the elbow when they cough or sneeze. Throw away used tissues and empty the trash often. Make sure to wash bedding often, especially when someone is sick. You can reduce the chances of spreading infection by not sharing food, drinks, and personal items such as towels, washcloths, toothbrushes, and underclothes. Where can you learn more? Go to http://cielo-jayne.info/. Enter Y176 in the search box to learn more about \"Learning About Reducing Germs and Infection in the Home. \"  Current as of: April 20, 2017  Content Version: 11.3  © 6371-7261 [employer], [employer]. Care instructions adapted under license by [employer] (which disclaims liability or warranty for this information). If you have questions about a medical condition or this instruction, always ask your healthcare professional. [place] [address] any warranty or liability for your use of this information.

## 2017-10-20 NOTE — PROGRESS NOTES
OPIC short consult note:    8976 Pt arrived to [facility] ambulatory and in no distress for IV abx and PICC removal.  Denies any new complaints. DL PICC intact with positive blood return. Invanz given. PICC removed. Pressure applied. Pt monitored 30 minutes post removal. Dressing remains intact without any drainage noted. Tolerated procedure well. Discharge instructions given. Patient Vitals for the past 12 hrs:   Temp Pulse Resp BP   10/20/17 1612 - 60 - 153/57   10/20/17 1454 97 °F (36.1 °C) 60 18 158/58     Medication given:  TNRTEA    9167 Discharged home ambulatory and in no distress. No further appointments scheduled.

## 2017-10-20 NOTE — DISCHARGE INSTRUCTIONS
OUTPATIENT INFUSION CENTER    DISCHARGE INSTRUCTIONS FOR:  REMOVAL OF PICC LINE    Now that your PICC Line has been removed, please follow the instructions below regarding your site care: You will be kept resting in a supine position for 30--60 minutes immediately after the PICC has been removed. Avoid heavy lifting or excessive use of the extremity for 24 hours. If drainage or bleeding is present, apply direct pressure until it has stopped. If bleeding persists, continue pressure and seek emergency medical care. The area under the dressing must be kept clean and dry for 3 days. Do not submerge the area in water for 3 days. You may shower, but cover the dressing with clear plastic wrap and apply tape around the edges. Remove the plastic wrap after showering. After 3 days, the dressing may be removed. Before removing the dressing, wash hands thoroughly with soap and water. Inspect the site. Gently wash with warm soapy water. Pat dry and re-cover with a band aid until a scab forms. Report to your physician any of the following:    Chills and fever;  Swelling, redness, pain or if site is warm to the touch; Any pus or unusual drainage from the site; Any questions or concerns.     Tomasz uLke, Signature: ______________________________ 10/20/2017  Kylah Hoff RN

## 2017-10-31 NOTE — TELEPHONE ENCOUNTER
Requested Prescriptions     Pending Prescriptions Disp Refills    insulin glargine (LANTUS) 100 unit/mL injection 1 Vial 0     Si-12 Units by SubCUTAneous route nightly.  Patient uses sliding scale, but cannot remember at this time

## 2017-11-20 NOTE — PROGRESS NOTES
Chief Complaint   Patient presents with    Hypertension     f/u    Diabetes     results       SUBJECTIVE:    Frank Chery is a 68 y.o. male who returns in follow-up of his medical problems include hypertension, chronic kidney disease, recent complete heart block with pacemaker placement, recent abscess with infection of his left hand requiring amputation of his index finger, and recent CVA. He seemed to be just feeling slow and down over the past week although really nothing that he can explain is going on. He is noted no increased thirst or increased urination. There is been no head congestion sore throat cough chest congestion other cardiorespiratory complaints. He denies any GI complaints. He denies any new neurologic complaints. And no other complaints other just being slow and no energy. Current Outpatient Prescriptions   Medication Sig Dispense Refill    insulin glargine (LANTUS) 100 unit/mL injection 9-12 Units by SubCUTAneous route nightly. Patient uses sliding scale, but cannot remember at this time 1 Vial 0    simvastatin (ZOCOR) 10 mg tablet Take 10 mg by mouth.  nadolol (CORGARD) 40 mg tablet Take 40 mg by mouth daily.  traMADol (ULTRAM) 50 mg tablet 1 tablet four times daily as needed for pain. 360 Tab 1    losartan (COZAAR) 100 mg tablet Take 100 mg by mouth two (2) times a day.  lisinopril-hydroCHLOROthiazide (PRINZIDE, ZESTORETIC) 20-12.5 mg per tablet Take 2 Tabs by mouth daily.  clopidogrel (PLAVIX) 75 mg tab Take 75 mg by mouth daily.  ferrous sulfate 325 mg (65 mg iron) tablet Take 325 mg by mouth three (3) times daily (with meals).  omeprazole (PRILOSEC) 20 mg capsule Take 20 mg by mouth nightly.  insulin NPH (NOVOLIN N) 100 unit/mL injection 20 Units by SubCUTAneous route daily (with breakfast). Indications: type 2 diabetes mellitus      insulin aspart (NOVOLOG) 100 unit/mL injection 10 Units by SubCUTAneous route daily (with dinner). Indications: type 2 diabetes mellitus      amLODIPine (NORVASC) 5 mg tablet Take 5 mg by mouth daily.  aspirin 81 mg chewable tablet Take 81 mg by mouth nightly.  tamsulosin (FLOMAX) 0.4 mg capsule Take 0.4 mg by mouth daily.  simvastatin (ZOCOR) 10 mg tablet Take 10 mg by mouth nightly. Past Medical History:   Diagnosis Date    Allergic rhinitis     Anemia     ASVD (arteriosclerotic vascular disease)     Alfonso's esophagus 7/28/2017    Carotid stenosis 7/28/2017    Chronic kidney disease     Diabetes ([facility] [address]     glucose runs 70 - 160's at home    Diabetic neuropathy ([facility] [address] 7/28/2017    Diverticulosis     DJD (degenerative joint disease)     ED (erectile dysfunction)     Enlarged prostate     JUDY (generalized anxiety disorder)     GERD (gastroesophageal reflux disease)     controlled with med; alfonso's esophagus    Hiatal hernia     Hypercholesterolemia     Hypertension     Hypertrophy (benign) of prostate 7/28/2017    Ill-defined condition     peripheral vascular disease    Lung nodule     Neuropathy     On statin therapy 7/28/2017    Prostate cancer screening 7/28/2017    PVD (peripheral vascular disease) ([facility] [address]     Sebaceous cyst 7/28/2017    Spinal stenosis      Past Surgical History:   Procedure Laterality Date    CARDIAC SURG PROCEDURE UNLIST      CATH-2008    COLONOSCOPY N/A 6/15/2016    COLONOSCOPY performed by Eh Rogers MD at [address]  6/15/2016         HX CATARACT REMOVAL      BILATERAL    HX ORTHOPAEDIC  1977    BONE CYST REM,ANU FROM RIGHT LEG    HX ORTHOPAEDIC  5-, 7-22-17, 7-26-17    left hand index finger    HX OTHER SURGICAL Left 06/2016    carotid endarectomy    NEUROLOGICAL PROCEDURE UNLISTED  2011    Back: spinal stenosis, pinched nerve repair    UPPER GI ENDOSCOPY,BIOPSY  6/15/2016          No Known Allergies    REVIEW OF SYSTEMS:  General: negative for - chills or fever, or weight loss or gain. Positive for lack of energy as noted above  ENT: negative for - headaches, nasal congestion or tinnitus  Eyes: no blurred or visual changes  Neck: No stiffness or swollen nodes  Respiratory: negative for - cough, hemoptysis, shortness of breath or wheezing  Cardiovascular : negative for - chest pain, edema, palpitations or shortness of breath  Gastrointestinal: negative for - abdominal pain, blood in stools, heartburn or nausea/vomiting  Genito-Urinary: no dysuria, trouble voiding, or hematuria  Musculoskeletal: negative for - gait disturbance, joint pain, joint stiffness or joint swelling  Neurological: no TIA or stroke symptoms  Hematologic: no bruises, no bleeding  Lymphatic: no swollen glands  Integument: no lumps, mole changes, nail changes or rash  Endocrine:no malaise/lethargy poly uria or polydipsia or unexpected weight changes        Social History     Social History    Marital status:      Spouse name: N/A    Number of children: N/A    Years of education: N/A     Social History Main Topics    Smoking status: Former Smoker     Quit date: 7/7/1996    Smokeless tobacco: Never Used    Alcohol use No      Comment: rarely    Drug use: No    Sexual activity: Yes     Other Topics Concern    None     Social History Narrative     Family History   Problem Relation Age of Onset    Diabetes Mother     Cancer Father      LUNG    Heart Disease Father        OBJECTIVE:     Visit Vitals    /60 (BP 1 Location: Left arm, BP Patient Position: Sitting)    Pulse 60    Temp (!) 94.6 °F (34.8 °C) (Oral)  Comment: Pt drank something cold    Resp 14    Ht 5' 9\" (1.753 m)    Wt 171 lb (77.6 kg)    SpO2 97%    BMI 25.25 kg/m2     CONSTITUTIONAL:   well nourished, appears age appropriate  EYES: sclera anicteric, PERRL, EOMI  ENMT:nars clear, moist mucous membranes, pharynx clear  NECK: supple.  Thyroid normal, No JVD or bruits  RESPIRATORY: Chest: clear to ascultation and percussion, normal inspiratory effort  CARDIOVASCULAR: Heart: regular rate and rhythm no murmurs, rubs or gallops, PMI not displaced, No thrills  GASTROINTESTINAL: Abdomen: non distended, soft, non tender, bowel sounds normal  HEMATOLOGIC: no purpura, petechiae or bruising  LYMPHATIC: No lymph node enlargemant  MUSCULOSKELETAL: Extremities: no edema or active synovitis, pulse 1+   INTEGUMENT: No unusual rashes or suspicious skin lesions noted. Nails appear normal.  PERIPHERAL VASCULAR: normal pulses femoral, PT and DP  NEUROLOGIC: non-focal exam, A & O X 3  PSYCHIATRIC:, appropriate affect     ASSESSMENT:   1. Essential hypertension    2. Controlled type 2 diabetes mellitus without complication, with long-term current use of insulin ([address]    3. ASVD (arteriosclerotic vascular disease)    4. Cerebrovascular accident (CVA), unspecified mechanism ([address]      Impression  1. Hypertension that is currently controlled  2. Diabetes we will see what the blood sugar looks like on his BMP  3. ASVD that seem to be stable  4. Recent CVA continue aspirin seem to be stable  5. Recent complete heart block with pacemaker placement that seems to be stable  6. Recent abscess of his hand will check CBC  7. General fatigue will make sure his CBC looks okay I will call the lab make further recommendations been no changes in medicine today pending results of lab. PLAN:  .  Orders Placed This Encounter    AMB POC BASIC METABOLIC PANEL    AMB POC COMPLETE CBC,AUTOMATED ENTER    AMB POC URINALYSIS DIP STICK AUTO W/ MICRO          ATTENTION:   This medical record was transcribed using an electronic medical records system. Although proofread, it may and can contain electronic and spelling errors. Other human spelling and other errors may be present. Corrections may be executed at a later time. Please feel free to contact us for any clarifications as needed. Follow-up Disposition:  Return in about 1 month (around 12/20/2017).     No results found for any visits on 11/20/17. Kristy Meadows MD    The patient verbalized understanding of the problems and plans as explained.

## 2017-11-20 NOTE — PROGRESS NOTES
Chief Complaint   Patient presents with    Hypertension     f/u    Diabetes     results     1. Have you been to the ER, urgent care clinic since your last visit? Hospitalized since your last visit? No    2. Have you seen or consulted any other health care providers outside of the [address] [place] [address] since your last visit? Include any pap smears or colon screening.  No

## 2017-11-20 NOTE — PROGRESS NOTES
Blood sugar is extremely poorly controlled so I would switch his Lantus to tresiba and use 12 units nightly. No one should be using a sliding scale on long-acting insulin.

## 2017-11-20 NOTE — MR AVS SNAPSHOT
Visit Information Date & Time Provider Department Dept. Phone Encounter #  
 11/20/2017  1:50 PM Princess Brian MD [facility] [id number] Follow-up Instructions Return in about 1 month (around 12/20/2017). Follow-up and Disposition History Your Appointments 2/5/2018  9:00 AM  
FOLLOW UP 10 with MD CHON Perez [facility] ([facility]) Appt Note: [address] [facility] [address] [address] [address] [facility] [address] Upcoming Health Maintenance Date Due  
 EYE EXAM RETINAL OR DILATED Q1 9/14/1950 ZOSTER VACCINE AGE 60> 7/14/2000 GLAUCOMA SCREENING Q2Y 9/14/2005 HEMOGLOBIN A1C Q6M 4/19/2018 FOOT EXAM Q1 10/19/2018 MICROALBUMIN Q1 10/19/2018 LIPID PANEL Q1 10/19/2018 MEDICARE YEARLY EXAM 10/20/2018 DTaP/Tdap/Td series (2 - Td) 5/22/2027 Allergies as of 11/20/2017  Review Complete On: 11/20/2017 By: Princess Brian MD  
 No Known Allergies Current Immunizations  Reviewed on 10/19/2017 Name Date Influenza Vaccine 10/11/2016, 10/30/2015 Influenza Vaccine (Quad) PF 9/22/2017  8:45 AM  
 Pneumococcal Conjugate (PCV-13) 8/14/2015 Pneumococcal Polysaccharide (PPSV-23) 1/1/2006 Tdap 5/22/2017  1:11 PM  
  
 Not reviewed this visit You Were Diagnosed With   
  
 Codes Comments Essential hypertension    -  Primary ICD-10-CM: I10 
ICD-9-CM: 401.9 Controlled type 2 diabetes mellitus without complication, with long-term current use of insulin (HCC)     ICD-10-CM: E11.9, Z79.4 ICD-9-CM: 250.00, V58.67   
 ASVD (arteriosclerotic vascular disease)     ICD-10-CM: I70.90 ICD-9-CM: 440.9 Cerebrovascular accident (CVA), unspecified mechanism ([facility] [address]     ICD-10-CM: I63.9 ICD-9-CM: 434.91 Vitals BP Pulse Temp Resp Height(growth percentile) Weight(growth percentile)  
 138/60 (BP 1 Location: Left arm, BP Patient Position: Sitting) 60 (!) 94.6 °F (34.8 °C) (Oral) 14 5' 9\" (1.753 m) 171 lb (77.6 kg) SpO2 BMI Smoking Status 97% 25.25 kg/m2 Former Smoker Vitals History BMI and BSA Data Body Mass Index Body Surface Area  
 25.25 kg/m 2 1.94 m 2 Preferred Pharmacy Pharmacy Name Phone [facility] [address] , [address] [place] [address] Avenue [phone] Your Updated Medication List  
  
   
This list is accurate as of: 11/20/17  2:31 PM.  Always use your most recent med list. amLODIPine 5 mg tablet Commonly known as:  Tad Orquidea Take 5 mg by mouth daily. aspirin 81 mg chewable tablet Take 81 mg by mouth nightly. clopidogrel 75 mg Tab Commonly known as:  PLAVIX Take 75 mg by mouth daily. ferrous sulfate 325 mg (65 mg iron) tablet Take 325 mg by mouth three (3) times daily (with meals). insulin glargine 100 unit/mL injection Commonly known as:  LANTUS  
9-12 Units by SubCUTAneous route nightly. Patient uses sliding scale, but cannot remember at this time  
  
 lisinopril-hydroCHLOROthiazide 20-12.5 mg per tablet Commonly known as:  Irineo Mj Take 2 Tabs by mouth daily. losartan 100 mg tablet Commonly known as:  COZAAR Take 100 mg by mouth two (2) times a day. nadolol 40 mg tablet Commonly known as:  CORGARD Take 40 mg by mouth daily. NovoLIN N 100 unit/mL injection Generic drug:  insulin NPH  
20 Units by SubCUTAneous route daily (with breakfast). Indications: type 2 diabetes mellitus NovoLOG 100 unit/mL injection Generic drug:  insulin aspart 10 Units by SubCUTAneous route daily (with dinner). Indications: type 2 diabetes mellitus  
  
 omeprazole 20 mg capsule Commonly known as:  PRILOSEC Take 20 mg by mouth nightly. * simvastatin 10 mg tablet Commonly known as:  ZOCOR Take 10 mg by mouth. * simvastatin 10 mg tablet Commonly known as:  ZOCOR Take 10 mg by mouth nightly. tamsulosin 0.4 mg capsule Commonly known as:  FLOMAX Take 0.4 mg by mouth daily. traMADol 50 mg tablet Commonly known as:  ULTRAM  
1 tablet four times daily as needed for pain. * Notice: This list has 2 medication(s) that are the same as other medications prescribed for you. Read the directions carefully, and ask your doctor or other care provider to review them with you. We Performed the Following AMB POC BASIC METABOLIC PANEL [95788 CPT(R)] AMB POC COMPLETE CBC,AUTOMATED ENTER E0005500 CPT(R)] AMB POC URINALYSIS DIP STICK AUTO W/ MICRO  [83580 CPT(R)] Follow-up Instructions Return in about 1 month (around 12/20/2017). Introducing [facility] & HEALTH SERVICES! Abhilash Henry introduces Speak With Me patient portal. Now you can access parts of your medical record, email your doctor's office, and request medication refills online. 1. In your internet browser, go to [email or web address] 2. Click on the First Time User? Click Here link in the Sign In box. You will see the New Member Sign Up page. 3. Enter your Speak With Me Access Code exactly as it appears below. You will not need to use this code after youve completed the sign-up process. If you do not sign up before the expiration date, you must request a new code. · Speak With Me Access Code: [email or web address] Expires: 2/18/2018  1:27 PM 
 
4. Enter the last four digits of your Social Security Number (xxxx) and Date of Birth (mm/dd/yyyy) as indicated and click Submit. You will be taken to the next sign-up page. 5. Create a Speak With Me ID. This will be your Speak With Me login ID and cannot be changed, so think of one that is secure and easy to remember. 6. Create a [employer] password. You can change your password at any time. 7. Enter your Password Reset Question and Answer. This can be used at a later time if you forget your password. 8. Enter your e-mail address. You will receive e-mail notification when new information is available in [address] 9. Click Sign Up. You can now view and download portions of your medical record. 10. Click the Download Summary menu link to download a portable copy of your medical information. If you have questions, please visit the Frequently Asked Questions section of the [employer] website. Remember, Per[employer] is NOT to be used for urgent needs. For medical emergencies, dial 911. Now available from your iPhone and Android! Please provide this summary of care documentation to your next provider. Your primary care clinician is listed as Girma. If you have any questions after today's visit, please call [phone].

## 2017-12-04 NOTE — TELEPHONE ENCOUNTER
Requested Prescriptions     Pending Prescriptions Disp Refills    insulin degludec (TRESIBA FLEXTOUCH U-200) 200 unit/mL (3 mL) inpn 2 Pen 11     Si Units by SubCUTAneous route nightly as needed.

## 2017-12-04 NOTE — PROGRESS NOTES
Blood sugar is extremely poorly controlled so I would switch his Lantus to tresiba and use 12 units nightly. No one should be using a sliding scale on long-acting insulin. Patient informed. Rx sent to patient's pharmacy.

## 2017-12-20 PROBLEM — D64.9 ANEMIA: Status: ACTIVE | Noted: 2017-01-01

## 2017-12-20 NOTE — MR AVS SNAPSHOT
Visit Information Date & Time Provider Department Dept. Phone Encounter #  
 12/20/2017  1:30 PM Little Geronimo, [facility] [address] [facility] [phone] [id number] Your Appointments 1/22/2018  1:00 PM  
FOLLOW UP 10 with MD JOSH [facility] ([address]) Appt Note: 1 month follow up [address] [address] [address] [facility] [address] [address]  
  
    
 2/5/2018  9:00 AM  
FOLLOW UP 10 with MD JOSH [facility] ([address]) Appt Note: [address] [address] [phone] Upcoming Health Maintenance Date Due  
 EYE EXAM RETINAL OR DILATED Q1 9/14/1950 ZOSTER VACCINE AGE 60> 7/14/2000 GLAUCOMA SCREENING Q2Y 9/14/2005 HEMOGLOBIN A1C Q6M 4/19/2018 FOOT EXAM Q1 10/19/2018 MICROALBUMIN Q1 10/19/2018 LIPID PANEL Q1 10/19/2018 MEDICARE YEARLY EXAM 10/20/2018 DTaP/Tdap/Td series (2 - Td) 5/22/2027 Allergies as of 12/20/2017  Review Complete On: 12/20/2017 By: Little Geronimo MD  
 No Known Allergies Current Immunizations  Reviewed on 10/19/2017 Name Date Influenza Vaccine 10/11/2016, 10/30/2015 Influenza Vaccine (Quad) PF 9/22/2017  8:45 AM  
 Pneumococcal Conjugate (PCV-13) 8/14/2015 Pneumococcal Polysaccharide (PPSV-23) 1/1/2006 Tdap 5/22/2017  1:11 PM  
  
 Not reviewed this visit You Were Diagnosed With   
  
 Codes Comments Essential hypertension    -  Primary ICD-10-CM: I10 
ICD-9-CM: 401.9 Controlled type 2 diabetes mellitus without complication, with long-term current use of insulin (HCC)     ICD-10-CM: E11.9, Z79.4 ICD-9-CM: 250.00, V58.67 Vitals BP Pulse Temp Resp Height(growth percentile) Weight(growth percentile) 168/76 (!) 59 97.6 °F (36.4 °C) (Oral) 16 5' 9\" (1.753 m) 173 lb (78.5 kg) SpO2 BMI Smoking Status 100% 25.55 kg/m2 Former Smoker Vitals History BMI and BSA Data Body Mass Index Body Surface Area 25.55 kg/m 2 1.95 m 2 Preferred Pharmacy Pharmacy Name Phone [facility] [address] , [address] [phone] Your Updated Medication List  
  
   
This list is accurate as of: 12/20/17  2:19 PM.  Always use your most recent med list. amLODIPine 5 mg tablet Commonly known as:  Dick Praveen Take 5 mg by mouth daily. aspirin 81 mg chewable tablet Take 81 mg by mouth nightly. clopidogrel 75 mg Tab Commonly known as:  PLAVIX Take 75 mg by mouth daily. ferrous sulfate 325 mg (65 mg iron) tablet Take 325 mg by mouth three (3) times daily (with meals). insulin degludec 200 unit/mL (3 mL) Inpn Commonly known as:  TRESIBA FLEXTOUCH U-200  
12 Units by SubCUTAneous route nightly as needed. Insulin Needles (Disposable) 30 gauge x 1/3\" Use daily with his Stacie  pen  
  
 lisinopril-hydroCHLOROthiazide 20-12.5 mg per tablet Commonly known as:  Floyce Plenty Take 2 Tabs by mouth daily. losartan 100 mg tablet Commonly known as:  COZAAR Take 100 mg by mouth two (2) times a day. nadolol 40 mg tablet Commonly known as:  CORGARD Take 40 mg by mouth daily. NovoLIN N 100 unit/mL injection Generic drug:  insulin NPH  
20 Units by SubCUTAneous route daily (with breakfast). Indications: type 2 diabetes mellitus NovoLOG 100 unit/mL injection Generic drug:  insulin aspart 10 Units by SubCUTAneous route daily (with dinner). Indications: type 2 diabetes mellitus  
  
 omeprazole 20 mg capsule Commonly known as:  PRILOSEC Take 20 mg by mouth nightly. * simvastatin 10 mg tablet Commonly known as:  ZOCOR Take 10 mg by mouth. * simvastatin 10 mg tablet Commonly known as:  ZOCOR Take 10 mg by mouth nightly. tamsulosin 0.4 mg capsule Commonly known as:  FLOMAX Take 0.4 mg by mouth daily. traMADol 50 mg tablet Commonly known as:  ULTRAM  
1 tablet four times daily as needed for pain. * Notice: This list has 2 medication(s) that are the same as other medications prescribed for you. Read the directions carefully, and ask your doctor or other care provider to review them with you. Introducing [facility] HE[facility] SERVICES! Kai Arango introduces Voxxter patient portal. Now you can access parts of your medical record, email your doctor's office, and request medication refills online. 1. In your internet browser, go to [email or web address] 2. Click on the First Time User? Click Here link in the Sign In box. You will see the New Member Sign Up page. 3. Enter your Voxxter Access Code exactly as it appears below. You will not need to use this code after youve completed the sign-up process. If you do not sign up before the expiration date, you must request a new code. · Voxxter Access Code: [email or web address] Expires: 2/18/2018  1:27 PM 
 
4. Enter the last four digits of your Social Security Number (xxxx) and Date of Birth (mm/dd/yyyy) as indicated and click Submit. You will be taken to the next sign-up page. 5. Create a Voxxter ID. This will be your Voxxter login ID and cannot be changed, so think of one that is secure and easy to remember. 6. Create a V[employer]xter password. You can change your password at any time. 7. Enter your Password Reset Question and Answer. This can be used at a later time if you forget your password. 8. Enter your e-mail address. You will receive e-mail notification when new information is available in [address] 9. Click Sign Up. You can now view and download portions of your medical record.  
10. Click the Download Summary menu link to download a portable copy of your medical information. If you have questions, please visit the Frequently Asked Questions section of the [employer]HE[employer] website. Remember, [employer]HEOS is NOT to be used for urgent needs. For medical emergencies, dial 911. Now available from your iPhone and Android! Please provide this summary of care documentation to your next provider. Your primary care clinician is listed as Girma. If you have any questions after today's visit, please call [phone].

## 2017-12-20 NOTE — PROGRESS NOTES
Chief Complaint   Patient presents with    Hypertension     1 mo. f/u    Diabetes     1 mo. f/u       SUBJECTIVE:    Petros Son is a 68 y.o. male who returns in follow-up for his diabetes, chronic kidney disease, anemia, recent CVA with thrombotic stroke, recent osteomyelitis of his left hand, and  and DJD with chronic pain. He notes he is trying to taper his tramadol take less of it and substituted with Tylenol not helping some but he still needs her tramadol at times. He denies any chest pain shortness of breath or cardiorespiratory complaints. He denies any headaches he denies any GI/ complaints. Or focal neurologic weakness or new neurologic complaints. He is not sure that the [facility] insulin is made any difference in his blood sugars. He denies any poly-urea polydipsia. He does have the chronic arthritic complaints without change. He denies any other complaints complete review of systems. He is trying to follow his diet and take his medications regular. Current Outpatient Prescriptions   Medication Sig Dispense Refill    insulin degludec (TRESIBA FLEXTOUCH U-200) 200 unit/mL (3 mL) inpn 16 Units by SubCUTAneous route nightly. 2 Pen 11    Insulin Needles, Disposable, 30 gauge x 1/3\" Use daily with his [facility] pen 1 Package 11    simvastatin (ZOCOR) 10 mg tablet Take 10 mg by mouth.  nadolol (CORGARD) 40 mg tablet Take 40 mg by mouth daily.  traMADol (ULTRAM) 50 mg tablet 1 tablet four times daily as needed for pain. 360 Tab 1    losartan (COZAAR) 100 mg tablet Take 100 mg by mouth two (2) times a day.  lisinopril-hydroCHLOROthiazide (PRINZIDE, ZESTORETIC) 20-12.5 mg per tablet Take 2 Tabs by mouth daily.  clopidogrel (PLAVIX) 75 mg tab Take 75 mg by mouth daily.  ferrous sulfate 325 mg (65 mg iron) tablet Take 325 mg by mouth three (3) times daily (with meals).  omeprazole (PRILOSEC) 20 mg capsule Take 20 mg by mouth nightly.       insulin NPH (Keny Lay N) 100 unit/mL injection 20 Units by SubCUTAneous route daily (with breakfast). Indications: type 2 diabetes mellitus      insulin aspart (NOVOLOG) 100 unit/mL injection 10 Units by SubCUTAneous route daily (with dinner). Indications: type 2 diabetes mellitus      amLODIPine (NORVASC) 5 mg tablet Take 5 mg by mouth daily.  aspirin 81 mg chewable tablet Take 81 mg by mouth nightly.  tamsulosin (FLOMAX) 0.4 mg capsule Take 0.4 mg by mouth daily.  simvastatin (ZOCOR) 10 mg tablet Take 10 mg by mouth nightly.        Past Medical History:   Diagnosis Date    Allergic rhinitis     Anemia     ASVD (arteriosclerotic vascular disease)     Alfonso's esophagus 7/28/2017    Carotid stenosis 7/28/2017    Chronic kidney disease     Diabetes ([address]     glucose runs 70 - 160's at home    Diabetic neuropathy ([facility] [address] 7/28/2017    Diverticulosis     DJD (degenerative joint disease)     ED (erectile dysfunction)     Enlarged prostate     JUDY (generalized anxiety disorder)     GERD (gastroesophageal reflux disease)     controlled with med; alfonso's esophagus    Hiatal hernia     Hypercholesterolemia     Hypertension     Hypertrophy (benign) of prostate 7/28/2017    Ill-defined condition     peripheral vascular disease    Lung nodule     Neuropathy     On statin therapy 7/28/2017    Prostate cancer screening 7/28/2017    PVD (peripheral vascular disease) ([facility] [address]     Sebaceous cyst 7/28/2017    Spinal stenosis      Past Surgical History:   Procedure Laterality Date    CARDIAC SURG PROCEDURE UNLIST      CATH-2008    COLONOSCOPY N/A 6/15/2016    COL[facility]  6/15/2016         HX CATARACT REMOVAL      BILATERAL    HX ORTHOPAEDIC  1977    BONE CYST REM,ANU FROM RIGHT LEG    HX ORTHOPAEDIC  5-, 7-22-17, 7-26-17    left hand index finger    HX OTHER SURGICAL Left 06/2016    carotid endarectomy    NEUROLOGICAL PROCEDURE UNLISTED  2011    Back: spinal stenosis, pinched nerve repair    UPPER GI ENDOSCOPY,BIOPSY  6/15/2016          No Known Allergies    REVIEW OF SYSTEMS:  General: negative for - chills or fever, or weight loss or gain  ENT: negative for - headaches, nasal congestion or tinnitus  Eyes: no blurred or visual changes  Neck: No stiffness or swollen nodes  Respiratory: negative for - cough, hemoptysis, shortness of breath or wheezing  Cardiovascular : negative for - chest pain, edema, palpitations or shortness of breath  Gastrointestinal: negative for - abdominal pain, blood in stools, heartburn or nausea/vomiting  Genito-Urinary: no dysuria, trouble voiding, or hematuria  Musculoskeletal: negative for - gait disturbance, joint pain, joint stiffness or joint swelling  Neurological: no TIA or stroke symptoms  Hematologic: no bruises, no bleeding  Lymphatic: no swollen glands  Integument: no lumps, mole changes, nail changes or rash  Endocrine:no malaise/lethargy poly uria or polydipsia or unexpected weight changes        Social History     Social History    Marital status:      Spouse name: N/A    Number of children: N/A    Years of education: N/A     Social History Main Topics    Smoking status: Former Smoker     Quit date: 7/7/1996    Smokeless tobacco: Never Used    Alcohol use No      Comment: rarely    Drug use: No    Sexual activity: Yes     Other Topics Concern    None     Social History Narrative     Family History   Problem Relation Age of Onset    Diabetes Mother     Cancer Father      LUNG    Heart Disease Father        OBJECTIVE:     Visit Vitals    /76    Pulse (!) 59    Temp 97.6 °F (36.4 °C) (Oral)    Resp 16    Ht 5' 9\" (1.753 m)    Wt 173 lb (78.5 kg)    SpO2 100%    BMI 25.55 kg/m2     CONSTITUTIONAL:   well nourished, appears age appropriate  EYES: sclera anicteric, PERRL, EOMI  ENMT:nars clear, moist mucous membranes, pharynx clear  NECK: supple.  Thyroid normal, No JVD or bruits  RESPIRATORY: Chest: clear to ascultation and percussion, normal inspiratory effort  CARDIOVASCULAR: Heart: regular rate and rhythm no murmurs, rubs or gallops, PMI not displaced, No thrills  GASTROINTESTINAL: Abdomen: non distended, soft, non tender, bowel sounds normal  HEMATOLOGIC: no purpura, petechiae or bruising  LYMPHATIC: No lymph node enlargemant  MUSCULOSKELETAL: Extremities: no edema or active synovitis, pulse 1+   INTEGUMENT: No unusual rashes or suspicious skin lesions noted. Nails appear normal.  PERIPHERAL VASCULAR: normal pulses femoral, PT and DP  NEUROLOGIC: non-focal exam, A & O X 3  PSYCHIATRIC:, appropriate affect     ASSESSMENT:   1. Essential hypertension    2. Controlled type 2 diabetes mellitus without complication, with long-term current use of insulin ([address]    3. Anemia, unspecified type    4. Cerebrovascular accident (CVA), unspecified mechanism ([address]      Impression  1. Hypertension that is poorly controlled he had a systolic of 619 by the nurse was 160 by me I reviewed all his blood pressure medicines he says he is still taking them some ocular changes today since it was normal last check but it was supple next visit we will need to make some changes which I discussed with him  2. Diabetes we discussed the insulin at this point we will can increase his long-acting insulin to 16 units daily from his current dose of 12 units daily I reviewed the pending instructions with him and pointed out to him that the 600 units in each pin. 3.  Anemia repeat status pending and reviewed last labs with him  4. Recent CVA that seems to be stable  Other medical problems seem to be stable at this point will make no other changes in medication other than the increased dose of insulin. We will call with lab. I recheck him again in a month or sooner should they be a problem.     PLAN:  .  Orders Placed This Encounter    AMB POC BASIC METABOLIC PANEL    AMB POC COMPLETE CBC,AUTOMATED ENTER  insulin degludec (TRESIBA FLEXTOUCH U-200) 200 unit/mL (3 mL) inpn         ATTENTION:   This medical record was transcribed using an electronic medical records system. Although proofread, it may and can contain electronic and spelling errors. Other human spelling and other errors may be present. Corrections may be executed at a later time. Please feel free to contact us for any clarifications as needed. Follow-up Disposition:  Return in about 1 month (around 1/20/2018). No results found for any visits on 12/20/17. Braxtno Abdalla MD    The patient verbalized understanding of the problems and plans as explained.

## 2017-12-20 NOTE — PROGRESS NOTES
1. Have you been to the ER, urgent care clinic since your last visit? Hospitalized since your last visit? No    2. Have you seen or consulted any other health care providers outside of the [address] [place] [address] since your last visit? Include any pap smears or colon screening. No         Chief Complaint   Patient presents with    Hypertension     1 mo. f/u    Diabetes     1 mo.  f/u       Not fasting

## 2017-12-20 NOTE — PROGRESS NOTES
Blood sugar is extremely high so I would increase his Seldon Leaver to 20 units daily rather than 16 we did at office visit. Increase his a.m. NPH and Novolin insulin by 4 units each morning.

## 2017-12-22 NOTE — PROGRESS NOTES
Blood sugar is extremely high so I would increase his Rodolph Long to 20 units daily rather than 16 we did at office visit. Rivka Cancer his a.m. NPH and Novolin insulin by 4 units each morning. Patient informed.   States he will make these changes.

## 2017-12-22 NOTE — TELEPHONE ENCOUNTER
Requested Prescriptions     Pending Prescriptions Disp Refills    insulin degludec (TRESIBA FLEXTOUCH U-200) 200 unit/mL (3 mL) inpn 2 Pen 0     Si Units by SubCUTAneous route nightly.

## 2018-01-01 ENCOUNTER — APPOINTMENT (OUTPATIENT)
Dept: GENERAL RADIOLOGY | Age: 78
DRG: 283 | End: 2018-01-01
Attending: INTERNAL MEDICINE
Payer: MEDICARE

## 2018-01-01 ENCOUNTER — HOSPITAL ENCOUNTER (INPATIENT)
Age: 78
LOS: 2 days | DRG: 283 | End: 2018-02-17
Attending: EMERGENCY MEDICINE | Admitting: HOSPITALIST
Payer: MEDICARE

## 2018-01-01 ENCOUNTER — APPOINTMENT (OUTPATIENT)
Dept: CT IMAGING | Age: 78
DRG: 283 | End: 2018-01-01
Attending: PSYCHIATRY & NEUROLOGY
Payer: MEDICARE

## 2018-01-01 ENCOUNTER — APPOINTMENT (OUTPATIENT)
Dept: CT IMAGING | Age: 78
DRG: 283 | End: 2018-01-01
Attending: EMERGENCY MEDICINE
Payer: MEDICARE

## 2018-01-01 ENCOUNTER — OFFICE VISIT (OUTPATIENT)
Dept: INTERNAL MEDICINE CLINIC | Age: 78
End: 2018-01-01

## 2018-01-01 ENCOUNTER — APPOINTMENT (OUTPATIENT)
Dept: GENERAL RADIOLOGY | Age: 78
DRG: 283 | End: 2018-01-01
Attending: EMERGENCY MEDICINE
Payer: MEDICARE

## 2018-01-01 VITALS
WEIGHT: 196.65 LBS | DIASTOLIC BLOOD PRESSURE: 31 MMHG | SYSTOLIC BLOOD PRESSURE: 67 MMHG | HEIGHT: 69 IN | OXYGEN SATURATION: 17 % | TEMPERATURE: 99 F | BODY MASS INDEX: 29.13 KG/M2

## 2018-01-01 VITALS
DIASTOLIC BLOOD PRESSURE: 60 MMHG | SYSTOLIC BLOOD PRESSURE: 138 MMHG | HEART RATE: 60 BPM | RESPIRATION RATE: 16 BRPM | BODY MASS INDEX: 26.51 KG/M2 | TEMPERATURE: 98.4 F | OXYGEN SATURATION: 97 % | WEIGHT: 179 LBS | HEIGHT: 69 IN

## 2018-01-01 VITALS
TEMPERATURE: 98.6 F | OXYGEN SATURATION: 99 % | HEART RATE: 60 BPM | WEIGHT: 178.8 LBS | DIASTOLIC BLOOD PRESSURE: 62 MMHG | RESPIRATION RATE: 16 BRPM | SYSTOLIC BLOOD PRESSURE: 150 MMHG | BODY MASS INDEX: 26.48 KG/M2 | HEIGHT: 69 IN

## 2018-01-01 DIAGNOSIS — I10 ESSENTIAL HYPERTENSION: Primary | ICD-10-CM

## 2018-01-01 DIAGNOSIS — I70.90 ASVD (ARTERIOSCLEROTIC VASCULAR DISEASE): ICD-10-CM

## 2018-01-01 DIAGNOSIS — E11.9 CONTROLLED TYPE 2 DIABETES MELLITUS WITHOUT COMPLICATION, WITH LONG-TERM CURRENT USE OF INSULIN (HCC): ICD-10-CM

## 2018-01-01 DIAGNOSIS — Z95.0 S/P CARDIAC PACEMAKER PROCEDURE: ICD-10-CM

## 2018-01-01 DIAGNOSIS — I63.9 CEREBROVASCULAR ACCIDENT (CVA), UNSPECIFIED MECHANISM (HCC): ICD-10-CM

## 2018-01-01 DIAGNOSIS — K22.70 BARRETT'S ESOPHAGUS WITHOUT DYSPLASIA: ICD-10-CM

## 2018-01-01 DIAGNOSIS — E11.42 DIABETIC POLYNEUROPATHY ASSOCIATED WITH TYPE 2 DIABETES MELLITUS (HCC): ICD-10-CM

## 2018-01-01 DIAGNOSIS — R40.2431 GLASGOW COMA SCALE TOTAL SCORE 3-8, IN THE FIELD (EMT OR AMBULANCE) (HCC): ICD-10-CM

## 2018-01-01 DIAGNOSIS — I63.311 THROMBOTIC STROKE INVOLVING RIGHT MIDDLE CEREBRAL ARTERY (HCC): ICD-10-CM

## 2018-01-01 DIAGNOSIS — I10 ESSENTIAL HYPERTENSION: ICD-10-CM

## 2018-01-01 DIAGNOSIS — I21.4 ACUTE NON-ST ELEVATION MYOCARDIAL INFARCTION (NSTEMI) (HCC): ICD-10-CM

## 2018-01-01 DIAGNOSIS — G93.6: ICD-10-CM

## 2018-01-01 DIAGNOSIS — Z79.4 CONTROLLED TYPE 2 DIABETES MELLITUS WITHOUT COMPLICATION, WITH LONG-TERM CURRENT USE OF INSULIN (HCC): ICD-10-CM

## 2018-01-01 DIAGNOSIS — D64.9 ANEMIA, UNSPECIFIED TYPE: ICD-10-CM

## 2018-01-01 DIAGNOSIS — N40.0 BENIGN PROSTATIC HYPERPLASIA WITHOUT LOWER URINARY TRACT SYMPTOMS: ICD-10-CM

## 2018-01-01 DIAGNOSIS — E78.2 MIXED HYPERLIPIDEMIA: ICD-10-CM

## 2018-01-01 DIAGNOSIS — R09.02: ICD-10-CM

## 2018-01-01 DIAGNOSIS — I44.2 COMPLETE HEART BLOCK (HCC): ICD-10-CM

## 2018-01-01 DIAGNOSIS — I46.9 CARDIAC ARREST (HCC): Primary | ICD-10-CM

## 2018-01-01 LAB
ALBUMIN SERPL-MCNC: 1.5 G/DL (ref 3.5–5)
ALBUMIN SERPL-MCNC: 2 G/DL (ref 3.5–5)
ALBUMIN SERPL-MCNC: 2.1 G/DL (ref 3.5–5)
ALBUMIN SERPL-MCNC: 3.1 G/DL (ref 3.9–5.4)
ALBUMIN SERPL-MCNC: 3.4 G/DL (ref 3.9–5.4)
ALBUMIN/GLOB SERPL: 0.6 {RATIO} (ref 1.1–2.2)
ALKALINE PHOS POC: 76 U/L (ref 38–126)
ALKALINE PHOS POC: 82 U/L (ref 38–126)
ALP SERPL-CCNC: 135 U/L (ref 45–117)
ALP SERPL-CCNC: 175 U/L (ref 45–117)
ALP SERPL-CCNC: 239 U/L (ref 45–117)
ALT SERPL-CCNC: 12 U/L (ref 9–52)
ALT SERPL-CCNC: 130 U/L (ref 12–78)
ALT SERPL-CCNC: 16 U/L (ref 9–52)
ALT SERPL-CCNC: 76 U/L (ref 12–78)
ALT SERPL-CCNC: 77 U/L (ref 12–78)
AMORPH CRY URNS QL MICRO: ABNORMAL
ANION GAP SERPL CALC-SCNC: 10 MMOL/L (ref 5–15)
ANION GAP SERPL CALC-SCNC: 11 MMOL/L (ref 5–15)
ANION GAP SERPL CALC-SCNC: 14 MMOL/L (ref 5–15)
ANION GAP SERPL CALC-SCNC: 9 MMOL/L (ref 5–15)
APPEARANCE UR: ABNORMAL
APTT PPP: 39 SEC (ref 22.1–32.5)
ARTERIAL PATENCY WRIST A: YES
AST SERPL-CCNC: 118 U/L (ref 15–37)
AST SERPL-CCNC: 150 U/L (ref 15–37)
AST SERPL-CCNC: 17 U/L (ref 14–36)
AST SERPL-CCNC: 18 U/L (ref 14–36)
AST SERPL-CCNC: 250 U/L (ref 15–37)
ATRIAL RATE: 56 BPM
ATRIAL RATE: 72 BPM
BACTERIA URNS QL MICRO: NEGATIVE /HPF
BASE DEFICIT BLDA-SCNC: 8.2 MMOL/L
BASOPHILS # BLD: 0 K/UL (ref 0–0.1)
BASOPHILS # BLD: 0 K/UL (ref 0–0.1)
BASOPHILS # BLD: 0.1 K/UL (ref 0–0.1)
BASOPHILS NFR BLD: 0 % (ref 0–1)
BDY SITE: ABNORMAL
BILIRUB DIRECT SERPL-MCNC: 0.1 MG/DL (ref 0–0.2)
BILIRUB SERPL-MCNC: 0.2 MG/DL (ref 0.2–1)
BILIRUB SERPL-MCNC: 0.4 MG/DL (ref 0.2–1)
BILIRUB SERPL-MCNC: 0.6 MG/DL (ref 0.2–1)
BILIRUB UR QL: NEGATIVE
BUN BLD-MCNC: 19 MG/DL (ref 9–20)
BUN BLD-MCNC: 30 MG/DL (ref 9–20)
BUN SERPL-MCNC: 24 MG/DL (ref 6–20)
BUN SERPL-MCNC: 38 MG/DL (ref 6–20)
BUN SERPL-MCNC: 51 MG/DL (ref 6–20)
BUN SERPL-MCNC: 51 MG/DL (ref 6–20)
BUN/CREAT SERPL: 20 (ref 12–20)
BUN/CREAT SERPL: 21 (ref 12–20)
BUN/CREAT SERPL: 23 (ref 12–20)
BUN/CREAT SERPL: 24 (ref 12–20)
CALCIUM BLD-MCNC: 8.7 MG/DL (ref 8.4–10.2)
CALCIUM BLD-MCNC: 9.2 MG/DL (ref 8.4–10.2)
CALCIUM SERPL-MCNC: 6.1 MG/DL (ref 8.5–10.1)
CALCIUM SERPL-MCNC: 7.3 MG/DL (ref 8.5–10.1)
CALCIUM SERPL-MCNC: 7.5 MG/DL (ref 8.5–10.1)
CALCIUM SERPL-MCNC: 8 MG/DL (ref 8.5–10.1)
CALCULATED P AXIS, ECG09: 53 DEGREES
CALCULATED R AXIS, ECG10: -74 DEGREES
CALCULATED R AXIS, ECG10: -77 DEGREES
CALCULATED T AXIS, ECG11: 125 DEGREES
CALCULATED T AXIS, ECG11: 129 DEGREES
CHLORIDE BLD-SCNC: 103 MMOL/L (ref 98–107)
CHLORIDE BLD-SCNC: 103 MMOL/L (ref 98–107)
CHLORIDE SERPL-SCNC: 106 MMOL/L (ref 97–108)
CHLORIDE SERPL-SCNC: 108 MMOL/L (ref 97–108)
CHLORIDE SERPL-SCNC: 111 MMOL/L (ref 97–108)
CHLORIDE SERPL-SCNC: 114 MMOL/L (ref 97–108)
CHOLEST SERPL-MCNC: 134 MG/DL (ref 0–200)
CHOLEST SERPL-MCNC: 140 MG/DL (ref 0–200)
CK (CPK) POC: 29 U/L (ref 30–135)
CK MB CFR SERPL CALC: 2.2 % (ref 0–2.5)
CK MB CFR SERPL CALC: 5.9 % (ref 0–2.5)
CK MB CFR SERPL CALC: 7.4 % (ref 0–2.5)
CK MB SERPL-MCNC: 24.9 NG/ML (ref 5–25)
CK MB SERPL-MCNC: 28.5 NG/ML (ref 5–25)
CK MB SERPL-MCNC: 6.5 NG/ML (ref 5–25)
CK SERPL-CCNC: 291 U/L (ref 39–308)
CK SERPL-CCNC: 387 U/L (ref 39–308)
CK SERPL-CCNC: 425 U/L (ref 39–308)
CO2 POC: 28 MMOL/L (ref 22–32)
CO2 POC: 29 MMOL/L (ref 22–32)
CO2 SERPL-SCNC: 19 MMOL/L (ref 21–32)
CO2 SERPL-SCNC: 21 MMOL/L (ref 21–32)
CO2 SERPL-SCNC: 23 MMOL/L (ref 21–32)
CO2 SERPL-SCNC: 23 MMOL/L (ref 21–32)
COLOR UR: ABNORMAL
CREAT BLD-MCNC: 1 MG/DL (ref 0.8–1.5)
CREAT BLD-MCNC: 1 MG/DL (ref 0.8–1.5)
CREAT SERPL-MCNC: 1.15 MG/DL (ref 0.7–1.3)
CREAT SERPL-MCNC: 1.58 MG/DL (ref 0.7–1.3)
CREAT SERPL-MCNC: 2.19 MG/DL (ref 0.7–1.3)
CREAT SERPL-MCNC: 2.49 MG/DL (ref 0.7–1.3)
DIAGNOSIS, 93000: NORMAL
DIAGNOSIS, 93000: NORMAL
DIFFERENTIAL METHOD BLD: ABNORMAL
EGFR (POC): 72.3
EGFR (POC): 72.3
EOSINOPHIL # BLD: 0 K/UL (ref 0–0.4)
EOSINOPHIL # BLD: 0 K/UL (ref 0–0.4)
EOSINOPHIL # BLD: 0.1 K/UL (ref 0–0.4)
EOSINOPHIL NFR BLD: 0 % (ref 0–7)
EOSINOPHIL NFR BLD: 0 % (ref 0–7)
EOSINOPHIL NFR BLD: 1 % (ref 0–7)
EPAP/CPAP/PEEP, PAPEEP: 6
EPITH CASTS URNS QL MICRO: ABNORMAL /LPF
ERYTHROCYTE [DISTWIDTH] IN BLOOD BY AUTOMATED COUNT: 14.4 % (ref 11.5–14.5)
ERYTHROCYTE [DISTWIDTH] IN BLOOD BY AUTOMATED COUNT: 14.5 % (ref 11.5–14.5)
ERYTHROCYTE [DISTWIDTH] IN BLOOD BY AUTOMATED COUNT: 14.6 % (ref 11.5–14.5)
ERYTHROCYTE [DISTWIDTH] IN BLOOD BY AUTOMATED COUNT: 15 % (ref 11.5–14.5)
FIO2 ON VENT: 100 %
FLUAV AG NPH QL IA: NEGATIVE
FLUBV AG NOSE QL IA: NEGATIVE
GAS FLOW.O2 SETTING OXYMISER: 14 L/MIN
GLOBULIN SER CALC-MCNC: 2.7 G/DL (ref 2–4)
GLOBULIN SER CALC-MCNC: 3.5 G/DL (ref 2–4)
GLOBULIN SER CALC-MCNC: 3.8 G/DL (ref 2–4)
GLUCOSE BLD STRIP.AUTO-MCNC: 194 MG/DL (ref 65–100)
GLUCOSE BLD STRIP.AUTO-MCNC: 207 MG/DL (ref 65–100)
GLUCOSE BLD STRIP.AUTO-MCNC: 207 MG/DL (ref 65–100)
GLUCOSE BLD STRIP.AUTO-MCNC: 225 MG/DL (ref 65–100)
GLUCOSE BLD STRIP.AUTO-MCNC: 240 MG/DL (ref 65–100)
GLUCOSE BLD STRIP.AUTO-MCNC: 255 MG/DL (ref 65–100)
GLUCOSE BLD STRIP.AUTO-MCNC: 273 MG/DL (ref 65–100)
GLUCOSE BLD STRIP.AUTO-MCNC: 278 MG/DL (ref 65–100)
GLUCOSE BLD STRIP.AUTO-MCNC: 293 MG/DL (ref 65–100)
GLUCOSE BLD STRIP.AUTO-MCNC: 308 MG/DL (ref 65–100)
GLUCOSE BLD STRIP.AUTO-MCNC: 353 MG/DL (ref 65–100)
GLUCOSE POC: 62 MG/DL (ref 75–110)
GLUCOSE POC: 93 MG/DL (ref 75–110)
GLUCOSE SERPL-MCNC: 229 MG/DL (ref 65–100)
GLUCOSE SERPL-MCNC: 271 MG/DL (ref 65–100)
GLUCOSE SERPL-MCNC: 278 MG/DL (ref 65–100)
GLUCOSE SERPL-MCNC: 333 MG/DL (ref 65–100)
GLUCOSE UR STRIP.AUTO-MCNC: NEGATIVE MG/DL
HBA1C MFR BLD HPLC: 9 % (ref 4.5–5.7)
HBA1C MFR BLD HPLC: 9.5 % (ref 4.5–5.7)
HCO3 BLDA-SCNC: 17 MMOL/L (ref 22–26)
HCT VFR BLD AUTO: 26.2 % (ref 36.6–50.3)
HCT VFR BLD AUTO: 30.6 % (ref 36.6–50.3)
HCT VFR BLD AUTO: 33.3 % (ref 36.6–50.3)
HCT VFR BLD AUTO: 35.5 % (ref 36.6–50.3)
HDLC SERPL-MCNC: 46 MG/DL (ref 35–130)
HDLC SERPL-MCNC: 53 MG/DL (ref 35–130)
HGB BLD-MCNC: 10 G/DL (ref 12.1–17)
HGB BLD-MCNC: 10.8 G/DL (ref 12.1–17)
HGB BLD-MCNC: 11.4 G/DL (ref 12.1–17)
HGB BLD-MCNC: 7.9 G/DL (ref 12.1–17)
HGB UR QL STRIP: NEGATIVE
IMM GRANULOCYTES # BLD: 0 K/UL (ref 0–0.04)
IMM GRANULOCYTES # BLD: 0.1 K/UL (ref 0–0.04)
IMM GRANULOCYTES # BLD: 0.1 K/UL (ref 0–0.04)
IMM GRANULOCYTES NFR BLD AUTO: 0 % (ref 0–0.5)
IMM GRANULOCYTES NFR BLD AUTO: 1 % (ref 0–0.5)
IMM GRANULOCYTES NFR BLD AUTO: 1 % (ref 0–0.5)
INR PPP: 1.3 (ref 0.9–1.1)
KETONES UR QL STRIP.AUTO: NEGATIVE MG/DL
LACTATE SERPL-SCNC: 1.7 MMOL/L (ref 0.4–2)
LACTATE SERPL-SCNC: 1.9 MMOL/L (ref 0.4–2)
LACTATE SERPL-SCNC: 5.1 MMOL/L (ref 0.4–2)
LDL CHOLESTEROL POC: 67 MG/DL (ref 0–130)
LDL CHOLESTEROL POC: 73 MG/DL (ref 0–130)
LEUKOCYTE ESTERASE UR QL STRIP.AUTO: NEGATIVE
LYMPHOCYTES # BLD: 1 K/UL (ref 0.8–3.5)
LYMPHOCYTES # BLD: 1 K/UL (ref 0.8–3.5)
LYMPHOCYTES # BLD: 2 K/UL (ref 0.8–3.5)
LYMPHOCYTES NFR BLD: 28 % (ref 12–49)
LYMPHOCYTES NFR BLD: 8 % (ref 12–49)
LYMPHOCYTES NFR BLD: 9 % (ref 12–49)
MAGNESIUM SERPL-MCNC: 2 MG/DL (ref 1.6–2.4)
MAGNESIUM SERPL-MCNC: 2 MG/DL (ref 1.6–2.4)
MAGNESIUM SERPL-MCNC: 2.1 MG/DL (ref 1.6–2.4)
MCH RBC QN AUTO: 29.2 PG (ref 26–34)
MCH RBC QN AUTO: 29.9 PG (ref 26–34)
MCH RBC QN AUTO: 30.3 PG (ref 26–34)
MCH RBC QN AUTO: 30.3 PG (ref 26–34)
MCHC RBC AUTO-ENTMCNC: 30.2 G/DL (ref 30–36.5)
MCHC RBC AUTO-ENTMCNC: 32.1 G/DL (ref 30–36.5)
MCHC RBC AUTO-ENTMCNC: 32.4 G/DL (ref 30–36.5)
MCHC RBC AUTO-ENTMCNC: 32.7 G/DL (ref 30–36.5)
MCV RBC AUTO: 100.4 FL (ref 80–99)
MCV RBC AUTO: 90 FL (ref 80–99)
MCV RBC AUTO: 91.3 FL (ref 80–99)
MCV RBC AUTO: 94.4 FL (ref 80–99)
METAMYELOCYTES NFR BLD MANUAL: 1 %
MONOCYTES # BLD: 0.3 K/UL (ref 0–1)
MONOCYTES # BLD: 0.8 K/UL (ref 0–1)
MONOCYTES # BLD: 0.9 K/UL (ref 0–1)
MONOCYTES NFR BLD: 4 % (ref 5–13)
MONOCYTES NFR BLD: 7 % (ref 5–13)
MONOCYTES NFR BLD: 8 % (ref 5–13)
MYELOCYTES NFR BLD MANUAL: 2 %
NEUTS BAND NFR BLD MANUAL: 9 %
NEUTS SEG # BLD: 10.5 K/UL (ref 1.8–8)
NEUTS SEG # BLD: 4.5 K/UL (ref 1.8–8)
NEUTS SEG # BLD: 8.9 K/UL (ref 1.8–8)
NEUTS SEG NFR BLD: 55 % (ref 32–75)
NEUTS SEG NFR BLD: 82 % (ref 32–75)
NEUTS SEG NFR BLD: 84 % (ref 32–75)
NITRITE UR QL STRIP.AUTO: NEGATIVE
NRBC # BLD: 0 K/UL (ref 0–0.01)
NRBC BLD-RTO: 0 PER 100 WBC
PCO2 BLDA: 36 MMHG (ref 35–45)
PH BLDA: 7.3 [PH] (ref 7.35–7.45)
PH UR STRIP: 5.5 [PH] (ref 5–8)
PHOSPHATE SERPL-MCNC: 3 MG/DL (ref 2.6–4.7)
PHOSPHATE SERPL-MCNC: 4.4 MG/DL (ref 2.6–4.7)
PLATELET # BLD AUTO: 190 K/UL (ref 150–400)
PLATELET # BLD AUTO: 202 K/UL (ref 150–400)
PLATELET # BLD AUTO: 222 K/UL (ref 150–400)
PLATELET # BLD AUTO: 319 K/UL (ref 150–400)
PMV BLD AUTO: 10 FL (ref 8.9–12.9)
PMV BLD AUTO: 10.2 FL (ref 8.9–12.9)
PMV BLD AUTO: 10.3 FL (ref 8.9–12.9)
PMV BLD AUTO: 11.1 FL (ref 8.9–12.9)
PO2 BLDA: 391 MMHG (ref 80–100)
POTASSIUM SERPL-SCNC: 3.4 MMOL/L (ref 3.5–5.1)
POTASSIUM SERPL-SCNC: 3.5 MMOL/L (ref 3.5–5.1)
POTASSIUM SERPL-SCNC: 3.8 MMOL/L (ref 3.5–5.1)
POTASSIUM SERPL-SCNC: 3.8 MMOL/L (ref 3.6–5)
POTASSIUM SERPL-SCNC: 4.2 MMOL/L (ref 3.6–5)
POTASSIUM SERPL-SCNC: 4.4 MMOL/L (ref 3.5–5.1)
PROT SERPL-MCNC: 4.2 G/DL (ref 6.4–8.2)
PROT SERPL-MCNC: 5.5 G/DL (ref 6.4–8.2)
PROT SERPL-MCNC: 5.9 G/DL (ref 6.4–8.2)
PROT SERPL-MCNC: 6.2 G/DL (ref 6.3–8.2)
PROT SERPL-MCNC: 6.3 G/DL (ref 6.3–8.2)
PROT UR STRIP-MCNC: 300 MG/DL
PROTHROMBIN TIME: 12.7 SEC (ref 9–11.1)
Q-T INTERVAL, ECG07: 490 MS
Q-T INTERVAL, ECG07: 500 MS
QRS DURATION, ECG06: 162 MS
QRS DURATION, ECG06: 208 MS
QTC CALCULATION (BEZET), ECG08: 543 MS
QTC CALCULATION (BEZET), ECG08: 590 MS
RBC # BLD AUTO: 2.61 M/UL (ref 4.1–5.7)
RBC # BLD AUTO: 3.35 M/UL (ref 4.1–5.7)
RBC # BLD AUTO: 3.7 M/UL (ref 4.1–5.7)
RBC # BLD AUTO: 3.76 M/UL (ref 4.1–5.7)
RBC #/AREA URNS HPF: ABNORMAL /HPF (ref 0–5)
RBC MORPH BLD: ABNORMAL
SAO2 % BLD: 100 % (ref 92–97)
SAO2% DEVICE SAO2% SENSOR NAME: ABNORMAL
SERVICE CMNT-IMP: ABNORMAL
SODIUM SERPL-SCNC: 137 MMOL/L (ref 136–145)
SODIUM SERPL-SCNC: 139 MMOL/L (ref 137–145)
SODIUM SERPL-SCNC: 140 MMOL/L (ref 136–145)
SODIUM SERPL-SCNC: 141 MMOL/L (ref 137–145)
SODIUM SERPL-SCNC: 145 MMOL/L (ref 136–145)
SODIUM SERPL-SCNC: 147 MMOL/L (ref 136–145)
SP GR UR REFRACTOMETRY: 1.02 (ref 1–1.03)
SPECIMEN SITE: ABNORMAL
TCHOL/HDL RATIO (POC): 2.6 (ref 0–4)
TCHOL/HDL RATIO (POC): 2.9 (ref 0–4)
THERAPEUTIC RANGE,PTTT: ABNORMAL SECS (ref 58–77)
TOTAL BILIRUBIN POC: 0.8 MG/DL (ref 0.2–1.3)
TOTAL BILIRUBIN POC: 0.8 MG/DL (ref 0.2–1.3)
TRIGL SERPL-MCNC: 100 MG/DL (ref 0–200)
TRIGL SERPL-MCNC: 75 MG/DL (ref 0–200)
TROPONIN I SERPL-MCNC: 3.04 NG/ML
TROPONIN I SERPL-MCNC: 6.31 NG/ML
TROPONIN I SERPL-MCNC: 8 NG/ML
UA: UC IF INDICATED,UAUC: ABNORMAL
UROBILINOGEN UR QL STRIP.AUTO: 0.2 EU/DL (ref 0.2–1)
VENTILATION MODE VENT: ABNORMAL
VENTRICULAR RATE, ECG03: 74 BPM
VENTRICULAR RATE, ECG03: 84 BPM
VLDLC SERPL CALC-MCNC: 15 MG/DL
VLDLC SERPL CALC-MCNC: 20 MG/DL
VT SETTING VENT: 500 ML
WBC # BLD AUTO: 10.9 K/UL (ref 4.1–11.1)
WBC # BLD AUTO: 12.4 K/UL (ref 4.1–11.1)
WBC # BLD AUTO: 17.7 K/UL (ref 4.1–11.1)
WBC # BLD AUTO: 7.1 K/UL (ref 4.1–11.1)
WBC URNS QL MICRO: ABNORMAL /HPF (ref 0–4)

## 2018-01-01 PROCEDURE — 83735 ASSAY OF MAGNESIUM: CPT | Performed by: HOSPITALIST

## 2018-01-01 PROCEDURE — 74018 RADEX ABDOMEN 1 VIEW: CPT

## 2018-01-01 PROCEDURE — 82962 GLUCOSE BLOOD TEST: CPT

## 2018-01-01 PROCEDURE — 74011250636 HC RX REV CODE- 250/636: Performed by: INTERNAL MEDICINE

## 2018-01-01 PROCEDURE — 96365 THER/PROPH/DIAG IV INF INIT: CPT

## 2018-01-01 PROCEDURE — 36569 INSJ PICC 5 YR+ W/O IMAGING: CPT | Performed by: INTERNAL MEDICINE

## 2018-01-01 PROCEDURE — 83605 ASSAY OF LACTIC ACID: CPT | Performed by: EMERGENCY MEDICINE

## 2018-01-01 PROCEDURE — 77030010896 HC CIRC VENT TRNSPT TRAN -B

## 2018-01-01 PROCEDURE — 84100 ASSAY OF PHOSPHORUS: CPT | Performed by: INTERNAL MEDICINE

## 2018-01-01 PROCEDURE — 74011250637 HC RX REV CODE- 250/637: Performed by: INTERNAL MEDICINE

## 2018-01-01 PROCEDURE — 77030018719 HC DRSG PTCH ANTIMIC J&J -A

## 2018-01-01 PROCEDURE — 74011636637 HC RX REV CODE- 636/637: Performed by: HOSPITALIST

## 2018-01-01 PROCEDURE — 80053 COMPREHEN METABOLIC PANEL: CPT | Performed by: HOSPITALIST

## 2018-01-01 PROCEDURE — 71045 X-RAY EXAM CHEST 1 VIEW: CPT

## 2018-01-01 PROCEDURE — 36600 WITHDRAWAL OF ARTERIAL BLOOD: CPT | Performed by: EMERGENCY MEDICINE

## 2018-01-01 PROCEDURE — 80048 BASIC METABOLIC PNL TOTAL CA: CPT | Performed by: INTERNAL MEDICINE

## 2018-01-01 PROCEDURE — 85027 COMPLETE CBC AUTOMATED: CPT | Performed by: HOSPITALIST

## 2018-01-01 PROCEDURE — 74011636637 HC RX REV CODE- 636/637: Performed by: INTERNAL MEDICINE

## 2018-01-01 PROCEDURE — 77030018846 HC SOL IRR STRL H20 ICUM -A

## 2018-01-01 PROCEDURE — 85025 COMPLETE CBC W/AUTO DIFF WBC: CPT | Performed by: INTERNAL MEDICINE

## 2018-01-01 PROCEDURE — 77030005514 HC CATH URETH FOL14 BARD -A

## 2018-01-01 PROCEDURE — 94003 VENT MGMT INPAT SUBQ DAY: CPT

## 2018-01-01 PROCEDURE — 80076 HEPATIC FUNCTION PANEL: CPT | Performed by: INTERNAL MEDICINE

## 2018-01-01 PROCEDURE — 93922 UPR/L XTREMITY ART 2 LEVELS: CPT

## 2018-01-01 PROCEDURE — 36415 COLL VENOUS BLD VENIPUNCTURE: CPT | Performed by: EMERGENCY MEDICINE

## 2018-01-01 PROCEDURE — 74011250636 HC RX REV CODE- 250/636: Performed by: HOSPITALIST

## 2018-01-01 PROCEDURE — 36415 COLL VENOUS BLD VENIPUNCTURE: CPT | Performed by: INTERNAL MEDICINE

## 2018-01-01 PROCEDURE — 74011250637 HC RX REV CODE- 250/637: Performed by: HOSPITALIST

## 2018-01-01 PROCEDURE — 93005 ELECTROCARDIOGRAM TRACING: CPT

## 2018-01-01 PROCEDURE — 74011250636 HC RX REV CODE- 250/636: Performed by: EMERGENCY MEDICINE

## 2018-01-01 PROCEDURE — 99285 EMERGENCY DEPT VISIT HI MDM: CPT

## 2018-01-01 PROCEDURE — 74011000250 HC RX REV CODE- 250: Performed by: INTERNAL MEDICINE

## 2018-01-01 PROCEDURE — 87040 BLOOD CULTURE FOR BACTERIA: CPT | Performed by: EMERGENCY MEDICINE

## 2018-01-01 PROCEDURE — 65610000006 HC RM INTENSIVE CARE

## 2018-01-01 PROCEDURE — 74011000258 HC RX REV CODE- 258: Performed by: HOSPITALIST

## 2018-01-01 PROCEDURE — 51702 INSERT TEMP BLADDER CATH: CPT

## 2018-01-01 PROCEDURE — 85610 PROTHROMBIN TIME: CPT | Performed by: EMERGENCY MEDICINE

## 2018-01-01 PROCEDURE — 5A1945Z RESPIRATORY VENTILATION, 24-96 CONSECUTIVE HOURS: ICD-10-PCS

## 2018-01-01 PROCEDURE — 84484 ASSAY OF TROPONIN QUANT: CPT | Performed by: HOSPITALIST

## 2018-01-01 PROCEDURE — 70450 CT HEAD/BRAIN W/O DYE: CPT

## 2018-01-01 PROCEDURE — C1751 CATH, INF, PER/CENT/MIDLINE: HCPCS

## 2018-01-01 PROCEDURE — 74011000250 HC RX REV CODE- 250: Performed by: EMERGENCY MEDICINE

## 2018-01-01 PROCEDURE — 83735 ASSAY OF MAGNESIUM: CPT | Performed by: INTERNAL MEDICINE

## 2018-01-01 PROCEDURE — 94002 VENT MGMT INPAT INIT DAY: CPT

## 2018-01-01 PROCEDURE — 82550 ASSAY OF CK (CPK): CPT | Performed by: INTERNAL MEDICINE

## 2018-01-01 PROCEDURE — 0DH67UZ INSERTION OF FEEDING DEVICE INTO STOMACH, VIA NATURAL OR ARTIFICIAL OPENING: ICD-10-PCS | Performed by: RADIOLOGY

## 2018-01-01 PROCEDURE — 71275 CT ANGIOGRAPHY CHEST: CPT

## 2018-01-01 PROCEDURE — 93306 TTE W/DOPPLER COMPLETE: CPT

## 2018-01-01 PROCEDURE — 87804 INFLUENZA ASSAY W/OPTIC: CPT | Performed by: EMERGENCY MEDICINE

## 2018-01-01 PROCEDURE — 77030018786 HC NDL GD F/USND BARD -B

## 2018-01-01 PROCEDURE — 77030008768 HC TU NG VYGC -A

## 2018-01-01 PROCEDURE — 82803 BLOOD GASES ANY COMBINATION: CPT | Performed by: EMERGENCY MEDICINE

## 2018-01-01 PROCEDURE — 77030020847 HC STATLOK BARD -A

## 2018-01-01 PROCEDURE — 82553 CREATINE MB FRACTION: CPT | Performed by: INTERNAL MEDICINE

## 2018-01-01 PROCEDURE — 81001 URINALYSIS AUTO W/SCOPE: CPT | Performed by: EMERGENCY MEDICINE

## 2018-01-01 PROCEDURE — 80053 COMPREHEN METABOLIC PANEL: CPT | Performed by: EMERGENCY MEDICINE

## 2018-01-01 PROCEDURE — 84484 ASSAY OF TROPONIN QUANT: CPT | Performed by: INTERNAL MEDICINE

## 2018-01-01 PROCEDURE — 85025 COMPLETE CBC W/AUTO DIFF WBC: CPT | Performed by: EMERGENCY MEDICINE

## 2018-01-01 PROCEDURE — 02HV33Z INSERTION OF INFUSION DEVICE INTO SUPERIOR VENA CAVA, PERCUTANEOUS APPROACH: ICD-10-PCS | Performed by: INTERNAL MEDICINE

## 2018-01-01 PROCEDURE — 75810000137 HC PLCMT CENT VENOUS CATH

## 2018-01-01 PROCEDURE — 76937 US GUIDE VASCULAR ACCESS: CPT

## 2018-01-01 PROCEDURE — 83605 ASSAY OF LACTIC ACID: CPT | Performed by: INTERNAL MEDICINE

## 2018-01-01 PROCEDURE — 82550 ASSAY OF CK (CPK): CPT | Performed by: HOSPITALIST

## 2018-01-01 PROCEDURE — 85730 THROMBOPLASTIN TIME PARTIAL: CPT | Performed by: EMERGENCY MEDICINE

## 2018-01-01 PROCEDURE — 87070 CULTURE OTHR SPECIMN AEROBIC: CPT | Performed by: INTERNAL MEDICINE

## 2018-01-01 PROCEDURE — 74011636320 HC RX REV CODE- 636/320: Performed by: EMERGENCY MEDICINE

## 2018-01-01 RX ORDER — CHLORHEXIDINE GLUCONATE 1.2 MG/ML
15 RINSE ORAL EVERY 12 HOURS
Status: DISCONTINUED | OUTPATIENT
Start: 2018-01-01 | End: 2018-01-01 | Stop reason: HOSPADM

## 2018-01-01 RX ORDER — HEPARIN 100 UNIT/ML
300 SYRINGE INTRAVENOUS AS NEEDED
Status: DISCONTINUED | OUTPATIENT
Start: 2018-01-01 | End: 2018-01-01

## 2018-01-01 RX ORDER — GUAIFENESIN 100 MG/5ML
81 LIQUID (ML) ORAL DAILY
Status: DISCONTINUED | OUTPATIENT
Start: 2018-01-01 | End: 2018-01-01

## 2018-01-01 RX ORDER — PROPOFOL 10 MG/ML
0-50 VIAL (ML) INTRAVENOUS
Status: DISCONTINUED | OUTPATIENT
Start: 2018-01-01 | End: 2018-01-01

## 2018-01-01 RX ORDER — ASCORBIC ACID 250 MG
250 TABLET ORAL 3 TIMES DAILY
COMMUNITY

## 2018-01-01 RX ORDER — INSULIN DEGLUDEC 200 U/ML
20 INJECTION, SOLUTION SUBCUTANEOUS
Qty: 2 PEN | Refills: 2 | Status: SHIPPED | OUTPATIENT
Start: 2018-01-01

## 2018-01-01 RX ORDER — SODIUM CHLORIDE 450 MG/100ML
50 INJECTION, SOLUTION INTRAVENOUS CONTINUOUS
Status: DISCONTINUED | OUTPATIENT
Start: 2018-01-01 | End: 2018-01-01 | Stop reason: HOSPADM

## 2018-01-01 RX ORDER — NOREPINEPHRINE BITARTRATE/D5W 8 MG/250ML
2-30 PLASTIC BAG, INJECTION (ML) INTRAVENOUS
Status: DISCONTINUED | OUTPATIENT
Start: 2018-01-01 | End: 2018-01-01

## 2018-01-01 RX ORDER — ONDANSETRON 2 MG/ML
4 INJECTION INTRAMUSCULAR; INTRAVENOUS
Status: DISCONTINUED | OUTPATIENT
Start: 2018-01-01 | End: 2018-01-01

## 2018-01-01 RX ORDER — SODIUM CHLORIDE 0.9 % (FLUSH) 0.9 %
5-10 SYRINGE (ML) INJECTION AS NEEDED
Status: DISCONTINUED | OUTPATIENT
Start: 2018-01-01 | End: 2018-01-01 | Stop reason: HOSPADM

## 2018-01-01 RX ORDER — ACETAMINOPHEN 650 MG/1
650 SUPPOSITORY RECTAL
Status: DISCONTINUED | OUTPATIENT
Start: 2018-01-01 | End: 2018-01-01 | Stop reason: HOSPADM

## 2018-01-01 RX ORDER — INSULIN GLARGINE 100 [IU]/ML
25 INJECTION, SOLUTION SUBCUTANEOUS DAILY
Status: DISCONTINUED | OUTPATIENT
Start: 2018-01-01 | End: 2018-01-01

## 2018-01-01 RX ORDER — AMLODIPINE BESYLATE 5 MG/1
TABLET ORAL
Qty: 90 TAB | Refills: 3 | Status: SHIPPED | OUTPATIENT
Start: 2018-01-01

## 2018-01-01 RX ORDER — SODIUM CHLORIDE 0.9 % (FLUSH) 0.9 %
10-40 SYRINGE (ML) INJECTION EVERY 8 HOURS
Status: DISCONTINUED | OUTPATIENT
Start: 2018-01-01 | End: 2018-01-01

## 2018-01-01 RX ORDER — SODIUM CHLORIDE 0.9 % (FLUSH) 0.9 %
10 SYRINGE (ML) INJECTION
Status: COMPLETED | OUTPATIENT
Start: 2018-01-01 | End: 2018-01-01

## 2018-01-01 RX ORDER — SODIUM CHLORIDE 0.9 % (FLUSH) 0.9 %
10 SYRINGE (ML) INJECTION EVERY 24 HOURS
Status: DISCONTINUED | OUTPATIENT
Start: 2018-01-01 | End: 2018-01-01

## 2018-01-01 RX ORDER — GLUCOSAMINE/CHONDR SU A SOD 167-133 MG
500 CAPSULE ORAL
COMMUNITY

## 2018-01-01 RX ORDER — POLYETHYLENE GLYCOL 3350 17 G/17G
17 POWDER, FOR SOLUTION ORAL DAILY
Status: DISCONTINUED | OUTPATIENT
Start: 2018-01-01 | End: 2018-01-01

## 2018-01-01 RX ORDER — POTASSIUM CHLORIDE 1.5 G/1.77G
40 POWDER, FOR SOLUTION ORAL
Status: COMPLETED | OUTPATIENT
Start: 2018-01-01 | End: 2018-01-01

## 2018-01-01 RX ORDER — MELOXICAM 15 MG/1
15 TABLET ORAL DAILY
COMMUNITY

## 2018-01-01 RX ORDER — INSULIN GLARGINE 100 [IU]/ML
15 INJECTION, SOLUTION SUBCUTANEOUS DAILY
Status: DISCONTINUED | OUTPATIENT
Start: 2018-01-01 | End: 2018-01-01

## 2018-01-01 RX ORDER — INSULIN GLARGINE 100 [IU]/ML
15 INJECTION, SOLUTION SUBCUTANEOUS
Status: COMPLETED | OUTPATIENT
Start: 2018-01-01 | End: 2018-01-01

## 2018-01-01 RX ORDER — NALOXONE HYDROCHLORIDE 1 MG/ML
1 INJECTION INTRAMUSCULAR; INTRAVENOUS; SUBCUTANEOUS
Status: DISCONTINUED | OUTPATIENT
Start: 2018-01-01 | End: 2018-01-01

## 2018-01-01 RX ORDER — INSULIN ASPART 100 [IU]/ML
12 INJECTION, SOLUTION INTRAVENOUS; SUBCUTANEOUS
Qty: 10 ML
Start: 2018-01-01

## 2018-01-01 RX ORDER — SODIUM CHLORIDE 9 MG/ML
50 INJECTION, SOLUTION INTRAVENOUS
Status: COMPLETED | OUTPATIENT
Start: 2018-01-01 | End: 2018-01-01

## 2018-01-01 RX ORDER — LEVOFLOXACIN 5 MG/ML
750 INJECTION, SOLUTION INTRAVENOUS
Status: COMPLETED | OUTPATIENT
Start: 2018-01-01 | End: 2018-01-01

## 2018-01-01 RX ORDER — SODIUM CHLORIDE 0.9 % (FLUSH) 0.9 %
10-30 SYRINGE (ML) INJECTION AS NEEDED
Status: DISCONTINUED | OUTPATIENT
Start: 2018-01-01 | End: 2018-01-01

## 2018-01-01 RX ORDER — INSULIN GLARGINE 100 [IU]/ML
10 INJECTION, SOLUTION SUBCUTANEOUS DAILY
Status: DISCONTINUED | OUTPATIENT
Start: 2018-01-01 | End: 2018-01-01

## 2018-01-01 RX ORDER — MORPHINE SULFATE 2 MG/ML
2-8 INJECTION, SOLUTION INTRAMUSCULAR; INTRAVENOUS AS NEEDED
Status: DISCONTINUED | OUTPATIENT
Start: 2018-01-01 | End: 2018-01-01 | Stop reason: HOSPADM

## 2018-01-01 RX ORDER — MAGNESIUM SULFATE 100 %
4 CRYSTALS MISCELLANEOUS AS NEEDED
Status: DISCONTINUED | OUTPATIENT
Start: 2018-01-01 | End: 2018-01-01

## 2018-01-01 RX ORDER — INSULIN ASPART 100 [IU]/ML
10 INJECTION, SOLUTION INTRAVENOUS; SUBCUTANEOUS
Qty: 10 ML | Status: SHIPPED | OUTPATIENT
Start: 2018-01-01 | End: 2018-01-01 | Stop reason: SDUPTHER

## 2018-01-01 RX ORDER — METFORMIN HYDROCHLORIDE 500 MG/1
500 TABLET ORAL 2 TIMES DAILY WITH MEALS
COMMUNITY

## 2018-01-01 RX ORDER — SODIUM CHLORIDE 0.9 % (FLUSH) 0.9 %
SYRINGE (ML) INJECTION
Status: COMPLETED
Start: 2018-01-01 | End: 2018-01-01

## 2018-01-01 RX ORDER — MUPIROCIN 20 MG/G
OINTMENT TOPICAL 2 TIMES DAILY
Status: DISCONTINUED | OUTPATIENT
Start: 2018-01-01 | End: 2018-01-01 | Stop reason: HOSPADM

## 2018-01-01 RX ORDER — SODIUM CHLORIDE 0.9 % (FLUSH) 0.9 %
5-10 SYRINGE (ML) INJECTION EVERY 8 HOURS
Status: DISCONTINUED | OUTPATIENT
Start: 2018-01-01 | End: 2018-01-01 | Stop reason: HOSPADM

## 2018-01-01 RX ORDER — SODIUM CHLORIDE 0.9 % (FLUSH) 0.9 %
5-10 SYRINGE (ML) INJECTION AS NEEDED
Status: DISCONTINUED | OUTPATIENT
Start: 2018-01-01 | End: 2018-01-01

## 2018-01-01 RX ORDER — DEXTROSE 50 % IN WATER (D50W) INTRAVENOUS SYRINGE
12.5-25 AS NEEDED
Status: DISCONTINUED | OUTPATIENT
Start: 2018-01-01 | End: 2018-01-01 | Stop reason: HOSPADM

## 2018-01-01 RX ORDER — INSULIN LISPRO 100 [IU]/ML
INJECTION, SOLUTION INTRAVENOUS; SUBCUTANEOUS EVERY 6 HOURS
Status: DISCONTINUED | OUTPATIENT
Start: 2018-01-01 | End: 2018-01-01

## 2018-01-01 RX ADMIN — SODIUM CHLORIDE 50 ML/HR: 450 INJECTION, SOLUTION INTRAVENOUS at 05:02

## 2018-01-01 RX ADMIN — MUPIROCIN: 20 OINTMENT TOPICAL at 08:41

## 2018-01-01 RX ADMIN — Medication 10 ML: at 00:09

## 2018-01-01 RX ADMIN — ASPIRIN 81 MG 81 MG: 81 TABLET ORAL at 10:39

## 2018-01-01 RX ADMIN — Medication 2 MCG/MIN: at 12:46

## 2018-01-01 RX ADMIN — MUPIROCIN: 20 OINTMENT TOPICAL at 18:44

## 2018-01-01 RX ADMIN — INSULIN LISPRO 3 UNITS: 100 INJECTION, SOLUTION INTRAVENOUS; SUBCUTANEOUS at 18:47

## 2018-01-01 RX ADMIN — POTASSIUM CHLORIDE 40 MEQ: 1.5 POWDER, FOR SOLUTION ORAL at 05:02

## 2018-01-01 RX ADMIN — PIPERACILLIN SODIUM AND TAZOBACTAM SODIUM 3.38 G: .375; 3 INJECTION, POWDER, LYOPHILIZED, FOR SOLUTION INTRAVENOUS at 08:41

## 2018-01-01 RX ADMIN — SODIUM CHLORIDE 50 ML/HR: 450 INJECTION, SOLUTION INTRAVENOUS at 11:00

## 2018-01-01 RX ADMIN — Medication 10 ML: at 16:54

## 2018-01-01 RX ADMIN — Medication 11 MCG/MIN: at 13:25

## 2018-01-01 RX ADMIN — INSULIN LISPRO 5 UNITS: 100 INJECTION, SOLUTION INTRAVENOUS; SUBCUTANEOUS at 00:58

## 2018-01-01 RX ADMIN — PROPOFOL 25 MCG/KG/MIN: 10 INJECTION, EMULSION INTRAVENOUS at 04:50

## 2018-01-01 RX ADMIN — PROPOFOL 30 MCG/KG/MIN: 10 INJECTION, EMULSION INTRAVENOUS at 09:22

## 2018-01-01 RX ADMIN — ACETAMINOPHEN 650 MG: 650 SUPPOSITORY RECTAL at 16:15

## 2018-01-01 RX ADMIN — IOPAMIDOL 80 ML: 755 INJECTION, SOLUTION INTRAVENOUS at 00:09

## 2018-01-01 RX ADMIN — PIPERACILLIN SODIUM AND TAZOBACTAM SODIUM 3.38 G: .375; 3 INJECTION, POWDER, LYOPHILIZED, FOR SOLUTION INTRAVENOUS at 03:10

## 2018-01-01 RX ADMIN — POLYETHYLENE GLYCOL 3350 17 G: 17 POWDER, FOR SOLUTION ORAL at 08:40

## 2018-01-01 RX ADMIN — ACETAMINOPHEN 650 MG: 650 SUPPOSITORY RECTAL at 10:38

## 2018-01-01 RX ADMIN — PIPERACILLIN SODIUM AND TAZOBACTAM SODIUM 3.38 G: .375; 3 INJECTION, POWDER, LYOPHILIZED, FOR SOLUTION INTRAVENOUS at 16:53

## 2018-01-01 RX ADMIN — FAMOTIDINE 20 MG: 10 INJECTION, SOLUTION INTRAVENOUS at 11:19

## 2018-01-01 RX ADMIN — MUPIROCIN: 20 OINTMENT TOPICAL at 17:50

## 2018-01-01 RX ADMIN — INSULIN LISPRO 2 UNITS: 100 INJECTION, SOLUTION INTRAVENOUS; SUBCUTANEOUS at 02:58

## 2018-01-01 RX ADMIN — INSULIN GLARGINE 15 UNITS: 100 INJECTION, SOLUTION SUBCUTANEOUS at 12:06

## 2018-01-01 RX ADMIN — INSULIN GLARGINE 10 UNITS: 100 INJECTION, SOLUTION SUBCUTANEOUS at 09:00

## 2018-01-01 RX ADMIN — Medication 10 ML: at 12:07

## 2018-01-01 RX ADMIN — PIPERACILLIN SODIUM AND TAZOBACTAM SODIUM 3.38 G: .375; 3 INJECTION, POWDER, LYOPHILIZED, FOR SOLUTION INTRAVENOUS at 08:53

## 2018-01-01 RX ADMIN — CHLORHEXIDINE GLUCONATE 15 ML: 1.2 RINSE ORAL at 21:12

## 2018-01-01 RX ADMIN — AMIODARONE HYDROCHLORIDE 0.5 MG/MIN: 50 INJECTION, SOLUTION INTRAVENOUS at 09:03

## 2018-01-01 RX ADMIN — INSULIN LISPRO 5 UNITS: 100 INJECTION, SOLUTION INTRAVENOUS; SUBCUTANEOUS at 06:58

## 2018-01-01 RX ADMIN — PROPOFOL 25 MCG/KG/MIN: 10 INJECTION, EMULSION INTRAVENOUS at 00:47

## 2018-01-01 RX ADMIN — Medication 30 MCG/MIN: at 02:45

## 2018-01-01 RX ADMIN — Medication 10 ML: at 16:53

## 2018-01-01 RX ADMIN — POLYETHYLENE GLYCOL 3350 17 G: 17 POWDER, FOR SOLUTION ORAL at 12:08

## 2018-01-01 RX ADMIN — FAMOTIDINE 20 MG: 10 INJECTION, SOLUTION INTRAVENOUS at 08:53

## 2018-01-01 RX ADMIN — INSULIN GLARGINE 10 UNITS: 100 INJECTION, SOLUTION SUBCUTANEOUS at 08:41

## 2018-01-01 RX ADMIN — LEVOFLOXACIN 750 MG: 5 INJECTION, SOLUTION INTRAVENOUS at 01:10

## 2018-01-01 RX ADMIN — INSULIN LISPRO 5 UNITS: 100 INJECTION, SOLUTION INTRAVENOUS; SUBCUTANEOUS at 12:10

## 2018-01-01 RX ADMIN — Medication 10 ML: at 06:56

## 2018-01-01 RX ADMIN — INSULIN LISPRO 2 UNITS: 100 INJECTION, SOLUTION INTRAVENOUS; SUBCUTANEOUS at 06:35

## 2018-01-01 RX ADMIN — Medication 13 MCG/MIN: at 11:00

## 2018-01-01 RX ADMIN — INSULIN LISPRO 7 UNITS: 100 INJECTION, SOLUTION INTRAVENOUS; SUBCUTANEOUS at 12:17

## 2018-01-01 RX ADMIN — Medication 8 MCG/MIN: at 13:43

## 2018-01-01 RX ADMIN — Medication 10 ML: at 21:14

## 2018-01-01 RX ADMIN — FAMOTIDINE 20 MG: 10 INJECTION, SOLUTION INTRAVENOUS at 08:40

## 2018-01-01 RX ADMIN — Medication 10 ML: at 06:37

## 2018-01-01 RX ADMIN — SODIUM CHLORIDE 1000 ML: 900 INJECTION, SOLUTION INTRAVENOUS at 23:07

## 2018-01-01 RX ADMIN — PROPOFOL 30 MCG/KG/MIN: 10 INJECTION, EMULSION INTRAVENOUS at 14:47

## 2018-01-01 RX ADMIN — Medication 9 MCG/MIN: at 13:12

## 2018-01-01 RX ADMIN — INSULIN LISPRO 3 UNITS: 100 INJECTION, SOLUTION INTRAVENOUS; SUBCUTANEOUS at 06:00

## 2018-01-01 RX ADMIN — Medication 10 ML: at 02:46

## 2018-01-01 RX ADMIN — Medication 7 MCG/MIN: at 13:08

## 2018-01-01 RX ADMIN — MUPIROCIN: 20 OINTMENT TOPICAL at 08:57

## 2018-01-01 RX ADMIN — ASPIRIN 81 MG 81 MG: 81 TABLET ORAL at 08:53

## 2018-01-01 RX ADMIN — SODIUM CHLORIDE 50 ML/HR: 900 INJECTION, SOLUTION INTRAVENOUS at 00:09

## 2018-01-01 RX ADMIN — Medication 10 ML: at 13:47

## 2018-01-01 RX ADMIN — CHLORHEXIDINE GLUCONATE 15 ML: 1.2 RINSE ORAL at 08:40

## 2018-01-01 RX ADMIN — INSULIN GLARGINE 25 UNITS: 100 INJECTION, SOLUTION SUBCUTANEOUS at 08:40

## 2018-01-01 RX ADMIN — SODIUM CHLORIDE 500 ML: 900 INJECTION, SOLUTION INTRAVENOUS at 11:19

## 2018-01-01 RX ADMIN — MUPIROCIN: 20 OINTMENT TOPICAL at 08:40

## 2018-01-01 RX ADMIN — Medication 3 MCG/MIN: at 13:00

## 2018-01-01 RX ADMIN — Medication 5 MCG/MIN: at 13:04

## 2018-01-01 RX ADMIN — CHLORHEXIDINE GLUCONATE 15 ML: 1.2 RINSE ORAL at 21:30

## 2018-01-01 RX ADMIN — PROPOFOL 25 MCG/KG/MIN: 10 INJECTION, EMULSION INTRAVENOUS at 22:09

## 2018-01-01 RX ADMIN — PIPERACILLIN SODIUM AND TAZOBACTAM SODIUM 3.38 G: .375; 3 INJECTION, POWDER, LYOPHILIZED, FOR SOLUTION INTRAVENOUS at 00:03

## 2018-01-01 RX ADMIN — Medication 2.5 MCG/MIN: at 12:56

## 2018-01-01 RX ADMIN — INSULIN LISPRO 12 UNITS: 100 INJECTION, SOLUTION INTRAVENOUS; SUBCUTANEOUS at 17:50

## 2018-01-01 RX ADMIN — AMIODARONE HYDROCHLORIDE 1 MG/MIN: 50 INJECTION, SOLUTION INTRAVENOUS at 03:03

## 2018-01-01 RX ADMIN — ASPIRIN 81 MG 81 MG: 81 TABLET ORAL at 08:40

## 2018-01-01 RX ADMIN — Medication 13 MCG/MIN: at 21:13

## 2018-01-01 RX ADMIN — Medication 10 ML: at 21:31

## 2018-01-01 RX ADMIN — Medication 10 ML: at 21:13

## 2018-01-01 RX ADMIN — CHLORHEXIDINE GLUCONATE 15 ML: 1.2 RINSE ORAL at 08:57

## 2018-01-01 RX ADMIN — PIPERACILLIN SODIUM AND TAZOBACTAM SODIUM 3.38 G: .375; 3 INJECTION, POWDER, LYOPHILIZED, FOR SOLUTION INTRAVENOUS at 16:15

## 2018-01-01 RX ADMIN — CHLORHEXIDINE GLUCONATE 15 ML: 1.2 RINSE ORAL at 08:41

## 2018-01-01 RX ADMIN — INSULIN LISPRO 5 UNITS: 100 INJECTION, SOLUTION INTRAVENOUS; SUBCUTANEOUS at 00:02

## 2018-01-01 RX ADMIN — Medication 30 MCG/MIN: at 23:17

## 2018-01-22 NOTE — PROGRESS NOTES
1. Have you been to the ER, urgent care clinic since your last visit? Hospitalized since your last visit? No    2. Have you seen or consulted any other health care providers outside of the [address] [place] [address] since your last visit? Include any pap smears or colon screening. No     Chief Complaint   Patient presents with    Hypertension     3 mo. f/u    Diabetes     3 mo. f/u    Cholesterol Problem     3 mo.  f/u       Not fasting

## 2018-01-22 NOTE — PROGRESS NOTES
No chief complaint on file. SUBJECTIVE:    Hyacinth Allen is a 68 y.o. male who returns in follow-up of his medical problems include hypertension, CKD, diabetes, hyperlipidemia, prior thrombotic CVA, diabetic neuropathy, ASVD with bilateral carotid stenosis, and Martínez's esophagus. He currently is taking his medications and he noted his blood sugar was running high so he actually increased his NovoLog insulin that he was taken 10 units before dinner today continue his before lunch and dinner his blood sugars have improved but they are still running high. He is continue to take his long-acting insulin in the evening and his NPH insulin in the morning. He denies any chest pain, palpitations, shortness of breath or cardiorespiratory complaints. He denies any GI/ complaints. He denies any headaches or neurologic complaints. He has no arthritic complaints and no other complaints on complete review of systems. He is taking all his other medications and trying to follow his diet but is wondering why he can go back on metformin because he seemed to have better blood sugar control is on that before. Current Outpatient Prescriptions   Medication Sig Dispense Refill    insulin aspart (NOVOLOG) 100 unit/mL injection 10 Units by SubCUTAneous route Before breakfast, lunch, and dinner. Indications: type 2 diabetes mellitus 10 mL prn    insulin degludec (TRESIBA FLEXTOUCH U-200) 200 unit/mL (3 mL) inpn 20 Units by SubCUTAneous route nightly. 2 Pen 0    insulin NPH (NOVOLIN N) 100 unit/mL injection 24 Units by SubCUTAneous route daily (with breakfast). Indications: type 2 diabetes mellitus 1 Vial 0    Insulin Needles, Disposable, 30 gauge x 1/3\" Use daily with his [facility] pen 1 Package 11    simvastatin (ZOCOR) 10 mg tablet Take 10 mg by mouth.  nadolol (CORGARD) 40 mg tablet Take 40 mg by mouth daily.  traMADol (ULTRAM) 50 mg tablet 1 tablet four times daily as needed for pain.  360 Tab 1    losartan (COZAAR) 100 mg tablet Take 100 mg by mouth two (2) times a day.  lisinopril-hydroCHLOROthiazide (PRINZIDE, ZESTORETIC) 20-12.5 mg per tablet Take 2 Tabs by mouth daily.  clopidogrel (PLAVIX) 75 mg tab Take 75 mg by mouth daily.  ferrous sulfate 325 mg (65 mg iron) tablet Take 325 mg by mouth three (3) times daily (with meals).  omeprazole (PRILOSEC) 20 mg capsule Take 20 mg by mouth nightly.  amLODIPine (NORVASC) 5 mg tablet Take 5 mg by mouth daily.  aspirin 81 mg chewable tablet Take 81 mg by mouth nightly.  tamsulosin (FLOMAX) 0.4 mg capsule Take 0.4 mg by mouth daily.  simvastatin (ZOCOR) 10 mg tablet Take 10 mg by mouth nightly.        Past Medical History:   Diagnosis Date    Allergic rhinitis     Anemia     ASVD (arteriosclerotic vascular disease)     Alfonso's esophagus 7/28/2017    Carotid stenosis 7/28/2017    Chronic kidney disease     Diabetes ([facility]     glucose runs 70 - 160's at home    Diabetic neuropathy ([facility] 7/28/2017    Diverticulosis     DJD (degenerative joint disease)     ED (erectile dysfunction)     Enlarged prostate     JUDY (generalized anxiety disorder)     GERD (gastroesophageal reflux disease)     controlled with med; alfonso's esophagus    Hiatal hernia     Hypercholesterolemia     Hypertension     Hypertrophy (benign) of prostate 7/28/2017    Ill-defined condition     peripheral vascular disease    Lung nodule     Neuropathy     On statin therapy 7/28/2017    Prostate cancer screening 7/28/2017    PVD (peripheral vascular disease) ([facility]     Sebaceous cyst 7/28/2017    Spinal stenosis      Past Surgical History:   Procedure Laterality Date    CARDIAC SURG PROCEDURE UNLIST      CATH-2008    COLONOSCOPY N/A 6/15/2016    COLONOSCOPY performed [facility]artha [facility]  6/15/2016         HX CATARACT REMOVAL      BILATERAL    HX ORTHOPAEDIC  1977    BONE CYST REM,ANU FROM RIGHT LEG    HX ORTHOPAEDIC  5-, 7-22-17, 7-26-17    left hand index finger    HX OTHER SURGICAL Left 06/2016    carotid endarectomy    NEUROLOGICAL PROCEDURE UNLISTED  2011    Back: spinal stenosis, pinched nerve repair    UPPER GI ENDOSCOPY,BIOPSY  6/15/2016          No Known Allergies    REVIEW OF SYSTEMS:  General: negative for - chills or fever, or weight loss or gain  ENT: negative for - headaches, nasal congestion or tinnitus  Eyes: no blurred or visual changes  Neck: No stiffness or swollen nodes  Respiratory: negative for - cough, hemoptysis, shortness of breath or wheezing  Cardiovascular : negative for - chest pain, edema, palpitations or shortness of breath  Gastrointestinal: negative for - abdominal pain, blood in stools, heartburn or nausea/vomiting  Genito-Urinary: no dysuria, trouble voiding, or hematuria  Musculoskeletal: negative for - gait disturbance, joint pain, joint stiffness or joint swelling  Neurological: no TIA or stroke symptoms  Hematologic: no bruises, no bleeding  Lymphatic: no swollen glands  Integument: no lumps, mole changes, nail changes or rash  Endocrine:no malaise/lethargy poly uria or polydipsia or unexpected weight changes        Social History     Social History    Marital status:      Spouse name: N/A    Number of children: N/A    Years of education: N/A     Social History Main Topics    Smoking status: Former Smoker     Quit date: 7/7/1996    Smokeless tobacco: Never Used    Alcohol use No      Comment: rarely    Drug use: No    Sexual activity: Yes     Other Topics Concern    None     Social History Narrative     Family History   Problem Relation Age of Onset    Diabetes Mother     Cancer Father      LUNG    Heart Disease Father        OBJECTIVE:     Visit Vitals    /62 (BP 1 Location: Left arm, BP Patient Position: Sitting)    Pulse 60    Temp 98.6 °F (37 °C)    Resp 16    Ht 5' 9\" (1.753 m)    Wt 178 lb 12.8 oz (81.1 kg)    SpO2 99%    BMI 26.4 kg/m2     CONSTITUTIONAL:   well nourished, appears age appropriate  EYES: sclera anicteric, PERRL, EOMI  ENMT:nars clear, moist mucous membranes, pharynx clear  NECK: supple. Thyroid normal, No JVD, and bilateral bruits are without change. RESPIRATORY: Chest: clear to ascultation and percussion, normal inspiratory effort  CARDIOVASCULAR: Heart: regular rate and rhythm no murmurs, rubs or gallops, PMI not displaced, No thrills  GASTROINTESTINAL: Abdomen: non distended, soft, non tender, bowel sounds normal  HEMATOLOGIC: no purpura, petechiae or bruising  LYMPHATIC: No lymph node enlargemant  MUSCULOSKELETAL: Extremities: no edema or active synovitis, pulse 1+   INTEGUMENT: No unusual rashes or suspicious skin lesions noted. Nails appear normal.  PERIPHERAL VASCULAR: normal pulses femoral, PT and DP  NEUROLOGIC: non-focal exam, A & O X 3  PSYCHIATRIC:, appropriate affect     ASSESSMENT:   1. Essential hypertension    2. Controlled type 2 diabetes mellitus without complication, with long-term current use of insulin ([address]    3. Mixed hyperlipidemia    4. ASVD (arteriosclerotic vascular disease)    5. Complete heart block ([address]    6. Cerebrovascular accident (CVA), unspecified mechanism ([address]    7. S/P cardiac pacemaker procedure    8. Martínez's esophagus without dysplasia    9. Diabetic polyneuropathy associated with type 2 diabetes mellitus ([address]    10. 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 Hypertension that is not quite controlled although we will not make a change in his medicine today I did review his current medicines and will see if we can get better with diet if not we may have to make some adjustments possibly and his lisinopril HCT pending on renal function  2.   Diabetes that status is pending at this point will increase his NovoLog insulin to 10 units before each meal and continue his long-acting insulin in the evening with his NPH in the morning and see if we get better diabetes control. His A1c is pending today  3. Hyperlipidemia repeat status is pending reviewed prior labs  4. ASCVD with bilateral carotid disease stable last check  5. Complete heart block status post pacemaker placement that has been recently evaluated and function perfectly fine  6. Status post thrombotic CVA right middle cerebral artery no residual  7. Martínez's esophagus stable  8. Diabetic neuropathy seems to be stable  We will call the lab make further recommendations adjustments if necessary. Follow stable continue same with the increase insulin as noted to taking the short acting NovoLog before each meals if we get better diabetes control. Follow-up schedule III months or sooner should the need to based upon the lab. PLAN:  .  Orders Placed This Encounter    AMB POC HEMOGLOBIN A1C    AMB POC COMPREHENSIVE METABOLIC PANEL    AMB POC LIPID PROFILE    insulin aspart (NOVOLOG) 100 unit/mL injection         ATTENTION:   This medical record was transcribed using an electronic medical records system. Although proofread, it may and can contain electronic and spelling errors. Other human spelling and other errors may be present. Corrections may be executed at a later time. Please feel free to contact us for any clarifications as needed. Follow-up Disposition:  Return in about 3 months (around 4/22/2018). No results found for any visits on 01/22/18. Rodolfo Mathur MD    The patient verbalized understanding of the problems and plans as explained.

## 2018-01-22 NOTE — PATIENT INSTRUCTIONS
Martínez's Esophagus: Care Instructions  Your Care Instructions    The esophagus is the tube that connects the throat to the stomach. Food and liquids go through this tube. In Martínez's esophagus, the cells that line the tube change. This is usually because of gastroesophageal reflux disease (GERD). GERD causes acid from your stomach to back up into the esophagus. When you have Martínez's esophagus, you are slightly more likely to get cancer of the esophagus. So regular testing is important to watch for signs of this cancer. You can treat GERD to control your symptoms and feel better. Follow-up care is a key part of your treatment and safety. Be sure to make and go to all appointments, and call your doctor if you are having problems. It's also a good idea to know your test results and keep a list of the medicines you take. How can you care for yourself at home? · Take your medicines exactly as prescribed. Call your doctor if you think you are having a problem with your medicine. · If you take over-the-counter medicine, such as antacids or acid reducers, follow all instructions on the label. If you use these medicines often, talk with your doctor. Be careful when you take over-the-counter antacid medicines. Many of these medicines have aspirin in them. Read the label to make sure that you are not taking more than the recommended dose. Too much aspirin can be harmful. · Do not smoke or chew tobacco. Smoking can make GERD worse. If you need help quitting, talk to your doctor about stop-smoking programs and medicines. These can increase your chances of quitting for good. · Chocolate, mint, and alcohol can make GERD worse. They relax the valve between the esophagus and the stomach. · Spicy foods, foods that have a lot of acid (like tomatoes and oranges), and coffee can make GERD symptoms worse in some people.  If your symptoms are worse after you eat a certain food, you may want to stop eating that food to see if your symptoms get better. · Eat smaller meals, and more often. After eating, wait 2 to 3 hours before you lie down. · Raise the head of your bed 6 to 8 inches by putting blocks under the frame or a foam wedge under the head of the mattress. · Do not wear tight clothing around your midsection. · If you are overweight, lose weight. Even losing 5 to 10 pounds can help. When should you call for help? Call your doctor now or seek immediate medical care if:  ? · You have new or worse belly pain. ? · You are vomiting. ? Watch closely for changes in your health, and be sure to contact your doctor if:  ? · You have any pain or difficulty swallowing. ? · You are losing weight. ? · You have new or worse symptoms, such as heartburn. ? · You do not get better as expected. Where can you learn more? Go to http://cielo-jayne.info/. Enter L146 in the search box to learn more about \"Martínez's Esophagus: Care Instructions. \"  Current as of: May 12, 2017  Content Version: 11.4  © 9687-1682 [employer], [employer]. Care instructions adapted under license by [employer] (which disclaims liability or warranty for this information). If you have questions about a medical condition or this instruction, always ask your healthcare professional. [address] any warranty or liability for your use of this information.

## 2018-01-22 NOTE — MR AVS SNAPSHOT
[address] [facility] [address] [address] [phone] Patient: Collin Medrano MRN: [id number] AGUILA:[id number] Visit Information Date & Time Provider Department Dept. Phone Encounter #  
 1/22/2018  1:00 PM [facility] [facility] [phone] [id number] Your Appointments 2/5/2018  9:00 AM  
FOLLOW UP 10 with MD NAOMI Sewell [facility] ([address]) Appt Note: [address] [id number] [address] [facility] [address] Upcoming Health Maintenance Date Due  
 EYE EXAM RETINAL OR DILATED Q1 9/14/1950 ZOSTER VACCINE AGE 60> 7/14/2000 GLAUCOMA SCREENING Q2Y 9/14/2005 HEMOGLOBIN A1C Q6M 4/19/2018 FOOT EXAM Q1 10/19/2018 MICROALBUMIN Q1 10/19/2018 LIPID PANEL Q1 10/19/2018 MEDICARE YEARLY EXAM 10/20/2018 DTaP/Tdap/Td series (2 - Td) 5/22/2027 Allergies as of 1/22/2018  Review Complete On: 12/20/2017 By: Krystal Teran MD  
 No Known Allergies Current Immunizations  Reviewed on 10/19/2017 Name Date Influenza Vaccine 10/11/2016, 10/30/2015 Influenza Vaccine (Quad) PF 9/22/2017  8:45 AM  
 Pneumococcal Conjugate (PCV-13) 8/14/2015 Pneumococcal Polysaccharide (PPSV-23) 1/1/2006 Tdap 5/22/2017  1:11 PM  
  
 Not reviewed this visit Vitals Smoking Status Former Smoker Preferred Pharmacy Pharmacy Name Phone [facility] [address], [address] [place] [address] Avenue [phone] Your Updated Medication List  
  
   
This list is accurate as of: 1/22/18  2:07 PM.  Always use your most recent med list. amLODIPine 5 mg tablet Commonly known as:  Ozark Sandhoff Take 5 mg by mouth daily. aspirin 81 mg chewable tablet Take 81 mg by mouth nightly. clopidogrel 75 mg Tab Commonly known as:  PLAVIX Take 75 mg by mouth daily. ferrous sulfate 325 mg (65 mg iron) tablet Take 325 mg by mouth three (3) times daily (with meals). insulin degludec 200 unit/mL (3 mL) Inpn Commonly known as:  TRESIBA FLEXTOUCH U-200  
20 Units by SubCUTAneous route nightly. Insulin Needles (Disposable) 30 gauge x 1/3\" Use daily with his Royetta Saver pen  
  
 insulin  unit/mL injection Commonly known as:  NovoLIN N  
24 Units by SubCUTAneous route daily (with breakfast). Indications: type 2 diabetes mellitus  
  
 lisinopril-hydroCHLOROthiazide 20-12.5 mg per tablet Commonly known as:  Cameron Nicolas Take 2 Tabs by mouth daily. losartan 100 mg tablet Commonly known as:  COZAAR Take 100 mg by mouth two (2) times a day. nadolol 40 mg tablet Commonly known as:  CORGARD Take 40 mg by mouth daily. NovoLOG 100 unit/mL injection Generic drug:  insulin aspart 10 Units by SubCUTAneous route daily (with dinner). Indications: type 2 diabetes mellitus  
  
 omeprazole 20 mg capsule Commonly known as:  PRILOSEC Take 20 mg by mouth nightly. * simvastatin 10 mg tablet Commonly known as:  ZOCOR Take 10 mg by mouth. * simvastatin 10 mg tablet Commonly known as:  ZOCOR Take 10 mg by mouth nightly. tamsulosin 0.4 mg capsule Commonly known as:  FLOMAX Take 0.4 mg by mouth daily. traMADol 50 mg tablet Commonly known as:  ULTRAM  
1 tablet four times daily as needed for pain. * Notice: This list has 2 medication(s) that are the same as other medications prescribed for you. Read the directions carefully, and ask your doctor or other care provider to review them with you. Introducing [facility]! Jovany Rosen introduces [employer] patient portal. Now you can access parts of your medical record, email your doctor's office, and request medication refills online. 1. In your internet browser, go to [email or web address] 2. Click on the First Time User? Click Here link in the Sign In box. You will see the New Member Sign Up page. 3. Enter your [employer] Access Code exactly as it appears below. You will not need to use this code after youve completed the sign-up process. If you do not sign up before the expiration date, you must request a new code. · [employer] Access Code: [email or web address] Expires: 2/18/2018  1:27 PM 
 
4. Enter the last four digits of your Social Security Number (xxxx) and Date of Birth (mm/dd/yyyy) as indicated and click Submit. You will be taken to the next sign-up page. 5. Create a Net[employer] ID. This will be your [employer] login ID and cannot be changed, so think of one that is secure and easy to remember. 6. Create a [employer] password. You can change your password at any time. 7. Enter your Password Reset Question and Answer. This can be used at a later time if you forget your password. 8. Enter your e-mail address. You will receive e-mail notification when new information is available in [address] 9. Click Sign Up. You can now view and download portions of your medical record. 10. Click the Download Summary menu link to download a portable copy of your medical information. If you have questions, please visit the Frequently Asked Questions section of the [employer] website. Remember, [employer] is NOT to be used for urgent needs. For medical emergencies, dial 911. Now available from your iPhone and Android! Please provide this summary of care documentation to your next provider. Your primary care clinician is listed as Girma. If you have any questions after today's visit, please call [phone].

## 2018-01-23 NOTE — PROGRESS NOTES
Labs are good except for the A1c is elevated as be expected would be. Increase the NovoLog insulin to 3 times daily before meals as we discussed at the visit and I think this should improve.

## 2018-01-24 NOTE — PROGRESS NOTES
Labs are good except for the A1c is elevated as be expected would be. Increase the NovoLog insulin to 3 times daily before meals as we discussed at the visit and I think this should improve. Discussed with patient.

## 2018-02-05 NOTE — PATIENT INSTRUCTIONS
Pacemaker Placement: What to Expect at [address] [facility] placement is surgery to put a pacemaker in your chest. This surgery may be done if you have bradycardia (a slow heart rate). A pacemaker is a small, battery-powered device. It sends electrical signals to the heart. These signals work to keep the heartbeat steady. Thin wires, called leads, carry the signals from the pacemaker to the heart. A pacemaker can prevent or reduce dizziness, fainting, and shortness of breath caused by a slow or unsteady heartbeat. Your chest may be sore where the doctor made the cut (incision) and put in the pacemaker. You also may have a bruise and mild swelling. These symptoms usually get better in 1 to 2 weeks. You may feel a hard ridge along the incision. This usually gets softer in the months after surgery. You may be able to see or feel the outline of the pacemaker under your skin. You will probably be able to go back to work or your usual routine 1 to 2 weeks after surgery. Pacemaker batteries usually last 5 to 15 years. Your doctor will talk to you about how often you will need to have your pacemaker checked. You can safely use most household and office electronics. This includes things such as kitchen appliances, electric power tools, and computers. You will need to stay away from things with strong magnetic and electrical fields. This includes things such as an MRI machine (unless your pacemaker is safe for an MRI), welding equipment, and power generators. You can use a cell phone, but keep it at least 6 inches away from your pacemaker. Check with your doctor about what you need to stay away from, what you need to use with care, and what is okay to use. This care sheet gives you a general idea about how long it will take for you to recover. But each person recovers at a different pace. Follow the steps below to get better as quickly as possible. How can you care for yourself at home? Activity  ? · Rest when you feel tired. ? · Be active. Walking is a good choice. ? · For 4 to 6 weeks:  ¨ Avoid activities that strain your chest or upper arm muscles. This includes pushing a  or vacuum, or mopping floors. It also includes swimming, or swinging a golf club or tennis racquet. ¨ Do not raise your arm (the one on the side of your body where the pacemaker is located) above your shoulder. ¨ Allow your body to heal. Don't move quickly or lift anything heavy until you are feeling better. ? · Many people are able to return to work within 1 to 2 weeks after surgery. ? · Ask your doctor when it is okay for you to have sex. Diet  ? · You can eat your normal diet. If your stomach is upset, try bland, low-fat foods like plain rice, broiled chicken, toast, and yogurt. Medicines  ? · Your doctor will tell you if and when you can restart your medicines. He or she will also give you instructions about taking any new medicines. ? · If you take aspirin or some other blood thinner, be sure to talk to your doctor. He or she will tell you if and when to start taking this medicine again. Make sure that you understand exactly what your doctor wants you to do. ? · Be safe with medicines. Read and follow all instructions on the label. ¨ If the doctor gave you a prescription medicine for pain, take it as prescribed. ¨ If you are not taking a prescription pain medicine, ask your doctor if you can take an over-the-counter medicine. ¨ Do not take aspirin, ibuprofen (Advil, Motrin), naproxen (Aleve), or other nonsteroidal anti-inflammatory drugs (NSAIDs) unless your doctor says it is okay. ? · If your doctor prescribed antibiotics, take them as directed. Do not stop taking them just because you feel better. You need to take the full course of antibiotics. Incision care  ?  · If you have strips of tape on the incision, leave the tape on for a week or until it falls off.   ? · Keep the incision dry while it heals. Your doctor may recommend sponge baths for about 7 days, but do not get the incision wet. Your doctor will let you know when you may take showers. After a shower, pat the incision dry. ? · Don't use hydrogen peroxide or alcohol on the incision, which can slow healing. You may cover the area with a gauze bandage if it oozes fluid or rubs against clothing. Change the bandage every day. ? · Do not take a bath or get into a hot tub for the first 2 weeks, or until your doctor tells you it is okay. Other instructions  ? · Keep a medical ID card with you at all times that says you have a pacemaker. The card should include the  and model information. ? · Wear medical alert jewelry stating that you have a pacemaker. You can buy this at most [employer]. ? · Check your pulse as directed by your doctor. ? · Have your pacemaker checked as often as your doctor recommends. In some cases, this may be done over the phone or the Internet. Your doctor will give you instructions about how to do this. Follow-up care is a key part of your treatment and safety. Be sure to make and go to all appointments, and call your doctor if you are having problems. It's also a good idea to know your test results and keep a list of the medicines you take. When should you call for help? Call 911 anytime you think you may need emergency care. For example, call if:  ? · You passed out (lost consciousness). ? · You have trouble breathing. ?Call your doctor now or seek immediate medical care if:  ? · You are dizzy or light-headed, or you feel like you may faint. ? · You have pain that does not get better after you take pain medicine. ? · You hear an alarm or feel a vibration from your pacemaker. ? · You have loose stitches, or your incision comes open. ? · Bright red blood has soaked through the bandage over your incision.    ? · You have signs of infection, such as:  ¨ Increased pain, swelling, warmth, or redness. ¨ Red streaks leading from the incision. ¨ Pus draining from the incision. ¨ A fever. ? Watch closely for changes in your health, and be sure to contact your doctor if:  ? · You have any problems with your pacemaker. Where can you learn more? Go to http://cielo-jayne.info/. Enter G550 in the search box to learn more about \"Pacemaker Placement: What to Expect at Home. \"  Current as of: September 21, 2016  Content Version: 11.4  © 2422-5176 [employer]. Care instructions adapted under license by [employer] (which disclaims liability or warranty for this information). If you have questions about a medical condition or this instruction, always ask your healthcare professional. [address] any warranty or liability for your use of this information.

## 2018-02-05 NOTE — MR AVS SNAPSHOT
[address] [facility] [address] [address] [phone] Patient: Andre Jacob MRN: [id number] WTQ:[id number] Visit Information Date & Time Provider Department Dept. Phone Encounter #  
 2/5/2018  9:00 AM Mark Puente MD [address] [facility] [phone] [id number] Your Appointments 4/24/2018  9:50 AM  
FOLLOW UP 10 with Mark Puente MD  
[facility] ([address]) Appt Note: [address] [address] [address] [address] Upcoming Health Maintenance Date Due  
 EYE EXAM RETINAL OR DILATED Q1 9/14/1950 ZOSTER VACCINE AGE 60> 7/14/2000 GLAUCOMA SCREENING Q2Y 9/14/2005 HEMOGLOBIN A1C Q6M 7/22/2018 FOOT EXAM Q1 10/19/2018 MICROALBUMIN Q1 10/19/2018 MEDICARE YEARLY EXAM 10/20/2018 LIPID PANEL Q1 1/22/2019 DTaP/Tdap/Td series (2 - Td) 5/22/2027 Allergies as of 2/5/2018  Review Complete On: 2/5/2018 By: Mark Puente MD  
 No Known Allergies Current Immunizations  Reviewed on 10/19/2017 Name Date Influenza Vaccine 10/11/2016, 10/30/2015 Influenza Vaccine (Quad) PF 9/22/2017  8:45 AM  
 Pneumococcal Conjugate (PCV-13) 8/14/2015 Pneumococcal Polysaccharide (PPSV-23) 1/1/2006 Tdap 5/22/2017  1:11 PM  
  
 Not reviewed this visit Vitals BP Pulse Temp Resp Height(growth percentile) Weight(growth percentile)  
 138/60 (BP 1 Location: Left arm, BP Patient Position: Sitting) 60 98.4 °F (36.9 °C) 16 5' 9\" (1.753 m) 179 lb (81.2 kg) SpO2 BMI Smoking Status 97% 26.43 kg/m2 Former Smoker BMI and BSA Data Body Mass Index Body Surface Area  
 26.43 kg/m 2 1.99 m 2 Preferred Pharmacy Pharmacy Name Phone [facility] [address], [address] [place] [address] Avenue [phone] Your Updated Medication List  
  
   
This list is accurate as of: 2/5/18  9:44 AM.  Always use your most recent med list. amLODIPine 5 mg tablet Commonly known as:  Laura Restrepoast Take 5 mg by mouth daily. aspirin 81 mg chewable tablet Take 81 mg by mouth nightly. clopidogrel 75 mg Tab Commonly known as:  PLAVIX Take 75 mg by mouth daily. ferrous sulfate 325 mg (65 mg iron) tablet Take 325 mg by mouth three (3) times daily (with meals). insulin aspart 100 unit/mL injection Commonly known as:  NovoLOG  
10 Units by SubCUTAneous route Before breakfast, lunch, and dinner. Indications: type 2 diabetes mellitus  
  
 insulin degludec 200 unit/mL (3 mL) Inpn Commonly known as:  TRESIBA FLEXTOUCH U-200  
20 Units by SubCUTAneous route nightly. Insulin Needles (Disposable) 30 gauge x 1/3\" Use daily with his [facility] pen  
  
 insulin  unit/mL injection Commonly known as:  NovoLIN N  
24 Units by SubCUTAneous route daily (with breakfast). Indications: type 2 diabetes mellitus  
  
 lisinopril-hydroCHLOROthiazide 20-12.5 mg per tablet Commonly known as:  Igor Jay Take 2 Tabs by mouth daily. losartan 100 mg tablet Commonly known as:  COZAAR Take 100 mg by mouth two (2) times a day. nadolol 40 mg tablet Commonly known as:  CORGARD Take 40 mg by mouth daily. omeprazole 20 mg capsule Commonly known as:  PRILOSEC Take 20 mg by mouth nightly. * simvastatin 10 mg tablet Commonly known as:  ZOCOR Take 10 mg by mouth. * simvastatin 10 mg tablet Commonly known as:  ZOCOR Take 10 mg by mouth nightly. tamsulosin 0.4 mg capsule Commonly known as:  FLOMAX Take 0.4 mg by mouth daily. traMADol 50 mg tablet Commonly known as:  ULTRAM  
1 tablet four times daily as needed for pain. * Notice:   This list has 2 medication(s) that are the same as other medications prescribed for you. Read the directions carefully, and ask your doctor or other care provider to review them with you. Introducing [facility]! Mago Klein introduces EventRegist patient portal. Now you can access parts of your medical record, email your doctor's office, and request medication refills online. 1. In your internet browser, go to [email or web address] 2. Click on the First Time User? Click Here link in the Sign In box. You will see the New Member Sign Up page. 3. Enter your EventRegist Access Code exactly as it appears below. You will not need to use this code after youve completed the sign-up process. If you do not sign up before the expiration date, you must request a new code. · EventRegist Access Code: [email or web address] Expires: 2/18/2018  1:27 PM 
 
4. Enter the last four digits of your Social Security Number (xxxx) and Date of Birth (mm/dd/yyyy) as indicated and click Submit. You will be taken to the next sign-up page. 5. Create a EventRegist ID. This will be your EventRegist login ID and cannot be changed, so think of one that is secure and easy to remember. 6. Create a EventRegist password. You can change your password at any time. 7. Enter your Password Reset Question and Answer. This can be used at a later time if you forget your password. 8. Enter your e-mail address. You will receive e-mail notification when new information is available in [address] 9. Click Sign Up. You can now view and download portions of your medical record. 10. Click the Download Summary menu link to download a portable copy of your medical information. If you have questions, please visit the Frequently Asked Questions section of the EventRegist website. Remember, EventRegist is NOT to be used for urgent needs. For medical emergencies, dial 911. Now available from your iPhone and Android! Please provide this summary of care documentation to your next provider. Your primary care clinician is listed as Girma. If you have any questions after today's visit, please call [phone].

## 2018-02-05 NOTE — PROGRESS NOTES
No chief complaint on file. SUBJECTIVE:    Jennifer Benjamin is a 68 y.o. male who returns in follow-up of his medical problems include hypertension, diabetes, hyperlipidemia, DJD, prior thrombotic CVA, ASVD with bilateral carotid disease, complete heart block status post pacemaker placement, GERD with history of Martínez's esophagus, BPH, and other medical problems. He is currently taking his medications and trying to follow his diet and get some exercise and follow his blood sugar very closely. He denies any chest pain, shortness of breath, palpitations, or cardiorespiratory complaints. He denies any GI or  complaints. He denies any headaches or neurologic complaints and says he has good feeling in his feet without any burning or tingling. He does note some arthritic complaints in his ankles without any other arthritic complaints. He denies any other complaints on complete review of systems. Current Outpatient Prescriptions   Medication Sig Dispense Refill    insulin aspart (NOVOLOG) 100 unit/mL injection 10 Units by SubCUTAneous route Before breakfast, lunch, and dinner. Indications: type 2 diabetes mellitus 10 mL prn    insulin degludec (TRESIBA FLEXTOUCH U-200) 200 unit/mL (3 mL) inpn 20 Units by SubCUTAneous route nightly. 2 Pen 0    insulin NPH (NOVOLIN N) 100 unit/mL injection 24 Units by SubCUTAneous route daily (with breakfast). Indications: type 2 diabetes mellitus 1 Vial 0    Insulin Needles, Disposable, 30 gauge x 1/3\" Use daily with his Veronica Nieto pen 1 Package 11    simvastatin (ZOCOR) 10 mg tablet Take 10 mg by mouth.  nadolol (CORGARD) 40 mg tablet Take 40 mg by mouth daily.  traMADol (ULTRAM) 50 mg tablet 1 tablet four times daily as needed for pain. 360 Tab 1    losartan (COZAAR) 100 mg tablet Take 100 mg by mouth two (2) times a day.  lisinopril-hydroCHLOROthiazide (PRINZIDE, ZESTORETIC) 20-12.5 mg per tablet Take 2 Tabs by mouth daily.       clopidogrel (PLAVIX) 75 mg tab Take 75 mg by mouth daily.  ferrous sulfate 325 mg (65 mg iron) tablet Take 325 mg by mouth three (3) times daily (with meals).  omeprazole (PRILOSEC) 20 mg capsule Take 20 mg by mouth nightly.  amLODIPine (NORVASC) 5 mg tablet Take 5 mg by mouth daily.  aspirin 81 mg chewable tablet Take 81 mg by mouth nightly.  tamsulosin (FLOMAX) 0.4 mg capsule Take 0.4 mg by mouth daily.  simvastatin (ZOCOR) 10 mg tablet Take 10 mg by mouth nightly.        Past Medical History:   Diagnosis Date    Allergic rhinitis     Anemia     ASVD (arteriosclerotic vascular disease)     Alfonso's esophagus 7/28/2017    Carotid stenosis 7/28/2017    Chronic kidney disease     Diabetes ([facility]     glucose runs 70 - 160's at home    Diabetic neuropathy ([facility] 7/28/2017    Diverticulosis     DJD (degenerative joint disease)     ED (erectile dysfunction)     Enlarged prostate     JUDY (generalized anxiety disorder)     GERD (gastroesophageal reflux disease)     controlled with med; alfonso's esophagus    Hiatal hernia     Hypercholesterolemia     Hypertension     Hypertrophy (benign) of prostate 7/28/2017    Ill-defined condition     peripheral vascular disease    Lung nodule     Neuropathy     On statin therapy 7/28/2017    Prostate cancer screening 7/28/2017    PVD (peripheral vascular disease) ([facility]     Sebaceous cyst 7/28/2017    Spinal stenosis      Past Surgical History:   Procedure Laterality Date    CARDIAC SURG PROCEDURE UNLIST      CATH-2008    COLONOSCOPY N/A 6/15/2016    [facility]  6/15/2016         HX CATARACT REMOVAL      BILATERAL    HX ORTHOPAEDIC  1977    BONE CYST REM,ANU FROM RIGHT LEG    HX ORTHOPAEDIC  5-, 7-22-17, 7-26-17    left hand index finger    HX OTHER SURGICAL Left 06/2016    carotid endarectomy    NEUROLOGICAL PROCEDURE UNLISTED  2011    Back: spinal stenosis, pinched nerve repair    UPPER GI ENDOSCOPY,BIOPSY  6/15/2016          No Known Allergies    REVIEW OF SYSTEMS:  General: negative for - chills or fever, or weight loss or gain  ENT: negative for - headaches, nasal congestion or tinnitus  Eyes: no blurred or visual changes  Neck: No stiffness or swollen nodes  Respiratory: negative for - cough, hemoptysis, shortness of breath or wheezing  Cardiovascular : negative for - chest pain, edema, palpitations or shortness of breath  Gastrointestinal: negative for - abdominal pain, blood in stools, heartburn or nausea/vomiting  Genito-Urinary: no dysuria, trouble voiding, or hematuria  Musculoskeletal: negative for - gait disturbance, joint stiffness or joint swelling. Bilateral ankle pain  Neurological: no TIA or stroke symptoms  Hematologic: no bruises, no bleeding  Lymphatic: no swollen glands  Integument: no lumps, mole changes, nail changes or rash  Endocrine:no malaise/lethargy poly uria or polydipsia or unexpected weight changes        Social History     Social History    Marital status:      Spouse name: N/A    Number of children: N/A    Years of education: N/A     Social History Main Topics    Smoking status: Former Smoker     Quit date: 7/7/1996    Smokeless tobacco: Never Used    Alcohol use No      Comment: rarely    Drug use: No    Sexual activity: Yes     Other Topics Concern    None     Social History Narrative     Family History   Problem Relation Age of Onset    Diabetes Mother     Cancer Father      LUNG    Heart Disease Father        OBJECTIVE:     Visit Vitals    /60 (BP 1 Location: Left arm, BP Patient Position: Sitting)    Pulse 60    Temp 98.4 °F (36.9 °C)    Resp 16    Ht 5' 9\" (1.753 m)    Wt 179 lb (81.2 kg)    SpO2 97%    BMI 26.43 kg/m2     CONSTITUTIONAL:   well nourished, appears age appropriate  EYES: sclera anicteric, PERRL, EOMI  ENMT:nars clear, moist mucous membranes, pharynx clear  NECK: supple. Thyroid normal, No JVD or bruits  RESPIRATORY: Chest: clear to ascultation and percussion, normal inspiratory effort  CARDIOVASCULAR: Heart: regular rate and rhythm no murmurs, rubs or gallops, PMI not displaced, No thrills  GASTROINTESTINAL: Abdomen: non distended, soft, non tender, bowel sounds normal  HEMATOLOGIC: no purpura, petechiae or bruising  LYMPHATIC: No lymph node enlargemant  MUSCULOSKELETAL: Extremities: no edema or active synovitis, pulse 1+. Mild discomfort ankles with range of motion without any joint effusion. No diabetic foot changes noted. INTEGUMENT: No unusual rashes or suspicious skin lesions noted. Nails appear normal.  PERIPHERAL VASCULAR: normal pulses femoral, PT and DP  NEUROLOGIC: non-focal exam, A & O X 3. Distal sensation and proprioception present in both feet with decreased sensation. PSYCHIATRIC:, appropriate affect     ASSESSMENT:   1. Essential hypertension    2. Controlled type 2 diabetes mellitus without complication, with long-term current use of insulin ([address]    3. Mixed hyperlipidemia    4. ASVD (arteriosclerotic vascular disease)    5. Martínez's esophagus without dysplasia    6. Complete heart block ([address]    7. Cerebrovascular accident (CVA), unspecified mechanism ([address]    8. Benign prostatic hyperplasia without lower urinary tract symptoms    9. S/P cardiac pacemaker procedure    10. Diabetic polyneuropathy associated with type 2 diabetes mellitus ([address]    11. 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 Hypertension we will continue his current medications which are reviewed with him as it is adequate controlled today  2. Hyperlipidemia repeat status pending reviewed prior labs will make adjustments if necessary  3.   Diabetes blood sugars have been running in the 170s according his check so we will increase his mealtime insulin from 10 units with each meal to 12 units with each meal and continue his long-acting insulin 24 units daily  4. ASCVD continue aspirin daily  5. History of Martínez's esophagus seems to be stable  6. History of complete heart block status post pacemaker he is in a paced rhythm now  7. CVA with prior middle cerebral artery thrombotic stroke continue aspirin daily  8. BPH that is stable  9. Diabetic neuropathy stable  I will call the lab make further recommendations adjustments if necessary. Follow-up scheduled again for 3 months or sooner should there be a problem. PLAN:  .  Orders Placed This Encounter    AMB POC LIPID PROFILE    AMB POC COMPREHENSIVE METABOLIC PANEL    AMB POC HEMOGLOBIN A1C    AMB POC CK (CPK)         ATTENTION:   This medical record was transcribed using an electronic medical records system. Although proofread, it may and can contain electronic and spelling errors. Other human spelling and other errors may be present. Corrections may be executed at a later time. Please feel free to contact us for any clarifications as needed. Follow-up Disposition:  Return in about 3 months (around 5/5/2018). No results found for any visits on 02/05/18. Rodolfo Mathur MD    The patient verbalized understanding of the problems and plans as explained.

## 2018-02-12 NOTE — TELEPHONE ENCOUNTER
Requested Prescriptions     Pending Prescriptions Disp Refills    amLODIPine (NORVASC) 5 mg tablet [Pharmacy Med Name: AMLODIPINE BESYLATE 5 MG Tablet] 90 Tab 3     Sig: TAKE 1 TABLET EVERY DAY

## 2018-02-12 NOTE — PROGRESS NOTES
Lab is all okay except for glycohemoglobin is elevated as be expected. Treatment changes as we discussed at office visit.

## 2018-02-14 NOTE — TELEPHONE ENCOUNTER
RX refill request from the pharmacy, due to increase in dosage since last ordered. Patient last seen 2/5/18 with labs, and next appt. scheduled for 4/24/18.

## 2018-02-15 PROBLEM — I46.9 CARDIAC ARREST (HCC): Status: ACTIVE | Noted: 2018-01-01

## 2018-02-15 PROBLEM — I21.9 ACUTE MYOCARDIAL INFARCTION (HCC): Status: ACTIVE | Noted: 2018-01-01

## 2018-02-15 NOTE — CARDIO/PULMONARY
Cardiopulmonary Rehab Nursing Entry:    Chart reviewed secondary to in-basket referral.  Pt 67 yo s/p cardiac arrest.  PMHx of CHB, PPM, HTN, DM, CVA, hypercholesterolemia, former smoker. Pt currently intubated. CR will follow pt progress during this admission and provide cardiac teaching as appropriate.

## 2018-02-15 NOTE — ED NOTES
Rounded on pt and family, updated on plan of care. Pt appears to be resting comfortably, vital signs stable. Family at bedside and call bell within reach.

## 2018-02-15 NOTE — CONSULTS
PULMONARY ASSOCIATES [facility] INTENSIVIST Consult Service Note  Pulmonary, Critical Care, and Sleep Medicine    Name: India Willson MRN: [id number]   : 1940 Hospital: [facility]   Date: 2/15/2018   Hospital Day: 2       Subjective/Interval History:   I have reviewed the notes from other providers and old records readily available and summarized findings below with the flowsheet. Seen earlier today on rounds. IMPRESSION:   1. Acute respiratory failure - now currently on mechanical ventilator as life support in the setting of  2. Acute PEA Cardiac arrest   3. Shock likely cardiogenic   4. Acute encepahlopathy- agitated off sedation with gag but not following commands  5. Abnormal chest CT- asymmetric and wonder about occult aspiration or early pneumonia- no prodrome at home- bilateral pleural effusions with bibasilar airspace opacification; marc RUL and RML  6. RML nodule- age??  9. Constipation on KUB  8. H/o complete heart block s/p PPM 2017 now with   9. Temporary pacemaker wire intact through sheath in right groin. rythmn is sinus rythmn with runs of paced beats. Site soft,no oozing or hematoma noted. 10. Atrial fibrillation? ? Started in ER but no strips? 11. PICC intact in right upper arm. PICC present on admission. 12. Hypernatremia - why  13. Gastric retention? NG recovered > liter of brown fluid but not coffee ground  14. Anemia - baseline Hb ~10, admission 7.9 after fluids?-no signs of over bleeding   15. Hypokalemia  16. HTN -head CT: normal; CTA - no PE, possible asymptomatic pulmonary edema vs pneumonia. 17. IDDM type II  18. Stroke 2017   19. BL ICA stenosis (50-59%),  Hx L CEA  20. Multiorgan dysfunction as outlined above with more testing to come as below  21.  Pt is critically ill on life support and at high risk of sudden decompensation and/ or continued end organ dysfunction and failure      RECOMMENDATIONS/PLAN:   1. CCU monitoring- support patient and focus on safety  2. Discussed management with nursing, wife, daughter at bedside  3. Temp pacemaker  4. Levophed for BP support  5. Mechanical Ventilation for life support  6. bronchial hygiene  7. Continue IV abx pending cultures- aspiration or early pneumonia  8. CXR in AM to follow infiltrates seen best on Ct chest POA  9. Amiodarone drip - d/w Dr. Chris Pemberton  10. Troponin up post CPR  11. Neurology evaluation, EEG pending but too early to prognosticate  12. Follow BS and treat SSI  13. Patient requiring restraints due to threat of injury to self, interference with medical devices. 14. Pt needs IV fluids with additives and Drug therapy requiring intensive monitoring for toxicity  15. Prescription drug management with home med reconciliation reviewed  16. DVT, SUP prophylaxis  17. Will be available to assist in medical management while in the CCU pending disposition          My assessment/management was discussed with the follwoing to coordinate care:  Nursing XX    respiratory therapy XX    hospitals[facility] XX      I have provided   45    minutes of critical care time rendering care exclusive of any procedures. During this entire length of time I was immediately available to the patient.      The reason for providing this level of medical care was due to a critical illness that impaired one or more vital organ systems, such that there was a high probability of imminent or life threatening deterioration in the patient's condition. Pt's condition is unstable and unpredictable. Risk of deterioration: high   [x] High complexity decision making was performed  [x] See my orders for details  Tubes:   Liu, NGT and Intubated/Vent  ICU disposition :Updated Family. Subjective/Initial History:   I have reviewed the flowsheet and previous days notes. Seen earlier today on rounds.     I was asked by Kelly Ford MD to see Isabel Kebede in consultation for a chief complaint of respiratory failure in the setting of PEA arrest at home. Pt is a 67 yo WM who was found unresponsive and pulseless at home last PM and transported to the ER. He was intubated in the field and CPR started and once arrived in ER CPR with eventual ROSC. Temp pacer placed in right femoral. Pt without PE on CTA chest. On IV levophed for shock. Large gastric output. Not making much urine. Pt now in CCU and paced on the monitor. and he has remained unresponsive with non reactive pupils and no pain response. He remains intubated and on Levophed. No history obtainable except from chart review and discussion with his nurse. He apparently told his wife that he didn't feel well before laying down in the recliner and when she subsequently checked on him he was unresponsive. No prodrome though may have felt like vomiting but did not. No fever. No recent ill contacts. PMH:  has a past medical history of Allergic rhinitis; Anemia; ASVD (arteriosclerotic vascular disease); Martínez's esophagus (7/28/2017); Carotid stenosis (7/28/2017); Chronic kidney disease; Diabetes ([facility] Diabetic neuropathy ([facility] (7/28/2017); Diverticulosis; DJD (degenerative joint disease); ED (erectile dysfunction); Enlarged prostate; JUDY (generalized anxiety disorder); GERD (gastroesophageal reflux disease); Hiatal hernia; Hypercholesterolemia; Hypertension; Hypertrophy (benign) of prostate (7/28/2017); Ill-defined condition; Lung nodule; Neuropathy; On statin therapy (7/28/2017); Prostate cancer screening (7/28/2017); PVD (peripheral vascular disease) ([facility] Sebaceous cyst (7/28/2017); and Spinal stenosis. He also has no past medical history of Nausea & vomiting.   PSH:   has a past surgical history that includes upper gi endoscopy,biopsy (6/15/2016); colonoscopy,diagnostic (6/15/2016); colonoscopy (N/A, 6/15/2016); hx cataract removal; hx other surgical (Left, 06/2016); pr cardiac surg procedure unlist; pr neurological procedure unlisted (2011); hx orthopaedic (1977); and hx orthopaedic (2017, 17, 17). FHX: family history includes Cancer in his father; Diabetes in his mother; Heart Disease in his father. SHX:  reports that he quit smoking about 21 years ago. He has never used smokeless tobacco. He reports that he does not drink alcohol or use illicit drugs. The patient is unable to give any meaningful history or review of systems due to patient factors. ROS:Review of systems not obtained due to patient factors.     No Known Allergies     MAR reviewed and pertinent medications noted or modified as needed  MEDS:   Current Facility-Administered Medications   Medication    [START ON 2018] piperacillin-tazobactam (ZOSYN) 3.375 g in  ml premix/cmpd    sodium chloride (NS) flush 5-10 mL    sodium chloride (NS) flush 5-10 mL    acetaminophen (TYLENOL) suppository 650 mg    ondansetron (ZOFRAN) injection 4 mg    insulin lispro (HUMALOG) injection    glucose chewable tablet 16 g    dextrose (D50W) injection syrg 12.5-25 g    glucagon (GLUCAGEN) injection 1 mg    0.45% sodium chloride infusion    amiodarone (CORDARONE) 375 mg/250 mL D5W infusion    mupirocin (BACTROBAN) 2 % ointment    chlorhexidine (PERIDEX) 0.12 % mouthwash 15 mL    sodium chloride (NS) flush 5-10 mL    propofol (DIPRIVAN) infusion    NOREPINephrine (LEVOPHED) 8 mg in 5% dextrose 250mL infusion        Objective:     Vital Signs: Intake/Output: Intake/Output:   Temp (24hrs), Av.5 °F (36.4 °C), Min:97.2 °F (36.2 °C), Max:97.8 °F (36.6 °C)    Visit Vitals    /57    Pulse 76    Temp 97.8 °F (36.6 °C)    Resp 26    Wt 81.2 kg (179 lb)    SpO2 99%    BMI 26.43 kg/m2          Telemetry:    paced O2 Device: Ventilator       Wt Readings from Last 4 Encounters:   18 81.2 kg (179 lb)   18 81.2 kg (179 lb)   18 81.1 kg (178 lb 12.8 oz)   17 78.5 kg (173 lb)          Intake/Output Summary (Last 24 hours) at 02/15/18 0706  Last data filed at 02/15/18 0630   Gross per 24 hour   Intake                0 ml   Output              320 ml   Net             -320 ml     Last shift:         Last 3 shifts: 02/13 1901 - 02/15 0700  In: -   Out: 320 [Urine:320]     Hemodynamics:    CO:    CI:    CVP:    SVR:   PAP Systolic:    PAP Diastolic:    PVR:    HZ04:        Ventilator Settings:      Mode Rate TV Press PEEP FiO2 PIP Min. Vent   Assist control    500 ml    6 cm H20 50 % (FIO2 lowered to 50%)  24 cm H2O  15.1 l/min      Physical Exam:     General: afebrile and severely ill elderly WM on vent   HEAD: Normocephalic, without obvious abnormality, atraumatic   EYES: conjunctivae clear. PERRL,  AN Icteric sclerae   NOSE: nares normal, no drainage, no nasal flaring,    THROAT: mucous membranes dry; Lips, mucosa dry; intubated   Neck: Supple, symmetrical, trachea midline,  No accessory mm use; No Stridor/ cuff leak, No goiter or thyroid tenderness   LYMPH: No abnormally enlarged lymph nodes. in neck or groin   Chest: normal   Lungs: decreased air exchange bilaterally   Heart: Regular rate and rhythm; Abdomen: soft, non-tender, without masses or organomegaly; protuberant   : normal;   Liu; clear urine; NO gross hematuria   Extremity: negative, clubbing; no joint swelling or erythema   Neuro: obtunded; sedated; non verbal; withdraws to pain; unable to check gait and station; does not follow simple commands   Psych: Unable to assess;  agitation off sedation   Skin: Pale and Cool;    Pulses:Bilateral, Radial, 1+   Capillary refill: abnormal: sluggish capillary refill, pale,      Data:   Interval lab and diagnostic data was reviewed. Interval radiology images were independently viewed and available reports were reviewed. All lab results for the last 24 hours reviewed.           Labs:    Recent Labs      02/15/18   0532  02/14/18   2230   WBC  17.7*  7.1   HGB  11.4*  7.9*   PLT  319  190   INR   --   1.3*   APTT   --   39.0*     Recent Labs      02/15/18   0532  02/15/18   0024 02/14/18   2230   NA  137   --   147*   K  4.4   --   3.4*   CL  106   --   114*   CO2  21   --   19*   GLU  333*   --   229*   BUN  38*   --   24*   CREA  1.58*   --   1.15   CA  8.0*   --   6.1*   MG  2.0   --    --    LAC  1.7  5.1*   --    ALB  2.1*   --   1.5*   SGOT  250*   --   118*   ALT  130*   --   76     Recent Labs      02/14/18   2330   PH  7.30*   PCO2  36   PO2  391*   HCO3  17*   FIO2  100     Recent Labs      02/15/18   0532   CPK  387*   CKNDX  7.4*   TROIQ  6.31*     No results found for: BNPP, BNP   Lab Results   Component Value Date/Time    Culture result: GRAM NEGATIVE RODS (A) 09/16/2017 06:40 PM    Culture result: NO GROWTH 5 DAYS 09/11/2017 06:54 PM    Culture result: NO ANAEROBES ISOLATED 08/16/2017 10:04 AM    Culture result: (A) 08/16/2017 10:04 AM     HEAVY STREPTOCOCCUS INTERMEDIUS  ( MICROAEROPHILIC STREPTOCOCCUS )    Culture result:  08/16/2017 10:04 AM     DR. SANTOS HAS REQUESTED SUSCEPTIBILITIES. Jessica Brown 8/19/17 DB    Culture result:  08/16/2017 10:04 AM     AN ISOLATE HAS BEEN SENT TO OUR REFERENCE LAB FOR SUSCEPTIBILITIES.   PLEASE REFER TO REFERENCE ACCESSION NUMBER [id number], FOR SUBSEQUENT RESULT       Lab Results   Component Value Date/Time     (H) 02/15/2018 05:32 AM    CK 46 09/18/2017 11:10 PM       Lab Results   Component Value Date/Time    Color YELLOW/STRAW 02/15/2018 12:37 AM    Appearance CLOUDY (A) 02/15/2018 12:37 AM    pH (UA) 5.5 02/15/2018 12:37 AM    Protein 300 (A) 02/15/2018 12:37 AM    Glucose NEGATIVE  02/15/2018 12:37 AM    Ketone NEGATIVE  02/15/2018 12:37 AM    Bilirubin NEGATIVE  02/15/2018 12:37 AM    Blood NEGATIVE  02/15/2018 12:37 AM    Urobilinogen 0.2 02/15/2018 12:37 AM    Nitrites NEGATIVE  02/15/2018 12:37 AM    Leukocyte Esterase NEGATIVE  02/15/2018 12:37 AM    WBC 0-4 02/15/2018 12:37 AM    RBC 0-5 02/15/2018 12:37 AM    Bacteria NEGATIVE  02/15/2018 12:37 AM       No results found for: IRON, FE, TIBC, IBCT, PSAT, FERR  Lab Results   Component Value Date/Time    C-Reactive protein 0.56 09/11/2017 06:55 PM      No results found for: TOXA1, RPR, HBCM, HBSAG, HAAB, HCAB1, HAAT, G6PD, CRYAC, HIVGT, HIVR, HIV1, HIV12, HIVPC, HIVRPI      Imaging:  I have personally reviewed the patients radiographs and have reviewed the reports:          Results from [place] encounter on 02/14/18   XR ABD PORT  1 V   Narrative History: Evaluate nasogastric tube placement. An AP supine radiograph of the abdomen show demonstrate a large amount of stool  throughout the colon. Soft tissue detail is normal. A nasogastric tube side  port is seen in the region of the stomach. No free air is demonstrated. There are no unusual calcifications. There is a  central line overlying the right pelvis. Impression IMPRESSION:  Appropriate nasogastric tube placement. Constipation. Femoral line in place. Results from [place] encounter on 02/14/18   CTA CHEST W OR W WO CONT   Narrative History: Cardiac arrest    CTA of the chest was performed. 80 mL Isovue-370 were injected and scanning was  performed during the arterial phase of contrast administration. Post processing  was performed. 3D reconstructions were performed. CT dose reduction was  achieved through use of a standardized protocol tailored for this examination  and automatic exposure control for dose modulation. There are no intraluminal filling defects to suggest pulmonary embolism. The  heart, pericardium, and great vessels appear unremarkable. The chest wall and  axilla appear unremarkable. There are bilateral pleural effusions, right greater  than left. There is patchy opacification in the right upper lobe, right middle  lobe, and bilateral lower lobes. An 11 mm right middle lobe pulmonary nodule is  stable. An endotracheal tube tip is at the thoracic inlet. There is reflux of contrast  in to the hepatic veins. There is cholelithiasis. There is mild perihepatic  ascites. Impression IMPRESSION:   1. No evidence for pulmonary embolism. 2. Reflux of contrast into hepatic veins suggesting right heart failure. 3. Bilateral pleural effusions with bibasilar airspace opacification and right  upper lobe and right middle lobe airspace opacification which may represent  asymmetric edema or pneumonia. 4. Unchanged right middle lobe pulmonary nodule. 5. Cholelithiasis. 6. Mild ascites. This care involved high complexity decision making which includes independently reviewing the patient's past medical records, current laboratory results, medication profiles that were immediately available to me and actual Xray images at the bedside in order to assess, support vital system function, and to treat this degree of vital organ system failure, and to prevent further life threatening deterioration of the patients condition. I was in direct communication with the nursing staff throughout this time. I have personally and independently reviewed the patients interval diagnostic lab data, radiographs and the reports. I have ordered additional labs to follow the current medical conditions and to monitor treatment responses over the next 24 hours or sooner if needed. I will order additional imaging to follow longitudinal changes found on the most current imaging. Thank you for allowing us to participate in the care of this patient. We will be happy to follow along with you.     Dion Rodriguez MD

## 2018-02-15 NOTE — PROGRESS NOTES
Spiritual Care Assessment/Progress Notes    An Weeks [id number]  [id number]    1940  68 y.o.  male    Patient Telephone Number: [phone] (home)   Baptist Affiliation: [facility]   Language: English   Extended Emergency Contact Information  Primary Emergency Contact: Neisha Harrington  Address: [address]           [address], [address] [address] [place] Phone: [phone]  Mobile Phone: [phone]  Relation: Spouse   Patient Active Problem List    Diagnosis Date Noted    Anemia 12/20/2017    Medicare annual wellness visit, initial 10/19/2017    S/P cardiac pacemaker procedure 09/28/2017    Complete heart block ([address] 09/12/2017    Syncope and collapse 09/11/2017    Thrombotic stroke involving right middle cerebral artery ([address] 09/01/2017    Stenosis of both internal carotid arteries 09/01/2017    CVA (cerebral vascular accident) ([address] 08/31/2017    Martínez's esophagus 07/28/2017    Carotid stenosis 07/28/2017    Diabetic neuropathy ([address] 07/28/2017    On statin therapy 07/28/2017    Benign prostatic hyperplasia without lower urinary tract symptoms 07/28/2017    Prostate cancer screening 07/28/2017    Essential hypertension 07/27/2017    Controlled type 2 diabetes mellitus without complication, with long-term current use of insulin ([address] 07/27/2017    Mixed hyperlipidemia 07/27/2017    ASVD (arteriosclerotic vascular disease) 07/27/2017    Diverticulosis 10/23/2013    Colitis, nonspecific 10/23/2013    Lumbar stenosis with neurogenic claudication 07/12/2011        Date: 2/14/2018       Level of Baptist/Spiritual Activity:  [x]         Involved in kimmie tradition/spiritual practice    []         Not involved in kimmie tradition/spiritual practice  [x]         Spiritually oriented    []         Claims no spiritual orientation    []         seeking spiritual identity  []         Feels alienated from Gnosticism practice/tradition  []         Feels angry about Alevism practice/tradition  [x]         Spirituality/Alevism tradition is a resource for coping at this time. []         Not able to assess due to medical condition    Services Provided Today:  [x]         crisis intervention    []         reading Scriptures  [x]         spiritual assessment    [x]         prayer  [x]         empathic listening/emotional support  []         rites and rituals (cite in comments)  []         life review     []         Alevism support  []         theological development   []         advocacy  []         ethical dialog     []         blessing  []         bereavement support    [x]         support to family  []         anticipatory grief support   []         help with AMD  []         spiritual guidance    []         meditation      Spiritual Care Needs  [x]         Emotional Support  [x]         Spiritual/Adventism Care  []         Loss/Adjustment  []         Advocacy/Referral                /Ethics  []         No needs expressed at               this time  []         Other: (note in               comments)  Spiritual Care Plan  []         Follow up visits with               pt/family  []         Provide materials  []         Schedule sacraments  []         Contact Community               Clergy  [x]         Follow up as needed  []         Other: (note in               comments)     Responded to page requesting pastoral support in 2000 Xavi Garrett due to cardiac arrest. Upon arrival consulted with charge RN, informed of details concerning pt and incident leading to cardiac arrest. Family in consult room, met with spouse, daughter and son. Led in processing. Spouse appeared calm and collected, self-reported to be coping well. Shared several significant events that had led to pt being hospitalized starting last May. Voiced her kimmie in God throughout. Affirmed kimmie and provided prayer for family and pt. Family did not identify any other needs at this time. Waiting to speak with physician. Advised of availability as desired. Updated pt's RN on conversation. Will follow up as needed. JOSÉ Valentine

## 2018-02-15 NOTE — PROGRESS NOTES
0700 Bedside and Verbal shift change report received from ADRIEL Wells (offgoing nurse). Report included the following information SBAR, Kardex, Intake/Output, MAR, Accordion and Recent Results. 0800 Assessment complete. See flowsheet for details. 0830 Patient's wife and daughter at bedside. Visitor brochure and access code provided. 1200 Reassessment unchanged. Will continue to monitor. 1500 Patient's son calling for an update. 1520 PTA med list completed with home medication list provided by patient's wife. 1600 Reassessment unchanged. Will continue to monitor. 1630 Wound care RN at bedside. [address] with Dr[address] Torres Reasons; new orders received for cooling blanket. Standing orders received to give 12 units of Humalog for any blood sugar equal to or >350. 1815 Cooling blanket placed. Will monitor closely. 1900 Bedside and Verbal shift change report given to ADRIEL Wells (oncoming nurse). Report included the following information SBAR, Kardex, Intake/Output, MAR, Accordion and Recent Results.

## 2018-02-15 NOTE — ED NOTES
Pressor started, NG tube inserted, Liu placed. Pt not responded to painful stimuli. Pupils fixed and uneve. Josee trejo, family in conference room.

## 2018-02-15 NOTE — ED NOTES
Pt starting to flutter eyes, slight movement of legs. Not responding to commands or painful stimilu, no purposeful movement. Propofol started. Liu inserted, urine spemi obtained. 1255: Hospitalist at bedside updating family on plan of care.

## 2018-02-15 NOTE — PROGRESS NOTES
PROGRESS NOTE    NAME:  Jameson Lazo   :      MRN:   [id number]     Date/Time:  2/15/2018 6:51 AM  Subjective:   History:  Chart reviewed and patient seen and examined and D/W his nurse, his wife and Dr. Lenny Gutierrez  this AM and all events noted. He was found unresponsive and pulseless at home last PM and transported to the ER. He was intubated in the field and CPR started and once arrived in ER CPR continued with obtainment of pulse; however he has remained unresponsive with non reactive pupils and no pain response. He remains intubated and on Levophed. No history obtainable except from chart review and discussion with his nurse and wife. He apparently told his wife that he didn't feel well before laying down in the recliner and when she subsequently checked on him a little later he was unresponsive.       Medications reviewed:  Current Facility-Administered Medications   Medication Dose Route Frequency    [START ON 2018] piperacillin-tazobactam (ZOSYN) 3.375 g in  ml premix/cmpd  3.375 g IntraVENous Q8H    sodium chloride (NS) flush 5-10 mL  5-10 mL IntraVENous Q8H    sodium chloride (NS) flush 5-10 mL  5-10 mL IntraVENous PRN    acetaminophen (TYLENOL) suppository 650 mg  650 mg Rectal Q4H PRN    ondansetron (ZOFRAN) injection 4 mg  4 mg IntraVENous Q4H PRN    insulin lispro (HUMALOG) injection   SubCUTAneous Q6H    glucose chewable tablet 16 g  4 Tab Oral PRN    dextrose (D50W) injection syrg 12.5-25 g  12.5-25 g IntraVENous PRN    glucagon (GLUCAGEN) injection 1 mg  1 mg IntraMUSCular PRN    amiodarone (CORDARONE) 375 mg/250 mL D5W infusion  1 mg/min IntraVENous CONTINUOUS    0.45% sodium chloride infusion  50 mL/hr IntraVENous CONTINUOUS    amiodarone (CORDARONE) 375 mg/250 mL D5W infusion  0.5 mg/min IntraVENous CONTINUOUS    mupirocin (BACTROBAN) 2 % ointment   Both Nostrils BID    chlorhexidine (PERIDEX) 0.12 % mouthwash 15 mL  15 mL Oral Q12H    sodium chloride (NS) flush 5-10 mL  5-10 mL IntraVENous PRN    propofol (DIPRIVAN) infusion  0-50 mcg/kg/min IntraVENous TITRATE    NOREPINephrine (LEVOPHED) 8 mg in 5% dextrose 250mL infusion  2-30 mcg/min IntraVENous TITRATE        Objective:   Vitals:  Visit Vitals    /70    Pulse 74    Temp 97.8 °F (36.6 °C)    Resp 21    Wt 179 lb (81.2 kg)    SpO2 97%    BMI 26.43 kg/m2      O2 Device: Ventilator Temp (24hrs), Av.5 °F (36.4 °C), Min:97.2 °F (36.2 °C), Max:97.8 °F (36.6 °C)      Last 24hr Input/Output:    Intake/Output Summary (Last 24 hours) at 02/15/18 07  Last data filed at 02/15/18 0320   Gross per 24 hour   Intake                0 ml   Output              220 ml   Net             -220 ml        PHYSICAL EXAM:  General:     Unresponsive   Head:    Normocephalic, without obvious abnormality, atraumatic. Eyes:    Conjunctivae/corneas clear. Pupils fixed and not responsive  Nose:   Nares normal. No drainage or sinus tenderness. Throat:     Lips, mucosa, and tongue normal.  No Thrush. Intubated  Neck:   Supple, symmetrical,  no adenopathy, thyroid: non tender     no carotid bruit and no JVD. Back:     Symmetric,  No CVA tenderness. Lungs:    Clear to auscultation bilaterally. No Wheezing or Rhonchi. No rales. Heart:    Regular rate and rhythm,  no murmur, rub or gallop. Abdomen:    Soft, non-tender. Not distended. Bowel sounds normal. No masses  Extremities:  Extremities normal, atraumatic, No cyanosis. No edema.  No clubbing  Lymph nodes:  Cervical, supraclavicular normal.  Neurologic:  Unresponsive with no purposeful movement  Skin:                 No rash      Lab Data Reviewed:    Recent Results (from the past 24 hour(s))   EKG, 12 LEAD, INITIAL    Collection Time: 18 10:13 PM   Result Value Ref Range    Ventricular Rate 84 BPM    Atrial Rate 56 BPM    QRS Duration 162 ms    Q-T Interval 500 ms    QTC Calculation (Bezet) 590 ms    Calculated R Axis -77 degrees    Calculated T Axis 129 degrees Diagnosis       Atrial fibrillation  Left axis deviation  Left bundle branch block  When compared with ECG of 11-SEP-2017 16:19,  Atrial fibrillation has replaced Electronic ventricular pacemaker     METABOLIC PANEL, COMPREHENSIVE    Collection Time: 02/14/18 10:30 PM   Result Value Ref Range    Sodium 147 (H) 136 - 145 mmol/L    Potassium 3.4 (L) 3.5 - 5.1 mmol/L    Chloride 114 (H) 97 - 108 mmol/L    CO2 19 (L) 21 - 32 mmol/L    Anion gap 14 5 - 15 mmol/L    Glucose 229 (H) 65 - 100 mg/dL    BUN 24 (H) 6 - 20 MG/DL    Creatinine 1.15 0.70 - 1.30 MG/DL    BUN/Creatinine ratio 21 (H) 12 - 20      GFR est AA >60 >60 ml/min/1.73m2    GFR est non-AA >60 >60 ml/min/1.73m2    Calcium 6.1 (LL) 8.5 - 10.1 MG/DL    Bilirubin, total 0.2 0.2 - 1.0 MG/DL    ALT (SGPT) 76 12 - 78 U/L    AST (SGOT) 118 (H) 15 - 37 U/L    Alk. phosphatase 135 (H) 45 - 117 U/L    Protein, total 4.2 (L) 6.4 - 8.2 g/dL    Albumin 1.5 (L) 3.5 - 5.0 g/dL    Globulin 2.7 2.0 - 4.0 g/dL    A-G Ratio 0.6 (L) 1.1 - 2.2     CBC WITH AUTOMATED DIFF    Collection Time: 02/14/18 10:30 PM   Result Value Ref Range    WBC 7.1 4.1 - 11.1 K/uL    RBC 2.61 (L) 4.10 - 5.70 M/uL    HGB 7.9 (L) 12.1 - 17.0 g/dL    HCT 26.2 (L) 36.6 - 50.3 %    .4 (H) 80.0 - 99.0 FL    MCH 30.3 26.0 - 34.0 PG    MCHC 30.2 30.0 - 36.5 g/dL    RDW 14.4 11.5 - 14.5 %    PLATELET 479 207 - 611 K/uL    MPV 10.3 8.9 - 12.9 FL    NRBC 0.0 0  WBC    ABSOLUTE NRBC 0.00 0.00 - 0.01 K/uL    NEUTROPHILS 55 32 - 75 %    BAND NEUTROPHILS 9 %    LYMPHOCYTES 28 12 - 49 %    MONOCYTES 4 (L) 5 - 13 %    EOSINOPHILS 1 0 - 7 %    BASOPHILS 0 0 - 1 %    METAMYELOCYTES 1 %    MYELOCYTES 2 %    IMMATURE GRANULOCYTES 0 0.0 - 0.5 %    ABS. NEUTROPHILS 4.5 1.8 - 8.0 K/UL    ABS. LYMPHOCYTES 2.0 0.8 - 3.5 K/UL    ABS. MONOCYTES 0.3 0.0 - 1.0 K/UL    ABS. EOSINOPHILS 0.1 0.0 - 0.4 K/UL    ABS. BASOPHILS 0.0 0.0 - 0.1 K/UL    ABS. IMM.  GRANS. 0.0 0.00 - 0.04 K/UL    DF AUTOMATED      RBC COMMENTS ANISOCYTOSIS  1+       PROTHROMBIN TIME + INR    Collection Time: 02/14/18 10:30 PM   Result Value Ref Range    INR 1.3 (H) 0.9 - 1.1      Prothrombin time 12.7 (H) 9.0 - 11.1 sec   PTT    Collection Time: 02/14/18 10:30 PM   Result Value Ref Range    aPTT 39.0 (H) 22.1 - 32.5 sec    aPTT, therapeutic range     58.0 - 77.0 SECS   GLUCOSE, POC    Collection Time: 02/14/18 10:36 PM   Result Value Ref Range    Glucose (POC) 207 (H) 65 - 100 mg/dL    Performed by Raciel Mejía (ED Tech)    BLOOD GAS, ARTERIAL    Collection Time: 02/14/18 11:30 PM   Result Value Ref Range    pH 7.30 (L) 7.35 - 7.45      PCO2 36 35.0 - 45.0 mmHg    PO2 391 (H) 80 - 100 mmHg    O2  (H) 92 - 97 %    BICARBONATE 17 (L) 22 - 26 mmol/L    BASE DEFICIT 8.2 mmol/L    O2 METHOD VENTILATOR      FIO2 100 %    MODE A/C      Tidal volume 500      SET RATE 14      EPAP/CPAP/PEEP 6.0      Sample source ARTERIAL      SITE LEFT RADIAL      TATY'S TEST YES     LACTIC ACID    Collection Time: 02/15/18 12:24 AM   Result Value Ref Range    Lactic acid 5.1 (HH) 0.4 - 2.0 MMOL/L   URINALYSIS W/ REFLEX CULTURE    Collection Time: 02/15/18 12:37 AM   Result Value Ref Range    Color YELLOW/STRAW      Appearance CLOUDY (A) CLEAR      Specific gravity 1.020 1.003 - 1.030      pH (UA) 5.5 5.0 - 8.0      Protein 300 (A) NEG mg/dL    Glucose NEGATIVE  NEG mg/dL    Ketone NEGATIVE  NEG mg/dL    Bilirubin NEGATIVE  NEG      Blood NEGATIVE  NEG      Urobilinogen 0.2 0.2 - 1.0 EU/dL    Nitrites NEGATIVE  NEG      Leukocyte Esterase NEGATIVE  NEG      WBC 0-4 0 - 4 /hpf    RBC 0-5 0 - 5 /hpf    Epithelial cells FEW FEW /lpf    Bacteria NEGATIVE  NEG /hpf    UA:UC IF INDICATED CULTURE NOT INDICATED BY UA RESULT CNI      Amorphous Crystals 2+ (A) NEG   INFLUENZA A & B AG (RAPID TEST)    Collection Time: 02/15/18  1:14 AM   Result Value Ref Range    Influenza A Antigen NEGATIVE  NEG      Influenza B Antigen NEGATIVE  NEG     GLUCOSE, POC    Collection Time: 02/15/18 2:48 AM   Result Value Ref Range    Glucose (POC) 207 (H) 65 - 100 mg/dL    Performed by Glenn Elliott    LACTIC ACID    Collection Time: 02/15/18  5:32 AM   Result Value Ref Range    Lactic acid 1.7 0.4 - 2.0 MMOL/L   CBC W/O DIFF    Collection Time: 02/15/18  5:32 AM   Result Value Ref Range    WBC 17.7 (H) 4.1 - 11.1 K/uL    RBC 3.76 (L) 4.10 - 5.70 M/uL    HGB 11.4 (L) 12.1 - 17.0 g/dL    HCT 35.5 (L) 36.6 - 50.3 %    MCV 94.4 80.0 - 99.0 FL    MCH 30.3 26.0 - 34.0 PG    MCHC 32.1 30.0 - 36.5 g/dL    RDW 14.5 11.5 - 14.5 %    PLATELET 013 145 - 382 K/uL    MPV 10.2 8.9 - 12.9 FL    NRBC 0.0 0  WBC    ABSOLUTE NRBC 0.00 0.00 - 0.01 K/uL   TROPONIN I    Collection Time: 02/15/18  5:32 AM   Result Value Ref Range    Troponin-I, Qt. 6.31 (H) <0.05 ng/mL   MAGNESIUM    Collection Time: 02/15/18  5:32 AM   Result Value Ref Range    Magnesium 2.0 1.6 - 2.4 mg/dL   METABOLIC PANEL, COMPREHENSIVE    Collection Time: 02/15/18  5:32 AM   Result Value Ref Range    Sodium 137 136 - 145 mmol/L    Potassium 4.4 3.5 - 5.1 mmol/L    Chloride 106 97 - 108 mmol/L    CO2 21 21 - 32 mmol/L    Anion gap 10 5 - 15 mmol/L    Glucose 333 (H) 65 - 100 mg/dL    BUN 38 (H) 6 - 20 MG/DL    Creatinine 1.58 (H) 0.70 - 1.30 MG/DL    BUN/Creatinine ratio 24 (H) 12 - 20      GFR est AA 52 (L) >60 ml/min/1.73m2    GFR est non-AA 43 (L) >60 ml/min/1.73m2    Calcium 8.0 (L) 8.5 - 10.1 MG/DL    Bilirubin, total 0.6 0.2 - 1.0 MG/DL    ALT (SGPT) 130 (H) 12 - 78 U/L    AST (SGOT) 250 (H) 15 - 37 U/L    Alk.  phosphatase 239 (H) 45 - 117 U/L    Protein, total 5.9 (L) 6.4 - 8.2 g/dL    Albumin 2.1 (L) 3.5 - 5.0 g/dL    Globulin 3.8 2.0 - 4.0 g/dL    A-G Ratio 0.6 (L) 1.1 - 2.2     CK W/ CKMB & INDEX    Collection Time: 02/15/18  5:32 AM   Result Value Ref Range     (H) 39 - 308 U/L    CK - MB 28.5 (H) <3.6 NG/ML    CK-MB Index 7.4 (H) 0 - 2.5     GLUCOSE, POC    Collection Time: 02/15/18  6:57 AM   Result Value Ref Range    Glucose (POC) 240 (H) 65 - 100 mg/dL    Performed by Jc Polanco          Assessment/Plan:     Principal Problem:    Card[facility] arrest ([facility] (2/15/2018)    Active Problems:    Essential hypertension (7/27/2017)      Controlled type 2 diabetes mellitus without complication, with long-term current use of insulin ([facility] (7/27/2017)      CVA (cerebral vascular accident) ([facility] (8/31/2017)      Complete heart block ([facility] (9/12/2017)      Acute myocardial infarction (2/15/2018)       ___________________________________________________  PLAN:    1. Continue BP support for now with Levophed  2. Mechanical Ventilation  3. On Amiodarone drip for Afib  4. Troponin markedly up c/w massive MI  5. Neurology evaluation, EEG  6. Emperic Zosyn for now to cover respiratory infection evident on CT  7. Follow BS and treat SSI  8.   Grim Prognosis      40 minutes spent in direct care of this critically ill patient today in ICU      ___________________________________________________    Attending Physician: Lisandro Edge MD

## 2018-02-15 NOTE — CONSULTS
IP CONSULT TO NEUROLOGY  Consult performed by: Qi Boyd  Consult ordered by: Terrie Salinas        Chief Complaint: Altered mental status    Is a 80-year-old male with medical history of stroke. He was admitted yesterday after suffering cardiopulmonary arrest while at home. 10 minutes elapsed between his collapse and the arrival of the medical squad. He was found pulseless and needed 2 rounds of epinephrine to regain his pulse. In route to the hospital he needed cardiopulmonary resuscitation during which she was shocked once and intubated. He has remained sedated and intubated since. Assesment and Plan    1. Altered mental status  We will obtain an EEG and imaging studies when appropriate    2. Acute non-ST elevation myocardial infarction (NSTEMI) [facility])  Cardiology following    3. Controlled type 2 diabetes mellitus without complication, with long-term current use of insulin ([facility])  Continue insulin    4. History of stroke   Continue aspirin    Condition discussed with family (daughter and wife)      Allergies  Review of patient's allergies indicates no known allergies.      Medications  Current Facility-Administered Medications   Medication Dose Route Frequency    piperacillin-tazobactam (ZOSYN) 3.375 g in  ml premix/cmpd  3.375 g IntraVENous Q8H    sodium chloride (NS) flush 5-10 mL  5-10 mL IntraVENous Q8H    sodium chloride (NS) flush 5-10 mL  5-10 mL IntraVENous PRN    acetaminophen (TYLENOL) suppository 650 mg  650 mg Rectal Q4H PRN    ondansetron (ZOFRAN) injection 4 mg  4 mg IntraVENous Q4H PRN    insulin lispro (HUMALOG) injection   SubCUTAneous Q6H    glucose chewable tablet 16 g  4 Tab Oral PRN    dextrose (D50W) injection syrg 12.5-25 g  12.5-25 g IntraVENous PRN    glucagon (GLUCAGEN) injection 1 mg  1 mg IntraMUSCular PRN    0.45% sodium chloride infusion  50 mL/hr IntraVENous CONTINUOUS    mupirocin (BACTROBAN) 2 % ointment   Both Nostrils BID    chlorhexidine (PERIDEX) 0.12 % mouthwash 15 mL  15 mL Oral Q12H    insulin glargine (LANTUS) injection 10 Units  10 Units SubCUTAneous DAILY    aspirin chewable tablet 81 mg  81 mg Per NG tube DAILY    famotidine (PF) (PEPCID) 20 mg in sodium chloride 0.9 % 10 mL injection  20 mg IntraVENous DAILY    sodium chloride (NS) flush 5-10 mL  5-10 mL IntraVENous PRN    propofol (DIPRIVAN) infusion  0-50 mcg/kg/min IntraVENous TITRATE    NOREPINephrine (LEVOPHED) 8 mg in 5% dextrose 250mL infusion  2-30 mcg/min IntraVENous TITRATE        Medical History  Past Medical History:   Diagnosis Date    Allergic rhinitis     Anemia     ASVD (arteriosclerotic vascular disease)     Alfonso's esophagus 7/28/2017    Carotid stenosis 7/28/2017    Chronic kidney disease     Diabetes ([facility]     glucose runs 70 - 160's at home    Diabetic neuropathy ([facility] 7/28/2017    Diverticulosis     DJD (degenerative joint disease)     ED (erectile dysfunction)     Enlarged prostate     JUDY (generalized anxiety disorder)     GERD (gastroesophageal reflux disease)     controlled with med; alfonso's esophagus    Hiatal hernia     Hypercholesterolemia     Hypertension     Hypertrophy (benign) of prostate 7/28/2017    Ill-defined condition     peripheral vascular disease    Lung nodule     Neuropathy     On statin therapy 7/28/2017    Prostate cancer screening 7/28/2017    PVD (peripheral vascular disease) ([facility]     Sebaceous cyst 7/28/2017    Spinal stenosis      ROS   Can't be obtained patient is intubated      Exam:    Visit Vitals    /61    Pulse 83    Temp (!) 103.3 °F (39.6 °C)    Resp 26    Wt 179 lb (81.2 kg)    SpO2 100%    BMI 26.43 kg/m2      General:    Head: Normocephalic, atraumatic, anicteric sclera   Neck Normal ROM,  No thyromegally   Lungs:  Clear to auscultation bilaterally, No wheezes or rubs   Cardiac: Regular rate and rhythm with no murmurs. Abd: Bowel sounds were audible.  No tenderness on palpation Ext: No pedal edema   Skin: Supple no rash     NeurologicExam:  Mental Status:    Speech: Fluent no aphasia or dysarthria. Cranial Nerves:  Does not Open Eyes Spontaneously  Does not respond to sound  No dolls eyes  PERRLA  Absent Corneal reflex   Symmetric grimmace     Motor:  Does not withdraw to pain. Reflexes:   Deep tendon reflexes 1+/4 and symmetric. Sensory:   Symmetric and intact with no perceived deficits modalities involving small or large fibers. Tremor:   No tremor noted. Neurovascular: No carotid bruits. No JVD     Imaging    CT Results (most recent):    Results from Hospital Encounter encounter on 02/14/18   CTA CHEST W OR W WO CONT   Narrative History: Cardiac arrest    CTA of the chest was performed. 80 mL Isovue-370 were injected and scanning was  performed during the arterial phase of contrast administration. Post processing  was performed. 3D reconstructions were performed. CT dose reduction was  achieved through use of a standardized protocol tailored for this examination  and automatic exposure control for dose modulation. There are no intraluminal filling defects to suggest pulmonary embolism. The  heart, pericardium, and great vessels appear unremarkable. The chest wall and  axilla appear unremarkable. There are bilateral pleural effusions, right greater  than left. There is patchy opacification in the right upper lobe, right middle  lobe, and bilateral lower lobes. An 11 mm right middle lobe pulmonary nodule is  stable. An endotracheal tube tip is at the thoracic inlet. There is reflux of contrast  in to the hepatic veins. There is cholelithiasis. There is mild perihepatic  ascites. Impression IMPRESSION:   1. No evidence for pulmonary embolism. 2. Reflux of contrast into hepatic veins suggesting right heart failure.   3. Bilateral pleural effusions with bibasilar airspace opacification and right  upper lobe and right middle lobe airspace opacification which may represent  asymmetric edema or pneumonia. 4. Unchanged right middle lobe pulmonary nodule. 5. Cholelithiasis. 6. Mild ascites. MRI Results (most recent):    Results from [facility] encounter on 08/31/17   MRA BRAIN WO CONT   Narrative INDICATION:   CVA    COMPARISON:  Brain MRI of today, reported separately    TECHNIQUE:    3-D time-of-flight MRA of the brain was performed. Multiplanar reconstructions  were obtained. FINDINGS:  The vertebral arteries are codominant. The basilar artery and its branches are  patent. The posterior cerebral arteries have fetal origin bilaterally. . The  internal carotid, anterior cerebral, and middle cerebral arteries are patent. There is no flow-limiting intracranial stenosis. There is motion artifact on the  images which results in apparent irregularity of the intracranial vessels. No  aneurysms are demonstrated. Impression IMPRESSION:  Negative MRA of the head. No major vessel occlusion or stenosis. Peripheral branches not well evaluated by this technique. .        .   Lab Review    Recent Results (from the past 24 hour(s))   EKG, 12 LEAD, INITIAL    Collection Time: 02/14/18 10:13 PM   Result Value Ref Range    Ventricular Rate 84 BPM    Atrial Rate 56 BPM    QRS Duration 162 ms    Q-T Interval 500 ms    QTC Calculation (Bezet) 590 ms    Calculated R Axis -77 degrees    Calculated T Axis 129 degrees    Diagnosis       Ventricular-paced rhythm    Confirmed by Beni Quiroz ([id number]) on 2/15/2018 36:91:59 AM     METABOLIC PANEL, COMPREHENSIVE    Collection Time: 02/14/18 10:30 PM   Result Value Ref Range    Sodium 147 (H) 136 - 145 mmol/L    Potassium 3.4 (L) 3.5 - 5.1 mmol/L    Chloride 114 (H) 97 - 108 mmol/L    CO2 19 (L) 21 - 32 mmol/L    Anion gap 14 5 - 15 mmol/L    Glucose 229 (H) 65 - 100 mg/dL    BUN 24 (H) 6 - 20 MG/DL    Creatinine 1.15 0.70 - 1.30 MG/DL    BUN/Creatinine ratio 21 (H) 12 - 20      GFR est AA >60 >60 ml/min/1.73m2    GFR est non-AA >60 >60 ml/min/1.73m2    Calcium 6.1 (LL) 8.5 - 10.1 MG/DL    Bilirubin, total 0.2 0.2 - 1.0 MG/DL    ALT (SGPT) 76 12 - 78 U/L    AST (SGOT) 118 (H) 15 - 37 U/L    Alk. phosphatase 135 (H) 45 - 117 U/L    Protein, total 4.2 (L) 6.4 - 8.2 g/dL    Albumin 1.5 (L) 3.5 - 5.0 g/dL    Globulin 2.7 2.0 - 4.0 g/dL    A-G Ratio 0.6 (L) 1.1 - 2.2     CBC WITH AUTOMATED DIFF    Collection Time: 02/14/18 10:30 PM   Result Value Ref Range    WBC 7.1 4.1 - 11.1 K/uL    RBC 2.61 (L) 4.10 - 5.70 M/uL    HGB 7.9 (L) 12.1 - 17.0 g/dL    HCT 26.2 (L) 36.6 - 50.3 %    .4 (H) 80.0 - 99.0 FL    MCH 30.3 26.0 - 34.0 PG    MCHC 30.2 30.0 - 36.5 g/dL    RDW 14.4 11.5 - 14.5 %    PLATELET 980 143 - 675 K/uL    MPV 10.3 8.9 - 12.9 FL    NRBC 0.0 0  WBC    ABSOLUTE NRBC 0.00 0.00 - 0.01 K/uL    NEUTROPHILS 55 32 - 75 %    BAND NEUTROPHILS 9 %    LYMPHOCYTES 28 12 - 49 %    MONOCYTES 4 (L) 5 - 13 %    EOSINOPHILS 1 0 - 7 %    BASOPHILS 0 0 - 1 %    METAMYELOCYTES 1 %    MYELOCYTES 2 %    IMMATURE GRANULOCYTES 0 0.0 - 0.5 %    ABS. NEUTROPHILS 4.5 1.8 - 8.0 K/UL    ABS. LYMPHOCYTES 2.0 0.8 - 3.5 K/UL    ABS. MONOCYTES 0.3 0.0 - 1.0 K/UL    ABS. EOSINOPHILS 0.1 0.0 - 0.4 K/UL    ABS. BASOPHILS 0.0 0.0 - 0.1 K/UL    ABS. IMM.  GRANS. 0.0 0.00 - 0.04 K/UL    DF AUTOMATED      RBC COMMENTS ANISOCYTOSIS  1+       PROTHROMBIN TIME + INR    Collection Time: 02/14/18 10:30 PM   Result Value Ref Range    INR 1.3 (H) 0.9 - 1.1      Prothrombin time 12.7 (H) 9.0 - 11.1 sec   PTT    Collection Time: 02/14/18 10:30 PM   Result Value Ref Range    aPTT 39.0 (H) 22.1 - 32.5 sec    aPTT, therapeutic range     58.0 - 77.0 SECS   GLUCOSE, POC    Collection Time: 02/14/18 10:36 PM   Result Value Ref Range    Glucose (POC) 207 (H) 65 - 100 mg/dL    Performed by Hero Guerrero (ED Tech)    BLOOD GAS, ARTERIAL    Collection Time: 02/14/18 11:30 PM   Result Value Ref Range    pH 7.30 (L) 7.35 - 7.45      PCO2 36 35.0 - 45.0 mmHg    PO2 391 (H) 80 - 100 mmHg    O2  (H) 92 - 97 %    BICARBONATE 17 (L) 22 - 26 mmol/L    BASE DEFICIT 8.2 mmol/L    O2 METHOD VENTILATOR      FIO2 100 %    MODE A/C      Tidal volume 500      SET RATE 14      EPAP/CPAP/PEEP 6.0      Sample source ARTERIAL      SITE LEFT RADIAL      TATY'S TEST YES     CULTURE, BLOOD    Collection Time: 02/15/18 12:24 AM   Result Value Ref Range    Special Requests: NO SPECIAL REQUESTS      Culture result: NO GROWTH AFTER 7 HOURS     LACTIC ACID    Collection Time: 02/15/18 12:24 AM   Result Value Ref Range    Lactic acid 5.1 (HH) 0.4 - 2.0 MMOL/L   CULTURE, BLOOD    Collection Time: 02/15/18 12:24 AM   Result Value Ref Range    Special Requests: NO SPECIAL REQUESTS      Culture result: NO GROWTH AFTER 7 HOURS     URINALYSIS W/ REFLEX CULTURE    Collection Time: 02/15/18 12:37 AM   Result Value Ref Range    Color YELLOW/STRAW      Appearance CLOUDY (A) CLEAR      Specific gravity 1.020 1.003 - 1.030      pH (UA) 5.5 5.0 - 8.0      Protein 300 (A) NEG mg/dL    Glucose NEGATIVE  NEG mg/dL    Ketone NEGATIVE  NEG mg/dL    Bilirubin NEGATIVE  NEG      Blood NEGATIVE  NEG      Urobilinogen 0.2 0.2 - 1.0 EU/dL    Nitrites NEGATIVE  NEG      Leukocyte Esterase NEGATIVE  NEG      WBC 0-4 0 - 4 /hpf    RBC 0-5 0 - 5 /hpf    Epithelial cells FEW FEW /lpf    Bacteria NEGATIVE  NEG /hpf    UA:UC IF INDICATED CULTURE NOT INDICATED BY UA RESULT CNI      Amorphous Crystals 2+ (A) NEG   INFLUENZA A & B AG (RAPID TEST)    Collection Time: 02/15/18  1:14 AM   Result Value Ref Range    Influenza A Antigen NEGATIVE  NEG      Influenza B Antigen NEGATIVE  NEG     GLUCOSE, POC    Collection Time: 02/15/18  2:48 AM   Result Value Ref Range    Glucose (POC) 207 (H) 65 - 100 mg/dL    Performed by Anny Richardson    LACTIC ACID    Collection Time: 02/15/18  5:32 AM   Result Value Ref Range    Lactic acid 1.7 0.4 - 2.0 MMOL/L   CBC W/O DIFF    Collection Time: 02/15/18  5:32 AM   Result Value Ref Range    WBC 17.7 (H) 4.1 - 11.1 K/uL    RBC 3.76 (L) 4.10 - 5.70 M/uL    HGB 11.4 (L) 12.1 - 17.0 g/dL    HCT 35.5 (L) 36.6 - 50.3 %    MCV 94.4 80.0 - 99.0 FL    MCH 30.3 26.0 - 34.0 PG    MCHC 32.1 30.0 - 36.5 g/dL    RDW 14.5 11.5 - 14.5 %    PLATELET 528 056 - 500 K/uL    MPV 10.2 8.9 - 12.9 FL    NRBC 0.0 0  WBC    ABSOLUTE NRBC 0.00 0.00 - 0.01 K/uL   TROPONIN I    Collection Time: 02/15/18  5:32 AM   Result Value Ref Range    Troponin-I, Qt. 6.31 (H) <0.05 ng/mL   MAGNESIUM    Collection Time: 02/15/18  5:32 AM   Result Value Ref Range    Magnesium 2.0 1.6 - 2.4 mg/dL   METABOLIC PANEL, COMPREHENSIVE    Collection Time: 02/15/18  5:32 AM   Result Value Ref Range    Sodium 137 136 - 145 mmol/L    Potassium 4.4 3.5 - 5.1 mmol/L    Chloride 106 97 - 108 mmol/L    CO2 21 21 - 32 mmol/L    Anion gap 10 5 - 15 mmol/L    Glucose 333 (H) 65 - 100 mg/dL    BUN 38 (H) 6 - 20 MG/DL    Creatinine 1.58 (H) 0.70 - 1.30 MG/DL    BUN/Creatinine ratio 24 (H) 12 - 20      GFR est AA 52 (L) >60 ml/min/1.73m2    GFR est non-AA 43 (L) >60 ml/min/1.73m2    Calcium 8.0 (L) 8.5 - 10.1 MG/DL    Bilirubin, total 0.6 0.2 - 1.0 MG/DL    ALT (SGPT) 130 (H) 12 - 78 U/L    AST (SGOT) 250 (H) 15 - 37 U/L    Alk.  phosphatase 239 (H) 45 - 117 U/L    Protein, total 5.9 (L) 6.4 - 8.2 g/dL    Albumin 2.1 (L) 3.5 - 5.0 g/dL    Globulin 3.8 2.0 - 4.0 g/dL    A-G Ratio 0.6 (L) 1.1 - 2.2     CK W/ CKMB & INDEX    Collection Time: 02/15/18  5:32 AM   Result Value Ref Range     (H) 39 - 308 U/L    CK - MB 28.5 (H) <3.6 NG/ML    CK-MB Index 7.4 (H) 0 - 2.5     GLUCOSE, POC    Collection Time: 02/15/18  6:57 AM   Result Value Ref Range    Glucose (POC) 240 (H) 65 - 100 mg/dL    Performed by Florentino Tavera    CK W/ CKMB & INDEX    Collection Time: 02/15/18 10:42 AM   Result Value Ref Range     (H) 39 - 308 U/L    CK - MB 24.9 (H) <3.6 NG/ML    CK-MB Index 5.9 (H) 0 - 2.5     TROPONIN I    Collection Time: 02/15/18 10:42 AM   Result Value Ref Range Troponin-I, Qt. 8.00 (H) <0.05 ng/mL   GLUCOSE, POC    Collection Time: 02/15/18 11:48 AM   Result Value Ref Range    Glucose (POC) 293 (H) 65 - 100 mg/dL    Performed by Cameron Vides    GLUCOSE, POC    Collection Time: 02/15/18  5:27 PM   Result Value Ref Range    Glucose (POC) 353 (H) 65 - 100 mg/dL    Performed by Cameron Vides    Patient is in critical condition and is at risk for sudden deterioration.  30 minutes spent on floor examining patient and reviewing chart

## 2018-02-15 NOTE — WOUND CARE
Wound Care Consult: Chart reviewed and patient assessed for his right great toe wound that was present on admission. Patient is a diabetic with an A1C of 9.5. His wife reports that Kimberli Smart is stubborn and will deny to Dr. David Garrido that he has anything wrong with his feet\". He is currently admitted for cardiac arrest / shock with complete Heart block. He is intubated and on a Miguel drip. Assessment: Peeling skin on the toe that appeared wet when palpated. The skin peeled off easily to reveal a diabetic ulcer on the medial part of the great toe. The wound is full thickness but covered with slough and surrounded by inflamed skin tissue where the body is trying to heal itself. There is mild swelling of the toe. No purulent drainage noted and no odor. Treatment: The wound was exposed and the ulcer debrided as far as possible but the wound bed still has a thick layer of slough. Recommend that patient see a podiatrist if he survives this health crisis.    Cali Garcia, RN, BSN, CWON ([id number])

## 2018-02-15 NOTE — PROGRESS NOTES
0400 Patient arrived to CCU. Primary Nurse Jennifer Fine RN and Jon Osborn RN performed a dual skin assessment on this patient Impairment noted- see wound doc flow sheet  Rocco score is 13    Multiple scratches and bruises. Great Big toe on right foot is discolored, yellow and skin is peeling. Assessment complete. Patient is intubated and sedated. Patient does not withdrawal to pain. No purposeful movement. Does not follow commands. No corneal reflexes. Pupils are fixed left is 2mm right is fixed and 3mm. Lung sounds are diminished. Bowel sounds are hypoactive. Right femoral central line with levophed infusing @ 30mcg/min, Propofol @ 20mcg/kg/min, and Amiodarone @ 1mg. Liu catheter in place. NG tube connected to continuous suction. Patient will on occasion twitch his left foot. 0530 Propofol stopped. Titrating Levophed down. Patient will attempt to open eyes when sternal rub. No purposeful movement. No withdrawal in extremities. [address]25 [address] notified of patient and GCS of 3.    5021 Spoke with [address] [place] [address] Drive. She will call back with a plan. [phone] Dr. Leon Miller at bedside. Updated on patient condition. 0700 Report given to Sánchez Bell RN    [phone] [employer] called back signed off.

## 2018-02-15 NOTE — ED NOTES
2202: Pt arrives via EMS c/c of unresponsiveness, intubated, compressions in progress. I/O in right shin. EMS reports pt found hunched over down approximately 20 minutes, PEA rhythm, CPR started, EPI given twice, ROSC achieved. Pt shocked 200J, another epi given and amiodarone. 2205: CPR continued, Epi given by Hernan Whyte  2206: pulse check, no cardiac activity, CPR continued. 2208: Epi given  2210: Pulse check, contractility noted on ultrasound. 83HR  2234:  Bicarb given, Levo started at 20mcg  2235: Calcium given by MD Tiana Ron

## 2018-02-15 NOTE — ED NOTES
TRANSFER - OUT REPORT:    Verbal report given to Tanya RN(name) on Callie Anamika  being transferred to CCU Room 2526(unit) for routine progression of care       Report consisted of patients Situation, Background, Assessment and   Recommendations(SBAR). Information from the following report(s) SBAR, Kardex and ED Summary was reviewed with the receiving nurse. Lines:   Triple Lumen Femoral Line 02/14/18 Right Femoral (Active)   Central Line Being Utilized Yes 2/14/2018 11:14 PM   Site Assessment Clean, dry, & intact 2/14/2018 11:14 PM   Dressing Status Clean, dry, & intact 2/14/2018 11:14 PM   Positive Blood Return (Medial Site) Yes 2/14/2018 11:14 PM   Positive Blood Return (Lateral Site) Yes 2/14/2018 11:14 PM   Positive Blood Return (Site #3) Yes 2/14/2018 11:14 PM        Opportunity for questions and clarification was provided.       Patient transported with:   Monitor  Registered Nurse, RT and Vent

## 2018-02-15 NOTE — PROGRESS NOTES
02/15/18 0600   ABCDEF Bundle   SBT Safety Screen Passed No   SBT Screen Reason for Failure Vasopressor use

## 2018-02-15 NOTE — H&P
Hospitalist Admission Note    NAME: Brittany Nunez   :     MRN:  [id number]     Date/Time:  2/15/2018 12:45 AM    Patient PCP: Rodolfo Mathur MD  ______________________________________________________________________  Given the patient's current clinical presentation, I have a high level of concern for decompensation if discharged from the emergency department. Complex decision making was performed, which includes reviewing the patient's available past medical records, laboratory results, and x-ray films. My assessment of this patient's clinical condition and my plan of care is as follows. Assessment / Plan:  Cardiac arrest / acute respiratory failure / shock likely cardiogenic   H/o complete heart block s/p PPM 2017  HTN   -head CT: normal; CTA - no PE, possible asymptomatic pulmonary edema vs pneumonia. -c/w empiric AB for possible pneumonia. Thou clinically doesn't look like. S/p LVQ in ED - will avoid further use for now ( risk of prolonged QT); c/w zosyn; follow BC   -pulmonary and cardiology consult  -cycle troponin to r/o ACS  -ECHO  -amiodarone gtt per protocol ( s/p amio during code + shock)   -pressors   -cont vent support   -IVF: low rate with possible edema  -will be holding diuretics for now   CT:  1. No evidence for pulmonary embolism. 2. Reflux of contrast into hepatic veins suggesting right heart failure. 3. Bilateral pleural effusions with bibasilar airspace opacification and right  upper lobe and right middle lobe airspace opacification which may represent  asymmetric edema or pneumonia. 4. Unchanged right middle lobe pulmonary nodule. 5. Cholelithiasis. 6. Mild ascites.     Hypernatremia   -IVF  -monitor     Anemia   - baseline Hb ~10, admission 7.9  -no signs of over bleeding   -follow stool hemoccult   -monitor, transfuse as needed     Hypokalemia  -supplement as needed   -check Mg     IDDM type II  -holding home insulin  -c/w SS        Stroke 2017   BL ICA stenosis (50-59%),  Hx L CEA           Code Status: family wants to continue full code   Surrogate Decision Maker: wife     DVT Prophylaxis: SCD, holding a/coag with drop in h/h   GI Prophylaxis: not indicated    Baseline: independent     Admission was done for Dr. Renuka Trammell as a tuck in service. He will assume care of this pt at 7 am.       Subjective:   CHIEF COMPLAINT: unresponsive     HISTORY OF PRESENT ILLNESS:     Miguel Hairston is a 68 y.o.  male who presents with above complaint. History was obtained from family and ED/EMS. Pt was complaint to his wife not feeling well. He did not voiced any specific complaints. He went to sleep in his recliner. Wife checked on him in a little while and found him unresponsive. EMS found pt unresponsive and pulsless. He was intubated. Epi was given with  ROSC. Valeen Ivans En route, pt went pulsless again. CPR started, PEA rhythm, EPI given twice, shocked 200J, another epi given and amiodarone. On presentation to ED, CPR continued,Epi twice, bicarb and calcium. ROSC. Levophed started.      We were asked to admit for work up and evaluation of the above problems.      Past Medical History:   Diagnosis Date    Allergic rhinitis     Anemia     ASVD (arteriosclerotic vascular disease)     Alfonso's esophagus 7/28/2017    Carotid stenosis 7/28/2017    Chronic kidney disease     Diabetes ([facility] [address]     glucose runs 70 - 160's at home    Diabetic neuropathy ([facility] [address] 7/28/2017    Diverticulosis     DJD (degenerative joint disease)     ED (erectile dysfunction)     Enlarged prostate     JUDY (generalized anxiety disorder)     GERD (gastroesophageal reflux disease)     controlled with med; alfonso's esophagus    Hiatal hernia     Hypercholesterolemia     Hypertension     Hypertrophy (benign) of prostate 7/28/2017    Ill-defined condition     peripheral vascular disease    Lung nodule     Neuropathy     On statin therapy 7/28/2017    Prostate cancer screening 7/28/2017    PVD (peripheral vascular disease) ([facility] [place] [address]     Sebaceous cyst 7/28/2017    Spinal stenosis         Past Surgical History:   Procedure Laterality Date    CARDIAC SURG PROCEDURE UNLIST      CATH-2008    COLONOSCOPY N/A 6/15/2016    COLONOSCOPY performed by [facility]  6/15/2016         HX CATARACT REMOVAL      BILATERAL    HX ORTHOPAEDIC  1977    BONE CYST REM,ANU FROM RIGHT LEG    HX ORTHOPAEDIC  5-, 7-22-17, 7-26-17    left hand index finger    HX OTHER SURGICAL Left 06/2016    carotid endarectomy    NEUROLOGICAL PROCEDURE UNLISTED  2011    Back: spinal stenosis, pinched nerve repair    UPPER GI ENDOSCOPY,BIOPSY  6/15/2016            Social History   Substance Use Topics    Smoking status: Former Smoker     Quit date: 7/7/1996    Smokeless tobacco: Never Used    Alcohol use No      Comment: rarely        Family History   Problem Relation Age of Onset    Diabetes Mother     Cancer Father      LUNG    Heart Disease Father      No Known Allergies     Prior to Admission medications    Medication Sig Start Date End Date Taking? Authorizing Provider   insulin degludec (TRESIBA FLEXTOUCH U-200) 200 unit/mL (3 mL) inpn 20 Units by SubCUTAneous route nightly. 2/14/18   Jacky Rucker MD   amLODIPine (NORVASC) 5 mg tablet TAKE 1 TABLET EVERY DAY 2/12/18   Jacky Rucker MD   insulin aspart (NOVOLOG) 100 unit/mL injection 12 Units by SubCUTAneous route Before breakfast, lunch, and dinner. Indications: type 2 diabetes mellitus 2/5/18   Jacky Rucker MD   insulin NPH (NOVOLIN N) 100 unit/mL injection 24 Units by SubCUTAneous route daily (with breakfast). Indications: type 2 diabetes mellitus 12/22/17   Jacky Rucker MD   Insulin Needles, Disposable, 30 gauge x 1/3\" Use daily with his [place] April pen 12/4/17   Jacky Rucker MD   simvastatin (ZOCOR) 10 mg tablet Take 10 mg by mouth.     Historical Provider   nadolol (CORGARD) 40 mg tablet Take 40 mg by mouth daily. 10/15/17   Historical Provider   traMADol (ULTRAM) 50 mg tablet 1 tablet four times daily as needed for pain. 10/19/17   Luh Martini MD   losartan (COZAAR) 100 mg tablet Take 100 mg by mouth two (2) times a day. Historical Provider   lisinopril-hydroCHLOROthiazide (PRINZIDE, ZESTORETIC) 20-12.5 mg per tablet Take 2 Tabs by mouth daily. Historical Provider   clopidogrel (PLAVIX) 75 mg tab Take 75 mg by mouth daily. Historical Provider   ferrous sulfate 325 mg (65 mg iron) tablet Take 325 mg by mouth three (3) times daily (with meals). Historical Provider   omeprazole (PRILOSEC) 20 mg capsule Take 20 mg by mouth nightly. Historical Provider   aspirin 81 mg chewable tablet Take 81 mg by mouth nightly. Historical Provider   tamsulosin (FLOMAX) 0.4 mg capsule Take 0.4 mg by mouth daily. Historical Provider   simvastatin (ZOCOR) 10 mg tablet Take 10 mg by mouth nightly. Historical Provider       REVIEW OF SYSTEMS:     I am not able to complete the review of systems because:  y The patient is intubated and sedated    The patient has altered mental status due to his acute medical problems    The patient has baseline aphasia from prior stroke(s)    The patient has baseline dementia and is not reliable historian    The patient is in acute medical distress and unable to provide information               Objective:   VITALS:    Visit Vitals    /71    Pulse 60    Resp 13    Wt 81.2 kg (179 lb)    SpO2 100%    BMI 26.43 kg/m2       PHYSICAL EXAM:    General:    Alert, cooperative, no distress, appears stated age. HEENT: Atraumatic, anicteric sclerae, pink conjunctivae     No oral ulcers, mucosa moist, throat clear, dentition fair  Neck:  Supple, symmetrical,  thyroid: non tender  Lungs:   Clear to auscultation bilaterally. No Wheezing or Rhonchi. No rales. Chest wall:  No tenderness  No Accessory muscle use.   Heart:   Regular  rhythm,  No murmur   No edema  Abdomen:   Soft, non-tender. Not distended. Bowel sounds normal  Extremities: No cyanosis. No clubbing,      Skin turgor normal, Capillary refill normal, Radial dial pulse 2+  Skin:     Not pale. Not Jaundiced  No rashes   Psych:  Poor insight. Not depressed. Not anxious or agitated. Neurologic: EOMs intact. Sedated      _______________________________________________________________________  Care Plan discussed with:    Comments   Patient     Family  y Bedside    RN y    Care Manager                    Consultant:  barbara ED provider    _______________________________________________________________________  Expected  Disposition:   Home with Family    HH/PT/OT/RN    SNF/LTC y   [de-identified]    ________________________________________________________________________  TOTAL TIME:   Minutes    Critical Care Provided  72   Minutes non procedure based  Patient is critically ill and at high risk for further deterioration. Complex decision making was performed which includes reviewing the patient's past medical records, current laboratory results, and actual Xray films. I have also discussed this case with the involved ED physician and with Pulmonary Critical care. Critical Care:  The reason for providing this level of medical care for this critically ill patient was due a critical illness that impaired one or more vital organ systems such that there was a high probability of imminent or life threatening deterioration in the patients condition. This care involved high complexity decision making to assess, manipulate, and support vital system functions, to treat this degreee vital organ system failure and to prevent further life threatening deterioration of the patients condition  I was immediately available to the patient.           Comments    y Reviewed previous records    y Discussion with patient and/or family and questions answered ________________________________________________________________________  Signed: Ron Triana MD    Procedures: see electronic medical records for all procedures/Xrays and details which were not copied into this note but were reviewed prior to creation of Plan. LAB DATA REVIEWED:    Recent Results (from the past 24 hour(s))   EKG, 12 LEAD, INITIAL    Collection Time: 02/14/18 10:13 PM   Result Value Ref Range    Ventricular Rate 84 BPM    Atrial Rate 56 BPM    QRS Duration 162 ms    Q-T Interval 500 ms    QTC Calculation (Bezet) 590 ms    Calculated R Axis -77 degrees    Calculated T Axis 129 degrees    Diagnosis       Atrial fibrillation  Left axis deviation  Left bundle branch block  When compared with ECG of 11-SEP-2017 16:19,  Atrial fibrillation has replaced Electronic ventricular pacemaker     METABOLIC PANEL, COMPREHENSIVE    Collection Time: 02/14/18 10:30 PM   Result Value Ref Range    Sodium 147 (H) 136 - 145 mmol/L    Potassium 3.4 (L) 3.5 - 5.1 mmol/L    Chloride 114 (H) 97 - 108 mmol/L    CO2 19 (L) 21 - 32 mmol/L    Anion gap 14 5 - 15 mmol/L    Glucose 229 (H) 65 - 100 mg/dL    BUN 24 (H) 6 - 20 MG/DL    Creatinine 1.15 0.70 - 1.30 MG/DL    BUN/Creatinine ratio 21 (H) 12 - 20      GFR est AA >60 >60 ml/min/1.73m2    GFR est non-AA >60 >60 ml/min/1.73m2    Calcium 6.1 (LL) 8.5 - 10.1 MG/DL    Bilirubin, total 0.2 0.2 - 1.0 MG/DL    ALT (SGPT) 76 12 - 78 U/L    AST (SGOT) 118 (H) 15 - 37 U/L    Alk.  phosphatase 135 (H) 45 - 117 U/L    Protein, total 4.2 (L) 6.4 - 8.2 g/dL    Albumin 1.5 (L) 3.5 - 5.0 g/dL    Globulin 2.7 2.0 - 4.0 g/dL    A-G Ratio 0.6 (L) 1.1 - 2.2     CBC WITH AUTOMATED DIFF    Collection Time: 02/14/18 10:30 PM   Result Value Ref Range    WBC 7.1 4.1 - 11.1 K/uL    RBC 2.61 (L) 4.10 - 5.70 M/uL    HGB 7.9 (L) 12.1 - 17.0 g/dL    HCT 26.2 (L) 36.6 - 50.3 %    .4 (H) 80.0 - 99.0 FL    MCH 30.3 26.0 - 34.0 PG    MCHC 30.2 30.0 - 36.5 g/dL    RDW 14.4 11.5 - 14.5 % PLATELET 252 673 - 066 K/uL    MPV 10.3 8.9 - 12.9 FL    NRBC 0.0 0  WBC    ABSOLUTE NRBC 0.00 0.00 - 0.01 K/uL    NEUTROPHILS 55 32 - 75 %    BAND NEUTROPHILS 9 %    LYMPHOCYTES 28 12 - 49 %    MONOCYTES 4 (L) 5 - 13 %    EOSINOPHILS 1 0 - 7 %    BASOPHILS 0 0 - 1 %    METAMYELOCYTES 1 %    MYELOCYTES 2 %    IMMATURE GRANULOCYTES 0 0.0 - 0.5 %    ABS. NEUTROPHILS 4.5 1.8 - 8.0 K/UL    ABS. LYMPHOCYTES 2.0 0.8 - 3.5 K/UL    ABS. MONOCYTES 0.3 0.0 - 1.0 K/UL    ABS. EOSINOPHILS 0.1 0.0 - 0.4 K/UL    ABS. BASOPHILS 0.0 0.0 - 0.1 K/UL    ABS. IMM.  GRANS. 0.0 0.00 - 0.04 K/UL    DF AUTOMATED      RBC COMMENTS ANISOCYTOSIS  1+       PROTHROMBIN TIME + INR    Collection Time: 02/14/18 10:30 PM   Result Value Ref Range    INR 1.3 (H) 0.9 - 1.1      Prothrombin time 12.7 (H) 9.0 - 11.1 sec   PTT    Collection Time: 02/14/18 10:30 PM   Result Value Ref Range    aPTT 39.0 (H) 22.1 - 32.5 sec    aPTT, therapeutic range     58.0 - 77.0 SECS   GLUCOSE, POC    Collection Time: 02/14/18 10:36 PM   Result Value Ref Range    Glucose (POC) 207 (H) 65 - 100 mg/dL    Performed by Kristen Irene (ED Tech)    BLOOD GAS, ARTERIAL    Collection Time: 02/14/18 11:30 PM   Result Value Ref Range    pH 7.30 (L) 7.35 - 7.45      PCO2 36 35.0 - 45.0 mmHg    PO2 391 (H) 80 - 100 mmHg    O2  (H) 92 - 97 %    BICARBONATE 17 (L) 22 - 26 mmol/L    BASE DEFICIT 8.2 mmol/L    O2 METHOD VENTILATOR      FIO2 100 %    MODE A/C      Tidal volume 500      SET RATE 14      EPAP/CPAP/PEEP 6.0      Sample source ARTERIAL      SITE LEFT RADIAL      TATY'S TEST YES

## 2018-02-15 NOTE — ED PROVIDER NOTES
EMERGENCY DEPARTMENT HISTORY AND PHYSICAL EXAM      Date: 2/14/2018  Patient Name: Geremias Tom    History of Presenting Illness     Chief Complaint   Patient presents with    Cardiac arrest       History Provided By: EMS    HPI: Geremias Tom is a 68 y.o. male, pmhx DM / HTN / CKD / carotid stenosis, who presents via EMS to the ED in cardiopulmonary arrest with CPR in progress by EMS. Per EMS, pt was home with his wife and \"not feeling well\" when he went to bed early. Pt subsequently found unresponsive by wife who called 911. On arrival to scene EMS reports finding the pt slouched over in his recliner, unresponsive, without a pulse;  PEA on cardiac monitor, CPR initiated at that time. Pt given 2 rounds of Epinephrine by EMS with return of pulses and HR 70bpm. . Pt transferred to ambulance at which time pulse was lost. CPR resumed 3rd round of Epi given, v-fib noted on cardiac monitor. EMS shocked with 300 joules, gave 300mg Amiodarone, and resumed CPR. Pt intubated with a 7.0 ET tube in the field and transferred to the ED. PCP: Abbie Mcintyre MD    Allergies: NKDA  PMHx: Significant for DM, HTN, HLD, GERD, CKD, carotid stenosis, DM neuropathy  PSHx: Significant for EGD, colonoscopy, orthopedic surgery, nerve repair, pacemaker replacement  Social Hx: -tobacco (former 6624), -EtOH, -Illicit Drugs    Hx limited secondary to acuity of pt's current sxs.      Current Facility-Administered Medications   Medication Dose Route Frequency Provider Last Rate Last Dose    [START ON 2/16/2018] piperacillin-tazobactam (ZOSYN) 3.375 g in  ml premix/cmpd  3.375 g IntraVENous Q8H Aracelis Nunez MD        sodium chloride (NS) flush 5-10 mL  5-10 mL IntraVENous Q8H Aracelis Nunez MD   10 mL at 02/15/18 0246    sodium chloride (NS) flush 5-10 mL  5-10 mL IntraVENous PRN Aracelis Nunez MD        acetaminophen (TYLENOL) suppository 650 mg  650 mg Rectal Q4H PRN Aracelis Nunez MD       [facility] ondansetron (ZOFRAN) injection 4 mg  4 mg IntraVENous Q4H PRN Jack Hinson MD        insulin lispro (HUMALOG) injection   SubCUTAneous Q6H Jack Hinson MD   3 Units at 02/15/18 0600    glucose chewable tablet 16 g  4 Tab Oral PRN Jack Hinson MD        dextrose (D50W) injection syrg 12.5-25 g  12.5-25 g IntraVENous PRN Jack Hinson MD        glucagon [facility]) injection 1 mg  1 mg IntraMUSCular PRN Jack Hinson MD        0.45% sodium chloride infusion  50 mL/hr IntraVENous CONTINUOUS Jack Hinson MD 50 mL/hr at 02/15/18 0502 50 mL/hr at 02/15/18 0502    amiodarone (CORDARONE) 375 mg/250 mL D5W infusion  0.5 mg/min IntraVENous CONTINUOUS Jack Hinson MD        mupirocin (BACTROBAN) 2 % ointment   Both Nostrils BID Jack Hinson MD        chlorhexidine (PERIDEX) 0.12 % mouthwash 15 mL  15 mL Oral Q12H Jack Hinson MD        insulin glargine (LANTUS) injection 10 Units  10 Units SubCUTAneous DAILY Kodi Son MD        sodium chloride (NS) flush 5-10 mL  5-10 mL IntraVENous PRN Josue Alexandra MD        propofol (DIPRIVAN) infusion  0-50 mcg/kg/min IntraVENous TITRATE Josue Alexandra MD 24.4 mL/hr at 02/15/18 0759 50 mcg/kg/min at 02/15/18 0759    NOREPINephrine (LEVOPHED) 8 mg in 5% dextrose 250mL infusion  2-30 mcg/min IntraVENous TITRATE Josue Alexandra MD 18.8 mL/hr at 02/15/18 0818 10 mcg/min at 02/15/18 0818       Past History     Past Medical History:  Past Medical History:   Diagnosis Date    Allergic rhinitis     Anemia     ASVD (arteriosclerotic vascular disease)     Martínez's esophagus 7/28/2017    Carotid stenosis 7/28/2017    Chronic kidney disease     Diabetes ([facility] [address]     glucose runs 79 - 160's at home    Diabetic neuropathy ([facility] [address] 7/28/2017    Diverticulosis     DJD (degenerative joint disease)     ED (erectile dysfunction)     Enlarged prostate     JUDY (generalized anxiety disorder)     GERD (gastroesophageal reflux disease)     controlled with med; alfonso's esophagus    Hiatal hernia     Hypercholesterolemia     Hypertension     Hypertrophy (benign) of prostate 7/28/2017    Ill-defined condition     peripheral vascular disease    Lung nodule     Neuropathy     On statin therapy 7/28/2017    Prostate cancer screening 7/28/2017    PVD (peripheral vascular disease) ([facility] [address]     Sebaceous cyst 7/28/2017    Spinal stenosis        Past Surgical History:  Past Surgical History:   Procedure Laterality Date    CARDIAC SURG PROCEDURE UNLIST      CATH-2008    COLONOSCOPY N/A 6/15/2016    COLONOSCOPY performed by Anup Jang MD at [address]  6/15/2016         HX CATARACT REMOVAL      BILATERAL    HX ORTHOPAEDIC  1977    BONE CYST REM,ANU FROM RIGHT LEG    HX ORTHOPAEDIC  5-, 7-22-17, 7-26-17    left hand index finger    HX OTHER SURGICAL Left 06/2016    carotid endarectomy    NEUROLOGICAL PROCEDURE UNLISTED  2011    Back: spinal stenosis, pinched nerve repair    UPPER GI ENDOSCOPY,BIOPSY  6/15/2016            Family History:  Family History   Problem Relation Age of Onset    Diabetes Mother     Cancer Father      LUNG    Heart Disease Father        Social History:  Social History   Substance Use Topics    Smoking status: Former Smoker     Quit date: 7/7/1996    Smokeless tobacco: Never Used    Alcohol use No      Comment: rarely       Allergies:  No Known Allergies      Review of Systems   Review of Systems   Unable to perform ROS: Acuity of condition       Physical Exam   Physical Exam   Constitutional: He appears well-developed and well-nourished. He appears ill. He is intubated. HENT:   Head: Normocephalic and atraumatic. Eyes: Right pupil is not reactive. Left pupil is not reactive. Cardiovascular:   Pulses:       Femoral pulses are 0 on the right side  Pulmonary/Chest: Apnea noted. He is intubated. Abdominal: Soft. He exhibits no distension.    Musculoskeletal: He exhibits no deformity. Neurological: He is unresponsive. GCS eye subscore is 1. GCS verbal subscore is 1. GCS motor subscore is 1. Skin: Skin is dry and intact. There is cyanosis. There is pallor. Diagnostic Study Results     Labs -     Recent Results (from the past 12 hour(s))   EKG, 12 LEAD, INITIAL    Collection Time: 02/14/18 10:13 PM   Result Value Ref Range    Ventricular Rate 84 BPM    Atrial Rate 56 BPM    QRS Duration 162 ms    Q-T Interval 500 ms    QTC Calculation (Bezet) 590 ms    Calculated R Axis -77 degrees    Calculated T Axis 129 degrees    Diagnosis       Atrial fibrillation  Left axis deviation  Left bundle branch block  When compared with ECG of 11-SEP-2017 16:19,  Atrial fibrillation has replaced Electronic ventricular pacemaker     METABOLIC PANEL, COMPREHENSIVE    Collection Time: 02/14/18 10:30 PM   Result Value Ref Range    Sodium 147 (H) 136 - 145 mmol/L    Potassium 3.4 (L) 3.5 - 5.1 mmol/L    Chloride 114 (H) 97 - 108 mmol/L    CO2 19 (L) 21 - 32 mmol/L    Anion gap 14 5 - 15 mmol/L    Glucose 229 (H) 65 - 100 mg/dL    BUN 24 (H) 6 - 20 MG/DL    Creatinine 1.15 0.70 - 1.30 MG/DL    BUN/Creatinine ratio 21 (H) 12 - 20      GFR est AA >60 >60 ml/min/1.73m2    GFR est non-AA >60 >60 ml/min/1.73m2    Calcium 6.1 (LL) 8.5 - 10.1 MG/DL    Bilirubin, total 0.2 0.2 - 1.0 MG/DL    ALT (SGPT) 76 12 - 78 U/L    AST (SGOT) 118 (H) 15 - 37 U/L    Alk.  phosphatase 135 (H) 45 - 117 U/L    Protein, total 4.2 (L) 6.4 - 8.2 g/dL    Albumin 1.5 (L) 3.5 - 5.0 g/dL    Globulin 2.7 2.0 - 4.0 g/dL    A-G Ratio 0.6 (L) 1.1 - 2.2     CBC WITH AUTOMATED DIFF    Collection Time: 02/14/18 10:30 PM   Result Value Ref Range    WBC 7.1 4.1 - 11.1 K/uL    RBC 2.61 (L) 4.10 - 5.70 M/uL    HGB 7.9 (L) 12.1 - 17.0 g/dL    HCT 26.2 (L) 36.6 - 50.3 %    .4 (H) 80.0 - 99.0 FL    MCH 30.3 26.0 - 34.0 PG    MCHC 30.2 30.0 - 36.5 g/dL    RDW 14.4 11.5 - 14.5 %    PLATELET 729 997 - 650 K/uL    MPV 10.3 8.9 - 12.9 FL    NRBC 0.0 0  WBC    ABSOLUTE NRBC 0.00 0.00 - 0.01 K/uL    NEUTROPHILS 55 32 - 75 %    BAND NEUTROPHILS 9 %    LYMPHOCYTES 28 12 - 49 %    MONOCYTES 4 (L) 5 - 13 %    EOSINOPHILS 1 0 - 7 %    BASOPHILS 0 0 - 1 %    METAMYELOCYTES 1 %    MYELOCYTES 2 %    IMMATURE GRANULOCYTES 0 0.0 - 0.5 %    ABS. NEUTROPHILS 4.5 1.8 - 8.0 K/UL    ABS. LYMPHOCYTES 2.0 0.8 - 3.5 K/UL    ABS. MONOCYTES 0.3 0.0 - 1.0 K/UL    ABS. EOSINOPHILS 0.1 0.0 - 0.4 K/UL    ABS. BASOPHILS 0.0 0.0 - 0.1 K/UL    ABS. IMM.  GRANS. 0.0 0.00 - 0.04 K/UL    DF AUTOMATED      RBC COMMENTS ANISOCYTOSIS  1+       PROTHROMBIN TIME + INR    Collection Time: 02/14/18 10:30 PM   Result Value Ref Range    INR 1.3 (H) 0.9 - 1.1      Prothrombin time 12.7 (H) 9.0 - 11.1 sec   PTT    Collection Time: 02/14/18 10:30 PM   Result Value Ref Range    aPTT 39.0 (H) 22.1 - 32.5 sec    aPTT, therapeutic range     58.0 - 77.0 SECS   GLUCOSE, POC    Collection Time: 02/14/18 10:36 PM   Result Value Ref Range    Glucose (POC) 207 (H) 65 - 100 mg/dL    Performed by Berto Harris (ED Tech)    BLOOD GAS, ARTERIAL    Collection Time: 02/14/18 11:30 PM   Result Value Ref Range    pH 7.30 (L) 7.35 - 7.45      PCO2 36 35.0 - 45.0 mmHg    PO2 391 (H) 80 - 100 mmHg    O2  (H) 92 - 97 %    BICARBONATE 17 (L) 22 - 26 mmol/L    BASE DEFICIT 8.2 mmol/L    O2 METHOD VENTILATOR      FIO2 100 %    MODE A/C      Tidal volume 500      SET RATE 14      EPAP/CPAP/PEEP 6.0      Sample source ARTERIAL      SITE LEFT RADIAL      TATY'S TEST YES     LACTIC ACID    Collection Time: 02/15/18 12:24 AM   Result Value Ref Range    Lactic acid 5.1 (HH) 0.4 - 2.0 MMOL/L   URINALYSIS W/ REFLEX CULTURE    Collection Time: 02/15/18 12:37 AM   Result Value Ref Range    Color YELLOW/STRAW      Appearance CLOUDY (A) CLEAR      Specific gravity 1.020 1.003 - 1.030      pH (UA) 5.5 5.0 - 8.0      Protein 300 (A) NEG mg/dL    Glucose NEGATIVE  NEG mg/dL    Ketone NEGATIVE  NEG mg/dL Bilirubin NEGATIVE  NEG      Blood NEGATIVE  NEG      Urobilinogen 0.2 0.2 - 1.0 EU/dL    Nitrites NEGATIVE  NEG      Leukocyte Esterase NEGATIVE  NEG      WBC 0-4 0 - 4 /hpf    RBC 0-5 0 - 5 /hpf    Epithelial cells FEW FEW /lpf    Bacteria NEGATIVE  NEG /hpf    UA:UC IF INDICATED CULTURE NOT INDICATED BY UA RESULT CNI      Amorphous Crystals 2+ (A) NEG   INFLUENZA A & B AG (RAPID TEST)    Collection Time: 02/15/18  1:14 AM   Result Value Ref Range    Influenza A Antigen NEGATIVE  NEG      Influenza B Antigen NEGATIVE  NEG     GLUCOSE, POC    Collection Time: 02/15/18  2:48 AM   Result Value Ref Range    Glucose (POC) 207 (H) 65 - 100 mg/dL    Performed by Mike Denson    LACTIC ACID    Collection Time: 02/15/18  5:32 AM   Result Value Ref Range    Lactic acid 1.7 0.4 - 2.0 MMOL/L   CBC W/O DIFF    Collection Time: 02/15/18  5:32 AM   Result Value Ref Range    WBC 17.7 (H) 4.1 - 11.1 K/uL    RBC 3.76 (L) 4.10 - 5.70 M/uL    HGB 11.4 (L) 12.1 - 17.0 g/dL    HCT 35.5 (L) 36.6 - 50.3 %    MCV 94.4 80.0 - 99.0 FL    MCH 30.3 26.0 - 34.0 PG    MCHC 32.1 30.0 - 36.5 g/dL    RDW 14.5 11.5 - 14.5 %    PLATELET 191 355 - 150 K/uL    MPV 10.2 8.9 - 12.9 FL    NRBC 0.0 0  WBC    ABSOLUTE NRBC 0.00 0.00 - 0.01 K/uL   TROPONIN I    Collection Time: 02/15/18  5:32 AM   Result Value Ref Range    Troponin-I, Qt. 6.31 (H) <0.05 ng/mL   MAGNESIUM    Collection Time: 02/15/18  5:32 AM   Result Value Ref Range    Magnesium 2.0 1.6 - 2.4 mg/dL   METABOLIC PANEL, COMPREHENSIVE    Collection Time: 02/15/18  5:32 AM   Result Value Ref Range    Sodium 137 136 - 145 mmol/L    Potassium 4.4 3.5 - 5.1 mmol/L    Chloride 106 97 - 108 mmol/L    CO2 21 21 - 32 mmol/L    Anion gap 10 5 - 15 mmol/L    Glucose 333 (H) 65 - 100 mg/dL    BUN 38 (H) 6 - 20 MG/DL    Creatinine 1.58 (H) 0.70 - 1.30 MG/DL    BUN/Creatinine ratio 24 (H) 12 - 20      GFR est AA 52 (L) >60 ml/min/1.73m2    GFR est non-AA 43 (L) >60 ml/min/1.73m2    Calcium 8.0 (L) 8.5 - 10.1 MG/DL    Bilirubin, total 0.6 0.2 - 1.0 MG/DL    ALT (SGPT) 130 (H) 12 - 78 U/L    AST (SGOT) 250 (H) 15 - 37 U/L    Alk. phosphatase 239 (H) 45 - 117 U/L    Protein, total 5.9 (L) 6.4 - 8.2 g/dL    Albumin 2.1 (L) 3.5 - 5.0 g/dL    Globulin 3.8 2.0 - 4.0 g/dL    A-G Ratio 0.6 (L) 1.1 - 2.2     CK W/ CKMB & INDEX    Collection Time: 02/15/18  5:32 AM   Result Value Ref Range     (H) 39 - 308 U/L    CK - MB 28.5 (H) <3.6 NG/ML    CK-MB Index 7.4 (H) 0 - 2.5     GLUCOSE, POC    Collection Time: 02/15/18  6:57 AM   Result Value Ref Range    Glucose (POC) 240 (H) 65 - 100 mg/dL    Performed by Anne Port        Radiologic Studies -     XR ABD PORT  1 V   Final Result     History: Evaluate nasogastric tube placement.     An AP supine radiograph of the abdomen show demonstrate a large amount of stool  throughout the colon. Soft tissue detail is normal. A nasogastric tube side  port is seen in the region of the stomach.     No free air is demonstrated. There are no unusual calcifications. There is a  central line overlying the right pelvis.     IMPRESSION  IMPRESSION:  Appropriate nasogastric tube placement. Constipation. Femoral line in place. CT Results  (Last 48 hours)               02/15/18 0018  CT HEAD WO CONT Final result    Impression:  IMPRESSION:        1. No acute findings. 2. Sinusitis. Narrative:  EXAM:  CT HEAD WO CONT       INDICATION:   Confusion/delirium, altered LOC, unexplained; unresponsive       COMPARISON: September 14. CONTRAST:  None. TECHNIQUE: Unenhanced CT of the head was performed using 5 mm images. Brain and   bone windows were generated. CT dose reduction was achieved through use of a   standardized protocol tailored for this examination and automatic exposure   control for dose modulation. FINDINGS:   The ventricles and sulci are normal in size, shape and configuration and   midline.  There is no significant white matter disease. There is no intracranial   hemorrhage, extra-axial collection, mass, mass effect or midline shift. The   basilar cisterns are open. No acute infarct is identified. The bone windows   demonstrate a groundglass mass in the left skull which is stable. There is   opacification of the ethmoid and left sphenoid sinuses. There is a frontal   osteoma. The right ventricle is larger than left but this is stable. The   low-lying tonsils are stable. 02/15/18 0018  CTA CHEST W OR W WO CONT Final result    Impression:  IMPRESSION:    1. No evidence for pulmonary embolism. 2. Reflux of contrast into hepatic veins suggesting right heart failure. 3. Bilateral pleural effusions with bibasilar airspace opacification and right   upper lobe and right middle lobe airspace opacification which may represent   asymmetric edema or pneumonia. 4. Unchanged right middle lobe pulmonary nodule. 5. Cholelithiasis. 6. Mild ascites. Narrative:  History: Cardiac arrest       CTA of the chest was performed. 80 mL Isovue-370 were injected and scanning was   performed during the arterial phase of contrast administration. Post processing   was performed. 3D reconstructions were performed. CT dose reduction was   achieved through use of a standardized protocol tailored for this examination   and automatic exposure control for dose modulation. There are no intraluminal filling defects to suggest pulmonary embolism. The   heart, pericardium, and great vessels appear unremarkable. The chest wall and   axilla appear unremarkable. There are bilateral pleural effusions, right greater   than left. There is patchy opacification in the right upper lobe, right middle   lobe, and bilateral lower lobes. An 11 mm right middle lobe pulmonary nodule is   stable. An endotracheal tube tip is at the thoracic inlet. There is reflux of contrast   in to the hepatic veins. There is cholelithiasis.  There is mild perihepatic   ascites. CXR Results  (Last 48 hours)               02/14/18 2305  XR CHEST PORT Final result    Impression:  IMPRESSION: Right upper lobe and right lower lobe airspace opacification which   may represent asymmetric edema or pneumonia. Appropriate endotracheal tube and   nasogastric tube placement. Narrative:  EXAM:  XR CHEST PORT       INDICATION:  Sepsis       COMPARISON:  September 13, 2017       FINDINGS: A portable AP radiograph of the chest was obtained at 2255 hours. The   endotracheal tube tip is at the thoracic inlet. Left subclavian leads are   stable. A nasogastric tube continues beyond the film. The patient is on a   cardiac monitor. There is opacification in the right upper lobe and right lower   lobe. There is atherosclerosis of the aorta. The bones and soft tissues are   grossly within normal limits. Medical Decision Making   I am the first provider for this patient. I reviewed the vital signs, available nursing notes, past medical history, past surgical history, family history and social history. Vital Signs-Reviewed the patient's vital signs.   Patient Vitals for the past 12 hrs:   Temp Pulse Resp BP SpO2   02/15/18 0800 - 78 29 130/60 100 %   02/15/18 0745 - 77 28 - 100 %   02/15/18 0730 - 76 29 120/61 99 %   02/15/18 0700 - 76 26 121/57 99 %   02/15/18 0630 - 74 21 127/70 97 %   02/15/18 0600 - 66 24 128/64 98 %   02/15/18 0530 - 66 20 120/60 100 %   02/15/18 0500 - 63 17 121/59 100 %   02/15/18 0408 97.8 °F (36.6 °C) 63 19 117/62 100 %   02/15/18 0358 - 62 14 - 100 %   02/15/18 0320 - 60 15 121/60 100 %   02/15/18 0210 - (!) 59 16 127/54 100 %   02/15/18 0140 - (!) 59 18 105/56 100 %   02/15/18 0020 - 60 13 117/71 100 %   02/14/18 2310 - 65 26 98/47 100 %   02/14/18 2243 - (!) 59 13 (!) 89/57 95 %   02/14/18 2220 - 64 14 - -   02/14/18 2215 97.2 °F (36.2 °C) - 8 (!) 58/38 -     Records Reviewed: Nursing Notes, Old Medical Records, Previous electrocardiograms, Ambulance Run Sheet, Previous Radiology Studies and Previous Laboratory Studies    Provider Notes (Medical Decision Making):     DDX:  Mariella Chamberlainois, cardiac arrest, PE, electrolyte abnormality, arrhythmia    Plan:  Intubation, acls protocol, head ct, labs    Impression:  Cardiac arrest    ED Course:   Initial assessment performed. The patients presenting problems have been discussed, and they are in agreement with the care plan formulated and outlined with them. I have encouraged them to ask questions as they arise throughout their visit. I reviewed our electronic medical record system for any past medical records that were available that may contribute to the patients current condition, the nursing notes and and vital signs from today's visit    Nursing notes will be reviewed as they become available in realtime while the pt has been in the ED. Brendon Ahumada MD    EKG interpretation 2213: vpaced, L Axis, rate 84; IL n/q, , QTc 500; possible acute ischemia; Brendon Ahumada MD    I personally reviewed pt's imaging. Official read by radiology listed below. Brendon Ahumada MD    CODE NOTE:     7378  CPR in progress by EMS, transferred to ED staff    2205  4th round of Epinephrine given    2206  Pulse check, no palpable pulses, no rhythm noted on cardiac monitor, CPR resumed    2208  Pulse check, no palpable pulses, no rhythm noted on cardiac monitor, CPR resumed  5th Epi given    Procedure Note - Limited Bedside Ultrasound- Cardiac  10:10 PM  Performed by: Brendon Ahumada MD  Indication: cardiopulmonary arrest  Interpretation:   Transthoracic bedside US was performed using the subxiphoid view. Cardiac activity was appreciated showing contractility. Pericardial fluid was not appreciated. The procedure took 1-15 minutes. 2210  Pulse check, palpable R femoral pulse.  HR 83bpm, /81    Procedure Note - Central Line Placement:   10:13 PM  Performed by: Brendon Ahumada MD    Immediately prior to the procedure, the patient was reevaluated and found suitable for the planned procedure and any planned medications. Immediately prior to the procedure a time out was called to verify the correct patient, procedure, equipment, staff, and marking as appropriate. Area was cleansed with Betadine. Prepped and draped in sterile fashion. Landmarks identified. 18 gauge needle with triple lumen catheter was inserted into pt's Right, Femoral Vein with ultrasound guidance. Line sutured in place; sterile dressing applied. Position: Trendelenburg  Number of attempts: 1  Estimated blood loss: <5mL   The procedure took 16-30 minutes, and pt tolerated well. 2234  Bicarb given  Levophed started at 20mcg    2235  Calcium given by this physician    PROGRESS NOTE:  11:37 PM  Spoke with pt's family in consult room. Discussed critical nature of tonight's events and concern for pt's outcome based on clinical presentation. Pt's family acknowledged understanding of severity of his medical condition and requests we continue with all measures possible at this time. Maxine Bowers MD        CONSULT NOTE:   12:37 AM  Maxine Bowers MD spoke with Dr. Silvia Mena,   Specialty: Hospitalist  Discussed pt's hx, disposition, and available diagnostic and imaging results. Reviewed care plans. Consultant will evaluate pt for admission. Written by SUSIE Gilbert, as dictated by Maxine Bowers MD      PLAN:  1. Admit to hospitalist    Disposition:    ADMISSION NOTE:  12:37 AM  Patient is being admitted to the hospital by Dr. Silvia Mena. The results of their tests and reasons for their admission have been discussed with them and/or available family. They convey agreement and understanding for the need to be admitted and for their admission diagnosis. Written by SUSIE Burton, as dictated by Maxine Bowers MD.      Diagnosis     Clinical Impression:   1.  Cardiac arrest ([facility] [address]    2. Acute non-ST elevation myocardial infarction (NSTEMI) ([facility] [address]    3. Controlled type 2 diabetes mellitus without complication, with long-term current use of insulin ([facility] [address]    4. Essential hypertension    5. Complete heart block ([facility] [address]    6. Cerebrovascular accident (CVA), unspecified mechanism ([facility] [address]    7. ASVD (arteriosclerotic vascular disease)    8. S/P cardiac pacemaker procedure    9. Anemia, unspecified type        Attestations: This note is prepared by Dilcia Garber, acting as Scribe for MD Barb Rodgers MD : The scribe's documentation has been prepared under my direction and personally reviewed by me in its entirety. I confirm that the note above accurately reflects all work, treatment, procedures, and medical decision making performed by me. This note will not be viewable in [address]

## 2018-02-15 NOTE — CONSULTS
Cardiology Consult Note    CC: arrest    Cindy Osorio MD  Reason for consult:  arrest  Requesting MD:  Dr. Li Villar Date: 2/14/2018   Today's Date: 2/15/2018   Cardiologist:  Dr Ellen Thrasher. Cardiac Assessment/Plan:   Arrest: PEA arrest; Initial ecg was sinus/paced, not afib: will d/c amio. Enzymes this am argue against massive MI as cause of arrest; more c/w PEA itself: will need to follow however; Echo is pending (worse function vs baseline could again reflect PEA itself). PM check pending. Neg head CT w/o contrast: further eval for CVA per primary team.  ?aspiration during event. HypoK/Ca noted. Shock: Hemodynamically acting like cardiogenic/hypovolemic shock: pressors per Dr Gómez roa  Obviously no role for bblocker/acei/arb while on pressors. Rhythm: sinus with intermittent heart block; V pacing. KIMANI, electrolyte replacement, ID, Resp failure, Prior CVA, DM, peripheral neuropathy, Dispo: per primary team and consultants. Hospital Problem List:  Principal Problem:    Cardiac arrest ([facility] [address] (2/15/2018)    Active Problems:    Essential hypertension (7/27/2017)      Controlled type 2 diabetes mellitus without complication, with long-term current use of insulin ([facility] [address] (7/27/2017)      CVA (cerebral vascular accident) ([facility] [address] (8/31/2017)      Complete heart block ([facility] [address] (9/12/2017)      Acute myocardial infarction (2/15/2018)       ____________________________________________________________________  Janae Claros is a 68 y.o. male presents with Cardiac arrest ([facility] [address] As noted in H&P: \" 68 y.o.  male who presents with above complaint. History was obtained from family and ED/EMS. Pt was complaint to his wife not feeling well. He did not voiced any specific complaints. He went to sleep in his recliner. Wife checked on him in a little while and found him unresponsive. EMS found pt unresponsive and pulsless. He was intubated. Epi was given with  ROSC. Jennifer Dies  En route, pt went pulsless again. CPR started, PEA rhythm, EPI given twice, shocked 200J, another epi given and amiodarone. On presentation to ED, CPR continued,Epi twice, bicarb and calcium. ROSC. Levophed started. \"    ______________________________________________________________________    The patient can provide no history. His wife reports no recent CP/dyspnea but chronically \"feels poorly. \"  He had difficultly with swallowing per his wife but was alert w/o c/o CP/dyspnea. He then became unresponsive: RS found PEA with Rx as above. Now on high dose pressors. ECG: sinus/Vpacing; intermittent heart block. Tele: sinus/Vpacing; Intermittent heart block  CXR: \"Right upper lobe and right lower lobe airspace opacification which  may represent asymmetric edema or pneumonia. Appropriate endotracheal tube and  nasogastric tube placement. \"    Chest CT: \"1. No evidence for pulmonary embolism. 2. Reflux of contrast into hepatic veins suggesting right heart failure. 3. Bilateral pleural effusions with bibasilar airspace opacification and right  upper lobe and right middle lobe airspace opacification which may represent  asymmetric edema or pneumonia. 4. Unchanged right middle lobe pulmonary nodule. 5. Cholelithiasis. 6. Mild ascites. \"    Notable labs: K 3.4 to 4.4; Cr 1.15 to 1.58; Ca 6.1 to 8; alb 1.5 to 2.1; ast 76 to 130; alt to 250. /trop 6 (6 hrs after arrest). 7.3/36/391 on 100%/intubated. Notable prior cardiac history (as noted 9/2017 by Dr Melisa Hawley):    1. Bradycardia, probably due to complete heart block, symptomatic. S/p dual chamber pacer 9/13/17. 2. Mild left ventricular systolic dysfunction, no history of coronary artery disease. 3. Hypertension. 4. Type 2 diabetes. 5. Peripheral vascular disease. 6. Mild renal insufficiency. 7. Left hand infection, currently on chronic antibiotic therapy. 8. Prior left carotid endarterectomy. 9. Anemia   10.   CVA 2017    ______________________________________________________________________  Patient Active Problem List    Diagnosis Date Noted    Cardiac arrest ([address] 02/15/2018    Acute myocardial infarction 02/15/2018    Anemia 12/20/2017    Medicare annual wellness visit, initial 10/19/2017    S/P cardiac pacemaker procedure 09/28/2017    Complete heart block ([address] 09/12/2017    Syncope and collapse 09/11/2017    Thrombotic stroke involving right middle cerebral artery ([address] 09/01/2017    Stenosis of both internal carotid arteries 09/01/2017    CVA (cerebral vascular accident) ([address] 08/31/2017    Alfonso's esophagus 07/28/2017    Carotid stenosis 07/28/2017    Diabetic neuropathy ([address] 07/28/2017    On statin therapy 07/28/2017    Benign prostatic hyperplasia without lower urinary tract symptoms 07/28/2017    Prostate cancer screening 07/28/2017    Essential hypertension 07/27/2017    Controlled type 2 diabetes mellitus without complication, with long-term current use of insulin ([address] 07/27/2017    Mixed hyperlipidemia 07/27/2017    ASVD (arteriosclerotic vascular disease) 07/27/2017    Diverticulosis 10/23/2013    Colitis, nonspecific 10/23/2013    Lumbar stenosis with neurogenic claudication 07/12/2011        Past Medical History:   Diagnosis Date    Allergic rhinitis     Anemia     ASVD (arteriosclerotic vascular disease)     Alfonso's esophagus 7/28/2017    Carotid stenosis 7/28/2017    Chronic kidney disease     Diabetes ([address]     glucose runs 70 - 160's at home    Diabetic neuropathy ([address] 7/28/2017    Diverticulosis     DJD (degenerative joint disease)     ED (erectile dysfunction)     Enlarged prostate     JUDY (generalized anxiety disorder)     GERD (gastroesophageal reflux disease)     controlled with med; alfonso's esophagus    Hiatal hernia     Hypercholesterolemia     Hypertension     Hypertrophy (benign) of prostate 7/28/2017    Ill-defined condition     peripheral vascular disease    Lung nodule     Neuropathy     On statin therapy 7/28/2017    Prostate cancer screening 7/28/2017    PVD (peripheral vascular disease) ([facility] [address]     Sebaceous cyst 7/28/2017    Spinal stenosis       Past Surgical History:   Procedure Laterality Date    CARDIAC SURG PROCEDURE UNLIST      CATH-2008    COLONOSCOPY N/A 6/15/2016    COLONOSCOPY performed by Dandre Chow MD at [facility]  6/15/2016         HX CATARACT REMOVAL      BILATERAL    HX ORTHOPAEDIC  1977    BONE CYST REM,ANU FROM RIGHT LEG    HX ORTHOPAEDIC  5-, 7-22-17, 7-26-17    left hand index finger    HX OTHER SURGICAL Left 06/2016    carotid endarectomy    NEUROLOGICAL PROCEDURE UNLISTED  2011    Back: spinal stenosis, pinched nerve repair    UPPER GI ENDOSCOPY,BIOPSY  6/15/2016          No Known Allergies   Family History   Problem Relation Age of Onset    Diabetes Mother     Cancer Father      LUNG    Heart Disease Father       Social History     Social History    Marital status:      Spouse name: N/A    Number of children: N/A    Years of education: N/A     Occupational History    Not on file.      Social History Main Topics    Smoking status: Former Smoker     Quit date: 7/7/1996    Smokeless tobacco: Never Used    Alcohol use No      Comment: rarely    Drug use: No    Sexual activity: Yes     Other Topics Concern    Not on file     Social History Narrative     Current Facility-Administered Medications   Medication Dose Route Frequency    [START ON 2/16/2018] piperacillin-tazobactam (ZOSYN) 3.375 g in  ml premix/cmpd  3.375 g IntraVENous Q8H    sodium chloride (NS) flush 5-10 mL  5-10 mL IntraVENous Q8H    sodium chloride (NS) flush 5-10 mL  5-10 mL IntraVENous PRN    acetaminophen (TYLENOL) suppository 650 mg  650 mg Rectal Q4H PRN    ondansetron (ZOFRAN) injection 4 mg  4 mg IntraVENous Q4H PRN    insulin lispro (HUMALOG) injection   SubCUTAneous Q6H    glucose chewable tablet 16 g  4 Tab Oral PRN    dextrose (D50W) injection syrg 12.5-25 g  12.5-25 g IntraVENous PRN    glucagon (GLUCAGEN) injection 1 mg  1 mg IntraMUSCular PRN    0.45% sodium chloride infusion  50 mL/hr IntraVENous CONTINUOUS    amiodarone (CORDARONE) 375 mg/250 mL D5W infusion  0.5 mg/min IntraVENous CONTINUOUS    mupirocin (BACTROBAN) 2 % ointment   Both Nostrils BID    chlorhexidine (PERIDEX) 0.12 % mouthwash 15 mL  15 mL Oral Q12H    insulin glargine (LANTUS) injection 10 Units  10 Units SubCUTAneous DAILY    sodium chloride (NS) flush 5-10 mL  5-10 mL IntraVENous PRN    propofol (DIPRIVAN) infusion  0-50 mcg/kg/min IntraVENous TITRATE    NOREPINephrine (LEVOPHED) 8 mg in 5% dextrose 250mL infusion  2-30 mcg/min IntraVENous TITRATE        Prior to Admission Medications:  Prior to Admission medications    Medication Sig Start Date End Date Taking? Authorizing Provider   insulin degludec (TRESIBA FLEXTOUCH U-200) 200 unit/mL (3 mL) inpn 20 Units by SubCUTAneous route nightly. 2/14/18   Erica Berman MD   amLODIPine (NORVASC) 5 mg tablet TAKE 1 TABLET EVERY DAY 2/12/18   Erica Berman MD   insulin aspart (NOVOLOG) 100 unit/mL injection 12 Units by SubCUTAneous route Before breakfast, lunch, and dinner. Indications: type 2 diabetes mellitus 2/5/18   Erica Berman MD   insulin NPH (NOVOLIN N) 100 unit/mL injection 24 Units by SubCUTAneous route daily (with breakfast). Indications: type 2 diabetes mellitus 12/22/17   Erica Berman MD   Insulin Needles, Disposable, 30 gauge x 1/3\" Use daily with his Cleopatra Bile pen 12/4/17   Erica Berman MD   simvastatin (ZOCOR) 10 mg tablet Take 10 mg by mouth. Historical Provider   nadolol (CORGARD) 40 mg tablet Take 40 mg by mouth daily. 10/15/17   Historical Provider   traMADol (ULTRAM) 50 mg tablet 1 tablet four times daily as needed for pain.  10/19/17   Erica Berman MD   losartan (COZAAR) 100 mg tablet Take 100 mg by mouth two (2) times a day. Historical Provider   lisinopril-hydroCHLOROthiazide (PRINZIDE, ZESTORETIC) 20-12.5 mg per tablet Take 2 Tabs by mouth daily. Historical Provider   clopidogrel (PLAVIX) 75 mg tab Take 75 mg by mouth daily. Historical Provider   ferrous sulfate 325 mg (65 mg iron) tablet Take 325 mg by mouth three (3) times daily (with meals). Historical Provider   omeprazole (PRILOSEC) 20 mg capsule Take 20 mg by mouth nightly. Historical Provider   aspirin 81 mg chewable tablet Take 81 mg by mouth nightly. Historical Provider   tamsulosin (FLOMAX) 0.4 mg capsule Take 0.4 mg by mouth daily. Historical Provider   simvastatin (ZOCOR) 10 mg tablet Take 10 mg by mouth nightly. Historical Provider        Review of Symptoms: Unobtainable; pt intubated.        24 hr VS reviewed, overall VSSAF  Temp (24hrs), Av.5 °F (36.4 °C), Min:97.2 °F (36.2 °C), Max:97.8 °F (36.6 °C)    Patient Vitals for the past 8 hrs:   Pulse   02/15/18 0830 75   02/15/18 0800 78   02/15/18 0745 77   02/15/18 0730 76   02/15/18 0700 76   02/15/18 0630 74   02/15/18 0600 66   02/15/18 0530 66   02/15/18 0500 63   02/15/18 0408 63   02/15/18 0358 62   02/15/18 0320 60   02/15/18 0210 (!) 59   02/15/18 0140 (!) 59    Patient Vitals for the past 8 hrs:   Resp   02/15/18 0830 22   02/15/18 0800 29   02/15/18 0745 28   02/15/18 0730 29   02/15/18 0700 26   02/15/18 0630 21   02/15/18 0600 24   02/15/18 0530 20   02/15/18 0500 17   02/15/18 0408 19   02/15/18 0358 14   02/15/18 0320 15   02/15/18 0210 16   02/15/18 0140 18    Patient Vitals for the past 8 hrs:   BP   02/15/18 0830 93/46   02/15/18 0800 130/60   02/15/18 0730 120/61   02/15/18 0700 121/57   02/15/18 0630 127/70   02/15/18 0600 128/64   02/15/18 0530 120/60   02/15/18 0500 121/59   02/15/18 0408 117/62   02/15/18 0320 121/60   02/15/18 0210 127/54   02/15/18 0140 105/56          Intake/Output Summary (Last 24 hours) at 02/15/18 1127  Last data filed at 02/15/18 0800   Gross per 24 hour   Intake                0 ml   Output              365 ml   Net             -365 ml         Physical Exam (complete single organ system exam)    Cons: The patient is no distress. Appears stated age. Intubated; sedated  HEENT: Normal conjunctivae and palate. No xanthelasma. Neck: Flat JVP without appreciable HJR. Resp: Normal respiratory effort with clear lungs bilaterally. CV: Regular rate and rhythm. PMI not palpated. Normal S1,S2  No gallop or rubs appreciated. GEOVANNY murmur appreciated. Intact carotid upstroke bilaterally without appreciated bruits. Abdominal aorta not palpated; no abdominal bruit noted. Poor pedal pulses. No peripheral edema. GI: No abd mass noted, soft; no organomegaly noted. Bowel sounds present. Muscular:  No significant kyphosis. Strength WNL for age. Ext: No cyanosis, clubbing, or stigmata of peripheral embolization. Cold extremities. Derm: No ulcers or stasis dermatitis of lower extremities. Neuro: Intubated; sedated. Labs:   Recent Results (from the past 24 hour(s))   EKG, 12 LEAD, INITIAL    Collection Time: 02/14/18 10:13 PM   Result Value Ref Range    Ventricular Rate 84 BPM    Atrial Rate 56 BPM    QRS Duration 162 ms    Q-T Interval 500 ms    QTC Calculation (Bezet) 590 ms    Calculated R Axis -77 degrees    Calculated T Axis 129 degrees    Diagnosis       Sinus; V paced.      METABOLIC PANEL, COMPREHENSIVE    Collection Time: 02/14/18 10:30 PM   Result Value Ref Range    Sodium 147 (H) 136 - 145 mmol/L    Potassium 3.4 (L) 3.5 - 5.1 mmol/L    Chloride 114 (H) 97 - 108 mmol/L    CO2 19 (L) 21 - 32 mmol/L    Anion gap 14 5 - 15 mmol/L    Glucose 229 (H) 65 - 100 mg/dL    BUN 24 (H) 6 - 20 MG/DL    Creatinine 1.15 0.70 - 1.30 MG/DL    BUN/Creatinine ratio 21 (H) 12 - 20      GFR est AA >60 >60 ml/min/1.73m2    GFR est non-AA >60 >60 ml/min/1.73m2    Calcium 6.1 (LL) 8.5 - 10.1 MG/DL    Bilirubin, total 0.2 0.2 - 1.0 MG/DL ALT (SGPT) 76 12 - 78 U/L    AST (SGOT) 118 (H) 15 - 37 U/L    Alk. phosphatase 135 (H) 45 - 117 U/L    Protein, total 4.2 (L) 6.4 - 8.2 g/dL    Albumin 1.5 (L) 3.5 - 5.0 g/dL    Globulin 2.7 2.0 - 4.0 g/dL    A-G Ratio 0.6 (L) 1.1 - 2.2     CBC WITH AUTOMATED DIFF    Collection Time: 02/14/18 10:30 PM   Result Value Ref Range    WBC 7.1 4.1 - 11.1 K/uL    RBC 2.61 (L) 4.10 - 5.70 M/uL    HGB 7.9 (L) 12.1 - 17.0 g/dL    HCT 26.2 (L) 36.6 - 50.3 %    .4 (H) 80.0 - 99.0 FL    MCH 30.3 26.0 - 34.0 PG    MCHC 30.2 30.0 - 36.5 g/dL    RDW 14.4 11.5 - 14.5 %    PLATELET 505 994 - 674 K/uL    MPV 10.3 8.9 - 12.9 FL    NRBC 0.0 0  WBC    ABSOLUTE NRBC 0.00 0.00 - 0.01 K/uL    NEUTROPHILS 55 32 - 75 %    BAND NEUTROPHILS 9 %    LYMPHOCYTES 28 12 - 49 %    MONOCYTES 4 (L) 5 - 13 %    EOSINOPHILS 1 0 - 7 %    BASOPHILS 0 0 - 1 %    METAMYELOCYTES 1 %    MYELOCYTES 2 %    IMMATURE GRANULOCYTES 0 0.0 - 0.5 %    ABS. NEUTROPHILS 4.5 1.8 - 8.0 K/UL    ABS. LYMPHOCYTES 2.0 0.8 - 3.5 K/UL    ABS. MONOCYTES 0.3 0.0 - 1.0 K/UL    ABS. EOSINOPHILS 0.1 0.0 - 0.4 K/UL    ABS. BASOPHILS 0.0 0.0 - 0.1 K/UL    ABS. IMM.  GRANS. 0.0 0.00 - 0.04 K/UL    DF AUTOMATED      RBC COMMENTS ANISOCYTOSIS  1+       PROTHROMBIN TIME + INR    Collection Time: 02/14/18 10:30 PM   Result Value Ref Range    INR 1.3 (H) 0.9 - 1.1      Prothrombin time 12.7 (H) 9.0 - 11.1 sec   PTT    Collection Time: 02/14/18 10:30 PM   Result Value Ref Range    aPTT 39.0 (H) 22.1 - 32.5 sec    aPTT, therapeutic range     58.0 - 77.0 SECS   GLUCOSE, POC    Collection Time: 02/14/18 10:36 PM   Result Value Ref Range    Glucose (POC) 207 (H) 65 - 100 mg/dL    Performed by Marija Mosley (ED Tech)    BLOOD GAS, ARTERIAL    Collection Time: 02/14/18 11:30 PM   Result Value Ref Range    pH 7.30 (L) 7.35 - 7.45      PCO2 36 35.0 - 45.0 mmHg    PO2 391 (H) 80 - 100 mmHg    O2  (H) 92 - 97 %    BICARBONATE 17 (L) 22 - 26 mmol/L    BASE DEFICIT 8.2 mmol/L    O2 METHOD VENTILATOR      FIO2 100 %    MODE A/C      Tidal volume 500      SET RATE 14      EPAP/CPAP/PEEP 6.0      Sample source ARTERIAL      SITE LEFT RADIAL      TATY'S TEST YES     CULTURE, BLOOD    Collection Time: 02/15/18 12:24 AM   Result Value Ref Range    Special Requests: NO SPECIAL REQUESTS      Culture result: NO GROWTH AFTER 7 HOURS     LACTIC ACID    Collection Time: 02/15/18 12:24 AM   Result Value Ref Range    Lactic acid 5.1 (HH) 0.4 - 2.0 MMOL/L   CULTURE, BLOOD    Collection Time: 02/15/18 12:24 AM   Result Value Ref Range    Special Requests: NO SPECIAL REQUESTS      Culture result: NO GROWTH AFTER 7 HOURS     URINALYSIS W/ REFLEX CULTURE    Collection Time: 02/15/18 12:37 AM   Result Value Ref Range    Color YELLOW/STRAW      Appearance CLOUDY (A) CLEAR      Specific gravity 1.020 1.003 - 1.030      pH (UA) 5.5 5.0 - 8.0      Protein 300 (A) NEG mg/dL    Glucose NEGATIVE  NEG mg/dL    Ketone NEGATIVE  NEG mg/dL    Bilirubin NEGATIVE  NEG      Blood NEGATIVE  NEG      Urobilinogen 0.2 0.2 - 1.0 EU/dL    Nitrites NEGATIVE  NEG      Leukocyte Esterase NEGATIVE  NEG      WBC 0-4 0 - 4 /hpf    RBC 0-5 0 - 5 /hpf    Epithelial cells FEW FEW /lpf    Bacteria NEGATIVE  NEG /hpf    UA:UC IF INDICATED CULTURE NOT INDICATED BY UA RESULT CNI      Amorphous Crystals 2+ (A) NEG   INFLUENZA A & B AG (RAPID TEST)    Collection Time: 02/15/18  1:14 AM   Result Value Ref Range    Influenza A Antigen NEGATIVE  NEG      Influenza B Antigen NEGATIVE  NEG     GLUCOSE, POC    Collection Time: 02/15/18  2:48 AM   Result Value Ref Range    Glucose (POC) 207 (H) 65 - 100 mg/dL    Performed by Chan Mercado    LACTIC ACID    Collection Time: 02/15/18  5:32 AM   Result Value Ref Range    Lactic acid 1.7 0.4 - 2.0 MMOL/L   CBC W/O DIFF    Collection Time: 02/15/18  5:32 AM   Result Value Ref Range    WBC 17.7 (H) 4.1 - 11.1 K/uL    RBC 3.76 (L) 4.10 - 5.70 M/uL    HGB 11.4 (L) 12.1 - 17.0 g/dL    HCT 35.5 (L) 36.6 - 50.3 %    MCV 94.4 80.0 - 99.0 FL    MCH 30.3 26.0 - 34.0 PG    MCHC 32.1 30.0 - 36.5 g/dL    RDW 14.5 11.5 - 14.5 %    PLATELET 385 507 - 944 K/uL    MPV 10.2 8.9 - 12.9 FL    NRBC 0.0 0  WBC    ABSOLUTE NRBC 0.00 0.00 - 0.01 K/uL   TROPONIN I    Collection Time: 02/15/18  5:32 AM   Result Value Ref Range    Troponin-I, Qt. 6.31 (H) <0.05 ng/mL   MAGNESIUM    Collection Time: 02/15/18  5:32 AM   Result Value Ref Range    Magnesium 2.0 1.6 - 2.4 mg/dL   METABOLIC PANEL, COMPREHENSIVE    Collection Time: 02/15/18  5:32 AM   Result Value Ref Range    Sodium 137 136 - 145 mmol/L    Potassium 4.4 3.5 - 5.1 mmol/L    Chloride 106 97 - 108 mmol/L    CO2 21 21 - 32 mmol/L    Anion gap 10 5 - 15 mmol/L    Glucose 333 (H) 65 - 100 mg/dL    BUN 38 (H) 6 - 20 MG/DL    Creatinine 1.58 (H) 0.70 - 1.30 MG/DL    BUN/Creatinine ratio 24 (H) 12 - 20      GFR est AA 52 (L) >60 ml/min/1.73m2    GFR est non-AA 43 (L) >60 ml/min/1.73m2    Calcium 8.0 (L) 8.5 - 10.1 MG/DL    Bilirubin, total 0.6 0.2 - 1.0 MG/DL    ALT (SGPT) 130 (H) 12 - 78 U/L    AST (SGOT) 250 (H) 15 - 37 U/L    Alk.  phosphatase 239 (H) 45 - 117 U/L    Protein, total 5.9 (L) 6.4 - 8.2 g/dL    Albumin 2.1 (L) 3.5 - 5.0 g/dL    Globulin 3.8 2.0 - 4.0 g/dL    A-G Ratio 0.6 (L) 1.1 - 2.2     CK W/ CKMB & INDEX    Collection Time: 02/15/18  5:32 AM   Result Value Ref Range     (H) 39 - 308 U/L    CK - MB 28.5 (H) <3.6 NG/ML    CK-MB Index 7.4 (H) 0 - 2.5     GLUCOSE, POC    Collection Time: 02/15/18  6:57 AM   Result Value Ref Range    Glucose (POC) 240 (H) 65 - 100 mg/dL    Performed by Ruddy Cline      [facility]      02/15/18   0532   CPK  387*   CKMB  28.5*   CKNDX  7.4*   TROIQ  6.31*       Nicole Whitehead MD

## 2018-02-16 NOTE — PROGRESS NOTES
[id number] [facility]  Preliminary Report: Ankle Brachial Index    Pressure (mmHg) Right    Left    Brachial:  99   99  Ankle PTA:  52   67  Ankle DPA:  56   74  Great Toe:     0    Waveform:  Right   Left  CFA:       Popliteal:      PTA:   Biphasic  Monophasic  DPA:   Monophasic  Monophasic  Great Toe:     Absent    Right BRAXTON:   0.57  Left BRAXTON: 0.75  Right TBI: Bandaged toe Left TBI: 0    Final report to follow.     CFA = Common Femoral Artery  PTA = Posterior Tibial Artery  DPA = Dorsalis Pedis Artery  BRAXTON = Ankle Brachial Index  TBI = Toe Brachial Index  NC = Non-compressible

## 2018-02-16 NOTE — PROGRESS NOTES
1900 Bedside and Verbal shift change report given to Micki Wu RN (oncoming nurse) by Alcon Mehta RN (offgoing nurse). Report included the following information SBAR, Kardex, Procedure Summary, Intake/Output and Cardiac Rhythm Paced. 2000 Assessment complete. Patient is intubated and sedated. Does not withdrawal to pain. No purposeful movement. Pupils are sluggish but reactive to light. No cough or gag when suctioning. Lung sounds are diminished. Bowel sounds are hypoactive. Pulses are audible by doppler. NG tube to suction. Right femoral line with Propofol infusing @ 25mcg/kg/min, 1/2 Normal Saline @ 50mL/hr, Levophed @ 7mcg/min. Liu catheter draining clear yellow urine. 0000 Reassessment complete. Patient now hypothermic with rectal temp of 95.1. Blankets applied. 0400 Reassessment complete. No changes from previous. Patient temp 97.5    0510 Propofol stopped to conduct SAT/SBT.    0530 SBT started. 7529 Patient failed SBT due to high respiratory rate. Propofol restarted.     0700 Report given to Edwardo Hogan RN

## 2018-02-16 NOTE — CONSULTS
Vascular Surgery Consult Note  2/16/2018    Subjective:     Deyanira Sharma is a 68 y.o.  male with a pmhx significant for ASHD, CKD, BL ICA stenosis w hx of left CEA, , DM, HLD, complete heart block s/p PPM, CVA, and HTN who presented to the hospital following a cardiac arrest at home. He told his spouse that he did not feel well and sat down in his recliner. He was then found unresponsive. Upon EMS arrival he was in PEA. CPR was initiated and he was given 2 doses of Epi. .  He was then defibrillated, received an additional dose of epi and amiodarone. He was intubated and transferred to the hospital   CPR was ongoing on arrival.      Upon arrival to [facility] CPR was continued. Additional doses of epi were administered along with bicarb, calcium, and levophed. ROSC was achieved. Pupils were initially fixed and dilated. Patient was admitted to the ICU. He remains intubated. Initially patient was attempting to open his eyes with sternal rub and moving his extremities. That has since stopped. He currently has no purposeful movement. HCT was unremarkable for an acute issue. Cardiology has evaluated and troponins do not indicate massive MI. Suspect devastating neurological injury. Plan is for EEG today. This am he was noted to have a cool pulseless left foot and we have been asked to evaluate.       Past Medical History   ASHD  Complete heart block s/p left CEA  HTN  HLD  CKD  BL ICA stenosis s/p left CEA  CVA  DM w neuropathy   HLD   Anemia  GERD  Martínez esophagus   Hiatal hernia   Diverticulosis   DJD  Spinal stenosis   BPH   Erectile dysfunction   Pulmonary nodule     Past Surgical History in addition to above   Bilateral cataracts with IOL   Bone cyst resection from right leg   Left index finger amputation    Spinal surgery      Family History   Problem Relation Age of Onset    Diabetes Mother     Cancer Father      LUNG    Heart Disease Father       Social History   Substance Use Topics    Smoking status: Former Smoker     Quit date: 7/7/1996    Smokeless tobacco: Never Used    Alcohol use No      Comment: rarely       Prior to Admission medications    Medication Sig Start Date End Date Taking? Authorizing Provider   metFORMIN (GLUCOPHAGE) 500 mg tablet Take 500 mg by mouth two (2) times daily (with meals). Yes Historical Provider   meloxicam (MOBIC) 15 mg tablet Take 15 mg by mouth daily. Yes Historical Provider   nicotinic acid (NIACIN) 500 mg tablet Take 500 mg by mouth Daily (before breakfast). Yes Historical Provider   ascorbic acid, vitamin C, (VITAMIN C) 250 mg tablet Take 250 mg by mouth three (3) times daily. Yes Historical Provider   insulin degludec (TRESIBA FLEXTOUCH U-200) 200 unit/mL (3 mL) inpn 20 Units by SubCUTAneous route nightly. 2/14/18  Yes Eveline Yepez MD   amLODIPine (NORVASC) 5 mg tablet TAKE 1 TABLET EVERY DAY 2/12/18  Yes Eveline Yepez MD   insulin aspart (NOVOLOG) 100 unit/mL injection 12 Units by SubCUTAneous route Before breakfast, lunch, and dinner. Indications: type 2 diabetes mellitus 2/5/18  Yes Eveline Yepez MD   insulin NPH (NOVOLIN N) 100 unit/mL injection 24 Units by SubCUTAneous route daily (with breakfast). Indications: type 2 diabetes mellitus 12/22/17  Yes Eveline Yepez MD   Insulin Needles, Disposable, 30 gauge x 1/3\" Use daily with his Hi Pinedo pen 12/4/17  Yes Eveline Yepez MD   nadolol (CORGARD) 40 mg tablet Take 40 mg by mouth daily. 10/15/17  Yes Historical Provider   traMADol (ULTRAM) 50 mg tablet 1 tablet four times daily as needed for pain. 10/19/17  Yes Eveline Yepez MD   losartan (COZAAR) 100 mg tablet Take 100 mg by mouth two (2) times a day. Yes Historical Provider   lisinopril-hydroCHLOROthiazide (PRINZIDE, ZESTORETIC) 20-12.5 mg per tablet Take 2 Tabs by mouth daily. Yes Historical Provider   ferrous sulfate 325 mg (65 mg iron) tablet Take 325 mg by mouth three (3) times daily (with meals). Yes Historical Provider   omeprazole (PRILOSEC) 20 mg capsule Take 20 mg by mouth nightly. Yes Historical Provider   aspirin 81 mg chewable tablet Take 81 mg by mouth nightly. Yes Historical Provider   tamsulosin (FLOMAX) 0.4 mg capsule Take 0.4 mg by mouth daily. Yes Historical Provider   simvastatin (ZOCOR) 10 mg tablet Take 10 mg by mouth nightly. Yes Historical Provider   clopidogrel (PLAVIX) 75 mg tab Take 75 mg by mouth daily.     Historical Provider     No Known Allergies     Review of Systems   Reason unable to perform ROS: Sedation      Per the EMR is only prior symptom was a vague feeling on unwellness     Objective:       Patient Vitals for the past 24 hrs:   BP Temp Pulse Resp SpO2 Weight   02/16/18 0744 - - 72 21 100 % -   02/16/18 0730 108/53 99.3 °F (37.4 °C) 71 23 100 % -   02/16/18 0700 105/54 98.8 °F (37.1 °C) 71 22 100 % -   02/16/18 0600 116/57 - 69 23 100 % -   02/16/18 0530 122/58 - 66 23 100 % -   02/16/18 0500 110/53 - 65 18 100 % -   02/16/18 0449 - - - - - 90.3 kg (199 lb 1.2 oz)   02/16/18 0430 105/50 - 62 18 100 % -   02/16/18 0402 - - 60 16 100 % -   02/16/18 0400 103/50 - 60 20 100 % -   02/16/18 0330 105/48 - 60 21 100 % -   02/16/18 0300 111/51 - 60 15 100 % -   02/16/18 0230 108/50 - 60 18 99 % -   02/16/18 0200 108/48 - 60 18 99 % -   02/16/18 0130 129/60 - 60 17 99 % -   02/16/18 0100 127/60 - 60 17 99 % -   02/16/18 0030 126/59 - 60 16 98 % -   02/16/18 0001 133/64 95.1 °F (35.1 °C) 60 17 99 % -   02/15/18 2331 - - 60 17 99 % -   02/15/18 2330 128/62 - 60 18 98 % -   02/15/18 2300 128/61 - 62 18 100 % -   02/15/18 2230 124/59 - 64 18 100 % -   02/15/18 2200 124/59 - 64 22 100 % -   02/15/18 2130 124/60 - 66 22 100 % -   02/15/18 2100 130/66 - 68 23 100 % -   02/15/18 2030 124/62 - 71 19 100 % -   02/15/18 2000 118/62 (!) 102.9 °F (39.4 °C) 74 21 100 % -   02/15/18 1913 - - 77 25 100 % -   02/15/18 1900 115/64 (!) 103.2 °F (39.6 °C) 79 28 100 % -   02/15/18 1815 - (!) 103.3 °F (39.6 °C) - - - -   02/15/18 1800 108/61 - 83 26 100 % -   02/15/18 1730 109/63 (!) 102.3 °F (39.1 °C) 82 26 100 % -   02/15/18 1700 108/60 - 81 26 100 % -   02/15/18 1630 104/61 - 80 26 100 % -   02/15/18 1617 - - 83 29 100 % -   02/15/18 1616 - - 83 27 99 % -   02/15/18 1600 106/60 (!) 102.2 °F (39 °C) 84 24 99 % -   02/15/18 1530 104/59 - 82 26 99 % -   02/15/18 1500 101/58 - 80 26 99 % -   02/15/18 1430 100/54 - 79 23 99 % -   02/15/18 1400 99/54 - 79 24 99 % -   02/15/18 1330 106/60 - 78 24 99 % -   02/15/18 1300 110/58 - 77 22 99 % -   02/15/18 1230 99/53 - 77 23 99 % -   02/15/18 1200 108/60 100 °F (37.8 °C) 76 23 99 % -   02/15/18 1130 113/58 - 77 29 99 % -   02/15/18 1108 - - 76 22 100 % -   02/15/18 1100 114/57 - 76 25 100 % -   02/15/18 1030 111/60 - 74 23 100 % -   02/15/18 1000 108/57 - 73 21 100 % -     Physical Exam   Constitutional: He appears well-developed and well-nourished. He is intubated. HENT:   Head: Normocephalic. Eyes: Pupils are equal, round, and reactive to light. Neck: Normal range of motion. Cardiovascular: Normal rate and regular rhythm. Pulses:       Femoral pulses are 2+ on the right side, and 2+ on the left side. Popliteal pulses are 1+ on the right side, and 0 on the left side. Dorsalis pedis pulses are 0 on the right side, and 0 on the left side. Posterior tibial pulses are 0 on the right side, and 0 on the left side. Pace per telemetry  Patient's face and neck is cyanotic  Cool pulseless left foot with cyanosis of toes. Right foot is warm wo palpable pulse and trace pedal edema. Pulmonary/Chest: Tachypnea noted. He is intubated. No respiratory distress. He has decreased breath sounds in the right lower field and the left lower field. Abdominal: Soft. Normal appearance. Genitourinary:   Genitourinary Comments: Liu    Musculoskeletal:   No purposeful movements. PICC RUE    Neurological: GCS eye subscore is 1.  GCS verbal subscore is 1. GCS motor subscore is 1. GCS of 3 off sedation. Skin:   DM foot ulcer to right great toe.         Pertinent Test Results:   Recent Results (from the past 24 hour(s))   CK W/ CKMB & INDEX    Collection Time: 02/15/18 10:42 AM   Result Value Ref Range     (H) 39 - 308 U/L    CK - MB 24.9 (H) <3.6 NG/ML    CK-MB Index 5.9 (H) 0 - 2.5     TROPONIN I    Collection Time: 02/15/18 10:42 AM   Result Value Ref Range    Troponin-I, Qt. 8.00 (H) <0.05 ng/mL   GLUCOSE, POC    Collection Time: 02/15/18 11:48 AM   Result Value Ref Range    Glucose (POC) 293 (H) 65 - 100 mg/dL    Performed by Leveda Nipper    GLUCOSE, POC    Collection Time: 02/15/18  5:27 PM   Result Value Ref Range    Glucose (POC) 353 (H) 65 - 100 mg/dL    Performed by Leveda Nipper    GLUCOSE, POC    Collection Time: 02/15/18 11:47 PM   Result Value Ref Range    Glucose (POC) 278 (H) 65 - 100 mg/dL    Performed by [facility]    METABOLIC PANEL, BASIC    Collection Time: 02/16/18  3:16 AM   Result Value Ref Range    Sodium 140 136 - 145 mmol/L    Potassium 3.8 3.5 - 5.1 mmol/L    Chloride 108 97 - 108 mmol/L    CO2 23 21 - 32 mmol/L    Anion gap 9 5 - 15 mmol/L    Glucose 278 (H) 65 - 100 mg/dL    BUN 51 (H) 6 - 20 MG/DL    Creatinine 2.19 (H) 0.70 - 1.30 MG/DL    BUN/Creatinine ratio 23 (H) 12 - 20      GFR est AA 36 (L) >60 ml/min/1.73m2    GFR est non-AA 29 (L) >60 ml/min/1.73m2    Calcium 7.5 (L) 8.5 - 10.1 MG/DL   CBC WITH AUTOMATED DIFF    Collection Time: 02/16/18  3:16 AM   Result Value Ref Range    WBC 10.9 4.1 - 11.1 K/uL    RBC 3.70 (L) 4.10 - 5.70 M/uL    HGB 10.8 (L) 12.1 - 17.0 g/dL    HCT 33.3 (L) 36.6 - 50.3 %    MCV 90.0 80.0 - 99.0 FL    MCH 29.2 26.0 - 34.0 PG    MCHC 32.4 30.0 - 36.5 g/dL    RDW 14.6 (H) 11.5 - 14.5 %    PLATELET 483 640 - 470 K/uL    MPV 10.0 8.9 - 12.9 FL    NRBC 0.0 0  WBC    ABSOLUTE NRBC 0.00 0.00 - 0.01 K/uL    NEUTROPHILS 82 (H) 32 - 75 %    LYMPHOCYTES 9 (L) 12 - 49 %    MONOCYTES 8 5 - 13 % EOSINOPHILS 0 0 - 7 %    BASOPHILS 0 0 - 1 %    IMMATURE GRANULOCYTES 1 (H) 0.0 - 0.5 %    ABS. NEUTROPHILS 8.9 (H) 1.8 - 8.0 K/UL    ABS. LYMPHOCYTES 1.0 0.8 - 3.5 K/UL    ABS. MONOCYTES 0.9 0.0 - 1.0 K/UL    ABS. EOSINOPHILS 0.0 0.0 - 0.4 K/UL    ABS. BASOPHILS 0.0 0.0 - 0.1 K/UL    ABS. IMM. GRANS. 0.1 (H) 0.00 - 0.04 K/UL    DF AUTOMATED     HEPATIC FUNCTION PANEL    Collection Time: 02/16/18  3:16 AM   Result Value Ref Range    Protein, total 5.5 (L) 6.4 - 8.2 g/dL    Albumin 2.0 (L) 3.5 - 5.0 g/dL    Globulin 3.5 2.0 - 4.0 g/dL    A-G Ratio 0.6 (L) 1.1 - 2.2      Bilirubin, total 0.4 0.2 - 1.0 MG/DL    Bilirubin, direct 0.1 0.0 - 0.2 MG/DL    Alk. phosphatase 175 (H) 45 - 117 U/L    AST (SGOT) 150 (H) 15 - 37 U/L    ALT (SGPT) 77 12 - 78 U/L   MAGNESIUM    Collection Time: 02/16/18  3:16 AM   Result Value Ref Range    Magnesium 2.1 1.6 - 2.4 mg/dL   PHOSPHORUS    Collection Time: 02/16/18  3:16 AM   Result Value Ref Range    Phosphorus 4.4 2.6 - 4.7 MG/DL   CK-MB,QUANT. Collection Time: 02/16/18  3:16 AM   Result Value Ref Range    CK - MB 6.5 (H) <3.6 NG/ML    CK-MB Index 2.2 0 - 2.5     CK    Collection Time: 02/16/18  3:16 AM   Result Value Ref Range     39 - 308 U/L   TROPONIN I    Collection Time: 02/16/18  3:16 AM   Result Value Ref Range    Troponin-I, Qt. 3.04 (H) <0.05 ng/mL   GLUCOSE, POC    Collection Time: 02/16/18  6:54 AM   Result Value Ref Range    Glucose (POC) 273 (H) 65 - 100 mg/dL    Performed by Ayleen Jiang          Results from [place] encounter on 02/14/18   XR CHEST PORT   Narrative INDICATION: Acute myocardial infarction, diabetes, complete heart block, CVA. EXAM:    Portable AP chest radiograph was obtained at 0449 hours on February 16, 2018. FINDINGS:    Comparison studies:  February 14, 2018. The heart is borderline to slightly enlarged  in size. The lungs are slightly hypo- expanded bilaterally. The visualized bones are osteopenic.     Tip of endotracheal tube is in satisfactory position. Tip of NG tube is  projected over fundal/body of stomach. Pacemaker hardware redemonstrated. Slight  interval regression of pulmonary edema. New obscuration of left hemidiaphragm. Impression IMPRESSION:    ETT in satisfactory position. Interval incomplete regression of pulmonary edema. Possible new consolidation and/or pleural effusion at left base. Assessmen/Plan:     Consult problems:  PAD  Hx of BL ICA stenosis     Principal problems  Cardiac arrest   Acute respiratory failure   Multiorgan failure   Acute Renal Failure   Elevated transaminase   Subendocardial ischemia    Lactic acidosis   Possible devastating neurological injury   Hypoxia vs CVA   Hx of CVA     Active problems  HTN  ASHD LDL 73  DM with hyperglycemia   CKD II baseline creatinine 1.15  Hypernatremia  Anemia     LLE ischemia likely secondary to poor perfusion. Per nsg report family continues to want to pursue aggressive care. Obtain ABIs. Overall prognosis very poor.   Management of comorbid conditions by Intensivist, Cardiologist, and Neurologist.    VTE prophylaxis:   Per primary team     Signed By: Justyna Andrew NP     February 16, 2018

## 2018-02-16 NOTE — PROGRESS NOTES
0710 Bedside and verbal Report received from [facility]. SBAR, Kardex, ED Summary, Intake/Output, MAR, Recent Results or Cardiac Rhythm NSR were discussed. Benito Mcdaniel RN assumed care of the pt. Assessment complete per flowsheet  0809 trial off Propofol, pt remains unresponsive  0830 DR Karime Herrera in to see pt and spoke with wife and daughter in law. ETT advanced from 24 to 27 cm at teeth by RT per Dr Meño Juárez order. Pt remains with minimal response after stopping Propofol. 0900 Dr Melisa Hawley has been in to see pt and family  [phone] PICC in to assess pt for PICC to replace right femoral CVL  1000 IDR complete  1100 successful insertion of right PICC by PICC team. Right fem CVL d/c'd. shahid well, lines and caps changed  1200 pt reassessed per flow sheet, family remains at bedside. Ice packs for elevated temps that did not respond quickly enough to Tylenol. 1300 bp's low, restarted Levophed. 1330 bp improved on Levo- currently @13 mcg/min  1400 family remains at bedside, decreased urinary output from this am.  1600 pt reassessed per flow sheet, remains unchanged. Without response  1800 pt remains unresponsive, suctioned with mouth care q2hrs. Sputum obtained. Family has gone home for the evening. 1930 Bedside and verbal Report given to [facility], RN. SBAR, Kardex, ED Summary, Intake/Output, MAR, Recent Results or Cardiac Rhythm NSR/paced were discussed. [facility], RN assumed care of the pt.     ADRIEL Dsouza RN

## 2018-02-16 NOTE — PROGRESS NOTES
Pharmacy Automatic Renal Dosing Protocol - Antimicrobials    Indication for Antimicrobials: Pneumonia (CAP)    Current Regimen of Each Antimicrobial:  Zosyn 3.375 gram iv q8h (start date: , Day )    Previous antimicrobials  Levaquin 750mg IV x 1    Significant Cultures:    Blood cultures 2/15: NG x 1 day--pending      Labs:  Recent Labs      18   0316  02/15/18   0532  18   2230   CREA  2.19*  1.58*  1.15   BUN  51*  38*  24*   WBC  10.9  17.7*  7.1     Temp (24hrs), Av.7 °F (38.2 °C), Min:95.1 °F (35.1 °C), Max:103.3 °F (39.6 °C)        Paralysis, amputations, malnutrition: no  Creatinine Clearance (mL/min) 28. 2mL/min    Impression/Plan:  Zosyn changed to 3.375 gram iv q8h (each dosing infused over 4 hours) as per protocol.    -zosyn dosing correct for renal function 28.2ml/min, no adjustment needed until < 20ml/min  -7 day total stop date added in rounds on   -BMP and CBC already ordered by provider. Pharmacy will follow daily and adjust medications as appropriate for renal function and/or serum levels. Thank you,  MARIEL Rodriguez      Recommended duration of therapy  [email or web address]/[facility]/[email or web address]%[email or web address]. [email or web address]    Renal Dosing  [email or web address]/[facility]/Pharmacy/Clinical%20Companion/Renal%[email or web address] pdf

## 2018-02-16 NOTE — PROGRESS NOTES
PICC Education: Explained reason and rationale for PICC placement along with providing education in order to make an informed consent including nature, risks, benefits, potential complications, care and maintenance of PICC line. The opportunity for questions or concerns was given. A 'Patient PICC Handbook was also provided. Patient 's wife gave  consent for PICC procedure to be done at the bedside. Patient 's wife verbalizes understanding at this time. Pt is sedated on a ventilator. Procedure:  Time out completed. Pre procedure assessment done. Maximum sterile barrier precautions observed throughout procedure. Lidocaine 1% 4ml sc given prior to cannulation  Cannulated basilic vein using ultrasound guidance and modified seldinger technique. Inserted dual lumen 5 fr PICC in  right arm using, Typemock Tip Location System and 3CG   Patient has sinus rhythm. Tip Positioning System indicating tall P wave and no negative deflection before P wave which would indicate the PICC tip is properly placed in the distal SVC or the CAJ. PICC tip was confirmed by 2 PICC nurses and 3CG printout was placed on patients chart. Blood return verified and flushed with 20ml NS in each port. Sterile dressing applied with Biopatch, Stat loc, occlusive dressing per protocol. Curios caps applied to each port. Reason for access Hemodynamically unstable/ replace femoral Central line. Complications related to insertion : none. Patient tolerated procedure well with minimal blood loss. PICC procedure performed by: Marcie Mcnamara RN, BSN  Assisted by: Juliette Cancino RN  Right  arm circumference: 33 cm. Catheter internal length:  38  Catheter external length: 0  PICC catheter occupies 31% of vein  Brand of catheter BARD SOLO POWER PICC,  Lot #[id number]   REF# [id number]    EXP 2019-04-30. Primary nurse Carmela Stout RN, notified PICC line may be used and to hang new infusion tubing prior to use.       Marcie Mcnamara RN, BSN  Vascular Access Nurse

## 2018-02-16 NOTE — PROGRESS NOTES
PROGRESS NOTE    NAME:  Isabel Kebede   :      MRN:   [id number]     Date/Time:  2018 7:29 AM  Subjective:   History:  Chart reviewed and patient seen and examined and D/W his nurse and his wife and all events noted. He was admitted on  after suffering cardiopulmonary arrest while at home. His wife noted that he claimed to feel bad and went to his recliner where she found him a \"short time\" later unresponsive. 10 minutes elapsed between his observed known collapse and the arrival of the medical squad. He was found pulseless and needed 2 rounds of epinephrine to regain his pulse. In route to the hospital he needed cardiopulmonary resuscitation during which she was shocked once and intubated. He has remained sedated and intubated since. He has remained unresponsive with non reactive pupils and no pain response. He remains intubated and on Levophed. No history obtainable except from chart review and discussion with his nurse and wife.        Medications reviewed:  Current Facility-Administered Medications   Medication Dose Route Frequency    piperacillin-tazobactam (ZOSYN) 3.375 g in  ml premix/cmpd  3.375 g IntraVENous Q8H    sodium chloride (NS) flush 5-10 mL  5-10 mL IntraVENous Q8H    sodium chloride (NS) flush 5-10 mL  5-10 mL IntraVENous PRN    acetaminophen (TYLENOL) suppository 650 mg  650 mg Rectal Q4H PRN    ondansetron (ZOFRAN) injection 4 mg  4 mg IntraVENous Q4H PRN    insulin lispro (HUMALOG) injection   SubCUTAneous Q6H    glucose chewable tablet 16 g  4 Tab Oral PRN    dextrose (D50W) injection syrg 12.5-25 g  12.5-25 g IntraVENous PRN    glucagon (GLUCAGEN) injection 1 mg  1 mg IntraMUSCular PRN    0.45% sodium chloride infusion  50 mL/hr IntraVENous CONTINUOUS    mupirocin (BACTROBAN) 2 % ointment   Both Nostrils BID    chlorhexidine (PERIDEX) 0.12 % mouthwash 15 mL  15 mL Oral Q12H    insulin glargine (LANTUS) injection 10 Units  10 Units SubCUTAneous DAILY  aspirin chewable tablet 81 mg  81 mg Per NG tube DAILY    famotidine (PF) (PEPCID) 20 mg in sodium chloride 0.9 % 10 mL injection  20 mg IntraVENous DAILY    NOREPINephrine (LEVOPHED) 8 mg in 5% dextrose 250mL infusion  2-30 mcg/min IntraVENous TITRATE    sodium chloride (NS) flush 5-10 mL  5-10 mL IntraVENous PRN    propofol (DIPRIVAN) infusion  0-50 mcg/kg/min IntraVENous TITRATE        Objective:   Vitals:  Visit Vitals    /54    Pulse 71    Temp 95.1 °F (35.1 °C)    Resp 22    Wt 199 lb 1.2 oz (90.3 kg)    SpO2 100%    BMI 29.4 kg/m2      O2 Device: Ventilator Temp (24hrs), Av.9 °F (38.3 °C), Min:95.1 °F (35.1 °C), Max:103.3 °F (39.6 °C)      Last 24hr Input/Output:    Intake/Output Summary (Last 24 hours) at 18 0729  Last data filed at 18 0600   Gross per 24 hour   Intake          2653.02 ml   Output             1870 ml   Net           783.02 ml        PHYSICAL EXAM:  General:     Unresponsive   Head:    Normocephalic, without obvious abnormality, atraumatic. Eyes:    Conjunctivae/corneas clear. Pupils fixed and not responsive  Nose:   Nares normal. No drainage or sinus tenderness. Throat:     Lips, mucosa, and tongue normal.  No Thrush. Intubated  Neck:   Supple, symmetrical,  no adenopathy, thyroid: non tender     no carotid bruit and no JVD. Back:     Symmetric,  No CVA tenderness. Lungs:    Clear to auscultation bilaterally. No Wheezing or Rhonchi. No rales. Heart:    Regular rate and rhythm,  no murmur, rub or gallop. Abdomen:    Soft, non-tender. Not distended. Bowel sounds normal. No masses  Extremities:  Extremities normal, atraumatic, No cyanosis. No edema. No clubbing.  R femoral line in  Lymph nodes:  Cervical, supraclavicular normal.  Neurologic:  Unresponsive with no purposeful movement  Skin:                 No rash      Lab Data Reviewed:    Recent Results (from the past 24 hour(s))   CK W/ CKMB & INDEX    Collection Time: 02/15/18 10:42 AM   Result Value Ref Range     (H) 39 - 308 U/L    CK - MB 24.9 (H) <3.6 NG/ML    CK-MB Index 5.9 (H) 0 - 2.5     TROPONIN I    Collection Time: 02/15/18 10:42 AM   Result Value Ref Range    Troponin-I, Qt. 8.00 (H) <0.05 ng/mL   GLUCOSE, POC    Collection Time: 02/15/18 11:48 AM   Result Value Ref Range    Glucose (POC) 293 (H) 65 - 100 mg/dL    Performed by Mills Ax    GLUCOSE, POC    Collection Time: 02/15/18  5:27 PM   Result Value Ref Range    Glucose (POC) 353 (H) 65 - 100 mg/dL    Performed by Sembraire Ax    GLUCOSE, POC    Collection Time: 02/15/18 11:47 PM   Result Value Ref Range    Glucose (POC) 278 (H) 65 - 100 mg/dL    Performed by Farhat Warren    METABOLIC PANEL, BASIC    Collection Time: 02/16/18  3:16 AM   Result Value Ref Range    Sodium 140 136 - 145 mmol/L    Potassium 3.8 3.5 - 5.1 mmol/L    Chloride 108 97 - 108 mmol/L    CO2 23 21 - 32 mmol/L    Anion gap 9 5 - 15 mmol/L    Glucose 278 (H) 65 - 100 mg/dL    BUN 51 (H) 6 - 20 MG/DL    Creatinine 2.19 (H) 0.70 - 1.30 MG/DL    BUN/Creatinine ratio 23 (H) 12 - 20      GFR est AA 36 (L) >60 ml/min/1.73m2    GFR est non-AA 29 (L) >60 ml/min/1.73m2    Calcium 7.5 (L) 8.5 - 10.1 MG/DL   CBC WITH AUTOMATED DIFF    Collection Time: 02/16/18  3:16 AM   Result Value Ref Range    WBC 10.9 4.1 - 11.1 K/uL    RBC 3.70 (L) 4.10 - 5.70 M/uL    HGB 10.8 (L) 12.1 - 17.0 g/dL    HCT 33.3 (L) 36.6 - 50.3 %    MCV 90.0 80.0 - 99.0 FL    MCH 29.2 26.0 - 34.0 PG    MCHC 32.4 30.0 - 36.5 g/dL    RDW 14.6 (H) 11.5 - 14.5 %    PLATELET 074 941 - 860 K/uL    MPV 10.0 8.9 - 12.9 FL    NRBC 0.0 0  WBC    ABSOLUTE NRBC 0.00 0.00 - 0.01 K/uL    NEUTROPHILS 82 (H) 32 - 75 %    LYMPHOCYTES 9 (L) 12 - 49 %    MONOCYTES 8 5 - 13 %    EOSINOPHILS 0 0 - 7 %    BASOPHILS 0 0 - 1 %    IMMATURE GRANULOCYTES 1 (H) 0.0 - 0.5 %    ABS. NEUTROPHILS 8.9 (H) 1.8 - 8.0 K/UL    ABS. LYMPHOCYTES 1.0 0.8 - 3.5 K/UL    ABS. MONOCYTES 0.9 0.0 - 1.0 K/UL    ABS.  EOSINOPHILS 0.0 0.0 - 0.4 K/UL    ABS. BASOPHILS 0.0 0.0 - 0.1 K/UL    ABS. IMM. GRANS. 0.1 (H) 0.00 - 0.04 K/UL    DF AUTOMATED     HEPATIC FUNCTION PANEL    Collection Time: 02/16/18  3:16 AM   Result Value Ref Range    Protein, total 5.5 (L) 6.4 - 8.2 g/dL    Albumin 2.0 (L) 3.5 - 5.0 g/dL    Globulin 3.5 2.0 - 4.0 g/dL    A-G Ratio 0.6 (L) 1.1 - 2.2      Bilirubin, total 0.4 0.2 - 1.0 MG/DL    Bilirubin, direct 0.1 0.0 - 0.2 MG/DL    Alk. phosphatase 175 (H) 45 - 117 U/L    AST (SGOT) 150 (H) 15 - 37 U/L    ALT (SGPT) 77 12 - 78 U/L   MAGNESIUM    Collection Time: 02/16/18  3:16 AM   Result Value Ref Range    Magnesium 2.1 1.6 - 2.4 mg/dL   PHOSPHORUS    Collection Time: 02/16/18  3:16 AM   Result Value Ref Range    Phosphorus 4.4 2.6 - 4.7 MG/DL   CK-MB,QUANT. Collection Time: 02/16/18  3:16 AM   Result Value Ref Range    CK - MB 6.5 (H) <3.6 NG/ML    CK-MB Index 2.2 0 - 2.5     CK    Collection Time: 02/16/18  3:16 AM   Result Value Ref Range     39 - 308 U/L   TROPONIN I    Collection Time: 02/16/18  3:16 AM   Result Value Ref Range    Troponin-I, Qt. 3.04 (H) <0.05 ng/mL   GLUCOSE, POC    Collection Time: 02/16/18  6:54 AM   Result Value Ref Range    Glucose (POC) 273 (H) 65 - 100 mg/dL    Performed by Anne Johnson          Assessment/Plan:     Principal Problem:    Cardiac arrest ([facility] (2/15/2018)    Active Problems:    Essential hypertension (7/27/2017)      Controlled type 2 diabetes mellitus without complication, with long-term current use of insulin ([facility] (7/27/2017)      CVA (cerebral vascular accident) ([facility] (8/31/2017)      Complete heart block ([address] (9/12/2017)      Acute myocardial infarction (2/15/2018)       ___________________________________________________  PLAN:    1. Continue BP support for now with Levophed  2. Mechanical Ventilation per Pulmonary  3. On Amiodarone drip for Afib  4. Troponin markedly up c/w massive MI  5. Neurology evaluation appreciated and note reviewed, EEG planned  6.   Emperic Zosyn for now to cover respiratory infection evident on CT  7. Follow BS and treat SSI  8. Renal function a little worse Cr 1.58 to 2.19  9. LFT's up c/w shock liver  10.  Grim Prognosis      35 minutes spent in direct care of this critically ill patient today in ICU      ___________________________________________________    Attending Physician: Ning Waters MD

## 2018-02-16 NOTE — PROGRESS NOTES
Cardiology Progress Note      2/16/2018 8:58 AM    Admit Date: 2/14/2018          Subjective: Events noted. Remains on vent, off pressors. Off propofol not not waking up. Visit Vitals    /53    Pulse 72    Temp 99.3 °F (37.4 °C)    Resp 21    Wt 90.3 kg (199 lb 1.2 oz)    SpO2 100%    BMI 29.4 kg/m2     02/14 1901 - 02/16 0700  In: 2815.2 [I.V.:2730.2]  Out: 2190 [Urine:1490]        Objective:      Physical Exam:  VS as above  Chest CTA  Card RRR no gallop     Data Review:   Labs:      Trop 8  BUN 51  creat 2.2   Hgb 10.8       Telemetry: SR BBB      Assessment:     1. S/p out of hosp cardiac arrest with PEA ? Cause   2. . S/p dual chamber pacer 9/13/17.   2. Hx Mild left ventricular systolic dysfunction, no history of coronary artery disease. Now with EF 25-30%  3. Hypertension. 4. Type 2 diabetes. 5. Peripheral vascular disease. 6. Mild chonic  renal insufficiency. with superimposed ARF   8. Prior left carotid endarterectomy. 9. Chronic  Anemia   10. Shock liver   11. ? Anoxic brain injury     Plan: Cont supportive Rx. Would have expected evidence of VT/Vfib on pacer check if this was a primary cardiac event.

## 2018-02-16 NOTE — DIABETES MGMT
DTC Progress Note    Recommendations/ Comments: Pt discussed with rounding team and Dr. Missy Miranda. Lantus started yesterday. BG remains upper 200s-300s. Plan to increase lantus to 25units daily, 15units now to equal 25units today. Chart reviewed on [facility] during Multidisciplinary Rounds. A1c:   Lab Results   Component Value Date/Time    Hemoglobin A1c 6.9 (H) 09/01/2017 03:31 AM           Recent Glucose Results: Lab Results   Component Value Date/Time     (H) 02/16/2018 03:16 AM    GLUCPOC 273 (H) 02/16/2018 06:54 AM    GLUCPOC 278 (H) 02/15/2018 11:47 PM    GLUCPOC 353 (H) 02/15/2018 05:27 PM        Lab Results   Component Value Date/Time    Creatinine 2.19 (H) 02/16/2018 03:16 AM     Estimated Creatinine Clearance: 31.4 mL/min (based on Cr of 2.19). Active Orders   Diet    DIET NPO        PO intake: Patient Vitals for the past 72 hrs:   % Diet Eaten   02/16/18 0744 0 %       Will continue to follow as needed. Thank you.   Andrews Suarez, LORIN, RN, [address]

## 2018-02-16 NOTE — PROGRESS NOTES
Nutrition Assessment:    RECOMMENDATIONS:   As medically able, would initiate TF via NGT:   Recommend TwoCal HN @ 20mL/h, advance rate 10mL q 8h as tolerated to Goal Rate of 40mL/h + 100mL H2O flush q 4h (provides 1920kcals/80gPro/1272mL)     Add bowel regimen     DIETITIANS INTERVENTIONS/PLAN:   TF recommendations  Add bowel regimen  Monitor plan of care    ASSESSMENT:   Pt admitted with out of hospital cardiac arrest.  PMH: CVA, GERD, HTN, CKD, DM. Chart reviewed, case discussed during CCU rounds. Pt intubated and unresponsive. NGT to suction with moderately high OP. Imaging on 2/14 showed constipation, discussed with Dr. Tess Shipman, miralax to be added. Abdominal distension today. MST negative for previous nutritional risk factors. Provided TF recommendations above. BG uncontrolled (273-353) insulin adjusted per DTC recommendations. SUBJECTIVE/OBJECTIVE:   Pt intubated   Diet Order: NPO  % Eaten:  Patient Vitals for the past 72 hrs:   % Diet Eaten   02/16/18 0744 0 %     Pertinent Medications:pepcid, lantus, humalog, zosyn, miralax; Link@[employer]). Chemistries:  Lab Results   Component Value Date/Time    Sodium 140 02/16/2018 03:16 AM    Potassium 3.8 02/16/2018 03:16 AM    Chloride 108 02/16/2018 03:16 AM    CO2 23 02/16/2018 03:16 AM    Anion gap 9 02/16/2018 03:16 AM    Glucose 278 (H) 02/16/2018 03:16 AM    BUN 51 (H) 02/16/2018 03:16 AM    Creatinine 2.19 (H) 02/16/2018 03:16 AM    BUN/Creatinine ratio 23 (H) 02/16/2018 03:16 AM    GFR est AA 36 (L) 02/16/2018 03:16 AM    GFR est non-AA 29 (L) 02/16/2018 03:16 AM    Calcium 7.5 (L) 02/16/2018 03:16 AM    AST (SGOT) 150 (H) 02/16/2018 03:16 AM    Alk.  phosphatase 175 (H) 02/16/2018 03:16 AM    Protein, total 5.5 (L) 02/16/2018 03:16 AM    Albumin 2.0 (L) 02/16/2018 03:16 AM    Globulin 3.5 02/16/2018 03:16 AM    A-G Ratio 0.6 (L) 02/16/2018 03:16 AM    ALT (SGPT) 77 02/16/2018 03:16 AM      Anthropometrics: Height: 5' 9\" (175.3 cm) Weight: 90.3 kg (199 lb 1.2 oz)  [x]bed scale (2/16)   []stated   []unknown    IBW (%IBW):   ( ) UBW (%UBW):   (  %)    BMI: Body mass index is 29.4 kg/(m^2). This BMI is indicative of:  []Underweight   []Normal   [x]Overweight   [] Obesity   [] Extreme Obesity (BMI>40)  Estimated Nutrition Needs (Based on): 2306 Kcals/day (PSU ([facility])) , 72 g (0.8gPro/kg) Protein  Carbohydrate: At Least 130 g/day  Fluids: 2200 mL/day    Last BM: PTA   [x]Active     []Hyperactive  []Hypoactive       [] Absent   BS  Skin:    [] Intact   [] Incision  [x] Breakdown (R toe wound)  [] DTI   [] Tears/Excoriation/Abrasion  []Edema [] Other: Wt Readings from Last 30 Encounters:   02/16/18 90.3 kg (199 lb 1.2 oz)   02/05/18 81.2 kg (179 lb)   01/22/18 81.1 kg (178 lb 12.8 oz)   12/20/17 78.5 kg (173 lb)   11/20/17 77.6 kg (171 lb)   10/19/17 82.5 kg (181 lb 12.8 oz)   10/05/17 83.6 kg (184 lb 6.4 oz)   09/28/17 79.4 kg (175 lb)   09/18/17 76.2 kg (168 lb)   09/11/17 84.7 kg (186 lb 11.7 oz)   09/01/17 86 kg (189 lb 9.6 oz)   08/31/17 87.1 kg (192 lb)   08/23/17 87.4 kg (192 lb 9.6 oz)   08/15/17 86.2 kg (190 lb)   08/08/17 87.5 kg (193 lb)   07/28/17 87.7 kg (193 lb 6.4 oz)   07/26/17 86.2 kg (190 lb)   07/21/17 86.2 kg (190 lb)   05/25/17 86.2 kg (190 lb)   05/22/17 88.9 kg (195 lb 15.8 oz)   06/29/16 82.2 kg (181 lb 3 oz)   06/22/16 83.9 kg (185 lb 1 oz)   06/15/16 81.2 kg (179 lb)   10/13/14 99.5 kg (219 lb 5.7 oz)   10/23/13 96.6 kg (213 lb)   07/12/11 102.1 kg (225 lb)   07/07/11 102.1 kg (225 lb)      NUTRITION DIAGNOSES:   Problem:  Inadequate protein-energy intake      Etiology: related to pt NPO 2' vent     Signs/Symptoms: as evidenced by NPO meets 0% kcal and protein needs. NUTRITION INTERVENTIONS:          Nutrition-Related Medication Mgmt: Other (comment)          GOAL:   Pt will be started on nutrition support vs PO diet in 2-4 days.      Cultural, Mormonism, or Ethnic Dietary Needs: None     LEARNING NEEDS (Diet, Food/Nutrient-Drug Interaction):    [x] None Identified   [] Identified and Education Provided/Documented   [] Identified and Pt declined/was not appropriate      [x] Interdisciplinary Care Plan Reviewed/Documented    [x] Participated in Discharge Planning: Unable to determine    [x] Interdisciplinary Rounds     NUTRITION RISK:    [x] High              [] Moderate           []  Low  []  Minimal/Uncompromised    PT SEEN FOR:    []  MD Consult: []Calorie Count      []Diabetic Diet Education        []Diet Education     []Electrolyte Management     []General Nutrition Management and Supplements     []Management of Tube Feeding     []TPN Recommendations    []  RN Referral:  []MST score >=2     []Enteral/Parenteral Nutrition PTA     []Pregnant: Gestational DM or Multigestation   []  Low BMI  []  Re-Screen   []  ARIADNE [address], [address] [place]    Pager [phone]  Weekend Pager [phone]

## 2018-02-16 NOTE — PROGRESS NOTES
Chief Complaint: altered mental status    Remains unresponsive. Has been off sedation for 3 hours. Family in the room. Discussed the critical nature of his condition with daughter, son and wife. Assesment and Plan  1. Comatose  EEG pending  repeat CT tomorrow     2. Acute non-ST elevation myocardial infarction (NSTEMI) [facility])  Cardiology following     3. Controlled type 2 diabetes mellitus without complication, with long-term current use of insulin (HCC)  Continue insulin     4. History of stroke   Continue aspirin      Allergies  Review of patient's allergies indicates no known allergies.      Medications  Current Facility-Administered Medications   Medication Dose Route Frequency    sodium chloride (NS) flush 10-30 mL  10-30 mL InterCATHeter PRN    sodium chloride (NS) flush 10 mL  10 mL InterCATHeter Q24H    sodium chloride (NS) flush 10-40 mL  10-40 mL InterCATHeter Q8H    heparin (porcine) pf 300 Units  300 Units InterCATHeter PRN    polyethylene glycol (MIRALAX) packet 17 g  17 g Oral DAILY    [START ON 2/17/2018] insulin glargine (LANTUS) injection 25 Units  25 Units SubCUTAneous DAILY    insulin glargine (LANTUS) injection 15 Units  15 Units SubCUTAneous NOW    sodium chloride (NS) flush        piperacillin-tazobactam (ZOSYN) 3.375 g in  ml premix/cmpd  3.375 g IntraVENous Q8H    sodium chloride (NS) flush 5-10 mL  5-10 mL IntraVENous Q8H    sodium chloride (NS) flush 5-10 mL  5-10 mL IntraVENous PRN    acetaminophen (TYLENOL) suppository 650 mg  650 mg Rectal Q4H PRN    ondansetron (ZOFRAN) injection 4 mg  4 mg IntraVENous Q4H PRN    insulin lispro (HUMALOG) injection   SubCUTAneous Q6H    glucose chewable tablet 16 g  4 Tab Oral PRN    dextrose (D50W) injection syrg 12.5-25 g  12.5-25 g IntraVENous PRN    glucagon (GLUCAGEN) injection 1 mg  1 mg IntraMUSCular PRN    0.45% sodium chloride infusion  50 mL/hr IntraVENous CONTINUOUS    mupirocin (BACTROBAN) 2 % ointment   Both Nostrils BID    chlorhexidine (PERIDEX) 0.12 % mouthwash 15 mL  15 mL Oral Q12H    aspirin chewable tablet 81 mg  81 mg Per NG tube DAILY    famotidine (PF) (PEPCID) 20 mg in sodium chloride 0.9 % 10 mL injection  20 mg IntraVENous DAILY    NOREPINephrine (LEVOPHED) 8 mg in 5% dextrose 250mL infusion  2-30 mcg/min IntraVENous TITRATE    sodium chloride (NS) flush 5-10 mL  5-10 mL IntraVENous PRN    propofol (DIPRIVAN) infusion  0-50 mcg/kg/min IntraVENous TITRATE        Medical History  Past Medical History:   Diagnosis Date    Allergic rhinitis     Anemia     ASVD (arteriosclerotic vascular disease)     Alfonso's esophagus 7/28/2017    Carotid stenosis 7/28/2017    Chronic kidney disease     Diabetes ([facility]     glucose runs 70 - 160's at home    Diabetic neuropathy ([facility] 7/28/2017    Diverticulosis     DJD (degenerative joint disease)     ED (erectile dysfunction)     Enlarged prostate     JUDY (generalized anxiety disorder)     GERD (gastroesophageal reflux disease)     controlled with med; alfonso's esophagus    Hiatal hernia     Hypercholesterolemia     Hypertension     Hypertrophy (benign) of prostate 7/28/2017    Ill-defined condition     peripheral vascular disease    Lung nodule     Neuropathy     On statin therapy 7/28/2017    Prostate cancer screening 7/28/2017    PVD (peripheral vascular disease) ([facility]     Sebaceous cyst 7/28/2017    Spinal stenosis      ROS  unrepsonsive      Exam:    Visit Vitals    /72 (BP 1 Location: Left arm, BP Patient Position: At rest)    Pulse 92    Temp (!) 100.7 °F (38.2 °C)    Resp 27    Wt 199 lb 1.2 oz (90.3 kg)    SpO2 100%    BMI 29.4 kg/m2        General:    Head: Normocephalic, atraumatic, anicteric sclera   Neck Normal ROM,  thyromegally   Lungs:  Clear to auscultation bilaterally, No wheezes or rubs   Cardiac: Regular rate and rhythm with no murmurs. Abd: Bowel sounds were audible.  No tenderness on palpation   Ext: No pedal edema   Skin: Supple no rash     NeurologicExam:  Mental Status: Comatose   Speech: Non verbal   Cranial Nerves:  No eye opening  Does not respond to sound  No dolls eyes  Pupils minimally reactive  Absent Corneal reflex   No grimmace     Motor:  No withdrawal No grimmace. Reflexes:   Deep tendon reflexes 0+/4 and symmetric. Sensory:   Does not withdraw   Tremor:   No tremor noted. Neurovascular: No carotid bruits. No JVD     Imaging    CT Results (most recent):    Results from Hospital Encounter encounter on 02/14/18   CTA CHEST W OR W WO CONT   Narrative History: Cardiac arrest    CTA of the chest was performed. 80 mL Isovue-370 were injected and scanning was  performed during the arterial phase of contrast administration. Post processing  was performed. 3D reconstructions were performed. CT dose reduction was  achieved through use of a standardized protocol tailored for this examination  and automatic exposure control for dose modulation. There are no intraluminal filling defects to suggest pulmonary embolism. The  heart, pericardium, and great vessels appear unremarkable. The chest wall and  axilla appear unremarkable. There are bilateral pleural effusions, right greater  than left. There is patchy opacification in the right upper lobe, right middle  lobe, and bilateral lower lobes. An 11 mm right middle lobe pulmonary nodule is  stable. An endotracheal tube tip is at the thoracic inlet. There is reflux of contrast  in to the hepatic veins. There is cholelithiasis. There is mild perihepatic  ascites. Impression IMPRESSION:   1. No evidence for pulmonary embolism. 2. Reflux of contrast into hepatic veins suggesting right heart failure. 3. Bilateral pleural effusions with bibasilar airspace opacification and right  upper lobe and right middle lobe airspace opacification which may represent  asymmetric edema or pneumonia. 4. Unchanged right middle lobe pulmonary nodule.   5. Cholelithiasis. 6. Mild ascites. MRI Results (most recent):    Results from [place] encounter on 08/31/17   MRA BRAIN WO CONT   Narrative INDICATION:   CVA    COMPARISON:  Brain MRI of today, reported separately    TECHNIQUE:    3-D time-of-flight MRA of the brain was performed. Multiplanar reconstructions  were obtained. FINDINGS:  The vertebral arteries are codominant. The basilar artery and its branches are  patent. The posterior cerebral arteries have fetal origin bilaterally. . The  internal carotid, anterior cerebral, and middle cerebral arteries are patent. There is no flow-limiting intracranial stenosis. There is motion artifact on the  images which results in apparent irregularity of the intracranial vessels. No  aneurysms are demonstrated. Impression IMPRESSION:  Negative MRA of the head. No major vessel occlusion or stenosis. Peripheral branches not well evaluated by this technique. .        .   Lab Review    Recent Results (from the past 24 hour(s))   GLUCOSE, POC    Collection Time: 02/15/18 11:48 AM   Result Value Ref Range    Glucose (POC) 293 (H) 65 - 100 mg/dL    Performed by Sportody Snare    GLUCOSE, POC    Collection Time: 02/15/18  5:27 PM   Result Value Ref Range    Glucose (POC) 353 (H) 65 - 100 mg/dL    Performed by Sportody Snare    GLUCOSE, POC    Collection Time: 02/15/18 11:47 PM   Result Value Ref Range    Glucose (POC) 278 (H) 65 - 100 mg/dL    Performed by Angelia Fairbanks    METABOLIC PANEL, BASIC    Collection Time: 02/16/18  3:16 AM   Result Value Ref Range    Sodium 140 136 - 145 mmol/L    Potassium 3.8 3.5 - 5.1 mmol/L    Chloride 108 97 - 108 mmol/L    CO2 23 21 - 32 mmol/L    Anion gap 9 5 - 15 mmol/L    Glucose 278 (H) 65 - 100 mg/dL    BUN 51 (H) 6 - 20 MG/DL    Creatinine 2.19 (H) 0.70 - 1.30 MG/DL    BUN/Creatinine ratio 23 (H) 12 - 20      GFR est AA 36 (L) >60 ml/min/1.73m2    GFR est non-AA 29 (L) >60 ml/min/1.73m2    Calcium 7.5 (L) 8.5 - 10.1 MG/DL   CBC WITH AUTOMATED DIFF    Collection Time: 02/16/18  3:16 AM   Result Value Ref Range    WBC 10.9 4.1 - 11.1 K/uL    RBC 3.70 (L) 4.10 - 5.70 M/uL    HGB 10.8 (L) 12.1 - 17.0 g/dL    HCT 33.3 (L) 36.6 - 50.3 %    MCV 90.0 80.0 - 99.0 FL    MCH 29.2 26.0 - 34.0 PG    MCHC 32.4 30.0 - 36.5 g/dL    RDW 14.6 (H) 11.5 - 14.5 %    PLATELET 957 069 - 389 K/uL    MPV 10.0 8.9 - 12.9 FL    NRBC 0.0 0  WBC    ABSOLUTE NRBC 0.00 0.00 - 0.01 K/uL    NEUTROPHILS 82 (H) 32 - 75 %    LYMPHOCYTES 9 (L) 12 - 49 %    MONOCYTES 8 5 - 13 %    EOSINOPHILS 0 0 - 7 %    BASOPHILS 0 0 - 1 %    IMMATURE GRANULOCYTES 1 (H) 0.0 - 0.5 %    ABS. NEUTROPHILS 8.9 (H) 1.8 - 8.0 K/UL    ABS. LYMPHOCYTES 1.0 0.8 - 3.5 K/UL    ABS. MONOCYTES 0.9 0.0 - 1.0 K/UL    ABS. EOSINOPHILS 0.0 0.0 - 0.4 K/UL    ABS. BASOPHILS 0.0 0.0 - 0.1 K/UL    ABS. IMM. GRANS. 0.1 (H) 0.00 - 0.04 K/UL    DF AUTOMATED     HEPATIC FUNCTION PANEL    Collection Time: 02/16/18  3:16 AM   Result Value Ref Range    Protein, total 5.5 (L) 6.4 - 8.2 g/dL    Albumin 2.0 (L) 3.5 - 5.0 g/dL    Globulin 3.5 2.0 - 4.0 g/dL    A-G Ratio 0.6 (L) 1.1 - 2.2      Bilirubin, total 0.4 0.2 - 1.0 MG/DL    Bilirubin, direct 0.1 0.0 - 0.2 MG/DL    Alk. phosphatase 175 (H) 45 - 117 U/L    AST (SGOT) 150 (H) 15 - 37 U/L    ALT (SGPT) 77 12 - 78 U/L   MAGNESIUM    Collection Time: 02/16/18  3:16 AM   Result Value Ref Range    Magnesium 2.1 1.6 - 2.4 mg/dL   PHOSPHORUS    Collection Time: 02/16/18  3:16 AM   Result Value Ref Range    Phosphorus 4.4 2.6 - 4.7 MG/DL   CK-MB,QUANT.     Collection Time: 02/16/18  3:16 AM   Result Value Ref Range    CK - MB 6.5 (H) <3.6 NG/ML    CK-MB Index 2.2 0 - 2.5     CK    Collection Time: 02/16/18  3:16 AM   Result Value Ref Range     39 - 308 U/L   TROPONIN I    Collection Time: 02/16/18  3:16 AM   Result Value Ref Range    Troponin-I, Qt. 3.04 (H) <0.05 ng/mL   GLUCOSE, POC    Collection Time: 02/16/18  6:54 AM   Result Value Ref Range Glucose (POC) 273 (H) 65 - 100 mg/dL    Performed by Samy Escalante      There is a high probability of imminent or life-threatening deterioration in the patient's condition  40  Minutes  spent on chart and examining patient and formulation of plan of care

## 2018-02-16 NOTE — PROGRESS NOTES
PICC order acknowledged. Patient currently has triple lumen right femoral line that was placed on 2/14/18. PICC line will be placed tomorrow in order of priority. Primary nurse TRW Garfield, RN aware.     Radha Resendiz, ADRIEL / Vascular Access Team

## 2018-02-16 NOTE — PROGRESS NOTES
02/16/18 0537   ABCDEF Bundle   SBT Safety Screen Passed No   SBT Screen Reason for Failure Agitation; Increased inspiratory effort

## 2018-02-16 NOTE — PROCEDURES
[facility]  *** FINAL REPORT ***    Name: Tank Lockett  MRN: [id number]    Inpatient  : 14 Sep 1940  HIS Order #: [id number]  [address] [facility] Visit #: [id number]  Date: 2018    TYPE OF TEST: Peripheral Arterial Testing    REASON FOR TEST  cool left foot    Right Leg  Segmentals: Abnormal                     mmHg  Brachial          99  High thigh  Low thigh  Calf  Posterior tibial  52  Dorsalis pedis    56  Peroneal  Metatarsal  Toe pressure  Doppler:    Abnormal  PVR:        Abnormal  Ankle/Brachial: 0.57    Left Leg  Segmentals: Abnormal                     mmHg  Brachial          99  High thigh  Low thigh  Calf  Posterior tibial  67  Dorsalis pedis    74  Peroneal  Metatarsal  Toe pressure  Doppler:    Abnormal  PVR:        Abnormal  Ankle/Brachial: 0.75    INTERPRETATION/FINDINGS  PROCEDURE:  Ankle, brachial and digital arterial pressures, CW Doppler   waveforms and digital PPG waveforms were performed. 1. Moderate peripheral arterial disease indicated at rest in the right   leg. 2. Moderate peripheral arterial disease indicated at rest in the left  leg. 3. The right ankle/brachial index is 0.57 and the left ankle/brachial  index is 0.75.  4. The left great toe/brachial index is 0. The right toe is bandaged. ADDITIONAL COMMENTS    I have personally reviewed the data relevant to the interpretation of  this  study. TECHNOLOGIST: Benigno Jones.  ESPINOZA Machado, RDMS  Signed: 2018 01:57 PM    PHYSICIAN: Poornima Cain MD  Signed: 2018 12:41 AM

## 2018-02-16 NOTE — PROGRESS NOTES
[facility] INTENSIVIST Consult Service Note  Pulmonary, Critical Care, and Sleep Medicine    Name: Janae Claros MRN: [id number]   : 1940 Hospital: [facility]   Date: 2018   Hospital Day: 3       Subjective/Interval History:   I have reviewed the notes from other providers and old records readily available and summarized findings below with the flowsheet. Seen earlier today on rounds.  all exams done early AM were done on some IV propofol. Yesterday he was tachypneic with some agitation requiring IV propofol. After this was stopped, he has remained unresponsive with non reactive pupils and no pain response. He remains intubated and on Levophed. No history obtainable except from chart review and discussion with his nurse and wife. Left foot cooler than right foot. Known PAD and sees Dr. Ramirez Magallanes. Discussed management with wife and daughter at bedside. Off pressors in AM but now hypotensive agin. Still on vent and not assisting. ETT high on my review of CXR and adjusted then confirmed      IMPRESSION:   1. Acute respiratory failure - now currently on mechanical ventilator as life support in the setting of  2. Acute PEA Cardiac arrest   3. Shock likely cardiogenic - labile  4. Acute encepahlopathy- concerned about anoxic brain injury- now comatose off sedation with no gag but cough  5. Abnormal chest CT- asymmetric and wonder about occult aspiration or early pneumonia- no prodrome at home- bilateral pleural effusions with bibasilar airspace opacification; marc RUL and RML  6. RML nodule- age??  9. Constipation on KUB  8. H/o complete heart block s/p PPM 2017 now with   9. Temporary pacemaker wire intact through sheath in right groin. rythmn is sinus rythmn with runs of paced beats. Site soft,no oozing or hematoma noted. 10. Atrial fibrillation? ? Started in ER but no strips? 11. PICC intact in right upper arm. PICC present on admission. 12. Hypernatremia - why  13. Gastric retention? NG recovered > liter of brown fluid but not coffee ground  14. Anemia - baseline Hb ~10, admission 7.9 after fluids?-no signs of over bleeding   15. Hypokalemia  16. HTN -head CT: normal; CTA - no PE, possible asymptomatic pulmonary edema vs pneumonia. 17. IDDM type II  18. Stroke 2017   19. BL ICA stenosis (50-59%),  Hx L CEA  20. Multiorgan dysfunction as outlined above with more testing to come as below  21. Pt is critically ill on life support and at high risk of sudden decompensation and/ or continued end organ dysfunction and failure      RECOMMENDATIONS/PLAN:   1. CCU monitoring- support patient and focus on safety  2. Discussed management with nursing, wife, daughter at bedside  3. Consult Vascular Surgery for left foot  4. Temp pacemaker  5. Needs PICC line  6. Levophed prn for BP support SNP > 90  7. Mechanical Ventilation for life support  8. bronchial hygiene  9. Continue IV abx pending cultures- aspiration or early pneumonia  10. CXR in AM to follow infiltrates seen best on Ct chest POA  11. Amiodarone drip - per cardiology  12. Troponin up post CPR  13. Neurology evaluation, EEG pending but too early to prognosticate  14. Follow BS and treat SSI   15. Pt needs IV fluids with additives and Drug therapy requiring intensive monitoring for toxicity  16. Prescription drug management with home med reconciliation reviewed  17. DVT, SUP prophylaxis  18. Will be available to assist in medical management while in the CCU pending disposition          My assessment/management was discussed with the follwoing to coordinate care:  Nursing XX    respiratory therapy XX Dr. Karley Mcelroy XX      I have provided   45    minutes of critical care time rendering care exclusive of any procedures.  During this entire length of time I was immediately available to the patient.      The reason for providing this level of medical care was due to a critical illness that impaired one or more vital organ systems, such that there was a high probability of imminent or life threatening deterioration in the patient's condition. Pt's condition is unstable and unpredictable. Risk of deterioration: high   [x] High complexity decision making was performed  [x] See my orders for details  Tubes:   Liu, NGT and Intubated/Vent  ICU disposition :Updated Family. Subjective/Initial History:   I have reviewed the flowsheet and previous days notes. Seen earlier today on rounds. I was asked by Nader Pacheco MD to see Esthela Stewart in consultation for a chief complaint of respiratory failure in the setting of PEA arrest at home. Pt is a 69 yo WM who was found unresponsive and pulseless at home last PM and transported to the ER. He was intubated in the field and CPR started and once arrived in ER CPR with eventual ROSC. Temp pacer placed in right femoral. Pt without PE on CTA chest. On IV levophed for shock. Large gastric output. Not making much urine. Pt now in CCU and paced on the monitor. and he has remained unresponsive with non reactive pupils and no pain response. He remains intubated and on Levophed. No history obtainable except from chart review and discussion with his nurse. He apparently told his wife that he didn't feel well before laying down in the recliner and when she subsequently checked on him he was unresponsive. No prodrome though may have felt like vomiting but did not. No fever. No recent ill contacts. PMH:  has a past medical history of Allergic rhinitis; Anemia; ASVD (arteriosclerotic vascular disease); Martínez's esophagus (7/28/2017); Carotid stenosis (7/28/2017); Chronic kidney disease; Diabetes ([facility] [address] Diabetic neuropathy ([facility] [address] (7/28/2017); Diverticulosis; DJD (degenerative joint disease); ED (erectile dysfunction); Enlarged prostate; JUDY (generalized anxiety disorder); GERD (gastroesophageal reflux disease); Hiatal hernia; Hypercholesterolemia;  Hypertension; Hypertrophy (benign) of prostate (7/28/2017); Ill-defined condition; Lung nodule; Neuropathy; On statin therapy (7/28/2017); Prostate cancer screening (7/28/2017); PVD (peripheral vascular disease) ([facility] [address] Sebaceous cyst (7/28/2017); and Spinal stenosis. He also has no past medical history of Nausea & vomiting. PSH:   has a past surgical history that includes upper gi endoscopy,biopsy (6/15/2016); colonoscopy,diagnostic (6/15/2016); colonoscopy (N/A, 6/15/2016); hx cataract removal; hx other surgical (Left, 06/2016); pr cardiac surg procedure unlist; pr neurological procedure unlisted (2011); hx orthopaedic (1977); and hx orthopaedic (5-, 7-22-17, 7-26-17). FHX: family history includes Cancer in his father; Diabetes in his mother; Heart Disease in his father. SHX:  reports that he quit smoking about 21 years ago. He has never used smokeless tobacco. He reports that he does not drink alcohol or use illicit drugs. The patient is unable to give any meaningful history or review of systems due to patient factors. ROS:Review of systems not obtained due to patient factors.     No Known Allergies     MAR reviewed and pertinent medications noted or modified as needed  MEDS:   Current Facility-Administered Medications   Medication    sodium chloride (NS) flush 10-30 mL    sodium chloride (NS) flush 10 mL    sodium chloride (NS) flush 10-40 mL    heparin (porcine) pf 300 Units    polyethylene glycol (MIRALAX) packet 17 g    [START ON 2/17/2018] insulin glargine (LANTUS) injection 25 Units    piperacillin-tazobactam (ZOSYN) 3.375 g in  ml premix/cmpd    sodium chloride (NS) flush 5-10 mL    sodium chloride (NS) flush 5-10 mL    acetaminophen (TYLENOL) suppository 650 mg    ondansetron (ZOFRAN) injection 4 mg    insulin lispro (HUMALOG) injection    glucose chewable tablet 16 g    dextrose (D50W) injection syrg 12.5-25 g    glucagon (GLUCAGEN) injection 1 mg    0.45% sodium chloride infusion    mupirocin (BACTROBAN) 2 % ointment    chlorhexidine (PERIDEX) 0.12 % mouthwash 15 mL    aspirin chewable tablet 81 mg    famotidine (PF) (PEPCID) 20 mg in sodium chloride 0.9 % 10 mL injection    NOREPINephrine (LEVOPHED) 8 mg in 5% dextrose 250mL infusion    sodium chloride (NS) flush 5-10 mL    propofol (DIPRIVAN) infusion        Objective:     Vital Signs: Intake/Output: Intake/Output:   Temp (24hrs), Av.9 °F (38.3 °C), Min:95.1 °F (35.1 °C), Max:103.3 °F (39.6 °C)    Visit Vitals    /48 (BP 1 Location: Left arm, BP Patient Position: At rest)    Pulse 84    Temp (!) 101.5 °F (38.6 °C)    Resp 16    Wt 90.3 kg (199 lb 1.2 oz)    SpO2 98%    BMI 29.4 kg/m2          Telemetry:    paced O2 Device: Endotracheal tube, Ventilator       Wt Readings from Last 4 Encounters:   18 90.3 kg (199 lb 1.2 oz)   18 81.2 kg (179 lb)   18 81.1 kg (178 lb 12.8 oz)   17 78.5 kg (173 lb)          Intake/Output Summary (Last 24 hours) at 18 1236  Last data filed at 18 1200   Gross per 24 hour   Intake          2027.62 ml   Output             1280 ml   Net           747.62 ml     Last shift:       07 -  190  In: 410 [I.V.:350]  Out: 200 [Urine:200]  Last 3 shifts: 1901 -  0700  In: 2815.2 [I.V.:2730.2]  Out: 2190 [Urine:1490]     Hemodynamics:    CO:    CI:    CVP:    SVR:   PAP Systolic:    PAP Diastolic:    PVR:    QP36:        Ventilator Settings:      Mode Rate TV Press PEEP FiO2 PIP Min. Vent   Assist control    500 ml 5 cm H2O  6 cm H20 35 %  21 cm H2O  11.4 l/min      Physical Exam:     General: afebrile and severely ill elderly WM on vent   HEAD: Normocephalic, without obvious abnormality, atraumatic   EYES: conjunctivae clear. PERRL,  AN Icteric sclerae   NOSE: nares normal, no drainage, no nasal flaring,    THROAT: mucous membranes dry; Lips, mucosa dry; intubated   Neck: Supple, symmetrical, trachea midline,  No accessory mm use;  No Stridor/ cuff leak, No goiter or thyroid tenderness   LYMPH: No abnormally enlarged lymph nodes. in neck or groin   Chest: normal   Lungs: decreased air exchange bilaterally   Heart: Regular rate and rhythm; Abdomen: soft, non-tender, without masses or organomegaly; protuberant   : normal;   Liu; clear urine; NO gross hematuria   Extremity: negative, clubbing; no joint swelling or erythema   Neuro: obtunded; non verbal; withdraws to pain; unable to check gait and station; does not follow simple commands   Psych: Unable to assess; no agitation off sedation   Skin: Pale and Cool;    Pulses:Bilateral, Radial, 1+   Capillary refill: abnormal: sluggish capillary refill, pale,      Data:   Interval lab and diagnostic data was reviewed. Interval radiology images were independently viewed and available reports were reviewed. All lab results for the last 24 hours reviewed.           Labs:    Recent Labs      02/16/18   0316  02/15/18   0532  02/14/18   2230   WBC  10.9  17.7*  7.1   HGB  10.8*  11.4*  7.9*   PLT  222  319  190   INR   --    --   1.3*   APTT   --    --   39.0*     Recent Labs      02/16/18   0316  02/15/18   0532  02/15/18   0024  02/14/18   2230   NA  140  137   --   147*   K  3.8  4.4   --   3.4*   CL  108  106   --   114*   CO2  23  21   --   19*   GLU  278*  333*   --   229*   BUN  51*  38*   --   24*   CREA  2.19*  1.58*   --   1.15   CA  7.5*  8.0*   --   6.1*   MG  2.1  2.0   --    --    PHOS  4.4   --    --    --    LAC   --   1.7  5.1*   --    ALB  2.0*  2.1*   --   1.5*   SGOT  150*  250*   --   118*   ALT  77  130*   --   76     Recent Labs      02/14/18   2330   PH  7.30*   PCO2  36   PO2  391*   HCO3  17*   FIO2  100     Recent Labs      02/16/18 0316  02/15/18   1042  02/15/18   0532   CPK  291  425*  387*   CKNDX  2.2  5.9*  7.4*   TROIQ  3.04*  8.00*  6.31*     No results found for: BNPP, BNP   Lab Results   Component Value Date/Time    Culture result: NO GROWTH 1 DAY 02/15/2018 12:24 AM    Culture result: NO GROWTH 1 DAY 02/15/2018 12:24 AM    Culture result: GRAM NEGATIVE RODS (A) 09/16/2017 06:40 PM     Lab Results   Component Value Date/Time     02/16/2018 03:16 AM     (H) 02/15/2018 10:42 AM       Lab Results   Component Value Date/Time    Color YELLOW/STRAW 02/15/2018 12:37 AM    Appearance CLOUDY (A) 02/15/2018 12:37 AM    pH (UA) 5.5 02/15/2018 12:37 AM    Protein 300 (A) 02/15/2018 12:37 AM    Glucose NEGATIVE  02/15/2018 12:37 AM    Ketone NEGATIVE  02/15/2018 12:37 AM    Bilirubin NEGATIVE  02/15/2018 12:37 AM    Blood NEGATIVE  02/15/2018 12:37 AM    Urobilinogen 0.2 02/15/2018 12:37 AM    Nitrites NEGATIVE  02/15/2018 12:37 AM    Leukocyte Esterase NEGATIVE  02/15/2018 12:37 AM    WBC 0-4 02/15/2018 12:37 AM    RBC 0-5 02/15/2018 12:37 AM    Bacteria NEGATIVE  02/15/2018 12:37 AM       No results found for: IRON, FE, TIBC, IBCT, PSAT, FERR  Lab Results   Component Value Date/Time    C-Reactive protein 0.56 09/11/2017 06:55 PM      No results found for: TOXA1, RPR, HBCM, HBSAG, HAAB, HCAB1, HAAT, G6PD, CRYAC, HIVGT, HIVR, HIV1, HIV12, HIVPC, HIVRPI      Imaging:  I have personally reviewed the patients radiographs and have reviewed the reports:          Results from Hospital Encounter encounter on 02/14/18   XR CHEST PORT   Narrative INDICATION: Acute myocardial infarction, diabetes, complete heart block, CVA. EXAM:    Portable AP chest radiograph was obtained at 0449 hours on February 16, 2018. FINDINGS:    Comparison studies:  February 14, 2018. The heart is borderline to slightly enlarged  in size. The lungs are slightly hypo- expanded bilaterally. The visualized bones are osteopenic. Tip of endotracheal tube is in satisfactory position. Tip of NG tube is  projected over fundal/body of stomach. Pacemaker hardware redemonstrated. Slight  interval regression of pulmonary edema. New obscuration of left hemidiaphragm.          Impression IMPRESSION:    ETT in satisfactory position. Interval incomplete regression of pulmonary edema. Possible new consolidation and/or pleural effusion at left base. Results from [place] encounter on 02/14/18   CTA CHEST W OR W WO CONT   Narrative History: Cardiac arrest    CTA of the chest was performed. 80 mL Isovue-370 were injected and scanning was  performed during the arterial phase of contrast administration. Post processing  was performed. 3D reconstructions were performed. CT dose reduction was  achieved through use of a standardized protocol tailored for this examination  and automatic exposure control for dose modulation. There are no intraluminal filling defects to suggest pulmonary embolism. The  heart, pericardium, and great vessels appear unremarkable. The chest wall and  axilla appear unremarkable. There are bilateral pleural effusions, right greater  than left. There is patchy opacification in the right upper lobe, right middle  lobe, and bilateral lower lobes. An 11 mm right middle lobe pulmonary nodule is  stable. An endotracheal tube tip is at the thoracic inlet. There is reflux of contrast  in to the hepatic veins. There is cholelithiasis. There is mild perihepatic  ascites. Impression IMPRESSION:   1. No evidence for pulmonary embolism. 2. Reflux of contrast into hepatic veins suggesting right heart failure. 3. Bilateral pleural effusions with bibasilar airspace opacification and right  upper lobe and right middle lobe airspace opacification which may represent  asymmetric edema or pneumonia. 4. Unchanged right middle lobe pulmonary nodule. 5. Cholelithiasis. 6. Mild ascites.             This care involved high complexity decision making which includes independently reviewing the patient's past medical records, current laboratory results, medication profiles that were immediately available to me and actual Xray images at the bedside in order to assess, support vital system function, and to treat this degree of vital organ system failure, and to prevent further life threatening deterioration of the patients condition. I was in direct communication with the nursing staff throughout this time. I have personally and independently reviewed the patients interval diagnostic lab data, radiographs and the reports. I have ordered additional labs to follow the current medical conditions and to monitor treatment responses over the next 24 hours or sooner if needed. I will order additional imaging to follow longitudinal changes found on the most current imaging. Thank you for allowing us to participate in the care of this patient. We will be happy to follow along with you.     Dee Dee Nelson MD

## 2018-02-17 NOTE — PROGRESS NOTES
NEUROLOGY DAILY PROGRESS NOTE     Chief Complaint   Patient presents with    Cardiac arrest       Subjective  S/P cardiac arrest, comatose on vent, pacer pacing heart    ROS:  Not obtainanle  EXAMINATION:   Patient Vitals for the past 24 hrs:   Temp Pulse Resp BP SpO2   02/17/18 0730 - 61 14 (!) 130/37 100 %   02/17/18 0704 - 61 14 - 100 %   02/17/18 0700 - 61 14 135/41 100 %   02/17/18 0630 - 60 14 (!) 98/32 100 %   02/17/18 0600 - 62 20 (!) 118/36 100 %   02/17/18 0551 98.2 °F (36.8 °C) - - - -   02/17/18 0530 - 62 14 (!) 113/38 100 %   02/17/18 0500 98.2 °F (36.8 °C) 64 14 (!) 113/39 100 %   02/17/18 0430 - 61 14 (!) 110/39 100 %   02/17/18 0400 - 60 14 103/40 100 %   02/17/18 0352 - 60 14 - 100 %   02/17/18 0330 97.1 °F (36.2 °C) 60 15 112/41 100 %   02/17/18 0328 97.5 °F (36.4 °C) - - - -   02/17/18 0300 - 60 14 117/43 100 %   02/17/18 0230 - 61 19 116/41 100 %   02/17/18 0201 - 60 16 123/42 100 %   02/17/18 0200 96.9 °F (36.1 °C) 60 15 - 100 %   02/17/18 0149 - 60 14 - 100 %   02/17/18 0128 - 64 17 - -   02/17/18 0100 - 60 14 (!) 112/36 100 %   02/17/18 0030 - 60 14 129/47 100 %   02/17/18 0001 - 63 12 129/47 100 %   02/17/18 0000 98.5 °F (36.9 °C) 61 14 - 100 %   02/16/18 2330 - 60 14 129/47 100 %   02/16/18 2300 98.5 °F (36.9 °C) 60 14 123/44 100 %   02/16/18 2230 - 60 14 118/43 100 %   02/16/18 2200 98.4 °F (36.9 °C) 60 14 118/42 100 %   02/16/18 2137 - 61 14 136/41 100 %   02/16/18 2130 - 61 14 136/46 100 %   02/16/18 2100 98.5 °F (36.9 °C) 61 14 118/42 100 %   02/16/18 2030 - 62 14 129/41 100 %   02/16/18 2011 - 62 14 - 100 %   02/16/18 2000 98.4 °F (36.9 °C) 61 14 117/40 100 %   02/16/18 1930 - 61 14 (!) 115/39 100 %   02/16/18 1900 - 61 14 (!) 115/39 100 %   02/16/18 1830 - 61 14 131/46 100 %   02/16/18 1800 - 61 14 123/46 100 %   02/16/18 1748 - 61 13 122/45 100 %   02/16/18 1745 - 60 14 121/45 100 %   02/16/18 1740 - 60 14 119/45 100 %   02/16/18 1735 - 60 14 120/45 100 %   02/16/18 1730 - 60 14 120/44 100 %   02/16/18 1720 - 60 14 119/44 100 %   02/16/18 1715 - 60 14 118/44 100 %   02/16/18 1710 - 60 14 118/45 100 %   02/16/18 1705 - 60 14 118/45 100 %   02/16/18 1700 - 60 14 117/44 100 %   02/16/18 1655 - 60 14 118/45 100 %   02/16/18 1650 - 61 14 115/44 100 %   02/16/18 1645 - 60 14 122/43 100 %   02/16/18 1640 - 60 14 120/44 100 %   02/16/18 1635 - 60 14 117/43 100 %   02/16/18 1630 - 60 14 118/43 100 %   02/16/18 1625 - 60 14 112/42 100 %   02/16/18 1620 - 60 18 112/43 100 %   02/16/18 1610 - 60 14 115/43 100 %   02/16/18 1605 - 60 14 108/42 100 %   02/16/18 1602 - 60 14 - 100 %   02/16/18 1600 98.9 °F (37.2 °C) 60 14 105/41 100 %   02/16/18 1555 - 60 14 106/41 100 %   02/16/18 1550 - 60 14 106/42 100 %   02/16/18 1545 - 60 14 104/40 100 %   02/16/18 1540 - 60 14 105/41 100 %   02/16/18 1535 - 60 14 103/40 100 %   02/16/18 1530 - 60 14 103/41 100 %   02/16/18 1525 - 60 14 102/40 100 %   02/16/18 1520 - 60 14 102/41 100 %   02/16/18 1515 - 60 14 102/41 100 %   02/16/18 1510 - 60 14 102/40 100 %   02/16/18 1505 - 60 14 107/41 100 %   02/16/18 1500 - 60 14 107/40 100 %   02/16/18 1455 - 60 14 103/40 100 %   02/16/18 1450 - 60 14 (!) 102/39 100 %   02/16/18 1445 - 60 14 (!) 101/39 100 %   02/16/18 1440 - 60 14 (!) 101/38 100 %   02/16/18 1435 - 60 14 (!) 101/39 100 %   02/16/18 1430 - 60 14 (!) 101/38 100 %   02/16/18 1425 - 61 14 (!) 98/39 100 %   02/16/18 1420 - 61 14 (!) 98/38 100 %   02/16/18 1415 - 61 14 (!) 97/38 100 %   02/16/18 1410 - 62 14 (!) 97/38 100 %   02/16/18 1405 - 62 14 (!) 97/38 100 %   02/16/18 1404 - 62 14 (!) 97/38 100 %   02/16/18 1400 - 62 14 (!) 97/38 100 %   02/16/18 1350 - 64 14 (!) 97/38 100 %   02/16/18 1345 - 65 14 (!) 97/37 100 %   02/16/18 1340 - 66 14 (!) 99/38 100 %   02/16/18 1335 - 67 14 (!) 99/38 100 %   02/16/18 1330 - 69 14 (!) 97/38 100 %   02/16/18 1325 - 71 14 (!) 90/37 100 %   02/16/18 1320 - 73 14 (!) 89/35 100 %   02/16/18 1315 - 77 14 (!) 87/38 100 %   02/16/18 1313 - 80 14 (!) 86/38 100 %   02/16/18 1310 - 82 14 (!) 81/37 100 %   02/16/18 1308 - 84 14 (!) 83/35 100 %   02/16/18 1305 - 85 15 (!) 81/39 99 %   02/16/18 1300 - 84 14 (!) 82/37 100 %   02/16/18 1256 - 83 14 (!) 85/36 100 %   02/16/18 1251 - 87 14 93/54 99 %   02/16/18 1241 - 86 14 (!) 86/36 99 %   02/16/18 1240 - 86 14 (!) 89/38 99 %   02/16/18 1230 - 81 14 (!) 94/39 99 %   02/16/18 1200 (!) 101.5 °F (38.6 °C) 84 16 137/48 98 %   02/16/18 1152 - 84 18 - 97 %   02/16/18 1130 - 98 26 155/66 99 %   02/16/18 1100 (!) 100.7 °F (38.2 °C) 92 27 157/72 100 %   02/16/18 1030 - 85 25 175/74 100 %   02/16/18 1000 100.2 °F (37.9 °C) 84 27 161/74 100 %   02/16/18 0900 - 77 25 128/67 100 %        General:   Physical Exam     Neurological Examination:   Mental Status: Comatose   Cranial Nerves:Pupils 3mm unresponsive to light, no dolls eye movement  Motor: Flaccid    Reflexes: NR    BABINSKI NR      Imaging Review  CT HEAD 2/16/18    FINDINGS:    There is diffuse intracranial edema with loss of gray-white differentiation and  diffuse sulcal effacement. There is parenchymal low attenuation in the region of  the right temporal lobe. There is rightward midline shift measuring 9 mm with  effacement of the left lateral ventricle.      There is density within the intracranial vessels, which may represent  thrombosis, slow flow, or recently administered intravenous contrast. There is  effacement of the basal cisterns. There is no acute parenchymal hemorrhage.     There is diffuse paranasal sinus mucosal thickening. The mastoid air cells are  clear.     IMPRESSION  IMPRESSION:   Markedly abnormal exam with diffuse loss of gray-white differentiation and  diffuse sulcal effacement and effacement of the basal cisterns. There is no  acute parenchymal hemorrhage.     MEDICATIONS:  Current Facility-Administered Medications   Medication Dose Route Frequency    sodium chloride (NS) flush 10 mL  10 mL InterCATHeter Q24H    sodium chloride (NS) flush 10-40 mL  10-40 mL InterCATHeter Q8H    polyethylene glycol (MIRALAX) packet 17 g  17 g Oral DAILY    insulin glargine (LANTUS) injection 25 Units  25 Units SubCUTAneous DAILY    piperacillin-tazobactam (ZOSYN) 3.375 g in  ml premix/cmpd  3.375 g IntraVENous Q8H    sodium chloride (NS) flush 5-10 mL  5-10 mL IntraVENous Q8H    insulin lispro (HUMALOG) injection   SubCUTAneous Q6H    0.45% sodium chloride infusion  50 mL/hr IntraVENous CONTINUOUS    mupirocin (BACTROBAN) 2 % ointment   Both Nostrils BID    chlorhexidine (PERIDEX) 0.12 % mouthwash 15 mL  15 mL Oral Q12H    aspirin chewable tablet 81 mg  81 mg Per NG tube DAILY    famotidine (PF) (PEPCID) 20 mg in sodium chloride 0.9 % 10 mL injection  20 mg IntraVENous DAILY    NOREPINephrine (LEVOPHED) 8 mg in 5% dextrose 250mL infusion  2-30 mcg/min IntraVENous TITRATE    propofol (DIPRIVAN) infusion  0-50 mcg/kg/min IntraVENous TITRATE       Assessment/Plan  EXAM C/W no cerebral function, CT Head today shows marked cerebral edema with loss of almost all landmarks. I told the family this was irreversible severe brain damage, the did not wish to proceed with EEG, they will likely withdraw life support. Signed: Jing De Souza MD  Neurologist      This note will not be viewable in [address]

## 2018-02-17 NOTE — PROGRESS NOTES
02/17/18 0627   ABCDEF Bundle   SBT Safety Screen Passed No   SBT Screen Reason for Failure Vasopressor use

## 2018-02-17 NOTE — PROGRESS NOTES
Responded to call from [facility], at time of patient's death. Several family members are present at the bedside along with two of the ministers from their [employer]. Family declined additional support at this time. Advised of  availability as needed or desired. Chaplain Amy Kyle M.Div.    Paging Service [phone])

## 2018-02-17 NOTE — PROGRESS NOTES
0730   Patient received last evening at 1900 hours from [facility]; on vent FiO2 35%, peep 6 cm, , AC 14; patient suctioned for scant to moderate amount of thick, tan secretions from ETT and small amount of thick clear secretions from mouth; patient does not open eyes; pupils size 5 and fixed; no cough, no gag, no movement of extremities to pain; dressing right foot dry and intact; right upper arm PICC line infusing 1/2 NS at 50 cc/hr and levophed drip 9 - 13 mics and NS at 10 cc/hr (iv med line); paced rhythm; NPO; NG tube to low continuous wall suction drained 125 cc brown color fluid this shift; erickson catheter drained 1025 cc of hazy, yellow color urine this shift; lab done, portable chest x-ray done; CT scan done around 0145 hours and results called to Dr. Yovany Mujica; no new orders at that time; no contact with family during the night; report given to ADRIEL Lucas this AM; patient's rectal temp down to 96.9 one time and warming blanket on for about 1 1/2 hours this shift and off the rest of the shift.

## 2018-02-17 NOTE — PROGRESS NOTES
Problem: Falls - Risk of  Goal: *Absence of Falls  Document Chau Fall Risk and appropriate interventions in the flowsheet.    Outcome: Progressing Towards Goal  Fall Risk Interventions:  Mobility Interventions: Bed/chair exit alarm, Communicate number of staff needed for ambulation/transfer    Mentation Interventions: Adequate sleep, hydration, pain control, Bed/chair exit alarm, Door open when patient unattended, Evaluate medications/consider consulting pharmacy, Eyeglasses and hearing aids, Familiar objects from home, More frequent rounding, Reorient patient, Room close to nurse's station, Toileting rounds, Update white board    Medication Interventions: Bed/chair exit alarm, Evaluate medications/consider consulting pharmacy    Elimination Interventions: Bed/chair exit alarm, Call light in reach, Patient to call for help with toileting needs, Toilet paper/wipes in reach, Toileting schedule/hourly rounds    History of Falls Interventions: Bed/chair exit alarm, Consult care management for discharge planning, Door open when patient unattended, Evaluate medications/consider consulting pharmacy, Investigate reason for fall, Room close to nurse's station

## 2018-02-17 NOTE — PROGRESS NOTES
PROGRESS NOTE    NAME:  Deyanira Sharma   :      MRN:   [id number]     Date/Time:  2018 11:42 AM  Subjective:   History: Long discussion with family after CT results and they are making decision for compassionate withdrawal of support/extubation. Medications reviewed:  Current Facility-Administered Medications   Medication Dose Route Frequency    morphine injection 2-8 mg  2-8 mg IntraVENous PRN    sodium chloride (NS) flush 5-10 mL  5-10 mL IntraVENous Q8H    sodium chloride (NS) flush 5-10 mL  5-10 mL IntraVENous PRN    acetaminophen (TYLENOL) suppository 650 mg  650 mg Rectal Q4H PRN    dextrose (D50W) injection syrg 12.5-25 g  12.5-25 g IntraVENous PRN    0.45% sodium chloride infusion  50 mL/hr IntraVENous CONTINUOUS    mupirocin (BACTROBAN) 2 % ointment   Both Nostrils BID    chlorhexidine (PERIDEX) 0.12 % mouthwash 15 mL  15 mL Oral Q12H        Objective:   Vitals:  Visit Vitals    BP (!) 129/36    Pulse 64    Temp 99 °F (37.2 °C)    Resp 14    Ht 5' 9\" (1.753 m)    Wt 196 lb 10.4 oz (89.2 kg)    SpO2 99%    BMI 29.04 kg/m2      O2 Device: Endotracheal tube, Ventilator Temp (24hrs), Av.4 °F (36.9 °C), Min:96.9 °F (36.1 °C), Max:101.5 °F (38.6 °C)      Last 24hr Input/Output:    Intake/Output Summary (Last 24 hours) at 18 1138  Last data filed at 18 1000   Gross per 24 hour   Intake          2171.15 ml   Output             1885 ml   Net           286.15 ml        PHYSICAL EXAM:  General:     Unresponsive   Head:    Normocephalic, without obvious abnormality, atraumatic. Eyes:    Conjunctivae/corneas clear. Pupils fixed and not responsive  Nose:   Nares normal. No drainage or sinus tenderness. Throat:     Lips, mucosa, and tongue normal.  No Thrush. Intubated  Neck:   Supple, symmetrical,  no adenopathy, thyroid: non tender     no carotid bruit and no JVD. Back:     Symmetric,  No CVA tenderness. Lungs:    Clear to auscultation bilaterally.   No Wheezing or Rhonchi. No rales. Heart:    Regular rate and rhythm,  no murmur, rub or gallop. Abdomen:    Soft, non-tender. Not distended. Bowel sounds normal. No masses  Extremities:  Extremities normal, atraumatic, No cyanosis. No edema. No clubbing.  R femoral line in  Lymph nodes:  Cervical, supraclavicular normal.  Neurologic:  Unresponsive with no purposeful movement  Skin:                 No rash      Lab Data Reviewed:    Recent Results (from the past 24 hour(s))   GLUCOSE, POC    Collection Time: 02/16/18 12:12 PM   Result Value Ref Range    Glucose (POC) 308 (H) 65 - 100 mg/dL    Performed by [facility]    GLUCOSE, POC    Collection Time: 02/16/18  6:38 PM   Result Value Ref Range    Glucose (POC) 225 (H) 65 - 100 mg/dL    Performed by [facility]    CULTURE, RESPIRATORY/SPUTUM/BRONCH Durel Rakers STAIN    Collection Time: 02/16/18  6:43 PM   Result Value Ref Range    Special Requests: NO SPECIAL REQUESTS      GRAM STAIN 1+ WBCS SEEN      GRAM STAIN RARE EPITHELIAL CELLS SEEN      GRAM STAIN RARE GRAM POSITIVE COCCI IN PAIRS      Culture result: PENDING    GLUCOSE, POC    Collection Time: 02/17/18 12:48 AM   Result Value Ref Range    Glucose (POC) 255 (H) 65 - 100 mg/dL    Performed by Marte Arnt    LACTIC ACID    Collection Time: 02/17/18  3:41 AM   Result Value Ref Range    Lactic acid 1.9 0.4 - 2.0 MMOL/L   CBC WITH AUTOMATED DIFF    Collection Time: 02/17/18  3:41 AM   Result Value Ref Range    WBC 12.4 (H) 4.1 - 11.1 K/uL    RBC 3.35 (L) 4.10 - 5.70 M/uL    HGB 10.0 (L) 12.1 - 17.0 g/dL    HCT 30.6 (L) 36.6 - 50.3 %    MCV 91.3 80.0 - 99.0 FL    MCH 29.9 26.0 - 34.0 PG    MCHC 32.7 30.0 - 36.5 g/dL    RDW 15.0 (H) 11.5 - 14.5 %    PLATELET 209 269 - 266 K/uL    MPV 11.1 8.9 - 12.9 FL    NRBC 0.0 0  WBC    ABSOLUTE NRBC 0.00 0.00 - 0.01 K/uL    NEUTROPHILS 84 (H) 32 - 75 %    LYMPHOCYTES 8 (L) 12 - 49 %    MONOCYTES 7 5 - 13 %    EOSINOPHILS 0 0 - 7 %    BASOPHILS 0 0 - 1 %    IMMATURE GRANULOCYTES 1 (H) 0.0 - 0.5 %    ABS. NEUTROPHILS 10.5 (H) 1.8 - 8.0 K/UL    ABS. LYMPHOCYTES 1.0 0.8 - 3.5 K/UL    ABS. MONOCYTES 0.8 0.0 - 1.0 K/UL    ABS. EOSINOPHILS 0.0 0.0 - 0.4 K/UL    ABS. BASOPHILS 0.1 0.0 - 0.1 K/UL    ABS. IMM. GRANS. 0.1 (H) 0.00 - 0.04 K/UL    DF AUTOMATED     METABOLIC PANEL, BASIC    Collection Time: 02/17/18  3:41 AM   Result Value Ref Range    Sodium 145 136 - 145 mmol/L    Potassium 3.5 3.5 - 5.1 mmol/L    Chloride 111 (H) 97 - 108 mmol/L    CO2 23 21 - 32 mmol/L    Anion gap 11 5 - 15 mmol/L    Glucose 271 (H) 65 - 100 mg/dL    BUN 51 (H) 6 - 20 MG/DL    Creatinine 2.49 (H) 0.70 - 1.30 MG/DL    BUN/Creatinine ratio 20 12 - 20      GFR est AA 31 (L) >60 ml/min/1.73m2    GFR est non-AA 25 (L) >60 ml/min/1.73m2    Calcium 7.3 (L) 8.5 - 10.1 MG/DL   MAGNESIUM    Collection Time: 02/17/18  3:41 AM   Result Value Ref Range    Magnesium 2.0 1.6 - 2.4 mg/dL   PHOSPHORUS    Collection Time: 02/17/18  3:41 AM   Result Value Ref Range    Phosphorus 3.0 2.6 - 4.7 MG/DL   GLUCOSE, POC    Collection Time: 02/17/18  5:54 AM   Result Value Ref Range    Glucose (POC) 194 (H) 65 - 100 mg/dL    Performed by Subha Arias          Assessment/Plan:     Principal Problem:    Cardiac arrest ([facility] (2/15/2018)    Active Problems:    Essential hypertension (7/27/2017)      Controlled type 2 diabetes mellitus without complication, with long-term current use of insulin ([facility] (7/27/2017)      CVA (cerebral vascular accident) ([facility] (8/31/2017)      Complete heart block ([facility] (9/12/2017)      Acute myocardial infarction (2/15/2018)       ___________________________________________________  PLAN:    1.   Time for compassionate withdrawal of support and this will proceed when family ready          ___________________________________________________    Attending Physician: Geno Conway MD

## 2018-02-17 NOTE — PROGRESS NOTES
0730 Bedside and verbal Report received from [facility], [address] [place] [address]. SBAR, Kardex, Intake/Output, MAR, Recent Results or Cardiac Rhythm NSR/paced were discussed. Monster Orellana with Tennille Arrieta RN assumed care of the pt. Family in to see pt, reviewed plan of care. 9619 Dr Sharmaine Guerra in to see pt and family, updates provided  0900 Dr Rupa Dotson in to see pt and family, reviewed head CT results as well as observed images at nurses station with explination. Family has decided to withdraw care.  Dr Mariam Rothman made aware and will address  1000 pt's  at bedside, children have stepped out to allow private time for  and wife with pt.  1100 Dr Mariam Rothman in to see pt and family, pt's  remains at bedside for family support  Frida Ratliff RN

## 2018-02-17 NOTE — PROGRESS NOTES
[facility] INT[facility] Consult Service Note  Pulmonary, Critical Care, and Sleep Medicine    Name: Sarthak Childs MRN: [id number]   : 1940 Hospital: [facility]   Date: 2018   Hospital Day: 4       Subjective/Interval History:   I have reviewed the notes from other providers and old records readily available and summarized findings below with the flowsheet. Seen earlier today on rounds.  all exams done early AM were done on some IV propofol. Yesterday he was tachypneic with some agitation requiring IV propofol. After this was stopped, he has remained unresponsive with non reactive pupils and no pain response. He remains intubated and on Levophed. No history obtainable except from chart review and discussion with his nurse and wife. Left foot cooler than right foot. Known PAD and sees Dr. Wilma Rueda. Discussed management with wife and daughter at bedside. Off pressors in AM but now hypotensive agin. Still on vent and not assisting. ETT high on my review of CXR and adjusted then confirmed  : remains comatose. Long discussion with family who feels that comfort measures are appropriate at this time. IMPRESSION:   1. Acute respiratory failure - now currently on mechanical ventilator as life support in the setting of  2. Acute PEA Cardiac arrest   3. Cardiogenic shock  4. Severe anoxic brain injury with cerebra edema  5. Abnormal chest CT- asymmetric and wonder about occult aspiration or early pneumonia- no prodrome at home- bilateral pleural effusions with bibasilar airspace opacification; marc RUL and RML  6. RML nodule- age??  9. Constipation on KUB  8. H/o complete heart block s/p PPM 2017 now with   9. Temporary pacemaker wire intact through sheath in right groin. rythmn is sinus rythmn with runs of paced beats. Site soft,no oozing or hematoma noted. 10. Atrial fibrillation? ? Started in ER but no strips? 11. PICC intact in right upper arm. PICC present on admission. 12. Hypernatremia - why  13. Gastric retention? NG recovered > liter of brown fluid but not coffee ground  14. Anemia - baseline Hb ~10, admission 7.9 after fluids?-no signs of over bleeding   15. Hypokalemia  16. HTN -head CT: normal; CTA - no PE, possible asymptomatic pulmonary edema vs pneumonia. 17. IDDM type II  18. Stroke 2017   19. BL ICA stenosis (50-59%),  Hx L CEA  20. Multiorgan dysfunction as outlined above with more testing to come as below  21. Pt is critically ill on life support and at high risk of sudden decompensation and/ or continued end organ dysfunction and failure      RECOMMENDATIONS/PLAN:   1. Long discussion at bedside with family. They have opted for comfort measures and will proceed with compassionate extubation when they are ready. My assessment/management was discussed with the follwoing to coordinate care:  Nursing XX    respiratory therapy XX [facility] XX      I have provided   45    minutes of critical care time rendering care exclusive of any procedures. During this entire length of time I was immediately available to the patient.      The reason for providing this level of medical care was due to a critical illness that impaired one or more vital organ systems, such that there was a high probability of imminent or life threatening deterioration in the patient's condition. Pt's condition is unstable and unpredictable. Risk of deterioration: high   [x] High complexity decision making was performed  [x] See my orders for details  Tubes:   Liu, NGT and Intubated/Vent  ICU disposition :Updated Family. Subjective/Initial History:   I have reviewed the flowsheet and previous days notes. Seen earlier today on rounds. I was asked by Iain Fitzgerald MD to see Jameson Lazo in consultation for a chief complaint of respiratory failure in the setting of PEA arrest at home.      Pt is a 67 yo WM who was found unresponsive and pulseless at home last PM and transported to the ER. He was intubated in the field and CPR started and once arrived in ER CPR with eventual ROSC. Temp pacer placed in right femoral. Pt without PE on CTA chest. On IV levophed for shock. Large gastric output. Not making much urine. Pt now in CCU and paced on the monitor. and he has remained unresponsive with non reactive pupils and no pain response. He remains intubated and on Levophed. No history obtainable except from chart review and discussion with his nurse. He apparently told his wife that he didn't feel well before laying down in the recliner and when she subsequently checked on him he was unresponsive. No prodrome though may have felt like vomiting but did not. No fever. No recent ill contacts. PMH:  has a past medical history of Allergic rhinitis; Anemia; ASVD (arteriosclerotic vascular disease); Martínez's esophagus (7/28/2017); Carotid stenosis (7/28/2017); Chronic kidney disease; Diabetes ([facility] Diabetic neuropathy ([facility] (7/28/2017); Diverticulosis; DJD (degenerative joint disease); ED (erectile dysfunction); Enlarged prostate; JUDY (generalized anxiety disorder); GERD (gastroesophageal reflux disease); Hiatal hernia; Hypercholesterolemia; Hypertension; Hypertrophy (benign) of prostate (7/28/2017); Ill-defined condition; Lung nodule; Neuropathy; On statin therapy (7/28/2017); Prostate cancer screening (7/28/2017); PVD (peripheral vascular disease) ([facility] Sebaceous cyst (7/28/2017); and Spinal stenosis. He also has no past medical history of Nausea & vomiting. PSH:   has a past surgical history that includes upper gi endoscopy,biopsy (6/15/2016); colonoscopy,diagnostic (6/15/2016); colonoscopy (N/A, 6/15/2016); hx cataract removal; hx other surgical (Left, 06/2016); pr cardiac surg procedure unlist; pr neurological procedure unlisted (2011); hx orthopaedic (1977); and hx orthopaedic (5-, 7-22-17, 7-26-17).    FHX: family history includes Cancer in his father; Diabetes in his mother; Heart Disease in his father. SHX:  reports that he quit smoking about 21 years ago. He has never used smokeless tobacco. He reports that he does not drink alcohol or use illicit drugs. The patient is unable to give any meaningful history or review of systems due to patient factors. ROS:Review of systems not obtained due to patient factors.     No Known Allergies     MAR reviewed and pertinent medications noted or modified as needed  MEDS:   Current Facility-Administered Medications   Medication    sodium chloride (NS) flush 10-30 mL    sodium chloride (NS) flush 10 mL    sodium chloride (NS) flush 10-40 mL    heparin (porcine) pf 300 Units    polyethylene glycol (MIRALAX) packet 17 g    insulin glargine (LANTUS) injection 25 Units    piperacillin-tazobactam (ZOSYN) 3.375 g in  ml premix/cmpd    sodium chloride (NS) flush 5-10 mL    sodium chloride (NS) flush 5-10 mL    acetaminophen (TYLENOL) suppository 650 mg    ondansetron (ZOFRAN) injection 4 mg    insulin lispro (HUMALOG) injection    glucose chewable tablet 16 g    dextrose (D50W) injection syrg 12.5-25 g    glucagon (GLUCAGEN) injection 1 mg    0.45% sodium chloride infusion    mupirocin (BACTROBAN) 2 % ointment    chlorhexidine (PERIDEX) 0.12 % mouthwash 15 mL    aspirin chewable tablet 81 mg    famotidine (PF) (PEPCID) 20 mg in sodium chloride 0.9 % 10 mL injection    NOREPINephrine (LEVOPHED) 8 mg in 5% dextrose 250mL infusion    sodium chloride (NS) flush 5-10 mL    propofol (DIPRIVAN) infusion        Objective:     Vital Signs: Intake/Output: Intake/Output:   Temp (24hrs), Av.4 °F (36.9 °C), Min:96.9 °F (36.1 °C), Max:101.5 °F (38.6 °C)    Visit Vitals    BP (!) 129/36    Pulse 64    Temp 99 °F (37.2 °C)    Resp 14    Ht 5' 9\" (1.753 m)    Wt 89.2 kg (196 lb 10.4 oz)    SpO2 99%    BMI 29.04 kg/m2          Telemetry:    paced O2 Device: Endotracheal tube, Ventilator       Wt Readings from Last 4 Encounters:   02/17/18 89.2 kg (196 lb 10.4 oz)   02/05/18 81.2 kg (179 lb)   01/22/18 81.1 kg (178 lb 12.8 oz)   12/20/17 78.5 kg (173 lb)          Intake/Output Summary (Last 24 hours) at 02/17/18 1119  Last data filed at 02/17/18 1000   Gross per 24 hour   Intake          2171.15 ml   Output             1885 ml   Net           286.15 ml     Last shift:      02/17 0701 - 02/17 1900  In: 223.2 [I.V.:223.2]  Out: 100 [Urine:100]  Last 3 shifts: 02/15 1901 - 02/17 0700  In: 3270.8 [I.V.:3150.8]  Out: 5409 [Urine:3110]     Hemodynamics:    CO:    CI:    CVP:    SVR:   PAP Systolic:    PAP Diastolic:    PVR:    LU33:        Ventilator Settings:      Mode Rate TV Press PEEP FiO2 PIP Min. Vent   Assist control    500 ml 5 cm H2O  6 cm H20 35 %  23 cm H2O  7.28 l/min      Physical Exam:     General: afebrile and severely ill elderly WM on vent   HEAD: Normocephalic, without obvious abnormality, atraumatic   EYES: conjunctivae clear. PERRL,  AN Icteric sclerae   NOSE: nares normal, no drainage, no nasal flaring,    THROAT: mucous membranes dry; Lips, mucosa dry; intubated   Neck: Supple, symmetrical, trachea midline,  No accessory mm use; No Stridor/ cuff leak, No goiter or thyroid tenderness   LYMPH: No abnormally enlarged lymph nodes. in neck or groin   Chest: normal   Lungs: decreased air exchange bilaterally   Heart: Regular rate and rhythm;     Abdomen: soft, non-tender, without masses or organomegaly; protuberant   : normal;   Liu; clear urine; NO gross hematuria   Extremity: negative, clubbing; no joint swelling or erythema   Neuro: obtunded; non verbal; withdraws to pain; unable to check gait and station; does not follow simple commands   Psych: Unable to assess; no agitation off sedation   Skin: Pale and Cool;    Pulses:Bilateral, Radial, 1+   Capillary refill: abnormal: sluggish capillary refill, pale,      Data:     Current Facility-Administered Medications   Medication Dose Route Frequency    sodium chloride (NS) flush 10 mL  10 mL InterCATHeter Q24H    sodium chloride (NS) flush 10-40 mL  10-40 mL InterCATHeter Q8H    polyethylene glycol (MIRALAX) packet 17 g  17 g Oral DAILY    insulin glargine (LANTUS) injection 25 Units  25 Units SubCUTAneous DAILY    piperacillin-tazobactam (ZOSYN) 3.375 g in  ml premix/cmpd  3.375 g IntraVENous Q8H    sodium chloride (NS) flush 5-10 mL  5-10 mL IntraVENous Q8H    insulin lispro (HUMALOG) injection   SubCUTAneous Q6H    0.45% sodium chloride infusion  50 mL/hr IntraVENous CONTINUOUS    mupirocin (BACTROBAN) 2 % ointment   Both Nostrils BID    chlorhexidine (PERIDEX) 0.12 % mouthwash 15 mL  15 mL Oral Q12H    aspirin chewable tablet 81 mg  81 mg Per NG tube DAILY    famotidine (PF) (PEPCID) 20 mg in sodium chloride 0.9 % 10 mL injection  20 mg IntraVENous DAILY    NOREPINephrine (LEVOPHED) 8 mg in 5% dextrose 250mL infusion  2-30 mcg/min IntraVENous TITRATE    propofol (DIPRIVAN) infusion  0-50 mcg/kg/min IntraVENous TITRATE                Labs:  Recent Labs      02/17/18   0341  02/16/18   0316  02/15/18   0532   WBC  12.4*  10.9  17.7*   HGB  10.0*  10.8*  11.4*   HCT  30.6*  33.3*  35.5*   PLT  202  222  319     Recent Labs      02/17/18   0341  02/16/18   0316  02/15/18   0532  02/14/18   2230   NA  145  140  137  147*   K  3.5  3.8  4.4  3.4*   CL  111*  108  106  114*   CO2  23  23  21  19*   GLU  271*  278*  333*  229*   BUN  51*  51*  38*  24*   CREA  2.49*  2.19*  1.58*  1.15   CA  7.3*  7.5*  8.0*  6.1*   MG  2.0  2.1  2.0   --    PHOS  3.0  4.4   --    --    ALB   --   2.0*  2.1*  1.5*   TBILI   --   0.4  0.6  0.2   SGOT   --   150*  250*  118*   ALT   --   77  130*  76   INR   --    --    --   1.3*     Recent Labs      02/14/18   2330   PH  7.30*   PCO2  36   PO2  391*   HCO3  17*   FIO2  100     Imaging:  I have personally reviewed the patients radiographs and have reviewed the reports:  New small bilateral effusions. CT head report reviewe        Total critical care time exclusive of procedures: 35 minutes  Armando Contreras MD

## 2018-02-17 NOTE — PROGRESS NOTES
Called for absence of signs of life after compassionate extubation. On my arrival, asystole was confirmed in multiple leads on telemetry. Patient was unresponsive with fixed/dilated pupils. Absent respirations and breath sounds. No palpable pulses. No heart tones on auscultation. Time of death 200. Family was at bedside. Dr. Evelin Steel to complete death certificate and discharge summary.   Carolina Ivan MD

## 2018-02-17 NOTE — PROGRESS NOTES
1309:  Family at bedside and states ready for care to be withdrawn. Asked family to step outside room. Oliva, RT, at bedside to extubate. Applied NC. Patient has a dual pacemaker. 1321:  Family at bedside. Able to obtain BP even though 63/31.   1335:  Patient's son to nursing station to view monitor since room monitor is to [facility] comfort. Heart rhythm on cardiac monitor at nursing station but no pulse and no BP with bedside assessment. Informed Dr. Rianna Call at nursing station. Dr. Rianna Call states if no pulse and no BP then no surviving.    1342:  Vfib on cardiac monitor. Notified Dr. Jennie Tao via phone. Dr. Jennie Tao states will be in to assess. 1411:  No heart rhythm with pacer spikes. Dr. Jennie Tao at bedside to assess, pronounce death, and comfort family. Informed Dr. Terrance Woods via phone of above.    1430:  Family left hospital.

## 2018-02-18 LAB
BACTERIA SPEC CULT: ABNORMAL
BACTERIA SPEC CULT: ABNORMAL
GRAM STN SPEC: ABNORMAL
SERVICE CMNT-IMP: ABNORMAL

## 2018-02-20 NOTE — DISCHARGE SUMMARY
[facility] [address]    Alesha Holbrook  MR#: [id number]  : 1940  ACCOUNT #: [de-identified]   ADMIT DATE: 2018  DISCHARGE DATE: 2018    EXPIRATION NOTE     FINAL DIAGNOSES:  1. Acute myocardial infarction. 2.  Cardiac arrest.  3.  Anoxic brain injury. For details of admission history and physical and hospital course, please review chart. Briefly, the patient is a 77-year-old white male who was found unresponsive at home and the rescue squad was summoned, arriving about 10 minutes later during that time. No CPR was administered and he was transported to the hospital where he remained comatose, on the ventilator until the day of expiration, at which time a CT of his head revealed massive cerebral edema and the findings were relayed to the family, who decided to proceed with compassionate care with compassionate extubation. That was accomplished and the patient  shortly later with no resuscitative efforts.     DISPOSITION:        MD EMIL Stuart/JERO  D: 2018 16:03     T: 2018 16:46  JOB #: [id number]

## 2018-02-21 LAB
BACTERIA SPEC CULT: NORMAL
BACTERIA SPEC CULT: NORMAL
SERVICE CMNT-IMP: NORMAL
SERVICE CMNT-IMP: NORMAL

## 2020-06-24 NOTE — ANESTHESIA PROCEDURE NOTES
Peripheral Block    Start time: 5/25/2017 12:50 PM  End time: 5/25/2017 1:00 PM  Performed by: Jacob Hannon  Authorized by: Jacob Hannon       Pre-procedure:    Indications: at surgeon's request, post-op pain management and primary anesthetic    Preanesthetic Checklist: patient identified, risks and benefits discussed, site marked, timeout performed, anesthesia consent given and patient being monitored    Timeout Time: 12:50          Block Type:   Block Type:  Axillary  Laterality:  Left  Monitoring:  Standard ASA monitoring, continuous pulse ox, frequent vital sign checks, heart rate, responsive to questions and oxygen  Injection Technique:  Single shot  Procedures: ultrasound guided and nerve stimulator    Patient Position: supine  Prep: betadine and povidone-iodine 7.5% surgical scrub    Location:  Axilla  Needle Type:  Stimuplex  Needle Gauge:  22 G  Needle Localization:  Nerve stimulator and ultrasound guidance  Motor Response: minimal motor response >0.4 mA    Medication Injected:  0.5%  ropivacaine  Volume (mL):  20  Add'l Medication Injected:  1.5%  mepivacaine  Volume (mL):  15    Assessment:  Number of attempts:  1  Injection Assessment:  Incremental injection every 5 mL, negative aspiration for blood, no intravascular symptoms, negative aspiration for CSF and no paresthesia  Patient tolerance:  Patient tolerated the procedure well with no immediate complications Detail Level: Zone

## 2021-05-14 NOTE — PROGRESS NOTES
Pt arrived to [facility] ambulatory in no acute distress at 1505 for Dapto.  Assessment unremarkable. R arm PICC accessed without issue and positive blood return noted.  Labs obtained, CBC, BMP, CPK. Visit Vitals    /53 (BP 1 Location: Left arm, BP Patient Position: Sitting)    Pulse 62    Temp 97.5 °F (36.4 °C)    Resp 18     The following medications administered:  Dapto 500mg IVP    Pt tolerated treatment well. PICC flushed per policy and new green cap placed.  Pt discharged ambulatory in no acute distress at 1515, accompanied by self. Next appointment 8/4/17 at 1500. c/w metoprolol   not candidate for a/c

## 2022-04-28 NOTE — PROGRESS NOTES
Outpatient Infusion [facility] Progress Note    0945 Pt admit to [facility] for [place] ambulatory in stable condition. Assessment completed. No new concerns voiced. Visit Vitals    /52    Pulse 63    Temp 98.7 °F (37.1 °C)    Resp 18     Double lumen PICC flushed well in both ports with positive blood return in both ports. Medications:  Invanz IV    1030 Pt tolerated treatment well. PICC line flushed per policy, positive blood return noted at conclusion in both ports. End caps were capped with alcohol caps, secured with net dressing on right arm. D/c home ambulatory in no distress.  Pt aware of next appointment scheduled for tomorrow at [facility] Likely multifactorial i/s/o possible GI bleed, iron deficiency, B12 deficiency, CKD, ?AS causing Macroangiopathic hemolytic anemia  Patient noted to have pallor, dyspnea at home  Hgb 7.2 to 7 in ED  Patient endorses dark stools for past week, on warfarin for Afib  BM Bx 4/6/2022 showed early/mild evidence of dysplastic changes although B12/folate deficiency not ruled out  Outpt work up revealed CKD, B12 deficiency (s/p B12 injection 4/20) pending auth for Aranesp injections  On outpt labs low normal ferritin level of 38 with normal Fe and TIBC  Iron 191, TIBC 351, ferritin 76  Send B12, folate  Active T+S  Transfuse Hgb>7  C/w Iron 325 mg PO daily  Start Retacrit 10,000 units weekly, plan to d/c on Aranesp    Haptoglobin 245  S/p 1/2u pRBC x 2   Appreciate heme/onc recs Likely multifactorial i/s/o possible GI bleed, iron deficiency, B12 deficiency, CKD, ?AS causing Macroangiopathic hemolytic anemia  Patient noted to have pallor, dyspnea at home  Hgb 7.2 to 7 in ED  Patient endorses dark stools for past week, on warfarin for Afib  BM Bx 4/6/2022 showed early/mild evidence of dysplastic changes although B12/folate deficiency not ruled out  Outpt work up revealed CKD, B12 deficiency (s/p B12 injection 4/20) pending auth for Aranesp injections  On outpt labs low normal ferritin level of 38 with normal Fe and TIBC  Iron 191, TIBC 351, ferritin 76  Send B12, folate   Active T+S  Transfuse Hgb>7  C/w Iron 325 mg PO daily  Start Retacrit 10,000 units weekly, plan to d/c on Aranesp  S/p 1/2u pRBC x 2   Appreciate heme/onc recs

## 2022-05-02 NOTE — INTERDISCIPLINARY ROUNDS
Interdisciplinary team rounds were held 2/16/2018 with the following team members:Care Management, Diabetes Treatment Specialist, Nursing, Nutrition, Pharmacy, Physical Therapy and Physician and the.    Plan of care discussed. See clinical pathway and/or care plan for interventions and desired outcomes. none

## 2024-06-14 NOTE — PROGRESS NOTES
[id number] [facility]. Short Visit Note:    9937 Pt arrived to [facility] ambulatory and in no distress for Daptomycin. Single lumen PICC intact to right upper arm, flushed with positive blood return noted. Patient's left second finger and hand bandaged. No new complaints voiced. Visit Vitals    /58 (BP 1 Location: Left arm, BP Patient Position: Sitting)    Pulse (!) 47    Temp 98 °F (36.7 °C)    Resp 18    SpO2 98%       Medications received:  Daptomycin 500mg IVP    1520 Pt tolerated treatment well, no adverse reaction noted. PICC flushed per protocol and end cap applied. Discharged from [facility] ambulatory and in no acute distress accompanied by spouse. Next appt 8/2/17 @ 1500. [Negative] : Mouth and Throat [FreeTextEntry4] : As per HPI

## (undated) DEVICE — HOOK LOCK LATEX FREE ELASTIC BANDAGE 3INX5YD

## (undated) DEVICE — SUPER SPONGES,MEDIUM: Brand: DERMACEA

## (undated) DEVICE — (D)PREP SKN CHLRAPRP APPL 26ML -- CONVERT TO ITEM 371833

## (undated) DEVICE — HANDLE LT SNAP ON ULT DURABLE LENS FOR TRUMPF ALC DISPOSABLE

## (undated) DEVICE — TIP SUCT BLU PLAS BLB W/O CTRL VENT YANK

## (undated) DEVICE — HOOK LOCK LATEX FREE ELASTIC BANDAGE 4INX5YD

## (undated) DEVICE — STERILE POLYISOPRENE POWDER-FREE SURGICAL GLOVES: Brand: PROTEXIS

## (undated) DEVICE — BIPOLAR FORCEPS CORD,BANANA LEADS: Brand: VALLEYLAB

## (undated) DEVICE — SOLUTION IV 1000ML 0.9% SOD CHL

## (undated) DEVICE — INTENDED FOR TISSUE SEPARATION, AND OTHER PROCEDURES THAT REQUIRE A SHARP SURGICAL BLADE TO PUNCTURE OR CUT.: Brand: BARD-PARKER ® CARBON RIB-BACK BLADES

## (undated) DEVICE — REM POLYHESIVE ADULT PATIENT RETURN ELECTRODE: Brand: VALLEYLAB

## (undated) DEVICE — INFECTION CONTROL KIT SYS

## (undated) DEVICE — TIBURON HAND DRAPE: Brand: CONVERTORS

## (undated) DEVICE — LIGHT HANDLE: Brand: DEVON

## (undated) DEVICE — DRAPE,REIN 53X77,STERILE: Brand: MEDLINE

## (undated) DEVICE — DRAPE,EXTREMITY,89X128,STERILE: Brand: MEDLINE

## (undated) DEVICE — PADDING CST CRMPD 3INX4YD NS --

## (undated) DEVICE — BNDG ELAS COBAN 3INX5YD NS --

## (undated) DEVICE — TOWEL SURG W17XL27IN STD BLU COT NONFENESTRATED PREWASHED

## (undated) DEVICE — SUTURE PROL SZ 2-0 L36IN NONABSORBABLE BLU SH L26MM 1/2 CIR 8523H

## (undated) DEVICE — SYR IRR BLB 2OZ DISP BLU STRL -- CONVERT TO ITEM 357637

## (undated) DEVICE — PADDING CST 4IN STERILE --

## (undated) DEVICE — Z DISCONTINUED NO SUB IDED BUCKET CAST INF CLAV STRP WHT BCKL CLSR PREM DISPOSABLE

## (undated) DEVICE — GRADUATED BOWL: Brand: DEVON

## (undated) DEVICE — GAUZE SPONGES,12 PLY: Brand: CURITY

## (undated) DEVICE — NEEDLE HYPO 22GA L1.5IN BLK POLYPR HUB S STL REG BVL STR

## (undated) DEVICE — CURITY AMD ANTIMICROBIAL PACKING STRIPS: Brand: CURITY

## (undated) DEVICE — SUTURE PERMAHAND SZ 2-0 L18IN NONABSORBABLE BLK L26MM PS 1588H

## (undated) DEVICE — Z DISCONTINUED GLOVE SURG SZ 7.5 L12IN FNGR THK13MIL WHT ISOLEX

## (undated) DEVICE — BANDAGE COMPR 9 FTX4 IN SMOOTH COMFORTABLE SYNTH ESMRK LF

## (undated) DEVICE — BASIC PACK: Brand: CONVERTORS

## (undated) DEVICE — ABDOMINAL PAD: Brand: DERMACEA

## (undated) DEVICE — CURITY NON-ADHERENT STRIPS: Brand: CURITY

## (undated) DEVICE — Device

## (undated) DEVICE — DISPOSABLE TOURNIQUET CUFF SINGLE BLADDER, DUAL PORT AND QUICK CONNECT CONNECTOR: Brand: COLOR CUFF

## (undated) DEVICE — (D)SYR 10ML 1/5ML GRAD NSAF -- PKGING CHANGE USE ITEM 338027

## (undated) DEVICE — BLADE SAW OSC COARSE 25X9MM --

## (undated) DEVICE — ROCKER SWITCH PENCIL BLADE ELECTRODE, HOLSTER: Brand: EDGE

## (undated) DEVICE — SUTURE ETHLN SZ 4-0 L18IN NONABSORBABLE BLK L19MM PS-2 3/8 1667H

## (undated) DEVICE — OCCLUSIVE GAUZE STRIP,3% BISMUTH TRIBROMOPHENATE IN PETROLATUM BLEND: Brand: XEROFORM

## (undated) DEVICE — SUTURE ETHBND EXCEL SZ 3-0 L30IN NONABSORBABLE GRN V-5 X916H

## (undated) DEVICE — NON-ADHERENT STRIPS,OIL EMULSION: Brand: CURITY

## (undated) DEVICE — SUTURE ETHLN SZ 8-0 L12IN NONABSORBABLE BLK L7MM TG175-8 1716G

## (undated) DEVICE — GOWN,SIRUS,FABRNF,XL,20/CS: Brand: MEDLINE

## (undated) DEVICE — IMMOBILIZER HND L W24.13XL21.59CM ALUM RADPQ ALUMI-HND

## (undated) DEVICE — 1200 GUARD II KIT W/5MM TUBE W/O VAC TUBE: Brand: GUARDIAN

## (undated) DEVICE — NEEDLE HYPO 25GA L1.5IN BVL ORIENTED ECLIPSE

## (undated) DEVICE — DEVON™ KNEE AND BODY STRAP 60" X 3" (1.5 M X 7.6 CM): Brand: DEVON

## (undated) DEVICE — (D)PREP SKN SCRB EXDINE 4OZ -- DUPE USE ITEM 324303

## (undated) DEVICE — GOWN,SIRUS,POLYRNF,BRTHSLV,XL,30/CS: Brand: MEDLINE

## (undated) DEVICE — SUTURE ETHLN SZ 4-0 L18IN NONABSORBABLE BLK L16MM PC-3 3/8 1864G

## (undated) DEVICE — SURGICAL PROCEDURE PACK BASIN MAJ SET CUST NO CAUT

## (undated) DEVICE — DERMACEA GAUZE FLUFF ROLL: Brand: DERMACEA

## (undated) DEVICE — SUT PROL 5-0 18IN P3 BLU --

## (undated) DEVICE — DRAPE SHT 3 QTR PROXIMA 53X77 --

## (undated) DEVICE — SUT ETHLN 4-0 18IN PS2 BLK --

## (undated) DEVICE — MEDI-VAC NON-CONDUCTIVE SUCTION TUBING: Brand: CARDINAL HEALTH